# Patient Record
Sex: MALE | Race: WHITE | NOT HISPANIC OR LATINO | Employment: OTHER
[De-identification: names, ages, dates, MRNs, and addresses within clinical notes are randomized per-mention and may not be internally consistent; named-entity substitution may affect disease eponyms.]

---

## 2019-10-04 ENCOUNTER — RECORDS - HEALTHEAST (OUTPATIENT)
Dept: ADMINISTRATIVE | Facility: OTHER | Age: 54
End: 2019-10-04

## 2019-11-02 ENCOUNTER — RECORDS - HEALTHEAST (OUTPATIENT)
Dept: ADMINISTRATIVE | Facility: OTHER | Age: 54
End: 2019-11-02

## 2019-11-06 ENCOUNTER — RECORDS - HEALTHEAST (OUTPATIENT)
Dept: ADMINISTRATIVE | Facility: OTHER | Age: 54
End: 2019-11-06

## 2019-12-04 ENCOUNTER — RECORDS - HEALTHEAST (OUTPATIENT)
Dept: ADMINISTRATIVE | Facility: OTHER | Age: 54
End: 2019-12-04

## 2019-12-04 ENCOUNTER — AMBULATORY - HEALTHEAST (OUTPATIENT)
Dept: VASCULAR SURGERY | Facility: CLINIC | Age: 54
End: 2019-12-04

## 2019-12-04 DIAGNOSIS — L03.031 PARONYCHIA OF FIFTH TOE OF RIGHT FOOT: ICD-10-CM

## 2019-12-16 ENCOUNTER — COMMUNICATION - HEALTHEAST (OUTPATIENT)
Dept: VASCULAR SURGERY | Facility: CLINIC | Age: 54
End: 2019-12-16

## 2021-05-26 ENCOUNTER — RECORDS - HEALTHEAST (OUTPATIENT)
Dept: ADMINISTRATIVE | Facility: CLINIC | Age: 56
End: 2021-05-26

## 2021-06-20 NOTE — LETTER
Letter by Gaye Cisneros at      Author: Gaye Cisneros Service: -- Author Type: --    Filed:  Encounter Date: 12/16/2019 Status: Signed         12/16/19      Angel CUENCA Jaimie  [address] [place] [id number]    Dear Angel,    As a valued [facility] patient, your healthcare needs are our priority. Our clinic records indicate we have attempted to contact you to schedule a wound consultation with Dr. Justin Cedeno, but have not heard back from you. To prevent further delays in your care please contact our office to schedule your appointment as soon as possible.  We can be reached at: [phone]    Sincerely,    [facility], Vein, [facility] Wound

## 2021-11-12 ENCOUNTER — TELEPHONE (OUTPATIENT)
Dept: DERMATOLOGY | Facility: CLINIC | Age: 56
End: 2021-11-12
Payer: COMMERCIAL

## 2022-03-08 ENCOUNTER — TRANSCRIBE ORDERS (OUTPATIENT)
Dept: OTHER | Age: 57
End: 2022-03-08
Payer: COMMERCIAL

## 2022-03-08 DIAGNOSIS — L73.2 HIDRADENITIS SUPPURATIVA: ICD-10-CM

## 2022-03-08 DIAGNOSIS — L98.9 SKIN PROBLEM: Primary | ICD-10-CM

## 2022-03-09 ENCOUNTER — OFFICE VISIT (OUTPATIENT)
Dept: DERMATOLOGY | Facility: CLINIC | Age: 57
End: 2022-03-09
Payer: COMMERCIAL

## 2022-03-09 ENCOUNTER — DOCUMENTATION ONLY (OUTPATIENT)
Dept: DERMATOLOGY | Facility: CLINIC | Age: 57
End: 2022-03-09

## 2022-03-09 DIAGNOSIS — L73.2 HIDRADENITIS SUPPURATIVA: ICD-10-CM

## 2022-03-09 DIAGNOSIS — L20.84 INTRINSIC ATOPIC DERMATITIS: Primary | ICD-10-CM

## 2022-03-09 PROCEDURE — 99202 OFFICE O/P NEW SF 15 MIN: CPT | Performed by: DERMATOLOGY

## 2022-03-09 RX ORDER — TRIAMCINOLONE ACETONIDE 1 MG/G
CREAM TOPICAL
COMMUNITY
Start: 2022-02-09 | End: 2022-11-10

## 2022-03-09 RX ORDER — FLUOCINONIDE 0.5 MG/G
CREAM TOPICAL DAILY
COMMUNITY
Start: 2020-07-24 | End: 2023-11-16

## 2022-03-09 RX ORDER — DOXYCYCLINE 100 MG/1
100 CAPSULE ORAL 2 TIMES DAILY
Qty: 60 CAPSULE | Refills: 4 | Status: SHIPPED | OUTPATIENT
Start: 2022-03-09 | End: 2022-03-25

## 2022-03-09 RX ORDER — FENOFIBRATE 160 MG/1
160 TABLET ORAL DAILY
COMMUNITY
Start: 2022-02-07 | End: 2022-12-14

## 2022-03-09 RX ORDER — CLINDAMYCIN PHOSPHATE 10 UG/ML
LOTION TOPICAL
Qty: 120 ML | Refills: 11 | Status: SHIPPED | OUTPATIENT
Start: 2022-03-09 | End: 2024-05-24

## 2022-03-09 RX ORDER — LOSARTAN POTASSIUM 25 MG/1
25 TABLET ORAL DAILY
COMMUNITY
Start: 2022-02-07 | End: 2022-12-14

## 2022-03-09 RX ORDER — MELOXICAM 15 MG/1
15 TABLET ORAL DAILY
COMMUNITY
Start: 2022-02-07 | End: 2022-12-14

## 2022-03-09 RX ORDER — BUPROPION HYDROCHLORIDE 150 MG/1
150 TABLET ORAL DAILY
COMMUNITY
Start: 2022-02-07 | End: 2022-12-14

## 2022-03-09 RX ORDER — ATORVASTATIN CALCIUM 40 MG/1
40 TABLET, FILM COATED ORAL DAILY
COMMUNITY
Start: 2022-02-07 | End: 2022-12-14

## 2022-03-09 ASSESSMENT — PAIN SCALES - GENERAL: PAINLEVEL: NO PAIN (0)

## 2022-03-09 NOTE — LETTER
"3/9/2022       RE: Angel Etienne  [address] [facility] [address]     Dear Colleague,    Thank you for referring your patient, Angel Etienne, to the [facility] DERMATOLOGY CLINIC [place] at [facility]. Please see a copy of my visit note below.    [facility] Dermatology Note  Encounter Date: Mar 9, 2022  Office Visit     Dermatology Problem List:  1. Hidradenitis suppurative  -Start doxycycline 100 mg BID, clindamycin lotion daily, BPO wash daily.  Dr Patel appointment in August 2022  2.  Atopic dermatitis  - Had patch testing with local dermatologist (Dr Parada) with several positive reactions including urea and clobetasol (full list not available at time of visit 3/9/2022)  - Will attempt to get Dupixent and refer to Allergy    ____________________________________________    Assessment & Plan:   1. Hidradenitis suppurative  -Start doxycycline 100 mg BID, clindamycin lotion daily, BPO wash daily.  Dr Patel appointment in August 2022  2.  Atopic dermatitis  - Had patch testing with local dermatologist (Dr Parada) with several positive reactions including urea and clobetasol (full list not available at time of visit 3/9/2022)  - Will attempt to get Dupixent and refer to Allergy      Follow-up: 3 month(s) in-person, or earlier for new or changing lesions    Staff:     Becki Tang MD  ____________________________________________    CC: Derm Problem (Karl is here today for a rash on his legs and back. He states \" I have boils on my arm pits and a rash on legs and back x15 years\")    HPI:  Mr. Angel Etienne is a(n) 56 year old male who presents today as a new patient for for 2 skin issues  1.  HS- limited to axilla at this time.  Has had groin and buttock involvement in past.  Tried ? HIbiclens without improvement  2. Dermatitis- most bothersome.  Ongoing for many years.  Pruritis keeps him up at night.  Had patch " testing with local dermatologist (Dr LISA Parada at [facility]) with many positive reaction.  Brought medical records on a computer disc (will be sent to scanning to download).  Did have allergy to clobetasol and urea.  Trying topical steroids without improvement.     Patient is otherwise feeling well, without additional skin concerns.     Labs Reviewed:  N/A    Physical Exam:  Vitals: There were no vitals taken for this visit.  SKIN: Total skin excluding the undergarment areas was performed. The exam included the head/face, neck, both arms, chest, back, abdomen, both legs, digits and/or nails.   - There are pink scaly patches and plaques on the arms, legs and trunk.  - inflammatory nodules of the bilateral axilla with scarring.    - No other lesions of concern on areas examined.     Medications:  Current Outpatient Medications   Medication     fluocinonide (LIDEX) 0.05 % external cream     atorvastatin (LIPITOR) 40 MG tablet     buPROPion (WELLBUTRIN XL) 150 MG 24 hr tablet     fenofibrate (TRIGLIDE/LOFIBRA) 160 MG tablet     losartan (COZAAR) 25 MG tablet     meloxicam (MOBIC) 15 MG tablet     triamcinolone (KENALOG) 0.1 % external cream     No current facility-administered medications for this visit.      Past Medical History:   There is no problem list on file for this patient.    No past medical history on file.    CC Antony Arizmendi [facility]  [address] [place] [address] on close of this encounter.

## 2022-03-09 NOTE — NURSING NOTE
"Dermatology Rooming Note    Angel Etienne's goals for this visit include:   Chief Complaint   Patient presents with     Derm Problem     Karl is here today for a rash on his legs and back. He states \" I have boils on my arm pits and a rash on legs and back x15 years\"     Judith Schmid, RMA  "

## 2022-03-09 NOTE — PATIENT INSTRUCTIONS
Preventive Care:    Colorectal Cancer Screening: During our visit today, we discussed that it is recommended you receive colorectal cancer screening. Please call or make an appointment with your primary care provider to discuss this. You may also call the [facility] Mobile scheduling line ([phone]) to set up a colonoscopy appointment.

## 2022-03-09 NOTE — PROGRESS NOTES
"[facility] Dermatology Note  Encounter Date: Mar 9, 2022  Office Visit     Dermatology Problem List:  1. Hidradenitis suppurative  -Start doxycycline 100 mg BID, clindamycin lotion daily, BPO wash daily.  Dr Patel appointment in August 2022  2.  Atopic dermatitis  - Had patch testing with local dermatologist (Dr Parada) with several positive reactions including urea and clobetasol (full list not available at time of visit 3/9/2022)  - Will attempt to get Dupixent and refer to Allergy    ____________________________________________    Assessment & Plan:   1. Hidradenitis suppurative  -Start doxycycline 100 mg BID, clindamycin lotion daily, BPO wash daily.  Dr Patel appointment in August 2022  2.  Atopic dermatitis  - Had patch testing with local dermatologist (Dr Parada) with several positive reactions including urea and clobetasol (full list not available at time of visit 3/9/2022)  - Will attempt to get Dupixent and refer to Allergy      Follow-up: 3 month(s) in-person, or earlier for new or changing lesions    Staff:     Becki Tang MD  ____________________________________________    CC: Derm Problem (Karl is here today for a rash on his legs and back. He states \" I have boils on my arm pits and a rash on legs and back x15 years\")    HPI:  Mr. Angel Etienne is a(n) 56 year old male who presents today as a new patient for for 2 skin issues  1.  HS- limited to axilla at this time.  Has had groin and buttock involvement in past.  Tried ? HIbiclens without improvement  2. Dermatitis- most bothersome.  Ongoing for many years.  Pruritis keeps him up at night.  Had patch testing with local dermatologist (Dr LISA Parada at [facility]) with many positive reaction.  Brought medical records on a computer disc (will be sent to scanning to download).  Did have allergy to clobetasol and urea.  Trying topical steroids without improvement.     Patient is otherwise feeling well, without " additional skin concerns.     Labs Reviewed:  N/A    Physical Exam:  Vitals: There were no vitals taken for this visit.  SKIN: Total skin excluding the undergarment areas was performed. The exam included the head/face, neck, both arms, chest, back, abdomen, both legs, digits and/or nails.   - There are pink scaly patches and plaques on the arms, legs and trunk.  - inflammatory nodules of the bilateral axilla with scarring.    - No other lesions of concern on areas examined.     Medications:  Current Outpatient Medications   Medication     fluocinonide (LIDEX) 0.05 % external cream     atorvastatin (LIPITOR) 40 MG tablet     buPROPion (WELLBUTRIN XL) 150 MG 24 hr tablet     fenofibrate (TRIGLIDE/LOFIBRA) 160 MG tablet     losartan (COZAAR) 25 MG tablet     meloxicam (MOBIC) 15 MG tablet     triamcinolone (KENALOG) 0.1 % external cream     No current facility-administered medications for this visit.      Past Medical History:   There is no problem list on file for this patient.    No past medical history on file.    CC Antony Arizmendi [facility]  [address] [place] [address] on close of this encounter.

## 2022-03-09 NOTE — PROGRESS NOTES
Action 03.09.2022 RM   Action Taken Received image via disc took to 4n  To be uploaded to chart.

## 2022-03-21 DIAGNOSIS — L73.2 HIDRADENITIS SUPPURATIVA: ICD-10-CM

## 2022-03-22 ENCOUNTER — TELEPHONE (OUTPATIENT)
Dept: DERMATOLOGY | Facility: CLINIC | Age: 57
End: 2022-03-22
Payer: COMMERCIAL

## 2022-03-22 DIAGNOSIS — L73.2 HIDRADENITIS SUPPURATIVA: Primary | ICD-10-CM

## 2022-03-22 NOTE — TELEPHONE ENCOUNTER
Per [facility], the patient is requesting an alternative to the clindamycin (CLEOCIN T) 1 % external lotion It is too expensive    Cynthia England, Procedure

## 2022-03-23 RX ORDER — CLINDAMYCIN PHOSPHATE 11.9 MG/ML
SOLUTION TOPICAL
Qty: 60 ML | Refills: 11 | Status: SHIPPED | OUTPATIENT
Start: 2022-03-23 | End: 2022-11-10

## 2022-03-23 NOTE — TELEPHONE ENCOUNTER
Called patient and let him know solution was sent in, patient asked about an allergy referral and I let him know it is placed they should call to schedule.     Janett BOTELLO CMA

## 2022-03-23 NOTE — TELEPHONE ENCOUNTER
I called the pharmacy and they said since the clindamycin lotion is covered they don't have a list of alternatives.     Lotion is $166.05  Solution ( cash price) $20     Routing to Dr. Tang to determine if the solution is appropriate.     Janett BOTELLO CMA

## 2022-03-23 NOTE — TELEPHONE ENCOUNTER
Becki Tang MD  [facility] 46 minutes ago (7:36 AM)     KB    Please ask the pharmacy which topical antibiotics for acne are covered for the patient by his insurance.

## 2022-03-25 RX ORDER — DOXYCYCLINE 100 MG/1
TABLET ORAL
Qty: 60 TABLET | Refills: 4 | Status: SHIPPED | OUTPATIENT
Start: 2022-03-25 | End: 2022-11-10

## 2022-03-25 NOTE — TELEPHONE ENCOUNTER
Switching tablet formulation as patient has shellac allergy. Will send new doxycycline prescription per pharmacy request.

## 2022-04-04 NOTE — TELEPHONE ENCOUNTER
FUTURE VISIT INFORMATION      FUTURE VISIT INFORMATION:    Date: 7.26.22    Time: 2:00    [place] [facility] [place]  REFERRAL INFORMATION:    Referring provider:  Dr. Tang    Referring providers clinic:  [facility]    Reason for visit/diagnosis      RECORDS REQUESTED FROM:       Clinic name Comments Records Status   [facility] 3.9.22  Dr. Tang Epic

## 2022-04-16 ENCOUNTER — HEALTH MAINTENANCE LETTER (OUTPATIENT)
Age: 57
End: 2022-04-16

## 2022-07-06 ENCOUNTER — OFFICE VISIT (OUTPATIENT)
Dept: DERMATOLOGY | Facility: CLINIC | Age: 57
End: 2022-07-06
Payer: COMMERCIAL

## 2022-07-06 DIAGNOSIS — L73.2 HIDRADENITIS SUPPURATIVA: ICD-10-CM

## 2022-07-06 DIAGNOSIS — L20.84 INTRINSIC ATOPIC DERMATITIS: Primary | ICD-10-CM

## 2022-07-06 PROCEDURE — 99214 OFFICE O/P EST MOD 30 MIN: CPT | Mod: GC | Performed by: DERMATOLOGY

## 2022-07-06 ASSESSMENT — PAIN SCALES - GENERAL: PAINLEVEL: NO PAIN (0)

## 2022-07-06 NOTE — PATIENT INSTRUCTIONS
Atopic Dermatitis or Eczema    This is a very common skin condition that is due to an abnormal protein in the skin called filaggrin. There is a genetic association and atopic dermatitis/eczema often runs in families. It is frequrntly also associated with asthma and allergies (hayfever). In this condition, the body can not keep water in the skin. It then gets very dry and irritated. To treat this, we need to get the water back in the skin by using moisturizer at least once daily. We also use topical steroids to help calm down flares.     Medicated ointments:  - Triamcinolone 0.1% ointment - 1-2 times daily to rash on body  - if you don't have any active flares/rash, do not use this medications (do not use these as moisturizer as it can cause skin thinning if used on normal skin)    Moisturizer:  - Vaseline - 1-2 times a day to all skin (even when rash is not present, this will help prevent the rash from coming back)    Medicated ointments should be applied first, and then moisturizer on top.     Follow gentle skin care recommendations below.     Gentle Skin Care Recommendations    Bathing   - limit showers to once daily, 15 minutes or less, with warm water   - use gentle soaps that are free of colors and scents and are not too strong of cleansers  - consider limiting soap to only the 'dirty' areas, like the armpits  and groin as much as possible to prevent stripping your skin in other areas of their natural moisture  - do not vigorously scrub when cleansing  - avoid any soaps with microbeads  - after showering, pat your skin gently dry with a towel  - make sure you are applying a good moisturizer after bathing every time (see below)  - do not take bubble baths    Examples of gentle cleansers:   - Mild bar soaps: Vanicream bar soap, Neutragena glycerin bar, Dove sensitive skin (fragrance-free)   - Mild liquid soaps: Vanicream liquid cleanser, Cerave liquid cleanser    Moisturizing     It is important to moisturize at  least twice per day to the whole body. IMMEDIATELY after getting out of the shower, apply a moisturizer (preferably from the list below) to the entire body. If you have been prescribed any medications, apply those medications to the skin first and then you can apply the moisturizer on top.    Moisturizers come in a variety of types some of which are greasier, heavier, or lighter. The heaviest moisturizers are ointments, which are greasy like vaseline. The next best are creams, which are usually white and thick but absorb into the skin a little better. The lightest are lotions which are typically thin and contain more ingredients which can be irritating to your skin. For this reason, we do NOT recommend lotions.     Examples of good moisturizers:   - Ointments: vaseline, Cerave healing ointment, Vaniply, coconut oil   - Creams: Cerave, Vanicream, Neutrogena Norwegian Formula Hand Cream     Laundry     Be sure to use laundry products that are free of dyes and fragrances--many laundry products that claim to be fragrance-free actually are not free of these! Do not use laundry softeners or dryer sheets.     Good laundry products include:  - 7th generation Natural Laundry Detergent Liquid, 7th Generation Free and Clear Natural Laundry Detergent packs, 7th Generation Free and Clear natural 4x Concentrated Laundry Detergent, ECOS hypoallergenic laundry detergent, free & clear, ERA Ultra Era Free Liquid Laundry Detergent, All Free & Clear     Other Gentle Skin Recommendations    - Do not use products such as powders, perfumes, or colognes on your skin  - Avoid saunas and steam baths - these temperatures are too hot   - Avoid tight or  scratchy  clothing such as wool  - Always wash new clothing before wearing them for the first time  - Sometimes a humidifier or vaporizer, used at night can help the dry skin - but remember to keep it clean to void mold growth.  - Avoid applying essential oils to the skin or diffusing in the  home.

## 2022-07-06 NOTE — LETTER
7/6/2022       RE: Angel Etienne  [address] [facility] [address]     Dear Colleague,    Thank you for referring your patient, Angel Etienne, to the [facility] DERMATOLOGY CLINIC [place] at [facility]. Please see a copy of my visit note below.    [facility] Dermatology Note  Encounter Date: Jul 6, 2022  Office Visit     Dermatology Problem List:  1. Hidradenitis suppurative  -Start doxycycline 100 mg BID, clindamycin lotion daily, BPO wash daily.  Dr Patel appointment in August 2022  2.  Atopic dermatitis  - Had patch testing with local dermatologist (Dr Parada) with several positive reactions including urea and clobetasol (full list not available at time of visit 3/9/2022)  - Triamcinolone 0.1% ointment, gentle skincares   - Approved for dupixent has not started yet     ____________________________________________    Assessment & Plan:   # Hidradenitis suppurative  - Continue doxycyline 100 mg BID, clindamycin lotion daily, BPO wash daily  - Follow up with Dr Patel appointment in August 2022    # Atopic dermatitis  - Had patch testing with local dermatologist (Dr Parada) with several positive reactions including urea and clobetasol (full list not available at time of visit 3/9/2022)  - Dupixent is approved by large copmylene, will await input from Dr. Novoa whether he recommends starting dupixent for this patient  - Appointment with Dr. Novoa in July   - Continue TAC 0.1% ointment to itchy rash areas BID PRN   - We discussed gentle skin cares and would recommend he use plain Vaseline until he sees Dr. Novoa daily; handout provided with instructions     Follow-up: with Dr. Novoa in July and Dr. Patel in August     Staff and Resident:      Rosemary Hardwick MD     The patient was seen and staffed with Dr. Kitty MD   I, Becki Tang MD, saw this patient with the resident and agree with the  resident s findings and plan of care as documented in the resident s note.      ____________________________________________    CC: Rash (Karl is here for a rash follow-up. States his condition has slightly improved since last seen.)    HPI:  Mr. Angel Etienne is a(n) 56 year old male who presents today as a new patient for for 2 skin issues.  HS has been on doxy for the past couple of months.  Thinks that he is still getting the boils in the axilla.  Just has one today on the left side.  It is not draining anything now but it did a couple of weeks ago.  It is not that painful at this time, it just is painful before it starts to drain.  Tolerating doxycyline well, no stomach upset, nausea.  Has appointment with Dr. Patel in August.      RE rash: When it is humid he gets rash on upper body and back.  He is still waiting to get tested by Dr. Clark.  He is using the triamcinolone ointment wherever he gets the rash.  He thinks his rash is much improved on the legs since starting doxycyline.  He is itchy on the back right now.      Patient is otherwise feeling well, without additional skin concerns.     Labs Reviewed:  N/A    Physical Exam:  Vitals: There were no vitals taken for this visit.  SKIN: Total skin excluding the undergarment areas was performed. The exam included the head/face, neck, both arms, chest, back, abdomen, both legs, digits and/or nails.   - There are pink scaly follicularly red/brown papules on the trunk and back   - Inflammatory papule without drainage on the proximal axilla with scarring of bilateral axilla  - No other lesions of concern on areas examined.     Medications:  Current Outpatient Medications   Medication     atorvastatin (LIPITOR) 40 MG tablet     benzoyl peroxide 5 % external liquid     buPROPion (WELLBUTRIN XL) 150 MG 24 hr tablet     clindamycin (CLEOCIN T) 1 % external lotion     clindamycin (CLEOCIN T) 1 % external solution     doxycycline monohydrate (ADOXA) 100 MG  tablet     Dupilumab 300 MG/2ML SOPN     Dupilumab 300 MG/2ML SOPN     fenofibrate (TRIGLIDE/LOFIBRA) 160 MG tablet     fluocinonide (LIDEX) 0.05 % external cream     losartan (COZAAR) 25 MG tablet     meloxicam (MOBIC) 15 MG tablet     triamcinolone (KENALOG) 0.1 % external cream     No current facility-administered medications for this visit.      Past Medical History:   There is no problem list on file for this patient.    No past medical history on file.    CC Antony Arizmendi [facility]  [address] [place] [address] on close of this encounter.

## 2022-07-06 NOTE — NURSING NOTE
Dermatology Rooming Note    Angel Etienne's goals for this visit include:   Chief Complaint   Patient presents with     Rash     Karl is here for a rash follow-up. States his condition has slightly improved since last seen.     Clovis Renner, EMT

## 2022-07-06 NOTE — PROGRESS NOTES
[facility] Dermatology Note  Encounter Date: Jul 6, 2022  Office Visit     Dermatology Problem List:  1. Hidradenitis suppurative  -Start doxycycline 100 mg BID, clindamycin lotion daily, BPO wash daily.  Dr Patel appointment in August 2022  2.  Atopic dermatitis  - Had patch testing with local dermatologist (Dr Parada) with several positive reactions including urea and clobetasol (full list not available at time of visit 3/9/2022)  - Triamcinolone 0.1% ointment, gentle skincares   - Approved for dupixent has not started yet     ____________________________________________    Assessment & Plan:   # Hidradenitis suppurative  - Continue doxycyline 100 mg BID, clindamycin lotion daily, BPO wash daily  - Follow up with Dr Patel appointment in August 2022    # Atopic dermatitis  - Had patch testing with local dermatologist (Dr Parada) with several positive reactions including urea and clobetasol (full list not available at time of visit 3/9/2022)  - Dupixent is approved by large copay, will await input from Dr. Novoa whether he recommends starting dupixent for this patient  - Appointment with Dr. Novoa in July   - Continue TAC 0.1% ointment to itchy rash areas BID PRN   - We discussed gentle skin cares and would recommend he use plain Vaseline until he sees Dr. Novoa daily; handout provided with instructions     Follow-up: with Dr. Novoa in July and Dr. Patel in August     Staff and Resident:      Rosemary Hardwick MD     The patient was seen and staffed with Dr. Kitty MD   I, Becki Tang MD, saw this patient with the resident and agree with the resident s findings and plan of care as documented in the resident s note.      ____________________________________________    CC: Rash (Karl is here for a rash follow-up. States his condition has slightly improved since last seen.)    HPI:  Mr. Angel Etienne is a(n) 56 year old male who presents today as a new  patient for for 2 skin issues.  HS has been on doxy for the past couple of months.  Thinks that he is still getting the boils in the axilla.  Just has one today on the left side.  It is not draining anything now but it did a couple of weeks ago.  It is not that painful at this time, it just is painful before it starts to drain.  Tolerating doxycyline well, no stomach upset, nausea.  Has appointment with Dr. Patel in August.      RE rash: When it is humid he gets rash on upper body and back.  He is still waiting to get tested by Dr. Clark.  He is using the triamcinolone ointment wherever he gets the rash.  He thinks his rash is much improved on the legs since starting doxycyline.  He is itchy on the back right now.      Patient is otherwise feeling well, without additional skin concerns.     Labs Reviewed:  N/A    Physical Exam:  Vitals: There were no vitals taken for this visit.  SKIN: Total skin excluding the undergarment areas was performed. The exam included the head/face, neck, both arms, chest, back, abdomen, both legs, digits and/or nails.   - There are pink scaly follicularly red/brown papules on the trunk and back   - Inflammatory papule without drainage on the proximal axilla with scarring of bilateral axilla  - No other lesions of concern on areas examined.     Medications:  Current Outpatient Medications   Medication     atorvastatin (LIPITOR) 40 MG tablet     benzoyl peroxide 5 % external liquid     buPROPion (WELLBUTRIN XL) 150 MG 24 hr tablet     clindamycin (CLEOCIN T) 1 % external lotion     clindamycin (CLEOCIN T) 1 % external solution     doxycycline monohydrate (ADOXA) 100 MG tablet     Dupilumab 300 MG/2ML SOPN     Dupilumab 300 MG/2ML SOPN     fenofibrate (TRIGLIDE/LOFIBRA) 160 MG tablet     fluocinonide (LIDEX) 0.05 % external cream     losartan (COZAAR) 25 MG tablet     meloxicam (MOBIC) 15 MG tablet     triamcinolone (KENALOG) 0.1 % external cream     No current facility-administered  medications for this visit.      Past Medical History:   There is no problem list on file for this patient.    No past medical history on file.    CC Antony Arizmendi [facility]  [address] [place] [address] on close of this encounter.

## 2022-07-08 NOTE — TELEPHONE ENCOUNTER
FUTURE VISIT INFORMATION      FUTURE VISIT INFORMATION:    Date: 8.25.22    Time: 1:00    Location[place] [facility] [place]  REFERRAL INFORMATION:    Referring provider:  Kitty    Referring providers clinic:  Derm    Reason for visit/diagnosis  hs    RECORDS REQUESTED FROM:       Clinic name Comments Records Status   Derm 7.6.22, 3.9.22  Kitty Fuller

## 2022-07-26 ENCOUNTER — PRE VISIT (OUTPATIENT)
Dept: ALLERGY | Facility: CLINIC | Age: 57
End: 2022-07-26

## 2022-07-26 ENCOUNTER — OFFICE VISIT (OUTPATIENT)
Dept: ALLERGY | Facility: CLINIC | Age: 57
End: 2022-07-26
Payer: COMMERCIAL

## 2022-07-26 DIAGNOSIS — Z91.09 ALLERGY TO AMERICAN HOUSE DUST MITE: ICD-10-CM

## 2022-07-26 DIAGNOSIS — L73.2 HIDRADENITIS SUPPURATIVA: ICD-10-CM

## 2022-07-26 DIAGNOSIS — L20.84 INTRINSIC ATOPIC DERMATITIS: Primary | ICD-10-CM

## 2022-07-26 PROCEDURE — 95004 PERQ TESTS W/ALRGNC XTRCS: CPT | Performed by: DERMATOLOGY

## 2022-07-26 PROCEDURE — 99214 OFFICE O/P EST MOD 30 MIN: CPT | Mod: 25 | Performed by: DERMATOLOGY

## 2022-07-26 ASSESSMENT — PAIN SCALES - GENERAL: PAINLEVEL: NO PAIN (0)

## 2022-07-26 NOTE — NURSING NOTE
Chief Complaint   Patient presents with     Allergy Consult     Karl is here today for allergy patch test consult, referred by pts primary care. Pt has had patch testing done previously at [facility] but pt did not get much info from that.     Faby Mccarty RN

## 2022-07-26 NOTE — PROGRESS NOTES
[facility] Dermato-allergology Note  Office visit  Encounter Date: Jul 26, 2022  ____________________________________________    CC: Allergy Consult (Karl is here today for allergy patch test consult, referred by pts primary care. Pt has had patch testing done previously at [facility] but pt did not get much info from that.)      HPI:  (Jul 26, 2022)  Mr. Angel Etienne is a(n) 56 year old male who presents today as new patient for allergy consultation  - 15 years of hidradenitis suppurativa: takes doxycycline, topical clindamycin, benzoyl peroxide. Prescribed humira but hasn't taken these yet. Has upcoming appointment with Dr. Patel for HS.   - 15 years of diffuse dermatitis. Per Dr. Tang of dermatology, consistent with atopic dermatitis. Prescribed dupixent, but hasn't started due to concern of side effects  - no triggers other than possibly heat identified. But he also gets it in the winter; just doesn't flare up as much in the winter  - chest, abdomen, flanks, back, arms affected. Typically occurs on areas that are covered by clothes.   - flares are intermittent and unpredictable.   - symptoms include itching and dryness  - no known history of eczema or asthma  - ?allergy to corn as a child  - has been using the BPO once to twice weekly  - history of venous stasis s/p multiple venous stasis surgeries   - local dermatologist (Dr Parada) performed patch testing, results with several positive reactions including urea and clobetasol during summer of 2021   - no problems with adhesives  - otherwise feeling well in usual state of health    Physical exam:  General: In no acute distress, well-developed, well-nourished  Eyes: no conjunctivitis  ENT: no signs of rhinitis   Pulmonary: no wheezing or coughing  Skin: Focused examination of the skin on test sites was performed = see test results below  Focused examination of the back, abdomen, chest and legs was performed.  -generalized  xerosis  -multiple poorly defined pink to red papules and plaques with excoriation on the back, abdomen, chest, bilateral legs  -pitted scars of the left axilla    Past Medical History:   There is no problem list on file for this patient.    No past medical history on file.    Allergies:  Allergies   Allergen Reactions     Clobetasol Other (See Comments)     Allergy testing done - Clobetasol-77-Propionate     Shellac Other (See Comments)     Allergy testing done     Trolamine Other (See Comments)     Allergy testing done     Urea Other (See Comments)     Allergy testing done       Medications:  Current Outpatient Medications   Medication     atorvastatin (LIPITOR) 40 MG tablet     benzoyl peroxide 5 % external liquid     buPROPion (WELLBUTRIN XL) 150 MG 24 hr tablet     clindamycin (CLEOCIN T) 1 % external lotion     clindamycin (CLEOCIN T) 1 % external solution     doxycycline monohydrate (ADOXA) 100 MG tablet     fenofibrate (TRIGLIDE/LOFIBRA) 160 MG tablet     fluocinonide (LIDEX) 0.05 % external cream     losartan (COZAAR) 25 MG tablet     meloxicam (MOBIC) 15 MG tablet     triamcinolone (KENALOG) 0.1 % external cream     Dupilumab 300 MG/2ML SOPN     Dupilumab 300 MG/2ML SOPN     No current facility-administered medications for this visit.       Social History:  The patient works as an independent . Patient has the following hobbies or non-occupational exposure: boating, snowmobiling, riding motorcycle.     Family History:  No family history on file.    Previous Labs, Allergy Tests, Dermatopathology, Imaging:  Patch testing as above    Referred By: Antony Arizmendi [facility]  [address] [place] [address]     Allergy Tests:    Past Allergy Test    Order for Future Allergy Testing:    [x] Outpatient  [] Inpatient: [facility]..../ Bed ....       Skin Atopy (atopic dermatitis) [x] Yes   [] No .........  Contact allergies:   [x] Yes   [] No ..........  Hand eczema:   [x] Yes    [] No           Leading hand:   [] R   [] L       [] Ambidextrous         Drug allergies:        [x] Yes   [] No  Which?......possibly clobetasol?    Urticaria/Angioedema  [] Yes   [x] No .........  Food Allergy:  [] Yes   [x] No  which?......  Pets :  [] Yes   [x] No  which?......         []  Rhinitis   [] Conjunctivitis   [] Sinusitis   [] Polyposis   [] Otitis   [] Pharyngitis         []  Postnasal drip    [x]  none  Operations:   [] Tonsils   [] Septum   [] Sinus   [] Polyposis        [] Asthma bronchiale   [] Coughing      [x]  none  Symptoms (mostly Rhinoconjunctivitis and Asthma) aggravated by:  Season   [] I   [] II   [] III   [] IV   []V   []VI   []VII   []VIII   []IX   []X   []XI   []XII     [] perennial   Day time      [] morning   [] noon      [] evening        [] night    [] whole day........  [x]  none  Location/changes    [] inside        [] outside   [] mountains    [] sea     [] others.............   [x]  none  Triggers, specific     [] animals     [] plants     [] dust              [] others ...........................    [x]  none  Triggers, others       [] work          [] psyche    [] sport            [] others .............................  [x]  none  Irritant                [] phys efforts [] smoke    [x] heat/cold     [] odors  []others............... []  none    Order for PATCH TESTS  Reason for tests (suspected allergy): possible allergic contact dermatitis  Known previous allergies: possible clobetasol or components, shellac, trolamine, urea per prior outside patch testing  Standardized panels  [x] Standard panel (40 tests)  [x] Preservatives & Antimicrobials (31 tests)  [x] Emulsifiers & Additives (25 tests)   [x] Perfumes/Flavours & Plants (25 tests)  [] Hairdresser panel (12 tests)  [] Rubber Chemicals (22 tests)  [] Plastics (26 tests)  [] Colorants/Dyes/Food additives (20 tests)  [] Metals (implants/dental) (24 tests)  [] Local anaesthetics/NSAIDs (13 tests)  [x] Antibiotics &  Antimycotics (14 tests)   [x] Corticosteroids (15 tests)   [] Photopatch test (62 tests)   [] others: ...      [] Patient's own products: ...    DO NOT test if chemical or biological identity is unknown!     always ask from patient the product information and safety sheets (MSDS)       Order for PRICK TESTS    Reason for tests (suspected allergy): generalized eczema on exam  Known previous allergies: none    Standardized prick panels  [x] Atopic panel (20 tests)  [] Pediatric Panel (12 tests)  [] Milk, Meat, Eggs, Grains (20 tests)   [] Dust, Epithelia, Feathers (10 tests)  [] Fish, Seafood, Shellfish (17 tests)  [] Nuts, Beans (14 tests)  [] Spice, Vegetable, Fruit (17 tests)  [] Pollen Panel = Tree, Grass, Weed (24 tests)  [] Others: ...      [] Patient's own products: ...    DO NOT test if chemical or biological identity is unknown!     always ask from patient the product information and safety sheets (MSDS)     Standardized intradermal tests  [x] Penicillium notatum [x] Aspergillus fumigatus [x] House dust mites D.farinae only!!!  [] Cat    [] dog  [] Others: ...  [] Bee venom   [] Wasp venom  !!Specific protocol with dilutions!!       Order for Drug allergy tests (prick & Intradermal & patch tests)    [] Penicillin G  [] Ampicillin [] Cefazolin   [] Ceftriaxone   [] Ceftazidime  [] Bactrim    [] Others: ...  Order for ... as test date    Atopy Screen (Placed Jul 26, 2022)  No Substance Readings (15 min) Evaluation   POS Histamine 1mg/ml ++    NEG NaCl 0.9% -      No Substance Readings (15 min) Evaluation   1 Alternaria alternata (tenuis)  -    2 Cladosporium herbarum -    3 Aspergillus fumigatus -    4 Penicillium notatum -    5 Dermatophagoides pteronyssinus ++    6 Dermatophagoides farinae -    7 Dog epithelium (canis spp) -    8 Cat hair (crescencio catus) -    9 Cockroach   (Blatella americana & germanica) -    10 Grass mix midwest   (Katerin, Orchard, Redtop, Aurelio) -    11 Josef grass (sorghum halepense) -     12 Weed mix   (common Cocklebur, Lamb s quarters, rough redroot Pigweed, Dock/Sorrel) -    13 Mug wort (artemisia vulgare) -    14 Ragweed giant/short (ambrosia spp) -    15 White birch (Betula papyrifera) -    16 Tree mix 1 (Pecan, Maple BHR, Oak RVW, american [place], black [place]) -    17 Red cedar (juniperus virginia) -    18 Tree mix 2   (white [place], river/red Birch, black [place], common [place], american Elm) -    19 Box elder/Maple mix (acer spp) -    20 [place] shagbark (carya ovata) -           Conclusion: immediate type reaction to house dust mite D. pteronyssinus    ________________________________    Assessment & Plan:    ==> Final Diagnosis:     # Hidradenitis suppurativa   Plan to consider Humira, upcoming appointment with Dr. Patel in August 2022  * chronic illness with exacerbation, progression, side effects from treatment    # atopic predisposition with:    In prick test positive to house dust mite D. Pteronyssinus (but clinically not very relevant)    Dry, hypersensitive skin  * chronic illness with severe exacerbation, progression, side effects from treatment    These conclusions are made at the best of one's knowledge and belief based on the provided evidence such as patient's history and allergy test results and they can change over time or can be incomplete because of missing information's.    ==> Treatment Plan:  -plan for patch testing; upcoming appointment in allergy in a few weeks  -Dupixent would likely be helpful given the patient's atopic dermatitis  -Humira would likely improve hidradenitis suppurutiva     Procedures Performed: Allergy tests, including prick tests    Staff:  Dr. Novoa and student    Staff Physician Comments:  I was present with the medical student who participated in the service and in the documentation of the note. I have verified the history and personally performed the physical exam and medical decision making. I agree with the assessment and plan as  documented in the note. I have reviewed and if necessary amended the note.      Denny Novoa MD  Professor  Head of Dermato-Allergy Division  Department of Dermatology  [facility]      Follow-up in Derm-Allergy clinic for scheduled patch testing in a few weeks.     I spent a total of 40 minutes with Angel Etienne. This time was spent counseling the patient and/or coordinating care, explaining the allergy tests , performing allergy tests and assessing the clinical relevance.

## 2022-07-26 NOTE — LETTER
7/26/2022         RE: Angel Etienne  [address]  [place] [address]        Dear Colleague,    Thank you for referring your patient, Angel Etienne, to the [facility] [place]. Please see a copy of my visit note below.    [facility] Dermato-allergology Note  Office visit  Encounter Date: Jul 26, 2022  ____________________________________________    CC: Allergy Consult (Karl is here today for allergy patch test consult, referred by pts primary care. Pt has had patch testing done previously at [facility] but pt did not get much info from that.)      HPI:  (Jul 26, 2022)  Mr. Angel Etienne is a(n) 56 year old male who presents today as new patient for allergy consultation  - 15 years of hidradenitis suppurativa: takes doxycycline, topical clindamycin, benzoyl peroxide. Prescribed humira but hasn't taken these yet. Has upcoming appointment with Dr. Patel for HS.   - 15 years of diffuse dermatitis. Per Dr. Tang of dermatology, consistent with atopic dermatitis. Prescribed dupixent, but hasn't started due to concern of side effects  - no triggers other than possibly heat identified. But he also gets it in the winter; just doesn't flare up as much in the winter  - chest, abdomen, flanks, back, arms affected. Typically occurs on areas that are covered by clothes.   - flares are intermittent and unpredictable.   - symptoms include itching and dryness  - no known history of eczema or asthma  - ?allergy to corn as a child  - has been using the BPO once to twice weekly  - history of venous stasis s/p multiple venous stasis surgeries   - local dermatologist (Dr Parada) performed patch testing, results with several positive reactions including urea and clobetasol during summer of 2021   - no problems with adhesives  - otherwise feeling well in usual state of health    Physical exam:  General: In no acute distress, well-developed, well-nourished  Eyes:  no conjunctivitis  ENT: no signs of rhinitis   Pulmonary: no wheezing or coughing  Skin: Focused examination of the skin on test sites was performed = see test results below  Focused examination of the back, abdomen, chest and legs was performed.  -generalized xerosis  -multiple poorly defined pink to red papules and plaques with excoriation on the back, abdomen, chest, bilateral legs  -pitted scars of the left axilla    Past Medical History:   There is no problem list on file for this patient.    No past medical history on file.    Allergies:  Allergies   Allergen Reactions     Clobetasol Other (See Comments)     Allergy testing done - Clobetasol-77-Propionate     Shellac Other (See Comments)     Allergy testing done     Trolamine Other (See Comments)     Allergy testing done     Urea Other (See Comments)     Allergy testing done       Medications:  Current Outpatient Medications   Medication     atorvastatin (LIPITOR) 40 MG tablet     benzoyl peroxide 5 % external liquid     buPROPion (WELLBUTRIN XL) 150 MG 24 hr tablet     clindamycin (CLEOCIN T) 1 % external lotion     clindamycin (CLEOCIN T) 1 % external solution     doxycycline monohydrate (ADOXA) 100 MG tablet     fenofibrate (TRIGLIDE/LOFIBRA) 160 MG tablet     fluocinonide (LIDEX) 0.05 % external cream     losartan (COZAAR) 25 MG tablet     meloxicam (MOBIC) 15 MG tablet     triamcinolone (KENALOG) 0.1 % external cream     Dupilumab 300 MG/2ML SOPN     Dupilumab 300 MG/2ML SOPN     No current facility-administered medications for this visit.       Social History:  The patient works as an independent . Patient has the following hobbies or non-occupational exposure: boating, snowmobiling, riding motorcycle.     Family History:  No family history on file.    Previous Labs, Allergy Tests, Dermatopathology, Imaging:  Patch testing as above    Referred By: Antony Arizmendi [facility]  [address] [place] [address]      Allergy Tests:    Past Allergy Test    Order for Future Allergy Testing:    [x] Outpatient  [] Inpatient: [facility]..../ Bed ....       Skin Atopy (atopic dermatitis) [x] Yes   [] No .........  Contact allergies:   [x] Yes   [] No ..........  Hand eczema:   [x] Yes   [] No           Leading hand:   [] R   [] L       [] Ambidextrous         Drug allergies:        [x] Yes   [] No  Which?......possibly clobetasol?    Urticaria/Angioedema  [] Yes   [x] No .........  Food Allergy:  [] Yes   [x] No  which?......  Pets :  [] Yes   [x] No  which?......         []  Rhinitis   [] Conjunctivitis   [] Sinusitis   [] Polyposis   [] Otitis   [] Pharyngitis         []  Postnasal drip    [x]  none  Operations:   [] Tonsils   [] Septum   [] Sinus   [] Polyposis        [] Asthma bronchiale   [] Coughing      [x]  none  Symptoms (mostly Rhinoconjunctivitis and Asthma) aggravated by:  Season   [] I   [] II   [] III   [] IV   []V   []VI   []VII   []VIII   []IX   []X   []XI   []XII     [] perennial   Day time      [] morning   [] noon      [] evening        [] night    [] whole day........  [x]  none  Location/changes    [] inside        [] outside   [] mountains    [] sea     [] others.............   [x]  none  Triggers, specific     [] animals     [] plants     [] dust              [] others ...........................    [x]  none  Triggers, others       [] work          [] psyche    [] sport            [] others .............................  [x]  none  Irritant                [] phys efforts [] smoke    [x] heat/cold     [] odors  []others............... []  none    Order for PATCH TESTS  Reason for tests (suspected allergy): possible allergic contact dermatitis  Known previous allergies: possible clobetasol or components, shellac, trolamine, urea per prior outside patch testing  Standardized panels  [x] Standard panel (40 tests)  [x] Preservatives & Antimicrobials (31 tests)  [x] Emulsifiers & Additives (25 tests)   [x]  Perfumes/Flavours & Plants (25 tests)  [] Hairdresser panel (12 tests)  [] Rubber Chemicals (22 tests)  [] Plastics (26 tests)  [] Colorants/Dyes/Food additives (20 tests)  [] Metals (implants/dental) (24 tests)  [] Local anaesthetics/NSAIDs (13 tests)  [x] Antibiotics & Antimycotics (14 tests)   [x] Corticosteroids (15 tests)   [] Photopatch test (62 tests)   [] others: ...      [] Patient's own products: ...    DO NOT test if chemical or biological identity is unknown!     always ask from patient the product information and safety sheets (MSDS)       Order for PRICK TESTS    Reason for tests (suspected allergy): generalized eczema on exam  Known previous allergies: none    Standardized prick panels  [x] Atopic panel (20 tests)  [] Pediatric Panel (12 tests)  [] Milk, Meat, Eggs, Grains (20 tests)   [] Dust, Epithelia, Feathers (10 tests)  [] Fish, Seafood, Shellfish (17 tests)  [] Nuts, Beans (14 tests)  [] Spice, Vegetable, Fruit (17 tests)  [] Pollen Panel = Tree, Grass, Weed (24 tests)  [] Others: ...      [] Patient's own products: ...    DO NOT test if chemical or biological identity is unknown!     always ask from patient the product information and safety sheets (MSDS)     Standardized intradermal tests  [x] Penicillium notatum [x] Aspergillus fumigatus [x] House dust mites D.farinae only!!!  [] Cat    [] dog  [] Others: ...  [] Bee venom   [] Wasp venom  !!Specific protocol with dilutions!!       Order for Drug allergy tests (prick & Intradermal & patch tests)    [] Penicillin G  [] Ampicillin [] Cefazolin   [] Ceftriaxone   [] Ceftazidime  [] Bactrim    [] Others: ...  Order for ... as test date    Atopy Screen (Placed Jul 26, 2022)  No Substance Readings (15 min) Evaluation   POS Histamine 1mg/ml ++    NEG NaCl 0.9% -      No Substance Readings (15 min) Evaluation   1 Alternaria alternata (tenuis)  -    2 Cladosporium herbarum -    3 Aspergillus fumigatus -    4 Penicillium notatum -    5  Dermatophagoides pteronyssinus ++    6 Dermatophagoides farinae -    7 Dog epithelium (canis spp) -    8 Cat hair (crescencio catus) -    9 Cockroach   (Blatella americana & germanica) -    10 Grass mix midwest   (Katerin, Orchard, Redtop, Aurelio) -    11 Josef grass (sorghum halepense) -    12 Weed mix   (common Cocklebur, Lamb s quarters, rough redroot Pigweed, Dock/Sorrel) -    13 Mug wort (artemisia vulgare) -    14 Ragweed giant/short (ambrosia spp) -    15 White birch (Betula papyrifera) -    16 Tree mix 1 (Pecan, Maple BHR, Oak RVW, american [place], black [place]) -    17 Red cedar (juniperus virgin[place]) -    18 Tree mix 2   (white [place], river/red Birch, black [place], common [place], american Elm) -    19 Box elder/Maple mix (acer spp) -    20 [place] shagbark (carya ovata) -           Conclusion: immediate type reaction to house dust mite D. pteronyssinus    ________________________________    Assessment & Plan:    ==> Final Diagnosis:     # Hidradenitis suppurativa   Plan to consider Humira, upcoming appointment with Dr. Patel in August 2022  * chronic illness with exacerbation, progression, side effects from treatment    # atopic predisposition with:    In prick test positive to house dust mite D. Pteronyssinus (but clinically not very relevant)    Dry, hypersensitive skin  * chronic illness with severe exacerbation, progression, side effects from treatment    These conclusions are made at the best of one's knowledge and belief based on the provided evidence such as patient's history and allergy test results and they can change over time or can be incomplete because of missing information's.    ==> Treatment Plan:  -plan for patch testing; upcoming appointment in allergy in a few weeks  -Dupixent would likely be helpful given the patient's atopic dermatitis  -Humira would likely improve hidradenitis suppurutiva     Procedures Performed: Allergy tests, including prick tests    Staff:  Dr. Novoa and  student    Staff Physician Comments:  I was present with the medical student who participated in the service and in the documentation of the note. I have verified the history and personally performed the physical exam and medical decision making. I agree with the assessment and plan as documented in the note. I have reviewed and if necessary amended the note.      Denny Novoa MD  Professor  Head of Dermato-Allergy Division  Department of Dermatology  [facility]      Follow-up in Derm-Allergy clinic for scheduled patch testing in a few weeks.     I spent a total of 40 minutes with Angel Etienne. This time was spent counseling the patient and/or coordinating care, explaining the allergy tests , performing allergy tests and assessing the clinical relevance.        Again, thank you for allowing me to participate in the care of your patient.        Sincerely,        Denny Novoa MD

## 2022-08-11 ENCOUNTER — TELEPHONE (OUTPATIENT)
Dept: ALLERGY | Facility: CLINIC | Age: 57
End: 2022-08-11

## 2022-08-11 NOTE — TELEPHONE ENCOUNTER
STANDARD Series                                          # Substance 2 days 4 days remarks     1 Joaquín Mix [C] - -       2 Colophony - -       3  2-Mercaptobenzothiazole  - -       4 Methylisothiazolinone - -       5 Carba Mix - -       6 Thiuram Mix [A] - -       7 Bisphenol A Epoxy Resin - -       8 I-Yrhn-Cqbwyylhrkg-Formaldehyde Resin - -       9 Mercapto Mix [A] - -       10 Black Rubber Mix- PPD [B] - -       11 Potassium Dichromate  -  -       12 Balsam of [place] (Myroxylon Pereirae Resin) - -       13 Nickel Sulphate Hexahydrate - -       14 Mixed Dialkyl Thiourea - -       15 Paraben Mix [B] - -       16 Methyldibromo Glutaronitrile - -       17 Fragrance Mix - -       18 2-Bromo-2-Nitropropane-1,3-Diol (Bronopol) CT - -       19 Lyral - -       20 Tixocortol-21- Pivalate CT - -       21 Diazolidiyl Urea (Germall II) - -       22 Methyl Methacrylate - -       23 Cobalt (II) Chloride Hexahydrate - -       24 Fragrance Mix II  - -       25 Compositae Mix - -       26 Benzoyl Peroxide - -       27 Bacitracin - -       28 Formaldehyde - -       29 Methylchloroisothiazolinone / Methylisothiazolinone - -       30 Corticosteroid Mix CT - -       31 Sodium Lauryl Sulfate - -       32 Lanolin Alcohol - -       33 Turpentine - -       34 Cetylstearylalcohol - -       35 Chlorhexidine Dicluconate - -       36 Budenoside - -       37 Imidazolidinyl Urea  - -       38 Ethyl-2 Cyanoacrylate - -       39 Quaternium 15 (Dowicil 200) - -       40 Decyl Glucoside - -     PRESERVATIVES & ANTIMICROBIALS        # Substance 2 days 4 days remarks   41 1  1,2-Benzisothiazoline-3-One, Sodium Salt - -     2  1,3,5-Guillermo (2-Hydroxyethyl) - Hexahydrotriazine (Grotan BK) - -     3 2-Hcprpixzjmgal-8-Nitro-1, 3-Propanediol - -     4  3, 4, 4' - Triclocarban - -    45 5 4 - Chloro - 3 - Cresol - -     6 4 - Chloro - 4 - Xylenol (PCMX) - -     7 7-Ethylbicyclooxazolidine (Bioban [id number]) - -     8 Benzalkonium Chloride CT - -     9 Benzyl  Alcohol - -    50 10 Cetalkonium Chloride - -     11 Cetylpyrimidine Chloride  - -     12 Chloroacetamide - -     13 DMDM Hydantoin - -     14 Glutaraldehyde - -    55 15 Triclosan - -     16 Glyoxal Trimeric Dihydrate - -     17 Iodopropynyl Butylcarbamate - -     18 Octylisothiazoline - -     19 Bithionol CT - -    60 20 Bioban P 1487 (Nitrobutyl) Morpholine/(Ethylnitro-Trimethylene) Dimorpholine - -     21 Phenoxyethanol - -     22 Phenyl Salicylate - -     23 Povidone Iodine - -     24 Sodium Benzoate - -    65 25 Sodium Disulfite - -     26 Sorbic Acid - -     27 Thimerosal       28 Melamine Formaldehyde Resin       29 Ethylenediamine Dihydrochloride        Parabens      70 30 Butyl-P-Hydroxybenzoate - -     31 Ethyl-P-Hydroxybenzoate - -     32 Methyl-P-Hydroxybenzoate - -    73 33 Propyl-P-Hydroxybenzoate - -    EMULSIFIERS & ADDITIVES       # Substance 2 days 4 days remarks   74 1 Polyethylene Glycol-400 - -    75 2 Cocamidopropyl Betaine - -     3 Amerchol L101 - -     4 Propylene Glycol - -     5 Triethanolamine - -     6 Sorbitane Sesquiolate CT - -    80 7 Isopropylmyristate - -     8 Polysorbate 80 CT - -     9 Amidoamine   (Stearamidopropyl Dimethylamine) - -     10 Oleamidopropyl Dimethylamine - -     11 Lauryl Glucoside - -    85 12 Coconut Diethanolamide  - -     13 2-Hydroxy-4-Methoxy Benzophenone (Oxybenzone) - -     14 Benzophenone-4 (Sulisobenzon) - -     15 Propolis - -     16 Dexpanthenol - -    90 17 Carboxymethyl Cellulose Sodium - -     18 Abitol - -     19 Tert-Butylhydroquinone - -     20 Benzyl Salicylate - -     21 Dimethylaminopropylamin (DMPA) CT? D053      95 22 Zinc Pyrithione (Zinc Omadine) CT? Z006       23 Guillermo(Hydroxymethyl) Nitromethane CT        Antioxidant - -     24 Dodecyl Gallate - -     25 Butylhydroxyanisole (BHA) - -     26 Butylhydroxytoluene (BHT) - -    100 27 Di-Alpha-Tocopherol (Vit E) - -    101 28 Propyl Gallate - -    PERFUMES, FLAVORS & PLANTS        #  Substance 2 days 4 days remarks   102 1 Benzyl Cinnamate - -     2 Di-Limonene (Dipentene) - -     3 Cananga Odorata (Abdoulaye Stanford) (I) - -    105 4 Lichen Acid Mix - -     5 Mentha Piperita Oil (Peppermint Oil) - -     6 Sesquiterpenelactone mix - -     7 Tea Tree Oil, Oxidized - -     8 Wood Tar Mix - -    110 9 Abietic Acid - -     10 Lavendula Angustifolia Oil (Lavender Oil) - -     11 Fragrance mix II CT * - -      Fragrance Mix I       12 [place]moss Absolute - -     13 Eugenol - -    115 14 Geraniol - -     15 Hydroxycitronellal - -     16 Isoeugenol - -     17 Cinnamic Aldehyde - -     18 Cinnamic Alcohol  - -      Fragrance mix II      120 19 Citronellol - -     20 Alpha-Hexylcinnamic Aldehyde    - -     21 Citral - -     22 Farnesol - -    124 23 Coumarin - -      Hexylcinnamic aldehyde, Coumarin, Farnesol, Hydroxyisohexy3-cyclohexene carboxaldehyde, citral, citrolellol  ANTIBIOTICS & ANTIMYCOTICS    # Substance 2 days 4 days remarks   125 1 Erythromycine - -     2 Framycetine Sulphate - -     3 Fusidic Acid Sodium Salt - -     4 Gentamicin Sulphate - -     5 Neomycine Sulphate - -    130 6 Oxytetracycline  - -     7 Polymyxin B Sulphate - -     8 Tetracycline-HCL - -     9 Sulfanilamide - -     10 Metronidazole - -    135 11 Oxyquinoline Mix - -     12 Nitrofurazone - -     13 Nystatin - -    138 14 Clotrimazole - -        CORTICOSTEROIDS   # Substance 2 days 4 days remarks Allergy  class   139 1 Amcinonide - -  B   140 2 Betametasone-17,21 Dipropionate - -  D1    3 Desoximetasone - -  C    4 Betamethasone-17-Valerate - -  D1    5 Dexamethasone - -  C    6 Hydrocortisone - -  A   145 7 Clobetasol-17-Propionate - -  D1    8 Dexamethasone-21-Phosphate Disodium Salt - -  C    9 Hydrocortisone-17 Butyrate - -  D2    10 Prednisolone - -  A    11 Triamcinolone Acetonide - -  B   150 12 Methylprednisolone Aceponate - -  D2    13 Hydrocortisone-21-Acetate - -  A   152 14 Prednicarbate - -  D2     Group Characteristics  of group Generic name Name  cross reactions   A Hydrocortisone   Cloprednole, Fludrocortisone acétate, Hydrocortison acetate, Methylprednisolone, Prednisolone, Tixocortolpivalate Alfacortone, Fucidin H, Dermacalm, Hexacortone, Premandole, Imacort With group D2   B Triamcinolone-acetonide   Budenoside (R-isomer), Amcinonide, Desonide, Fluocinolone acetonide, Triamcinolone acetonide Locapred, Locatop  Synalar, Pevisone, [place]ort -   C Betamethasone (Without Mounika)   Betamethasone, Dexamethasone, Flumethasone pivalate, Halomethasone Daivobet, Dexasalyl, Locasalen,   -   D1 Betamethasone-diproprionate   Betamethasone dipropionate, Betamethasone-17-valerate, Clobetasole-propionate, Fluticasone propionate, Mometasone furoate Betnovate, Diprogenta, Diprosalic, Diprosone, Celestoderm, Fucicort,  Cutivate, Axotide, Elocom -   D2 Methylprednisolone-aceponate   Hydrocortisone-aceponate, Hydrocortisone-buteprate, Hydrocortisone-17-butyrate, Methylprednisolone aceponate, Prednicarbate Locoïd, Advantan,  Prednitop With group A and Budesonide (S-isomer)     Results of patch tests:                         Interpretation:  - Negative                    A    = Allergic      (+) Erythema    TI   = Toxic/irritant   + E + Infiltration    RaP = Relevance at Present     ++ E/I + Papulovesicle   Rpr  = Relevance Previously     +++ E/I/P + Blister     nR   = No Relevance

## 2022-08-12 NOTE — PROGRESS NOTES
[facility] Dermato-allergology Note  Office visit  Encounter Date: Aug 15, 2022  ____________________________________________    CC: No chief complaint on file.      HPI:  (Aug 15, 2022)  Mr. Angel Etienne is a(n) 56 year old male who presents today as a return patient for allergy tests as planned  - Follow-up in Derm-Allergy clinic for scheduled patch testing in a few weeks.  - otherwise feeling well in usual state of health    Physical exam:  General: In no acute distress, well-developed, well-nourished  Eyes: no conjunctivitis  ENT: no signs of rhinitis   Pulmonary: no wheezing or coughing  Skin: Focused examination of the skin on test sites was performed = see test results below  No active eczematous skin lesions on tests sites, particularly back  --> left lower leg still swollen with some erythema and erosions --> probably itchy    Earlier History and Allergy exams:  (Jul 26, 2022)  - 15 years of hidradenitis suppurativa: takes doxycycline, topical clindamycin, benzoyl peroxide. Prescribed humira but hasn't taken these yet. Has upcoming appointment with Dr. Patel for HS.   - 15 years of diffuse dermatitis. Per Dr. Tang of dermatology, consistent with atopic dermatitis. Prescribed dupixent, but hasn't started due to concern of side effects  - no triggers other than possibly heat identified. But he also gets it in the winter; just doesn't flare up as much in the winter  - chest, abdomen, flanks, back, arms affected. Typically occurs on areas that are covered by clothes.   - flares are intermittent and unpredictable.   - symptoms include itching and dryness  - no known history of eczema or asthma  - ?allergy to corn as a child  - has been using the BPO once to twice weekly  - history of venous stasis s/p multiple venous stasis surgeries   - local dermatologist (Dr Parada) performed patch testing, results with several positive reactions including urea and clobetasol during summer of  2021   - no problems with adhesives    Focused examination of the back, abdomen, chest and legs was performed.  -generalized xerosis  -multiple poorly defined pink to red papules and plaques with excoriation on the back, abdomen, chest, bilateral legs  -pitted scars of the left axilla    Past Medical History:   There is no problem list on file for this patient.    No past medical history on file.    Allergies:  Allergies   Allergen Reactions     Clobetasol Other (See Comments)     Allergy testing done - Clobetasol-77-Propionate     Shellac Other (See Comments)     Allergy testing done     Trolamine Other (See Comments)     Allergy testing done     Urea Other (See Comments)     Allergy testing done       Medications:  Current Outpatient Medications   Medication     atorvastatin (LIPITOR) 40 MG tablet     benzoyl peroxide 5 % external liquid     buPROPion (WELLBUTRIN XL) 150 MG 24 hr tablet     clindamycin (CLEOCIN T) 1 % external lotion     clindamycin (CLEOCIN T) 1 % external solution     doxycycline monohydrate (ADOXA) 100 MG tablet     Dupilumab 300 MG/2ML SOPN     Dupilumab 300 MG/2ML SOPN     fenofibrate (TRIGLIDE/LOFIBRA) 160 MG tablet     fluocinonide (LIDEX) 0.05 % external cream     losartan (COZAAR) 25 MG tablet     meloxicam (MOBIC) 15 MG tablet     triamcinolone (KENALOG) 0.1 % external cream     No current facility-administered medications for this visit.       Social History:  The patient works as an independent . Patient has the following hobbies or non-occupational exposure: boating, snowmobiling, riding motorcycle.     Family History:  No family history on file.    Previous Labs, Allergy Tests, Dermatopathology, Imaging:  Patch testing as above    Referred By: Antony Arizmendi [facility]  [address] [place] [address]     Allergy Tests:    Past Allergy Test    Order for Future Allergy Testing:    [x] Outpatient  [] Inpatient: Wilder..../ Bed ....       Skin  Atopy (atopic dermatitis) [x] Yes   [] No .........  Contact allergies:   [x] Yes   [] No ..........  Hand eczema:   [x] Yes   [] No           Leading hand:   [] R   [] L       [] Ambidextrous         Drug allergies:        [x] Yes   [] No  Which?......possibly clobetasol?    Urticaria/Angioedema  [] Yes   [x] No .........  Food Allergy:  [] Yes   [x] No  which?......  Pets :  [] Yes   [x] No  which?......         []  Rhinitis   [] Conjunctivitis   [] Sinusitis   [] Polyposis   [] Otitis   [] Pharyngitis         []  Postnasal drip    [x]  none  Operations:   [] Tonsils   [] Septum   [] Sinus   [] Polyposis        [] Asthma bronchiale   [] Coughing      [x]  none  Symptoms (mostly Rhinoconjunctivitis and Asthma) aggravated by:  Season   [] I   [] II   [] III   [] IV   []V   []VI   []VII   []VIII   []IX   []X   []XI   []XII     [] perennial   Day time      [] morning   [] noon      [] evening        [] night    [] whole day........  [x]  none  Location/changes    [] inside        [] outside   [] mountains    [] sea     [] others.............   [x]  none  Triggers, specific     [] animals     [] plants     [] dust              [] others ...........................    [x]  none  Triggers, others       [] work          [] psyche    [] sport            [] others .............................  [x]  none  Irritant                [] phys efforts [] smoke    [x] heat/cold     [] odors  []others............... []  none    Order for PATCH TESTS  Reason for tests (suspected allergy): possible allergic contact dermatitis  Known previous allergies: possible clobetasol or components, shellac, trolamine, urea per prior outside patch testing  Standardized panels  [x] Standard panel (40 tests)  [x] Preservatives & Antimicrobials (31 tests)  [x] Emulsifiers & Additives (25 tests)   [x] Perfumes/Flavours & Plants (25 tests)  [] Hairdresser panel (12 tests)  [] Rubber Chemicals (22 tests)  [] Plastics (26 tests)  [] Colorants/Dyes/Food  additives (20 tests)  [] Metals (implants/dental) (24 tests)  [] Local anaesthetics/NSAIDs (13 tests)  [x] Antibiotics & Antimycotics (14 tests)   [x] Corticosteroids (15 tests)   [] Photopatch test (62 tests)   [] others: ...      [] Patient's own products: ...    DO NOT test if chemical or biological identity is unknown!     always ask from patient the product information and safety sheets (MSDS)       Order for PRICK TESTS    Reason for tests (suspected allergy): generalized eczema on exam  Known previous allergies: none    Standardized prick panels  [x] Atopic panel (20 tests)  [] Pediatric Panel (12 tests)  [] Milk, Meat, Eggs, Grains (20 tests)   [] Dust, Epithelia, Feathers (10 tests)  [] Fish, Seafood, Shellfish (17 tests)  [] Nuts, Beans (14 tests)  [] Spice, Vegetable, Fruit (17 tests)  [] Pollen Panel = Tree, Grass, Weed (24 tests)  [] Others: ...      [] Patient's own products: ...    DO NOT test if chemical or biological identity is unknown!     always ask from patient the product information and safety sheets (MSDS)     Standardized intradermal tests  [x] Penicillium notatum [x] Aspergillus fumigatus [x] House dust mites D.farinae only!!!  [] Cat    [] dog  [] Others: ...  [] Bee venom   [] Wasp venom  !!Specific protocol with dilutions!!       Order for Drug allergy tests (prick & Intradermal & patch tests)    [] Penicillin G  [] Ampicillin [] Cefazolin   [] Ceftriaxone   [] Ceftazidime  [] Bactrim    [] Others: ...  Order for ... as test date    ________________________________    RESULTS & EVALUATION of PATCH TESTS    Patch test readings after     [x] 2 days, [x] 3 days [] 4 days, [] 5 days,    Aug 15, 2022 application of patch tests with results:    STANDARD Series                                          # Substance 2 days 4 days remarks     1 Joaquín Mix [C] - -       2 Colophony - -       3  2-Mercaptobenzothiazole  - -       4 Methylisothiazolinone - -       5 Carba Mix - -       6 Thiuram Mix  [A] - -       7 Bisphenol A Epoxy Resin - -       8 K-Cosb-Xgroxkdltnk-Formaldehyde Resin - -       9 Mercapto Mix [A] - -       10 Black Rubber Mix- PPD [B] - -       11 Potassium Dichromate  -  -       12 Balsam of [place] (Myroxylon Pereirae Resin) - -       13 Nickel Sulphate Hexahydrate - -       14 Mixed Dialkyl Thiourea - -       15 Paraben Mix [B] - -       16 Methyldibromo Glutaronitrile - -       17 Fragrance Mix - -       18 2-Bromo-2-Nitropropane-1,3-Diol (Bronopol) CT - -       19 Lyral - -       20 Tixocortol-21- Pivalate CT - -       21 Diazolidiyl Urea (Germall II) - -       22 Methyl Methacrylate - -       23 Cobalt (II) Chloride Hexahydrate - -       24 Fragrance Mix II  - -       25 Compositae Mix - -       26 Benzoyl Peroxide - -       27 Bacitracin - -       28 Formaldehyde - -       29 Methylchloroisothiazolinone / Methylisothiazolinone - -       30 Corticosteroid Mix CT NA NA       31 Sodium Lauryl Sulfate - -       32 Lanolin Alcohol - -       33 Turpentine - -       34 Cetylstearylalcohol - -       35 Chlorhexidine Dicluconate - -       36 Budenoside - -       37 Imidazolidinyl Urea  - -       38 Ethyl-2 Cyanoacrylate NA NA       39 Quaternium 15 (Dowicil 200) - -       40 Decyl Glucoside - -     PRESERVATIVES & ANTIMICROBIALS        # Substance 2 days 4 days remarks   41 1  1,2-Benzisothiazoline-3-One, Sodium Salt - -     2  1,3,5-Guillermo (2-Hydroxyethyl) - Hexahydrotriazine (Grotan BK) - -     3 4-Rgyeqqswgnxhs-4-Nitro-1, 3-Propanediol NA NA     4  3, 4, 4' - Triclocarban - -    45 5 4 - Chloro - 3 - Cresol - -     6 4 - Chloro - 4 - Xylenol (PCMX) - -     7 7-Ethylbicyclooxazolidine (Bioban [id number]) - -     8 Benzalkonium Chloride CT - -     9 Benzyl Alcohol - -    50 10 Cetalkonium Chloride - -     11 Cetylpyrimidine Chloride  - -     12 Chloroacetamide - -     13 DMDM Hydantoin - -     14 Glutaraldehyde - -    55 15 Triclosan - -     16 Glyoxal Trimeric Dihydrate - -     17 Iodopropynyl  Butylcarbamate - -     18 Octylisothiazoline - -     19 Bithionol CT - -    60 20 Bioban P 1487 (Nitrobutyl) Morpholine/(Ethylnitro-Trimethylene) Dimorpholine - -     21 Phenoxyethanol - -     22 Phenyl Salicylate - -     23 Povidone Iodine - -     24 Sodium Benzoate - -    65 25 Sodium Disulfite - -     26 Sorbic Acid - -     27 Thimerosal       28 Melamine Formaldehyde Resin       29 Ethylenediamine Dihydrochloride        Parabens      70 30 Butyl-P-Hydroxybenzoate - -     31 Ethyl-P-Hydroxybenzoate - -     32 Methyl-P-Hydroxybenzoate - -    73 33 Propyl-P-Hydroxybenzoate - -    EMULSIFIERS & ADDITIVES       # Substance 2 days 4 days remarks   74 1 Polyethylene Glycol-400 - -    75 2 Cocamidopropyl Betaine - -     3 Amerchol L101 - -     4 Propylene Glycol NA NA     5 Triethanolamine - -     6 Sorbitane Sesquiolate CT - -    80 7 Isopropylmyristate - -     8 Polysorbate 80 CT - -     9 Amidoamine   (Stearamidopropyl Dimethylamine) NA NA     10 Oleamidopropyl Dimethylamine - -     11 Lauryl Glucoside NA NA    85 12 Coconut Diethanolamide  - -     13 2-Hydroxy-4-Methoxy Benzophenone (Oxybenzone) - -     14 Benzophenone-4 (Sulisobenzon) NA NA     15 Propolis - -     16 Dexpanthenol - -    90 17 Carboxymethyl Cellulose Sodium NA NA     18 Abitol - -     19 Tert-Butylhydroquinone - -     20 Benzyl Salicylate - -     21 Dimethylaminopropylamin (DMPA) CT? D053      95 22 Zinc Pyrithione (Zinc Omadine) CT? Z006       23 Guillermo(Hydroxymethyl) Nitromethane CT        Antioxidant - -     24 Dodecyl Gallate - -     25 Butylhydroxyanisole (BHA) - -     26 Butylhydroxytoluene (BHT) NA NA    100 27 Di-Alpha-Tocopherol (Vit E) - -    101 28 Propyl Gallate - -    PERFUMES, FLAVORS & PLANTS        # Substance 2 days 4 days remarks   102 1 Benzyl Cinnamate - -     2 Di-Limonene (Dipentene) - -     3 Cananga Odorata (Ylang Ylang) (I) - -    105 4 Lichen Acid Mix - -     5 Mentha Piperita Oil (Peppermint Oil) - -     6  Sesquiterpenelactone mix - -     7 Tea Tree Oil, Oxidized - -     8 Wood Tar Mix - -    110 9 Abietic Acid - -     10 Lavendula Angustifolia Oil (Lavender Oil) - -     11 Fragrance mix II CT * - -      Fragrance Mix I       12 [place]moss Absolute - -     13 Eugenol - -    115 14 Geraniol - -     15 Hydroxycitronellal - -     16 Isoeugenol - -     17 Cinnamic Aldehyde - -     18 Cinnamic Alcohol  NA NA      Fragrance mix II      120 19 Citronellol NA NA     20 Alpha-Hexylcinnamic Aldehyde    - -     21 Citral - -     22 Farnesol - -    124 23 Coumarin - -      Hexylcinnamic aldehyde, Coumarin, Farnesol, Hydroxyisohexy3-cyclohexene carboxaldehyde, citral, citrolellol  ANTIBIOTICS & ANTIMYCOTICS    # Substance 2 days 4 days remarks   125 1 Erythromycine - -     2 Framycetine Sulphate - -     3 Fusidic Acid Sodium Salt - -     4 Gentamicin Sulphate - -     5 Neomycine Sulphate - -    130 6 Oxytetracycline  - -     7 Polymyxin B Sulphate - -     8 Tetracycline-HCL - -     9 Sulfanilamide - -     10 Metronidazole - -    135 11 Oxyquinoline Mix - -     12 Nitrofurazone - -     13 Nystatin - -    138 14 Clotrimazole - -      CORTICOSTEROIDS   # Substance 2 days 4 days remarks Allergy  class   139 1 Amcinonide - -  B   140 2 Betametasone-17,21 Dipropionate - -  D1    3 Desoximetasone - -  C    4 Betamethasone-17-Valerate - -  D1    5 Dexamethasone - -  C    6 Hydrocortisone - -  A   145 7 Clobetasol-17-Propionate - -  D1    8 Dexamethasone-21-Phosphate Disodium Salt - -  C    9 Hydrocortisone-17 Butyrate - -  D2    10 Prednisolone - -  A    11 Triamcinolone Acetonide - -  B   150 12 Methylprednisolone Aceponate - -  D2    13 Hydrocortisone-21-Acetate - -  A   152 14 Prednicarbate - -  D2     Group Characteristics of group Generic name Name  cross reactions   A Hydrocortisone   Cloprednole, Fludrocortisone acétate, Hydrocortison acetate, Methylprednisolone, Prednisolone, Tixocortolpivalate Alfacortone, Fucidin H, Dermacalm,  Hexacortone, Premandole, Imacort With group D2   B Triamcinolone-acetonide   Budenoside (R-isomer), Amcinonide, Desonide, Fluocinolone acetonide, Triamcinolone acetonide Locapred, Locatop  Synalar, Pevisone, Ken[place]ort -   C Betamethasone (Without Mounika)   Betamethasone, Dexamethasone, Flumethasone pivalate, Halomethasone Daivobet, Dexasalyl, Locasalen,   -   D1 Betamethasone-diproprionate   Betamethasone dipropionate, Betamethasone-17-valerate, Clobetasole-propionate, Fluticasone propionate, Mometasone furoate Betnovate, Diprogenta, Diprosalic, Diprosone, Celestoderm, Fucicort,  Cutivate, Axotide, Elocom -   D2 Methylprednisolone-aceponate   Hydrocortisone-aceponate, Hydrocortisone-buteprate, Hydrocortisone-17-butyrate, Methylprednisolone aceponate, Prednicarbate Locoïd, Advantan,  Prednitop With group A and Budesonide (S-isomer)     Results of patch tests:                         Interpretation:  - Negative                    A    = Allergic      (+) Erythema    TI   = Toxic/irritant   + E + Infiltration    RaP = Relevance at Present     ++ E/I + Papulovesicle   Rpr  = Relevance Previously     +++ E/I/P + Blister     nR   = No Relevance    Atopy Screen (Placed Jul 26, 2022)  No Substance Readings (15 min) Evaluation   POS Histamine 1mg/ml ++    NEG NaCl 0.9% -      No Substance Readings (15 min) Evaluation   1 Alternaria alternata (tenuis)  -    2 Cladosporium herbarum -    3 Aspergillus fumigatus -    4 Penicillium notatum -    5 Dermatophagoides pteronyssinus ++    6 Dermatophagoides farinae -    7 Dog epithelium (canis spp) -    8 Cat hair (crescencio catus) -    9 Cockroach   (Blatella americana & germanica) -    10 Grass mix midwest   (Katerin, Orchard, Redtop, Aurelio) -    11 Josef grass (sorghum halepense) -    12 Weed mix   (common Cocklebur, Lamb s quarters, rough redroot Pigweed, Dock/Sorrel) -    13 Mug wort (artemisia vulgare) -    14 Ragweed giant/short (ambrosia spp) -    15 White birch (Betula papyrifera)  -    16 Tree mix 1 (Pecan, Maple BHR, Oak RVW, american Ashley, black [place]) -    17 Red cedar (juniperus virginia) -    18 Tree mix 2   (white [place], river/red Birch, black [place], common [place], american Elm) -    19 Box elder/Maple mix (acer spp) -    20 [place] shagbark (carya ovata) -           Conclusion: immediate type reaction to house dust mite D. pteronyssinus      Intradermal Testing (Placed Aug 15, 2022 )    No Substance Conc.  Reading (15min)  immediate Papule [mm] / Erythema [mm] Reading   (... days)  delayed Papule [mm] / Erythema [mm] Remarks   - NaCl  0.9% +   -    + Histamine (prick) 0.1mg/ml -   -    DF Standard Dust Mite - D. Farinae 1:10 ++ 7/15  -    A Aspergillus fumigatus  1:10 -   -    P Penicillium notatum 1:10 -   -    Conclusions: patient has immediate type sensitization to dust mites. Will observe if any delayed reactions to molds or dust mites    [] No relevant allergic reaction observed    [] Allergic reaction diagnosed against following allergens:      Interpretation/ remarks:   See later    [] Patient information given   [] ACDS CAMP information's (# ....) to following compounds: .....   [] General information's to following compounds: ......      Assessment & Plan:    ==> Final Diagnosis:     # Hidradenitis suppurativa   Plan to consider Humira, upcoming appointment with Dr. Patel in August 2022  * chronic illness with exacerbation, progression, side effects from treatment    # atopic predisposition with:    In prick test positive to house dust mite D. Pteronyssinus (but clinically not very relevant)    Dry, hypersensitive skin and atopic dermatitis?  * chronic illness with severe exacerbation, progression, side effects from treatment    # underlying contact allergy with recurrent dermatitis (maybe as well cause for the cellulitis and itching on left lower leg)  * chronic illness with severe exacerbation, progression, side effects from treatment    # Cellulitis on left lower  leg last 10 days    On antibiotics for 7 days    Put on open areas daily Gentian violet solution and elevate the left leg or put on compression stocking or short stretch bandages   * chronic illness with severe exacerbation, progression, side effects from treatment    These conclusions are made at the best of one's knowledge and belief based on the provided evidence such as patient's history and allergy test results and they can change over time or can be incomplete because of missing information's.    ==> Treatment Plan:    -Dupixent would likely be helpful given the patient's atopic dermatitis  -Humira would likely improve hidradenitis suppurutiva        Procedures Performed: Allergy tests, including intradermal and patch tests    Staff: : provider    Follow-up in Derm-Allergy clinic for 1st readings of patch tests after 2 days (virtual) and 2nd readings and final conclusions after 4 days (in person)   ___________________________    I spent a total of 35 minutes with Angel Etienne during today s  visit. This time was spent discussing all the individual test results, correlating them to the clinical relevance, counseling the patient and/or coordinating care and performing allergy tests

## 2022-08-15 ENCOUNTER — OFFICE VISIT (OUTPATIENT)
Dept: ALLERGY | Facility: CLINIC | Age: 57
End: 2022-08-15
Payer: COMMERCIAL

## 2022-08-15 DIAGNOSIS — L23.89 ALLERGIC CONTACT DERMATITIS DUE TO OTHER AGENTS: ICD-10-CM

## 2022-08-15 DIAGNOSIS — L98.9 SKIN EROSION: ICD-10-CM

## 2022-08-15 DIAGNOSIS — L73.2 HIDRADENITIS SUPPURATIVA: ICD-10-CM

## 2022-08-15 DIAGNOSIS — I89.0 LYMPHEDEMA: ICD-10-CM

## 2022-08-15 DIAGNOSIS — Z91.09 ALLERGY TO AMERICAN HOUSE DUST MITE: ICD-10-CM

## 2022-08-15 DIAGNOSIS — L03.116 CELLULITIS OF LEFT LOWER EXTREMITY: Primary | ICD-10-CM

## 2022-08-15 PROCEDURE — 95024 IQ TESTS W/ALLERGENIC XTRCS: CPT | Performed by: DERMATOLOGY

## 2022-08-15 PROCEDURE — 95044 PATCH/APPLICATION TESTS: CPT | Performed by: DERMATOLOGY

## 2022-08-15 RX ORDER — EMOLLIENT BASE
CREAM (GRAM) TOPICAL 2 TIMES DAILY
Qty: 113 G | Refills: 3 | Status: SHIPPED | OUTPATIENT
Start: 2022-08-15 | End: 2023-11-16

## 2022-08-15 ASSESSMENT — PAIN SCALES - GENERAL: PAINLEVEL: NO PAIN (0)

## 2022-08-15 NOTE — LETTER
8/15/2022         RE: Angel Etienne  [address]  [facility] [address]        Dear Colleague,    Thank you for referring your patient, Angel Eitenne, to the [facility] [place]. Please see a copy of my visit note below.    [facility] Dermato-allergology Note  Office visit  Encounter Date: Aug 15, 2022  ____________________________________________    CC: No chief complaint on file.      HPI:  (Aug 15, 2022)  Mr. Angel Etienne is a(n) 56 year old male who presents today as a return patient for allergy tests as planned  - Follow-up in Derm-Allergy clinic for scheduled patch testing in a few weeks.  - otherwise feeling well in usual state of health    Physical exam:  General: In no acute distress, well-developed, well-nourished  Eyes: no conjunctivitis  ENT: no signs of rhinitis   Pulmonary: no wheezing or coughing  Skin: Focused examination of the skin on test sites was performed = see test results below  No active eczematous skin lesions on tests sites, particularly back  --> left lower leg still swollen with some erythema and erosions --> probably itchy    Earlier History and Allergy exams:  (Jul 26, 2022)  - 15 years of hidradenitis suppurativa: takes doxycycline, topical clindamycin, benzoyl peroxide. Prescribed humira but hasn't taken these yet. Has upcoming appointment with Dr. Patel for HS.   - 15 years of diffuse dermatitis. Per Dr. Tang of dermatology, consistent with atopic dermatitis. Prescribed dupixent, but hasn't started due to concern of side effects  - no triggers other than possibly heat identified. But he also gets it in the winter; just doesn't flare up as much in the winter  - chest, abdomen, flanks, back, arms affected. Typically occurs on areas that are covered by clothes.   - flares are intermittent and unpredictable.   - symptoms include itching and dryness  - no known history of eczema or asthma  - ?allergy to corn as a  child  - has been using the BPO once to twice weekly  - history of venous stasis s/p multiple venous stasis surgeries   - local dermatologist (Dr Parada) performed patch testing, results with several positive reactions including urea and clobetasol during summer of 2021   - no problems with adhesives    Focused examination of the back, abdomen, chest and legs was performed.  -generalized xerosis  -multiple poorly defined pink to red papules and plaques with excoriation on the back, abdomen, chest, bilateral legs  -pitted scars of the left axilla    Past Medical History:   There is no problem list on file for this patient.    No past medical history on file.    Allergies:  Allergies   Allergen Reactions     Clobetasol Other (See Comments)     Allergy testing done - Clobetasol-77-Propionate     Shellac Other (See Comments)     Allergy testing done     Trolamine Other (See Comments)     Allergy testing done     Urea Other (See Comments)     Allergy testing done       Medications:  Current Outpatient Medications   Medication     atorvastatin (LIPITOR) 40 MG tablet     benzoyl peroxide 5 % external liquid     buPROPion (WELLBUTRIN XL) 150 MG 24 hr tablet     clindamycin (CLEOCIN T) 1 % external lotion     clindamycin (CLEOCIN T) 1 % external solution     doxycycline monohydrate (ADOXA) 100 MG tablet     Dupilumab 300 MG/2ML SOPN     Dupilumab 300 MG/2ML SOPN     fenofibrate (TRIGLIDE/LOFIBRA) 160 MG tablet     fluocinonide (LIDEX) 0.05 % external cream     losartan (COZAAR) 25 MG tablet     meloxicam (MOBIC) 15 MG tablet     triamcinolone (KENALOG) 0.1 % external cream     No current facility-administered medications for this visit.       Social History:  The patient works as an independent . Patient has the following hobbies or non-occupational exposure: boating, snowmobiling, riding motorcycle.     Family History:  No family history on file.    Previous Labs, Allergy Tests, Dermatopathology, Imaging:  Patch  testing as above    Referred By: Antony Arizmendi [facility]  [address] [place] [address]     Allergy Tests:    Past Allergy Test    Order for Future Allergy Testing:    [x] Outpatient  [] Inpatient: [facility]..../ Bed ....       Skin Atopy (atopic dermatitis) [x] Yes   [] No .........  Contact allergies:   [x] Yes   [] No ..........  Hand eczema:   [x] Yes   [] No           Leading hand:   [] R   [] L       [] Ambidextrous         Drug allergies:        [x] Yes   [] No  Which?......possibly clobetasol?    Urticaria/Angioedema  [] Yes   [x] No .........  Food Allergy:  [] Yes   [x] No  which?......  Pets :  [] Yes   [x] No  which?......         []  Rhinitis   [] Conjunctivitis   [] Sinusitis   [] Polyposis   [] Otitis   [] Pharyngitis         []  Postnasal drip    [x]  none  Operations:   [] Tonsils   [] Septum   [] Sinus   [] Polyposis        [] Asthma bronchiale   [] Coughing      [x]  none  Symptoms (mostly Rhinoconjunctivitis and Asthma) aggravated by:  Season   [] I   [] II   [] III   [] IV   []V   []VI   []VII   []VIII   []IX   []X   []XI   []XII     [] perennial   Day time      [] morning   [] noon      [] evening        [] night    [] whole day........  [x]  none  Location/changes    [] inside        [] outside   [] mountains    [] sea     [] others.............   [x]  none  Triggers, specific     [] animals     [] plants     [] dust              [] others ...........................    [x]  none  Triggers, others       [] work          [] psyche    [] sport            [] others .............................  [x]  none  Irritant                [] phys efforts [] smoke    [x] heat/cold     [] odors  []others............... []  none    Order for PATCH TESTS  Reason for tests (suspected allergy): possible allergic contact dermatitis  Known previous allergies: possible clobetasol or components, shellac, trolamine, urea per prior outside patch testing  Standardized panels  [x]  Standard panel (40 tests)  [x] Preservatives & Antimicrobials (31 tests)  [x] Emulsifiers & Additives (25 tests)   [x] Perfumes/Flavours & Plants (25 tests)  [] Hairdresser panel (12 tests)  [] Rubber Chemicals (22 tests)  [] Plastics (26 tests)  [] Colorants/Dyes/Food additives (20 tests)  [] Metals (implants/dental) (24 tests)  [] Local anaesthetics/NSAIDs (13 tests)  [x] Antibiotics & Antimycotics (14 tests)   [x] Corticosteroids (15 tests)   [] Photopatch test (62 tests)   [] others: ...      [] Patient's own products: ...    DO NOT test if chemical or biological identity is unknown!     always ask from patient the product information and safety sheets (MSDS)       Order for PRICK TESTS    Reason for tests (suspected allergy): generalized eczema on exam  Known previous allergies: none    Standardized prick panels  [x] Atopic panel (20 tests)  [] Pediatric Panel (12 tests)  [] Milk, Meat, Eggs, Grains (20 tests)   [] Dust, Epithelia, Feathers (10 tests)  [] Fish, Seafood, Shellfish (17 tests)  [] Nuts, Beans (14 tests)  [] Spice, Vegetable, Fruit (17 tests)  [] Pollen Panel = Tree, Grass, Weed (24 tests)  [] Others: ...      [] Patient's own products: ...    DO NOT test if chemical or biological identity is unknown!     always ask from patient the product information and safety sheets (MSDS)     Standardized intradermal tests  [x] Penicillium notatum [x] Aspergillus fumigatus [x] House dust mites D.farinae only!!!  [] Cat    [] dog  [] Others: ...  [] Bee venom   [] Wasp venom  !!Specific protocol with dilutions!!       Order for Drug allergy tests (prick & Intradermal & patch tests)    [] Penicillin G  [] Ampicillin [] Cefazolin   [] Ceftriaxone   [] Ceftazidime  [] Bactrim    [] Others: ...  Order for ... as test date    ________________________________    RESULTS & EVALUATION of PATCH TESTS    Patch test readings after     [x] 2 days, [x] 3 days [] 4 days, [] 5 days,    Aug 15, 2022 application of patch tests  with results:    STANDARD Series                                          # Substance 2 days 4 days remarks     1 Joaquín Mix [C] - -       2 Colophony - -       3  2-Mercaptobenzothiazole  - -       4 Methylisothiazolinone - -       5 Carba Mix - -       6 Thiuram Mix [A] - -       7 Bisphenol A Epoxy Resin - -       8 H-Eice-Tvkpovsajkj-Formaldehyde Resin - -       9 Mercapto Mix [A] - -       10 Black Rubber Mix- PPD [B] - -       11 Potassium Dichromate  -  -       12 Balsam of [place] (Myroxylon Pereirae Resin) - -       13 Nickel Sulphate Hexahydrate - -       14 Mixed Dialkyl Thiourea - -       15 Paraben Mix [B] - -       16 Methyldibromo Glutaronitrile - -       17 Fragrance Mix - -       18 2-Bromo-2-Nitropropane-1,3-Diol (Bronopol) CT - -       19 Lyral - -       20 Tixocortol-21- Pivalate CT - -       21 Diazolidiyl Urea (Germall II) - -       22 Methyl Methacrylate - -       23 Cobalt (II) Chloride Hexahydrate - -       24 Fragrance Mix II  - -       25 Compositae Mix - -       26 Benzoyl Peroxide - -       27 Bacitracin - -       28 Formaldehyde - -       29 Methylchloroisothiazolinone / Methylisothiazolinone - -       30 Corticosteroid Mix CT NA NA       31 Sodium Lauryl Sulfate - -       32 Lanolin Alcohol - -       33 Turpentine - -       34 Cetylstearylalcohol - -       35 Chlorhexidine Dicluconate - -       36 Budenoside - -       37 Imidazolidinyl Urea  - -       38 Ethyl-2 Cyanoacrylate NA NA       39 Quaternium 15 (Dowicil 200) - -       40 Decyl Glucoside - -     PRESERVATIVES & ANTIMICROBIALS        # Substance 2 days 4 days remarks   41 1  1,2-Benzisothiazoline-3-One, Sodium Salt - -     2  1,3,5-Guillermo (2-Hydroxyethyl) - Hexahydrotriazine (Grotan BK) - -     3 5-Mkwplsgsioacj-7-Nitro-1, 3-Propanediol NA NA     4  3, 4, 4' - Triclocarban - -    45 5 4 - Chloro - 3 - Cresol - -     6 4 - Chloro - 4 - Xylenol (PCMX) - -     7 7-Ethylbicyclooxazolidine (Bioban [id number]) - -     8 Benzalkonium Chloride  CT - -     9 Benzyl Alcohol - -    50 10 Cetalkonium Chloride - -     11 Cetylpyrimidine Chloride  - -     12 Chloroacetamide - -     13 DMDM Hydantoin - -     14 Glutaraldehyde - -    55 15 Triclosan - -     16 Glyoxal Trimeric Dihydrate - -     17 Iodopropynyl Butylcarbamate - -     18 Octylisothiazoline - -     19 Bithionol CT - -    60 20 Bioban P 1487 (Nitrobutyl) Morpholine/(Ethylnitro-Trimethylene) Dimorpholine - -     21 Phenoxyethanol - -     22 Phenyl Salicylate - -     23 Povidone Iodine - -     24 Sodium Benzoate - -    65 25 Sodium Disulfite - -     26 Sorbic Acid - -     27 Thimerosal       28 Melamine Formaldehyde Resin       29 Ethylenediamine Dihydrochloride        Parabens      70 30 Butyl-P-Hydroxybenzoate - -     31 Ethyl-P-Hydroxybenzoate - -     32 Methyl-P-Hydroxybenzoate - -    73 33 Propyl-P-Hydroxybenzoate - -    EMULSIFIERS & ADDITIVES       # Substance 2 days 4 days remarks   74 1 Polyethylene Glycol-400 - -    75 2 Cocamidopropyl Betaine - -     3 Amerchol L101 - -     4 Propylene Glycol NA NA     5 Triethanolamine - -     6 Sorbitane Sesquiolate CT - -    80 7 Isopropylmyristate - -     8 Polysorbate 80 CT - -     9 Amidoamine   (Stearamidopropyl Dimethylamine) NA NA     10 Oleamidopropyl Dimethylamine - -     11 Lauryl Glucoside NA NA    85 12 Coconut Diethanolamide  - -     13 2-Hydroxy-4-Methoxy Benzophenone (Oxybenzone) - -     14 Benzophenone-4 (Sulisobenzon) NA NA     15 Propolis - -     16 Dexpanthenol - -    90 17 Carboxymethyl Cellulose Sodium NA NA     18 Abitol - -     19 Tert-Butylhydroquinone - -     20 Benzyl Salicylate - -     21 Dimethylaminopropylamin (DMPA) CT? D053      95 22 Zinc Pyrithione (Zinc Omadine) CT? Z006       23 Guillermo(Hydroxymethyl) Nitromethane CT        Antioxidant - -     24 Dodecyl Gallate - -     25 Butylhydroxyanisole (BHA) - -     26 Butylhydroxytoluene (BHT) NA NA    100 27 Di-Alpha-Tocopherol (Vit E) - -    101 28 Propyl Gallate - -    PERFUMES,  FLAVORS & PLANTS        # Substance 2 days 4 days remarks   102 1 Benzyl Cinnamate - -     2 Di-Limonene (Dipentene) - -     3 Cananga Odorata (Abdoulaye Stanford) (I) - -    105 4 Lichen Acid Mix - -     5 Mentha Piperita Oil (Peppermint Oil) - -     6 Sesquiterpenelactone mix - -     7 Tea Tree Oil, Oxidized - -     8 Wood Tar Mix - -    110 9 Abietic Acid - -     10 Lavendula Angustifolia Oil (Lavender Oil) - -     11 Fragrance mix II CT * - -      Fragrance Mix I       12 [place]moss Absolute - -     13 Eugenol - -    115 14 Geraniol - -     15 Hydroxycitronellal - -     16 Isoeugenol - -     17 Cinnamic Aldehyde - -     18 Cinnamic Alcohol  NA NA      Fragrance mix II      120 19 Citronellol NA NA     20 Alpha-Hexylcinnamic Aldehyde    - -     21 Citral - -     22 Farnesol - -    124 23 Coumarin - -      Hexylcinnamic aldehyde, Coumarin, Farnesol, Hydroxyisohexy3-cyclohexene carboxaldehyde, citral, citrolellol  ANTIBIOTICS & ANTIMYCOTICS    # Substance 2 days 4 days remarks   125 1 Erythromycine - -     2 Framycetine Sulphate - -     3 Fusidic Acid Sodium Salt - -     4 Gentamicin Sulphate - -     5 Neomycine Sulphate - -    130 6 Oxytetracycline  - -     7 Polymyxin B Sulphate - -     8 Tetracycline-HCL - -     9 Sulfanilamide - -     10 Metronidazole - -    135 11 Oxyquinoline Mix - -     12 Nitrofurazone - -     13 Nystatin - -    138 14 Clotrimazole - -      CORTICOSTEROIDS   # Substance 2 days 4 days remarks Allergy  class   139 1 Amcinonide - -  B   140 2 Betametasone-17,21 Dipropionate - -  D1    3 Desoximetasone - -  C    4 Betamethasone-17-Valerate - -  D1    5 Dexamethasone - -  C    6 Hydrocortisone - -  A   145 7 Clobetasol-17-Propionate - -  D1    8 Dexamethasone-21-Phosphate Disodium Salt - -  C    9 Hydrocortisone-17 Butyrate - -  D2    10 Prednisolone - -  A    11 Triamcinolone Acetonide - -  B   150 12 Methylprednisolone Aceponate - -  D2    13 Hydrocortisone-21-Acetate - -  A   152 14 Prednicarbate - -   D2     Group Characteristics of group Generic name Name  cross reactions   A Hydrocortisone   Cloprednole, Fludrocortisone acétate, Hydrocortison acetate, Methylprednisolone, Prednisolone, Tixocortolpivalate Alfacortone, Fucidin H, Dermacalm, Hexacortone, Premandole, Imacort With group D2   B Triamcinolone-acetonide   Budenoside (R-isomer), Amcinonide, Desonide, Fluocinolone acetonide, Triamcinolone acetonide Locapred, Locatop  Synalar, Pevisone, Kenacort -   C Betamethasone (Without Mounika)   Betamethasone, Dexamethasone, Flumethasone pivalate, Halomethasone Daivobet, Dexasalyl, Locasalen,   -   D1 Betamethasone-diproprionate   Betamethasone dipropionate, Betamethasone-17-valerate, Clobetasole-propionate, Fluticasone propionate, Mometasone furoate Betnovate, Diprogenta, Diprosalic, Diprosone, Celestoderm, Fucicort,  Cutivate, Axotide, Elocom -   D2 Methylprednisolone-aceponate   Hydrocortisone-aceponate, Hydrocortisone-buteprate, Hydrocortisone-17-butyrate, Methylprednisolone aceponate, Prednicarbate Locoïd, Advantan,  Prednitop With group A and Budesonide (S-isomer)     Results of patch tests:                         Interpretation:  - Negative                    A    = Allergic      (+) Erythema    TI   = Toxic/irritant   + E + Infiltration    RaP = Relevance at Present     ++ E/I + Papulovesicle   Rpr  = Relevance Previously     +++ E/I/P + Blister     nR   = No Relevance    Atopy Screen (Placed Jul 26, 2022)  No Substance Readings (15 min) Evaluation   POS Histamine 1mg/ml ++    NEG NaCl 0.9% -      No Substance Readings (15 min) Evaluation   1 Alternaria alternata (tenuis)  -    2 Cladosporium herbarum -    3 Aspergillus fumigatus -    4 Penicillium notatum -    5 Dermatophagoides pteronyssinus ++    6 Dermatophagoides farinae -    7 Dog epithelium (canis spp) -    8 Cat hair (crescencio catus) -    9 Cockroach   (Blatella americana & germanica) -    10 Grass mix midwest   (Katerin, Orchard, Redtop, Aurelio) -    11  Josef grass (sorghum halepense) -    12 Weed mix   (common Cocklebur, Lamb s quarters, rough redroot Pigweed, Dock/Sorrel) -    13 Mug wort (artemisia vulgare) -    14 Ragweed giant/short (ambrosia spp) -    15 White birch (Betula papyrifera) -    16 Tree mix 1 (Pecan, Maple BHR, Oak RVW, american [place], black [place]) -    17 Red cedar (juniperus virginia) -    18 Tree mix 2   (white [place], river/red Birch, black [place], common [place], american Elm) -    19 Box elder/Maple mix (acer spp) -    20 Volusia shagbark (carya ovata) -           Conclusion: immediate type reaction to house dust mite D. pteronyssinus      Intradermal Testing (Placed Aug 15, 2022 )    No Substance Conc.  Reading (15min)  immediate Papule [mm] / Erythema [mm] Reading   (... days)  delayed Papule [mm] / Erythema [mm] Remarks   - NaCl  0.9% +   -    + Histamine (prick) 0.1mg/ml -   -    DF Standard Dust Mite - D. Farinae 1:10 ++ 7/15  -    A Aspergillus fumigatus  1:10 -   -    P Penicillium notatum 1:10 -   -    Conclusions: patient has immediate type sensitization to dust mites. Will observe if any delayed reactions to molds or dust mites    [] No relevant allergic reaction observed    [] Allergic reaction diagnosed against following allergens:      Interpretation/ remarks:   See later    [] Patient information given   [] ACDS CAMP information's (# ....) to following compounds: .....   [] General information's to following compounds: ......      Assessment & Plan:    ==> Final Diagnosis:     # Hidradenitis suppurativa   Plan to consider Humira, upcoming appointment with Dr. Patel in August 2022  * chronic illness with exacerbation, progression, side effects from treatment    # atopic predisposition with:    In prick test positive to house dust mite D. Pteronyssinus (but clinically not very relevant)    Dry, hypersensitive skin and atopic dermatitis?  * chronic illness with severe exacerbation, progression, side effects from  treatment    # underlying contact allergy with recurrent dermatitis (maybe as well cause for the cellulitis and itching on left lower leg)  * chronic illness with severe exacerbation, progression, side effects from treatment    # Cellulitis on left lower leg last 10 days    On antibiotics for 7 days    Put on open areas daily Gentian violet solution and elevate the left leg or put on compression stocking or short stretch bandages   * chronic illness with severe exacerbation, progression, side effects from treatment    These conclusions are made at the best of one's knowledge and belief based on the provided evidence such as patient's history and allergy test results and they can change over time or can be incomplete because of missing information's.    ==> Treatment Plan:    -Dupixent would likely be helpful given the patient's atopic dermatitis  -Humira would likely improve hidradenitis suppurutiva        Procedures Performed: Allergy tests, including intradermal and patch tests    Staff: : provider    Follow-up in Derm-Allergy clinic for 1st readings of patch tests after 2 days (virtual) and 2nd readings and final conclusions after 4 days (in person)   ___________________________    I spent a total of 35 minutes with Angel Etienne during today s  visit. This time was spent discussing all the individual test results, correlating them to the clinical relevance, counseling the patient and/or coordinating care and performing allergy tests            Again, thank you for allowing me to participate in the care of your patient.        Sincerely,        Denny Novoa MD

## 2022-08-15 NOTE — NURSING NOTE
Chief Complaint   Patient presents with     Allergy Testing Followup     Karl is here today for allergy Patch Testing Day 1     Tarik Ramirezedic

## 2022-08-16 NOTE — PROGRESS NOTES
[facility] Dermato-allergology Note  Office visit  Encounter Date: Aug 17, 2022  ____________________________________________    CC: No chief complaint on file.      HPI:  (Aug 17, 2022)  Mr. Angel Etienne is a(n) 56 year old male who presents today as a return patient for allergy consultation  -Follow-up in Derm-Allergy clinic for 1st readings of patch tests after 2 days   - otherwise feeling well in usual state of health    Physical exam:  General: In no acute distress, well-developed, well-nourished  Eyes: no conjunctivitis  ENT: no signs of rhinitis   Pulmonary: no wheezing or coughing  Skin:Focused examination of the skin on test sites was performed = see test results below    Earlier History and Allergy exams:  (Aug 15, 2022)  - Follow-up in Derm-Allergy clinic for scheduled patch testing in a few weeks.  --> left lower leg still swollen with some erythema and erosions and some pruritus    Earlier History and Allergy exams:  (Jul 26, 2022)  - 15 years of hidradenitis suppurativa: takes doxycycline, topical clindamycin, benzoyl peroxide. Prescribed humira but hasn't taken these yet. Has upcoming appointment with Dr. Patel for HS.   - 15 years of diffuse dermatitis. Per Dr. Tang of dermatology, consistent with atopic dermatitis. Prescribed dupixent, but hasn't started due to concern of side effects  - no triggers other than possibly heat identified. But he also gets it in the winter; just doesn't flare up as much in the winter  - chest, abdomen, flanks, back, arms affected. Typically occurs on areas that are covered by clothes.   - flares are intermittent and unpredictable.   - symptoms include itching and dryness  - no known history of eczema or asthma  - ?allergy to corn as a child  - has been using the BPO once to twice weekly  - history of venous stasis s/p multiple venous stasis surgeries   - local dermatologist (Dr Parada) performed patch testing, results with several  positive reactions including urea and clobetasol during summer of 2021   - no problems with adhesives    Focused examination of the back, abdomen, chest and legs was performed.  -generalized xerosis  -multiple poorly defined pink to red papules and plaques with excoriation on the back, abdomen, chest, bilateral legs  -pitted scars of the left axilla    Past Medical History:   There is no problem list on file for this patient.    No past medical history on file.    Allergies:  Allergies   Allergen Reactions     Clobetasol Other (See Comments)     Allergy testing done - Clobetasol-77-Propionate     Shellac Other (See Comments)     Allergy testing done     Trolamine Other (See Comments)     Allergy testing done     Urea Other (See Comments)     Allergy testing done       Medications:  Current Outpatient Medications   Medication     atorvastatin (LIPITOR) 40 MG tablet     benzoyl peroxide 5 % external liquid     buPROPion (WELLBUTRIN XL) 150 MG 24 hr tablet     clindamycin (CLEOCIN T) 1 % external lotion     clindamycin (CLEOCIN T) 1 % external solution     Compression Bandages KIT     doxycycline monohydrate (ADOXA) 100 MG tablet     Dupilumab 300 MG/2ML SOPN     Dupilumab 300 MG/2ML SOPN     emollient (VANICREAM) external cream     fenofibrate (TRIGLIDE/LOFIBRA) 160 MG tablet     fluocinonide (LIDEX) 0.05 % external cream     gentian violet 1 % external solution     losartan (COZAAR) 25 MG tablet     meloxicam (MOBIC) 15 MG tablet     triamcinolone (KENALOG) 0.1 % external cream     No current facility-administered medications for this visit.       Social History:  The patient works as an independent . Patient has the following hobbies or non-occupational exposure: boating, snowmobiling, riding motorcycle.     Family History:  No family history on file.    Previous Labs, Allergy Tests, Dermatopathology, Imaging:  Patch testing as above    Referred By: Antony Arizmendi [facility]  [address] [place] [address]     Allergy Tests:    Past Allergy Test    Order for Future Allergy Testing:    [x] Outpatient  [] Inpatient: Wilder..../ Bed ....       Skin Atopy (atopic dermatitis) [x] Yes   [] No .........  Contact allergies:   [x] Yes   [] No ..........  Hand eczema:   [x] Yes   [] No           Leading hand:   [] R   [] L       [] Ambidextrous         Drug allergies:        [x] Yes   [] No  Which?......possibly clobetasol?    Urticaria/Angioedema  [] Yes   [x] No .........  Food Allergy:  [] Yes   [x] No  which?......  Pets :  [] Yes   [x] No  which?......         []  Rhinitis   [] Conjunctivitis   [] Sinusitis   [] Polyposis   [] Otitis   [] Pharyngitis         []  Postnasal drip    [x]  none  Operations:   [] Tonsils   [] Septum   [] Sinus   [] Polyposis        [] Asthma bronchiale   [] Coughing      [x]  none  Symptoms (mostly Rhinoconjunctivitis and Asthma) aggravated by:  Season   [] I   [] II   [] III   [] IV   []V   []VI   []VII   []VIII   []IX   []X   []XI   []XII     [] perennial   Day time      [] morning   [] noon      [] evening        [] night    [] whole day........  [x]  none  Location/changes    [] inside        [] outside   [] mountains    [] sea     [] others.............   [x]  none  Triggers, specific     [] animals     [] plants     [] dust              [] others ...........................    [x]  none  Triggers, others       [] work          [] psyche    [] sport            [] others .............................  [x]  none  Irritant                [] phys efforts [] smoke    [x] heat/cold     [] odors  []others............... []  none    Order for PATCH TESTS  Reason for tests (suspected allergy): possible allergic contact dermatitis  Known previous allergies: possible clobetasol or components, shellac, trolamine, urea per prior outside patch testing  Standardized panels  [x] Standard panel (40 tests)  [x] Preservatives & Antimicrobials (31 tests)  [x] Emulsifiers &  Additives (25 tests)   [x] Perfumes/Flavours & Plants (25 tests)  [] Hairdresser panel (12 tests)  [] Rubber Chemicals (22 tests)  [] Plastics (26 tests)  [] Colorants/Dyes/Food additives (20 tests)  [] Metals (implants/dental) (24 tests)  [] Local anaesthetics/NSAIDs (13 tests)  [x] Antibiotics & Antimycotics (14 tests)   [x] Corticosteroids (15 tests)   [] Photopatch test (62 tests)   [] others: ...      [] Patient's own products: ...    DO NOT test if chemical or biological identity is unknown!     always ask from patient the product information and safety sheets (MSDS)       Order for PRICK TESTS    Reason for tests (suspected allergy): generalized eczema on exam  Known previous allergies: none    Standardized prick panels  [x] Atopic panel (20 tests)  [] Pediatric Panel (12 tests)  [] Milk, Meat, Eggs, Grains (20 tests)   [] Dust, Epithelia, Feathers (10 tests)  [] Fish, Seafood, Shellfish (17 tests)  [] Nuts, Beans (14 tests)  [] Spice, Vegetable, Fruit (17 tests)  [] Pollen Panel = Tree, Grass, Weed (24 tests)  [] Others: ...      [] Patient's own products: ...    DO NOT test if chemical or biological identity is unknown!     always ask from patient the product information and safety sheets (MSDS)     Standardized intradermal tests  [x] Penicillium notatum [x] Aspergillus fumigatus [x] House dust mites D.farinae only!!!  [] Cat    [] dog  [] Others: ...  [] Bee venom   [] Wasp venom  !!Specific protocol with dilutions!!       Order for Drug allergy tests (prick & Intradermal & patch tests)    [] Penicillin G  [] Ampicillin [] Cefazolin   [] Ceftriaxone   [] Ceftazidime  [] Bactrim    [] Others: ...  Order for ... as test date    ________________________________    RESULTS & EVALUATION of PATCH TESTS    Patch test readings after     [x] 2 days, [x] 3 days [] 4 days, [] 5 days,    Aug 15, 2022 application of patch tests with results:    STANDARD Series                                          # Substance 2 days  4 days remarks     1 Joaquín Mix [C] - -       2 Colophony - -       3  2-Mercaptobenzothiazole  - -       4 Methylisothiazolinone - -       5 Carba Mix - -       6 Thiuram Mix [A] - -       7 Bisphenol A Epoxy Resin - -       8 C-Nlgj-Zbvpcwnalyv-Formaldehyde Resin - -       9 Mercapto Mix [A] - -       10 Black Rubber Mix- PPD [B] - -       11 Potassium Dichromate  -  -       12 Balsam of [place] (Myroxylon Pereirae Resin) - -       13 Nickel Sulphate Hexahydrate - -       14 Mixed Dialkyl Thiourea - -       15 Paraben Mix [B] - -       16 Methyldibromo Glutaronitrile - -       17 Fragrance Mix - -       18 2-Bromo-2-Nitropropane-1,3-Diol (Bronopol) CT - -       19 Lyral - -       20 Tixocortol-21- Pivalate CT - -       21 Diazolidiyl Urea (Germall II) - -       22 Methyl Methacrylate - -       23 Cobalt (II) Chloride Hexahydrate - -       24 Fragrance Mix II  - -       25 Compositae Mix - -       26 Benzoyl Peroxide - -       27 Bacitracin - -       28 Formaldehyde - -       29 Methylchloroisothiazolinone / Methylisothiazolinone - -       30 Corticosteroid Mix CT NA NA       31 Sodium Lauryl Sulfate - -       32 Lanolin Alcohol - -       33 Turpentine - -       34 Cetylstearylalcohol - -       35 Chlorhexidine Dicluconate - -       36 Budenoside - -       37 Imidazolidinyl Urea  - -       38 Ethyl-2 Cyanoacrylate NA NA       39 Quaternium 15 (Dowicil 200) - -       40 Decyl Glucoside - -     PRESERVATIVES & ANTIMICROBIALS        # Substance 2 days 4 days remarks   41 1  1,2-Benzisothiazoline-3-One, Sodium Salt - -     2  1,3,5-Guillermo (2-Hydroxyethyl) - Hexahydrotriazine (Grotan BK) - -     3 4-Rewcmecyjqwuf-2-Nitro-1, 3-Propanediol NA NA     4  3, 4, 4' - Triclocarban - -    45 5 4 - Chloro - 3 - Cresol - -     6 4 - Chloro - 4 - Xylenol (PCMX) - -     7 7-Ethylbicyclooxazolidine (Bioban NH5703) - -     8 Benzalkonium Chloride CT - -     9 Benzyl Alcohol - -    50 10 Cetalkonium Chloride - -     11 Cetylpyrimidine  Chloride  - -     12 Chloroacetamide - -     13 DMDM Hydantoin - -     14 Glutaraldehyde - -    55 15 Triclosan - -     16 Glyoxal Trimeric Dihydrate - -     17 Iodopropynyl Butylcarbamate - -     18 Octylisothiazoline - -     19 Bithionol CT - -    60 20 Bioban P 1487 (Nitrobutyl) Morpholine/(Ethylnitro-Trimethylene) Dimorpholine - -     21 Phenoxyethanol - -     22 Phenyl Salicylate - -     23 Povidone Iodine - -     24 Sodium Benzoate - -    65 25 Sodium Disulfite - -     26 Sorbic Acid - -     27 Thimerosal       28 Melamine Formaldehyde Resin       29 Ethylenediamine Dihydrochloride        Parabens      70 30 Butyl-P-Hydroxybenzoate - -     31 Ethyl-P-Hydroxybenzoate - -     32 Methyl-P-Hydroxybenzoate - -    73 33 Propyl-P-Hydroxybenzoate - -    EMULSIFIERS & ADDITIVES       # Substance 2 days 4 days remarks   74 1 Polyethylene Glycol-400 - -    75 2 Cocamidopropyl Betaine - -     3 Amerchol L101 - -     4 Propylene Glycol NA NA     5 Triethanolamine - -     6 Sorbitane Sesquiolate CT - -    80 7 Isopropylmyristate - -     8 Polysorbate 80 CT - -     9 Amidoamine   (Stearamidopropyl Dimethylamine) NA NA     10 Oleamidopropyl Dimethylamine - -     11 Lauryl Glucoside NA NA    85 12 Coconut Diethanolamide  - -     13 2-Hydroxy-4-Methoxy Benzophenone (Oxybenzone) - -     14 Benzophenone-4 (Sulisobenzon) NA NA     15 Propolis - -     16 Dexpanthenol - -    90 17 Carboxymethyl Cellulose Sodium NA NA     18 Abitol - -     19 Tert-Butylhydroquinone - -     20 Benzyl Salicylate - -     21 Dimethylaminopropylamin (DMPA) CT? D053      95 22 Zinc Pyrithione (Zinc Omadine) CT? Z006       23 Guillermo(Hydroxymethyl) Nitromethane CT        Antioxidant - -     24 Dodecyl Gallate - -     25 Butylhydroxyanisole (BHA) - -     26 Butylhydroxytoluene (BHT) NA NA    100 27 Di-Alpha-Tocopherol (Vit E) - -    101 28 Propyl Gallate - -    PERFUMES, FLAVORS & PLANTS        # Substance 2 days 4 days remarks   102 1 Benzyl Cinnamate - -      2 Di-Limonene (Dipentene) - -     3 Cananga Odorata (Abdoulaye Stanford) (I) - -    105 4 Lichen Acid Mix - -     5 Mentha Piperita Oil (Peppermint Oil) - -     6 Sesquiterpenelactone mix - -     7 Tea Tree Oil, Oxidized - -     8 Wood Tar Mix - -    110 9 Abietic Acid - -     10 Lavendula Angustifolia Oil (Lavender Oil) - -     11 Fragrance mix II CT * - -      Fragrance Mix I       12 [place] Absolute - -     13 Eugenol - -    115 14 Geraniol - -     15 Hydroxycitronellal - -     16 Isoeugenol - -     17 Cinnamic Aldehyde - -     18 Cinnamic Alcohol  NA NA      Fragrance mix II      120 19 Citronellol NA NA     20 Alpha-Hexylcinnamic Aldehyde    - -     21 Citral - -     22 Farnesol - -    124 23 Coumarin - -      Hexylcinnamic aldehyde, Coumarin, Farnesol, Hydroxyisohexy3-cyclohexene carboxaldehyde, citral, citrolellol  ANTIBIOTICS & ANTIMYCOTICS    # Substance 2 days 4 days remarks   125 1 Erythromycine - -     2 Framycetine Sulphate - -     3 Fusidic Acid Sodium Salt - -     4 Gentamicin Sulphate - -     5 Neomycine Sulphate - -    130 6 Oxytetracycline  - -     7 Polymyxin B Sulphate - -     8 Tetracycline-HCL - -     9 Sulfanilamide - -     10 Metronidazole - -    135 11 Oxyquinoline Mix - -     12 Nitrofurazone - -     13 Nystatin - -    138 14 Clotrimazole - -      CORTICOSTEROIDS   # Substance 2 days 4 days remarks Allergy  class   139 1 Amcinonide - -  B   140 2 Betametasone-17,21 Dipropionate - -  D1    3 Desoximetasone - -  C    4 Betamethasone-17-Valerate - -  D1    5 Dexamethasone - -  C    6 Hydrocortisone - -  A   145 7 Clobetasol-17-Propionate - -  D1    8 Dexamethasone-21-Phosphate Disodium Salt - -  C    9 Hydrocortisone-17 Butyrate - -  D2    10 Prednisolone - -  A    11 Triamcinolone Acetonide - -  B   150 12 Methylprednisolone Aceponate - -  D2    13 Hydrocortisone-21-Acetate - -  A   152 14 Prednicarbate - -  D2     Group Characteristics of group Generic name Name  cross reactions   A  Hydrocortisone   Cloprednole, Fludrocortisone acétate, Hydrocortison acetate, Methylprednisolone, Prednisolone, Tixocortolpivalate Alfacortone, Fucidin H, Dermacalm, Hexacortone, Premandole, Imacort With group D2   B Triamcinolone-acetonide   Budenoside (R-isomer), Amcinonide, Desonide, Fluocinolone acetonide, Triamcinolone acetonide Locapred, Locatop  Synalar, Pevisone, [place] -   C Betamethasone (Without Mounika)   Betamethasone, Dexamethasone, Flumethasone pivalate, Halomethasone Daivobet, Dexasalyl, Locasalen,   -   D1 Betamethasone-diproprionate   Betamethasone dipropionate, Betamethasone-17-valerate, Clobetasole-propionate, Fluticasone propionate, Mometasone furoate Betnovate, Diprogenta, Diprosalic, Diprosone, Celestoderm, Fucicort,  Cutivate, Axotide, Elocom -   D2 Methylprednisolone-aceponate   Hydrocortisone-aceponate, Hydrocortisone-buteprate, Hydrocortisone-17-butyrate, Methylprednisolone aceponate, Prednicarbate Locoïd, Advantan,  Prednitop With group A and Budesonide (S-isomer)     Results of patch tests:                         Interpretation:  - Negative                    A    = Allergic      (+) Erythema    TI   = Toxic/irritant   + E + Infiltration    RaP = Relevance at Present     ++ E/I + Papulovesicle   Rpr  = Relevance Previously     +++ E/I/P + Blister     nR   = No Relevance    Atopy Screen (Placed Jul 26, 2022)  No Substance Readings (15 min) Evaluation   POS Histamine 1mg/ml ++    NEG NaCl 0.9% -      No Substance Readings (15 min) Evaluation   1 Alternaria alternata (tenuis)  -    2 Cladosporium herbarum -    3 Aspergillus fumigatus -    4 Penicillium notatum -    5 Dermatophagoides pteronyssinus ++    6 Dermatophagoides farinae -    7 Dog epithelium (canis spp) -    8 Cat hair (crescencio catus) -    9 Cockroach   (Blatella americana & germanica) -    10 Grass mix midwest   (Katerin, Orchard, Redtop, Aurelio) -    11 Josef grass (sorghum halepense) -    12 Weed mix   (common Cocklebur, Tee s  quarters, rough redroot Pigweed, Dock/Sorrel) -    13 Mug wort (artemisia vulgare) -    14 Ragweed giant/short (ambrosia spp) -    15 White birch (Betula papyrifera) -    16 Tree mix 1 (Pecan, Maple BHR, Oak RVW, american Rentz, black [place]) -    17 Red cedar (juniperus virginia) -    18 Tree mix 2   (white [place], river/red Birch, black [place], common [place], american Elm) -    19 Box elder/Maple mix (acer spp) -    20 [place] shagbark (carya ovata) -           Conclusion: immediate type reaction to house dust mite D. pteronyssinus      Intradermal Testing (Placed Aug 15, 2022 )    No Substance Conc.  Reading (15min)  immediate Papule [mm] / Erythema [mm] Reading   (... days)  delayed Papule [mm] / Erythema [mm] Remarks   - NaCl  0.9% +   -    + Histamine (prick) 0.1mg/ml -   -    DF Standard Dust Mite - D. Farinae 1:10 ++ 7/15  -    A Aspergillus fumigatus  1:10 -   -    P Penicillium notatum 1:10 -   -    Conclusions: patient has immediate type sensitization to dust mites. No delayed reactions after 2 and 3? days    [x] No relevant allergic reaction observed   Patient had an angry back with erythema all over, but it calmed down over time. However, no field in the moment infiltrated    [] Allergic reaction diagnosed against following allergens:      Interpretation/ remarks:   See later    [] Patient information given   [] ACDS CAMP information's (# ....) to following compounds: .....   [] General information's to following compounds: ......      Assessment & Plan:    ==> Final Diagnosis:     # Hidradenitis suppurativa   Plan to consider Humira, upcoming appointment with Dr. Patel in August 2022  * chronic illness with exacerbation, progression, side effects from treatment    # atopic predisposition with:    In prick test positive to house dust mite D. Pteronyssinus (but clinically not very relevant)    Dry, hypersensitive skin and atopic dermatitis?  * chronic illness with severe exacerbation, progression,  side effects from treatment    # underlying contact allergy with recurrent dermatitis (maybe as well cause for the cellulitis and itching on left lower leg)  * chronic illness with severe exacerbation, progression, side effects from treatment    # Cellulitis on left lower leg last 10 days    On antibiotics for 7 days    Put on open areas daily Gentian violet solution and elevate the left leg or put on compression stocking or short stretch bandages   * chronic illness with severe exacerbation, progression, side effects from treatment    These conclusions are made at the best of one's knowledge and belief based on the provided evidence such as patient's history and allergy test results and they can change over time or can be incomplete because of missing information's.    ==> Treatment Plan:  -Dupixent would likely be helpful given the patient's atopic dermatitis ==> maybe start tomorrow  -Humira would likely improve hidradenitis suppurutiva     ___________________________    Staff: : provider    Follow-up in Derm-Allergy clinic for 2nd readings and final conclusions after 3 days (in person)   ___________________________    I spent a total of 18 minutes with Angel Etienne during today s  visit. This time was spent discussing all the individual test results, correlating them to the clinical relevance, counseling the patient and/or coordinating care

## 2022-08-17 ENCOUNTER — OFFICE VISIT (OUTPATIENT)
Dept: ALLERGY | Facility: CLINIC | Age: 57
End: 2022-08-17
Payer: COMMERCIAL

## 2022-08-17 DIAGNOSIS — L23.89 ALLERGIC CONTACT DERMATITIS DUE TO OTHER AGENTS: Primary | ICD-10-CM

## 2022-08-17 DIAGNOSIS — L73.2 HIDRADENITIS SUPPURATIVA: ICD-10-CM

## 2022-08-17 DIAGNOSIS — Z91.09 ALLERGY TO AMERICAN HOUSE DUST MITE: ICD-10-CM

## 2022-08-17 DIAGNOSIS — L20.84 INTRINSIC ATOPIC DERMATITIS: ICD-10-CM

## 2022-08-17 PROCEDURE — 99214 OFFICE O/P EST MOD 30 MIN: CPT | Performed by: DERMATOLOGY

## 2022-08-17 NOTE — LETTER
8/17/2022         RE: Angel Etienne  [address]  [facility] [address]        Dear Colleague,    Thank you for referring your patient, Angel Etienne, to the [facility] [place]. Please see a copy of my visit note below.    [facility] Dermato-allergology Note  Office visit  Encounter Date: Aug 17, 2022  ____________________________________________    CC: No chief complaint on file.      HPI:  (Aug 17, 2022)  Mr. Angel Etienne is a(n) 56 year old male who presents today as a return patient for allergy consultation  -Follow-up in Derm-Allergy clinic for 1st readings of patch tests after 2 days   - otherwise feeling well in usual state of health    Physical exam:  General: In no acute distress, well-developed, well-nourished  Eyes: no conjunctivitis  ENT: no signs of rhinitis   Pulmonary: no wheezing or coughing  Skin:Focused examination of the skin on test sites was performed = see test results below    Earlier History and Allergy exams:  (Aug 15, 2022)  - Follow-up in Derm-Allergy clinic for scheduled patch testing in a few weeks.  --> left lower leg still swollen with some erythema and erosions and some pruritus    Earlier History and Allergy exams:  (Jul 26, 2022)  - 15 years of hidradenitis suppurativa: takes doxycycline, topical clindamycin, benzoyl peroxide. Prescribed humira but hasn't taken these yet. Has upcoming appointment with Dr. Patel for HS.   - 15 years of diffuse dermatitis. Per Dr. Tang of dermatology, consistent with atopic dermatitis. Prescribed dupixent, but hasn't started due to concern of side effects  - no triggers other than possibly heat identified. But he also gets it in the winter; just doesn't flare up as much in the winter  - chest, abdomen, flanks, back, arms affected. Typically occurs on areas that are covered by clothes.   - flares are intermittent and unpredictable.   - symptoms include itching and dryness  - no  known history of eczema or asthma  - ?allergy to corn as a child  - has been using the BPO once to twice weekly  - history of venous stasis s/p multiple venous stasis surgeries   - local dermatologist (Dr Parada) performed patch testing, results with several positive reactions including urea and clobetasol during summer of 2021   - no problems with adhesives    Focused examination of the back, abdomen, chest and legs was performed.  -generalized xerosis  -multiple poorly defined pink to red papules and plaques with excoriation on the back, abdomen, chest, bilateral legs  -pitted scars of the left axilla    Past Medical History:   There is no problem list on file for this patient.    No past medical history on file.    Allergies:  Allergies   Allergen Reactions     Clobetasol Other (See Comments)     Allergy testing done - Clobetasol-77-Propionate     Shellac Other (See Comments)     Allergy testing done     Trolamine Other (See Comments)     Allergy testing done     Urea Other (See Comments)     Allergy testing done       Medications:  Current Outpatient Medications   Medication     atorvastatin (LIPITOR) 40 MG tablet     benzoyl peroxide 5 % external liquid     buPROPion (WELLBUTRIN XL) 150 MG 24 hr tablet     clindamycin (CLEOCIN T) 1 % external lotion     clindamycin (CLEOCIN T) 1 % external solution     Compression Bandages KIT     doxycycline monohydrate (ADOXA) 100 MG tablet     Dupilumab 300 MG/2ML SOPN     Dupilumab 300 MG/2ML SOPN     emollient (VANICREAM) external cream     fenofibrate (TRIGLIDE/LOFIBRA) 160 MG tablet     fluocinonide (LIDEX) 0.05 % external cream     gentian violet 1 % external solution     losartan (COZAAR) 25 MG tablet     meloxicam (MOBIC) 15 MG tablet     triamcinolone (KENALOG) 0.1 % external cream     No current facility-administered medications for this visit.       Social History:  The patient works as an independent . Patient has the following hobbies or  non-occupational exposure: boating, snowmobiling, riding motorcycle.     Family History:  No family history on file.    Previous Labs, Allergy Tests, Dermatopathology, Imaging:  Patch testing as above    Referred By: Antony Arizmendi [facility]  [address] [place] [address]     Allergy Tests:    Past Allergy Test    Order for Future Allergy Testing:    [x] Outpatient  [] Inpatient: Wilder..../ Bed ....       Skin Atopy (atopic dermatitis) [x] Yes   [] No .........  Contact allergies:   [x] Yes   [] No ..........  Hand eczema:   [x] Yes   [] No           Leading hand:   [] R   [] L       [] Ambidextrous         Drug allergies:        [x] Yes   [] No  Which?......possibly clobetasol?    Urticaria/Angioedema  [] Yes   [x] No .........  Food Allergy:  [] Yes   [x] No  which?......  Pets :  [] Yes   [x] No  which?......         []  Rhinitis   [] Conjunctivitis   [] Sinusitis   [] Polyposis   [] Otitis   [] Pharyngitis         []  Postnasal drip    [x]  none  Operations:   [] Tonsils   [] Septum   [] Sinus   [] Polyposis        [] Asthma bronchiale   [] Coughing      [x]  none  Symptoms (mostly Rhinoconjunctivitis and Asthma) aggravated by:  Season   [] I   [] II   [] III   [] IV   []V   []VI   []VII   []VIII   []IX   []X   []XI   []XII     [] perennial   Day time      [] morning   [] noon      [] evening        [] night    [] whole day........  [x]  none  Location/changes    [] inside        [] outside   [] mountains    [] sea     [] others.............   [x]  none  Triggers, specific     [] animals     [] plants     [] dust              [] others ...........................    [x]  none  Triggers, others       [] work          [] psyche    [] sport            [] others .............................  [x]  none  Irritant                [] phys efforts [] smoke    [x] heat/cold     [] odors  []others............... []  none    Order for PATCH TESTS  Reason for tests (suspected allergy):  possible allergic contact dermatitis  Known previous allergies: possible clobetasol or components, shellac, trolamine, urea per prior outside patch testing  Standardized panels  [x] Standard panel (40 tests)  [x] Preservatives & Antimicrobials (31 tests)  [x] Emulsifiers & Additives (25 tests)   [x] Perfumes/Flavours & Plants (25 tests)  [] Hairdresser panel (12 tests)  [] Rubber Chemicals (22 tests)  [] Plastics (26 tests)  [] Colorants/Dyes/Food additives (20 tests)  [] Metals (implants/dental) (24 tests)  [] Local anaesthetics/NSAIDs (13 tests)  [x] Antibiotics & Antimycotics (14 tests)   [x] Corticosteroids (15 tests)   [] Photopatch test (62 tests)   [] others: ...      [] Patient's own products: ...    DO NOT test if chemical or biological identity is unknown!     always ask from patient the product information and safety sheets (MSDS)       Order for PRICK TESTS    Reason for tests (suspected allergy): generalized eczema on exam  Known previous allergies: none    Standardized prick panels  [x] Atopic panel (20 tests)  [] Pediatric Panel (12 tests)  [] Milk, Meat, Eggs, Grains (20 tests)   [] Dust, Epithelia, Feathers (10 tests)  [] Fish, Seafood, Shellfish (17 tests)  [] Nuts, Beans (14 tests)  [] Spice, Vegetable, Fruit (17 tests)  [] Pollen Panel = Tree, Grass, Weed (24 tests)  [] Others: ...      [] Patient's own products: ...    DO NOT test if chemical or biological identity is unknown!     always ask from patient the product information and safety sheets (MSDS)     Standardized intradermal tests  [x] Penicillium notatum [x] Aspergillus fumigatus [x] House dust mites D.farinae only!!!  [] Cat    [] dog  [] Others: ...  [] Bee venom   [] Wasp venom  !!Specific protocol with dilutions!!       Order for Drug allergy tests (prick & Intradermal & patch tests)    [] Penicillin G  [] Ampicillin [] Cefazolin   [] Ceftriaxone   [] Ceftazidime  [] Bactrim    [] Others: ...  Order for ... as test  date    ________________________________    RESULTS & EVALUATION of PATCH TESTS    Patch test readings after     [x] 2 days, [x] 3 days [] 4 days, [] 5 days,    Aug 15, 2022 application of patch tests with results:    STANDARD Series                                          # Substance 2 days 4 days remarks     1 Joaquín Mix [C] - -       2 Colophony - -       3  2-Mercaptobenzothiazole  - -       4 Methylisothiazolinone - -       5 Carba Mix - -       6 Thiuram Mix [A] - -       7 Bisphenol A Epoxy Resin - -       8 H-Bwov-Jagxrmqhqhp-Formaldehyde Resin - -       9 Mercapto Mix [A] - -       10 Black Rubber Mix- PPD [B] - -       11 Potassium Dichromate  -  -       12 Balsam of [place] (Myroxylon Pereirae Resin) - -       13 Nickel Sulphate Hexahydrate - -       14 Mixed Dialkyl Thiourea - -       15 Paraben Mix [B] - -       16 Methyldibromo Glutaronitrile - -       17 Fragrance Mix - -       18 2-Bromo-2-Nitropropane-1,3-Diol (Bronopol) CT - -       19 Lyral - -       20 Tixocortol-21- Pivalate CT - -       21 Diazolidiyl Urea (Germall II) - -       22 Methyl Methacrylate - -       23 Cobalt (II) Chloride Hexahydrate - -       24 Fragrance Mix II  - -       25 Compositae Mix - -       26 Benzoyl Peroxide - -       27 Bacitracin - -       28 Formaldehyde - -       29 Methylchloroisothiazolinone / Methylisothiazolinone - -       30 Corticosteroid Mix CT NA NA       31 Sodium Lauryl Sulfate - -       32 Lanolin Alcohol - -       33 Turpentine - -       34 Cetylstearylalcohol - -       35 Chlorhexidine Dicluconate - -       36 Budenoside - -       37 Imidazolidinyl Urea  - -       38 Ethyl-2 Cyanoacrylate NA NA       39 Quaternium 15 (Dowicil 200) - -       40 Decyl Glucoside - -     PRESERVATIVES & ANTIMICROBIALS        # Substance 2 days 4 days remarks   41 1  1,2-Benzisothiazoline-3-One, Sodium Salt - -     2  1,3,5-Guillermo (2-Hydroxyethyl) - Hexahydrotriazine (Grotan BK) - -     3 2-Fokakwsxubgdo-3-Nitro-1, 3-Propanediol  NA NA     4  3, 4, 4' - Triclocarban - -    45 5 4 - Chloro - 3 - Cresol - -     6 4 - Chloro - 4 - Xylenol (PCMX) - -     7 7-Ethylbicyclooxazolidine (Bioban RM5145) - -     8 Benzalkonium Chloride CT - -     9 Benzyl Alcohol - -    50 10 Cetalkonium Chloride - -     11 Cetylpyrimidine Chloride  - -     12 Chloroacetamide - -     13 DMDM Hydantoin - -     14 Glutaraldehyde - -    55 15 Triclosan - -     16 Glyoxal Trimeric Dihydrate - -     17 Iodopropynyl Butylcarbamate - -     18 Octylisothiazoline - -     19 Bithionol CT - -    60 20 Bioban P 1487 (Nitrobutyl) Morpholine/(Ethylnitro-Trimethylene) Dimorpholine - -     21 Phenoxyethanol - -     22 Phenyl Salicylate - -     23 Povidone Iodine - -     24 Sodium Benzoate - -    65 25 Sodium Disulfite - -     26 Sorbic Acid - -     27 Thimerosal       28 Melamine Formaldehyde Resin       29 Ethylenediamine Dihydrochloride        Parabens      70 30 Butyl-P-Hydroxybenzoate - -     31 Ethyl-P-Hydroxybenzoate - -     32 Methyl-P-Hydroxybenzoate - -    73 33 Propyl-P-Hydroxybenzoate - -    EMULSIFIERS & ADDITIVES       # Substance 2 days 4 days remarks   74 1 Polyethylene Glycol-400 - -    75 2 Cocamidopropyl Betaine - -     3 Amerchol L101 - -     4 Propylene Glycol NA NA     5 Triethanolamine - -     6 Sorbitane Sesquiolate CT - -    80 7 Isopropylmyristate - -     8 Polysorbate 80 CT - -     9 Amidoamine   (Stearamidopropyl Dimethylamine) NA NA     10 Oleamidopropyl Dimethylamine - -     11 Lauryl Glucoside NA NA    85 12 Coconut Diethanolamide  - -     13 2-Hydroxy-4-Methoxy Benzophenone (Oxybenzone) - -     14 Benzophenone-4 (Sulisobenzon) NA NA     15 Propolis - -     16 Dexpanthenol - -    90 17 Carboxymethyl Cellulose Sodium NA NA     18 Abitol - -     19 Tert-Butylhydroquinone - -     20 Benzyl Salicylate - -     21 Dimethylaminopropylamin (DMPA) CT? D053      95 22 Zinc Pyrithione (Zinc Omadine) CT? Z006       23 Guillermo(Hydroxymethyl) Nitromethane CT         Antioxidant - -     24 Dodecyl Gallate - -     25 Butylhydroxyanisole (BHA) - -     26 Butylhydroxytoluene (BHT) NA NA    100 27 Di-Alpha-Tocopherol (Vit E) - -    101 28 Propyl Gallate - -    PERFUMES, FLAVORS & PLANTS        # Substance 2 days 4 days remarks   102 1 Benzyl Cinnamate - -     2 Di-Limonene (Dipentene) - -     3 Cananga Odorata (Abdoulaye Stanford) (I) - -    105 4 Lichen Acid Mix - -     5 Mentha Piperita Oil (Peppermint Oil) - -     6 Sesquiterpenelactone mix - -     7 Tea Tree Oil, Oxidized - -     8 Wood Tar Mix - -    110 9 Abietic Acid - -     10 Lavendula Angustifolia Oil (Lavender Oil) - -     11 Fragrance mix II CT * - -      Fragrance Mix I       12 Oakmoss Absolute - -     13 Eugenol - -    115 14 Geraniol - -     15 Hydroxycitronellal - -     16 Isoeugenol - -     17 Cinnamic Aldehyde - -     18 Cinnamic Alcohol  NA NA      Fragrance mix II      120 19 Citronellol NA NA     20 Alpha-Hexylcinnamic Aldehyde    - -     21 Citral - -     22 Farnesol - -    124 23 Coumarin - -      Hexylcinnamic aldehyde, Coumarin, Farnesol, Hydroxyisohexy3-cyclohexene carboxaldehyde, citral, citrolellol  ANTIBIOTICS & ANTIMYCOTICS    # Substance 2 days 4 days remarks   125 1 Erythromycine - -     2 Framycetine Sulphate - -     3 Fusidic Acid Sodium Salt - -     4 Gentamicin Sulphate - -     5 Neomycine Sulphate - -    130 6 Oxytetracycline  - -     7 Polymyxin B Sulphate - -     8 Tetracycline-HCL - -     9 Sulfanilamide - -     10 Metronidazole - -    135 11 Oxyquinoline Mix - -     12 Nitrofurazone - -     13 Nystatin - -    138 14 Clotrimazole - -      CORTICOSTEROIDS   # Substance 2 days 4 days remarks Allergy  class   139 1 Amcinonide - -  B   140 2 Betametasone-17,21 Dipropionate - -  D1    3 Desoximetasone - -  C    4 Betamethasone-17-Valerate - -  D1    5 Dexamethasone - -  C    6 Hydrocortisone - -  A   145 7 Clobetasol-17-Propionate - -  D1    8 Dexamethasone-21-Phosphate Disodium Salt - -  C    9  Hydrocortisone-17 Butyrate - -  D2    10 Prednisolone - -  A    11 Triamcinolone Acetonide - -  B   150 12 Methylprednisolone Aceponate - -  D2    13 Hydrocortisone-21-Acetate - -  A   152 14 Prednicarbate - -  D2     Group Characteristics of group Generic name Name  cross reactions   A Hydrocortisone   Cloprednole, Fludrocortisone acétate, Hydrocortison acetate, Methylprednisolone, Prednisolone, Tixocortolpivalate Alfacortone, Fucidin H, Dermacalm, Hexacortone, Premandole, Imacort With group D2   B Triamcinolone-acetonide   Budenoside (R-isomer), Amcinonide, Desonide, Fluocinolone acetonide, Triamcinolone acetonide Locapred, Locatop  Synalar, Pevisone, Kenacort -   C Betamethasone (Without Mounika)   Betamethasone, Dexamethasone, Flumethasone pivalate, Halomethasone Daivobet, Dexasalyl, Locasalen,   -   D1 Betamethasone-diproprionate   Betamethasone dipropionate, Betamethasone-17-valerate, Clobetasole-propionate, Fluticasone propionate, Mometasone furoate Betnovate, Diprogenta, Diprosalic, Diprosone, Celestoderm, Fucicort,  Cutivate, Axotide, Elocom -   D2 Methylprednisolone-aceponate   Hydrocortisone-aceponate, Hydrocortisone-buteprate, Hydrocortisone-17-butyrate, Methylprednisolone aceponate, Prednicarbate Locoïd, Advantan,  Prednitop With group A and Budesonide (S-isomer)     Results of patch tests:                         Interpretation:  - Negative                    A    = Allergic      (+) Erythema    TI   = Toxic/irritant   + E + Infiltration    RaP = Relevance at Present     ++ E/I + Papulovesicle   Rpr  = Relevance Previously     +++ E/I/P + Blister     nR   = No Relevance    Atopy Screen (Placed Jul 26, 2022)  No Substance Readings (15 min) Evaluation   POS Histamine 1mg/ml ++    NEG NaCl 0.9% -      No Substance Readings (15 min) Evaluation   1 Alternaria alternata (tenuis)  -    2 Cladosporium herbarum -    3 Aspergillus fumigatus -    4 Penicillium notatum -    5 Dermatophagoides pteronyssinus ++    6  Dermatophagoides farinae -    7 Dog epithelium (canis spp) -    8 Cat hair (crescencio catus) -    9 Cockroach   (Blatella americana & germanica) -    10 Grass mix midwest   (Katerin, Orchard, Redtop, Aurelio) -    11 Josef grass (sorghum halepense) -    12 Weed mix   (common Cocklebur, Lamb s quarters, rough redroot Pigweed, Dock/Sorrel) -    13 Mug wort (artemisia vulgare) -    14 Ragweed giant/short (ambrosia spp) -    15 White birch (Betula papyrifera) -    16 Tree mix 1 (Pecan, Maple BHR, Oak RVW, american Calumet, black Chuckey) -    17 Red cedar (juniperus virginia) -    18 Tree mix 2   (white Jordan, river/red Birch, black Orlando, common Macoupin, american Elm) -    19 Box elder/Maple mix (acer spp) -    20 Kauai shagbark (carya ovata) -           Conclusion: immediate type reaction to house dust mite D. pteronyssinus      Intradermal Testing (Placed Aug 15, 2022 )    No Substance Conc.  Reading (15min)  immediate Papule [mm] / Erythema [mm] Reading   (... days)  delayed Papule [mm] / Erythema [mm] Remarks   - NaCl  0.9% +   -    + Histamine (prick) 0.1mg/ml -   -    DF Standard Dust Mite - D. Farinae 1:10 ++ 7/15  -    A Aspergillus fumigatus  1:10 -   -    P Penicillium notatum 1:10 -   -    Conclusions: patient has immediate type sensitization to dust mites. No delayed reactions after 2 and 3? days    [x] No relevant allergic reaction observed   Patient had an angry back with erythema all over, but it calmed down over time. However, no field in the moment infiltrated    [] Allergic reaction diagnosed against following allergens:      Interpretation/ remarks:   See later    [] Patient information given   [] ACDS CAMP information's (# ....) to following compounds: .....   [] General information's to following compounds: ......      Assessment & Plan:    ==> Final Diagnosis:     # Hidradenitis suppurativa   Plan to consider Humira, upcoming appointment with Dr. Patel in August 2022  * chronic illness with  exacerbation, progression, side effects from treatment    # atopic predisposition with:    In prick test positive to house dust mite D. Pteronyssinus (but clinically not very relevant)    Dry, hypersensitive skin and atopic dermatitis?  * chronic illness with severe exacerbation, progression, side effects from treatment    # underlying contact allergy with recurrent dermatitis (maybe as well cause for the cellulitis and itching on left lower leg)  * chronic illness with severe exacerbation, progression, side effects from treatment    # Cellulitis on left lower leg last 10 days    On antibiotics for 7 days    Put on open areas daily Gentian violet solution and elevate the left leg or put on compression stocking or short stretch bandages   * chronic illness with severe exacerbation, progression, side effects from treatment    These conclusions are made at the best of one's knowledge and belief based on the provided evidence such as patient's history and allergy test results and they can change over time or can be incomplete because of missing information's.    ==> Treatment Plan:  -Dupixent would likely be helpful given the patient's atopic dermatitis ==> maybe start tomorrow  -Humira would likely improve hidradenitis suppurutiva     ___________________________    Staff: : provider    Follow-up in Derm-Allergy clinic for 2nd readings and final conclusions after 3 days (in person)   ___________________________    I spent a total of 18 minutes with Angel Etienne during today s  visit. This time was spent discussing all the individual test results, correlating them to the clinical relevance, counseling the patient and/or coordinating care         Again, thank you for allowing me to participate in the care of your patient.        Sincerely,        Denny Novoa MD

## 2022-08-17 NOTE — NURSING NOTE
Dermatology Rooming Note    Angel Etienne's goals for this visit include:   Chief Complaint   Patient presents with     Allergy Recheck     Patch testing day 3     Elizabeth Wilkinson CMA

## 2022-08-17 NOTE — PROGRESS NOTES
[facility] Dermato-allergology Note  Office visit  Encounter Date: Aug 18, 2022  ____________________________________________    CC: No chief complaint on file.      HPI:  (Aug 18, 2022)  Mr. Angel Etienne is a(n) 56 year old male who presents today as a return patient for allergy consultation  - Follow-up in Derm-Allergy clinic for 2nd readings and final conclusions after 3 days (in person)   - otherwise feeling well in usual state of health    Physical exam:  General: In no acute distress, well-developed, well-nourished  Eyes: no conjunctivitis  ENT: no signs of rhinitis   Pulmonary: no wheezing or coughing  Skin:Focused examination of the skin on test sites was performed = see test results below    Earlier History and Allergy exams:  (Aug 17, 2022)  -Follow-up in Derm-Allergy clinic for 1st readings of patch tests after 2 days     Earlier History and Allergy exams:  (Aug 15, 2022)  - Follow-up in Derm-Allergy clinic for scheduled patch testing in a few weeks.  --> left lower leg still swollen with some erythema and erosions and some pruritus    Earlier History and Allergy exams:  (Jul 26, 2022)  - 15 years of hidradenitis suppurativa: takes doxycycline, topical clindamycin, benzoyl peroxide. Prescribed humira but hasn't taken these yet. Has upcoming appointment with Dr. Patel for HS.   - 15 years of diffuse dermatitis. Per Dr. Tang of dermatology, consistent with atopic dermatitis. Prescribed dupixent, but hasn't started due to concern of side effects  - no triggers other than possibly heat identified. But he also gets it in the winter; just doesn't flare up as much in the winter  - chest, abdomen, flanks, back, arms affected. Typically occurs on areas that are covered by clothes.   - flares are intermittent and unpredictable.   - symptoms include itching and dryness  - no known history of eczema or asthma  - ?allergy to corn as a child  - has been using the BPO once to twice  weekly  - history of venous stasis s/p multiple venous stasis surgeries   - local dermatologist (Dr Parada) performed patch testing, results with several positive reactions including urea and clobetasol during summer of 2021   - no problems with adhesives    Focused examination of the back, abdomen, chest and legs was performed.  -generalized xerosis  -multiple poorly defined pink to red papules and plaques with excoriation on the back, abdomen, chest, bilateral legs  -pitted scars of the left axilla    Past Medical History:   There is no problem list on file for this patient.    No past medical history on file.    Allergies:  Allergies   Allergen Reactions     Clobetasol Other (See Comments)     Allergy testing done - Clobetasol-77-Propionate     Shellac Other (See Comments)     Allergy testing done     Trolamine Other (See Comments)     Allergy testing done     Urea Other (See Comments)     Allergy testing done       Medications:  Current Outpatient Medications   Medication     atorvastatin (LIPITOR) 40 MG tablet     benzoyl peroxide 5 % external liquid     buPROPion (WELLBUTRIN XL) 150 MG 24 hr tablet     clindamycin (CLEOCIN T) 1 % external lotion     clindamycin (CLEOCIN T) 1 % external solution     Compression Bandages KIT     doxycycline monohydrate (ADOXA) 100 MG tablet     dupilumab (DUPIXENT) 300 MG/2ML prefilled pen     dupilumab (DUPIXENT) 300 MG/2ML prefilled pen     emollient (VANICREAM) external cream     fenofibrate (TRIGLIDE/LOFIBRA) 160 MG tablet     fluocinonide (LIDEX) 0.05 % external cream     gentian violet 1 % external solution     losartan (COZAAR) 25 MG tablet     meloxicam (MOBIC) 15 MG tablet     triamcinolone (KENALOG) 0.1 % external cream     No current facility-administered medications for this visit.       Social History:  The patient works as an independent . Patient has the following hobbies or non-occupational exposure: boating, snowmobiling, riding motorcycle.     Family  History:  No family history on file.    Previous Labs, Allergy Tests, Dermatopathology, Imaging:  Patch testing as above    Referred By: Antony Arizmendi [facility]  [address] [place] [address]     Allergy Tests:    Past Allergy Test    Order for Future Allergy Testing:    [x] Outpatient  [] Inpatient: Wilder..../ Bed ....       Skin Atopy (atopic dermatitis) [x] Yes   [] No .........  Contact allergies:   [x] Yes   [] No ..........  Hand eczema:   [x] Yes   [] No           Leading hand:   [] R   [] L       [] Ambidextrous         Drug allergies:        [x] Yes   [] No  Which?......possibly clobetasol?    Urticaria/Angioedema  [] Yes   [x] No .........  Food Allergy:  [] Yes   [x] No  which?......  Pets :  [] Yes   [x] No  which?......         []  Rhinitis   [] Conjunctivitis   [] Sinusitis   [] Polyposis   [] Otitis   [] Pharyngitis         []  Postnasal drip    [x]  none  Operations:   [] Tonsils   [] Septum   [] Sinus   [] Polyposis        [] Asthma bronchiale   [] Coughing      [x]  none  Symptoms (mostly Rhinoconjunctivitis and Asthma) aggravated by:  Season   [] I   [] II   [] III   [] IV   []V   []VI   []VII   []VIII   []IX   []X   []XI   []XII     [] perennial   Day time      [] morning   [] noon      [] evening        [] night    [] whole day........  [x]  none  Location/changes    [] inside        [] outside   [] mountains    [] sea     [] others.............   [x]  none  Triggers, specific     [] animals     [] plants     [] dust              [] others ...........................    [x]  none  Triggers, others       [] work          [] psyche    [] sport            [] others .............................  [x]  none  Irritant                [] phys efforts [] smoke    [x] heat/cold     [] odors  []others............... []  none    Order for PATCH TESTS  Reason for tests (suspected allergy): possible allergic contact dermatitis  Known previous allergies: possible clobetasol  or components, shellac, trolamine, urea per prior outside patch testing  Standardized panels  [x] Standard panel (40 tests)  [x] Preservatives & Antimicrobials (31 tests)  [x] Emulsifiers & Additives (25 tests)   [x] Perfumes/Flavours & Plants (25 tests)  [] Hairdresser panel (12 tests)  [] Rubber Chemicals (22 tests)  [] Plastics (26 tests)  [] Colorants/Dyes/Food additives (20 tests)  [] Metals (implants/dental) (24 tests)  [] Local anaesthetics/NSAIDs (13 tests)  [x] Antibiotics & Antimycotics (14 tests)   [x] Corticosteroids (15 tests)   [] Photopatch test (62 tests)   [] others: ...      [] Patient's own products: ...    DO NOT test if chemical or biological identity is unknown!     always ask from patient the product information and safety sheets (MSDS)       Order for PRICK TESTS    Reason for tests (suspected allergy): generalized eczema on exam  Known previous allergies: none    Standardized prick panels  [x] Atopic panel (20 tests)  [] Pediatric Panel (12 tests)  [] Milk, Meat, Eggs, Grains (20 tests)   [] Dust, Epithelia, Feathers (10 tests)  [] Fish, Seafood, Shellfish (17 tests)  [] Nuts, Beans (14 tests)  [] Spice, Vegetable, Fruit (17 tests)  [] Pollen Panel = Tree, Grass, Weed (24 tests)  [] Others: ...      [] Patient's own products: ...    DO NOT test if chemical or biological identity is unknown!     always ask from patient the product information and safety sheets (MSDS)     Standardized intradermal tests  [x] Penicillium notatum [x] Aspergillus fumigatus [x] House dust mites D.farinae only!!!  [] Cat    [] dog  [] Others: ...  [] Bee venom   [] Wasp venom  !!Specific protocol with dilutions!!       Order for Drug allergy tests (prick & Intradermal & patch tests)    [] Penicillin G  [] Ampicillin [] Cefazolin   [] Ceftriaxone   [] Ceftazidime  [] Bactrim    [] Others: ...  Order for ... as test date    ________________________________    RESULTS & EVALUATION of PATCH TESTS    Patch test readings  after     [x] 2 days, [x] 3 days [] 4 days, [] 5 days,    Aug 15, 2022 application of patch tests with results:    STANDARD Series                                          # Substance 2 days 4 days remarks     1 Joaquín Mix [C] - -       2 Colophony - -       3  2-Mercaptobenzothiazole  - -       4 Methylisothiazolinone - -       5 Carba Mix - -       6 Thiuram Mix [A] - -       7 Bisphenol A Epoxy Resin - -       8 Y-Whil-Mhzoinottij-Formaldehyde Resin - -       9 Mercapto Mix [A] - -       10 Black Rubber Mix- PPD [B] - -       11 Potassium Dichromate  -  -       12 Balsam of [place] (Myroxylon Pereirae Resin) - -       13 Nickel Sulphate Hexahydrate - -       14 Mixed Dialkyl Thiourea - -       15 Paraben Mix [B] - -       16 Methyldibromo Glutaronitrile - -       17 Fragrance Mix - -       18 2-Bromo-2-Nitropropane-1,3-Diol (Bronopol) CT - -       19 Lyral - -       20 Tixocortol-21- Pivalate CT - -       21 Diazolidiyl Urea (Germall II) - -       22 Methyl Methacrylate - -       23 Cobalt (II) Chloride Hexahydrate - -       24 Fragrance Mix II  - -       25 Compositae Mix - -       26 Benzoyl Peroxide - -       27 Bacitracin - -       28 Formaldehyde - -       29 Methylchloroisothiazolinone / Methylisothiazolinone - -       30 Corticosteroid Mix CT NA NA       31 Sodium Lauryl Sulfate - -       32 Lanolin Alcohol - -       33 Turpentine - -       34 Cetylstearylalcohol - -       35 Chlorhexidine Dicluconate - -       36 Budenoside - -       37 Imidazolidinyl Urea  - -       38 Ethyl-2 Cyanoacrylate NA NA       39 Quaternium 15 (Dowicil 200) - -       40 Decyl Glucoside - -     PRESERVATIVES & ANTIMICROBIALS        # Substance 2 days 4 days remarks   41 1  1,2-Benzisothiazoline-3-One, Sodium Salt - -     2  1,3,5-Guillermo (2-Hydroxyethyl) - Hexahydrotriazine (Grotan BK) - -     3 1-Aiuovibjkqdwz-6-Nitro-1, 3-Propanediol NA NA     4  3, 4, 4' - Triclocarban - -    45 5 4 - Chloro - 3 - Cresol - -     6 4 - Chloro - 4 -  Xylenol (PCMX) - -     7 7-Ethylbicyclooxazolidine (Bioban IC6269) - -     8 Benzalkonium Chloride CT - -     9 Benzyl Alcohol - -    50 10 Cetalkonium Chloride - -     11 Cetylpyrimidine Chloride  - -     12 Chloroacetamide - -     13 DMDM Hydantoin - -     14 Glutaraldehyde - -    55 15 Triclosan - -     16 Glyoxal Trimeric Dihydrate - -     17 Iodopropynyl Butylcarbamate - -     18 Octylisothiazoline - -     19 Bithionol CT - -    60 20 Bioban P 1487 (Nitrobutyl) Morpholine/(Ethylnitro-Trimethylene) Dimorpholine - -     21 Phenoxyethanol - -     22 Phenyl Salicylate - -     23 Povidone Iodine - -     24 Sodium Benzoate - -    65 25 Sodium Disulfite - -     26 Sorbic Acid - -     27 Thimerosal       28 Melamine Formaldehyde Resin       29 Ethylenediamine Dihydrochloride        Parabens      70 30 Butyl-P-Hydroxybenzoate - -     31 Ethyl-P-Hydroxybenzoate - -     32 Methyl-P-Hydroxybenzoate - -    73 33 Propyl-P-Hydroxybenzoate - -    EMULSIFIERS & ADDITIVES       # Substance 2 days 4 days remarks   74 1 Polyethylene Glycol-400 - -    75 2 Cocamidopropyl Betaine - -     3 Amerchol L101 - -     4 Propylene Glycol NA NA     5 Triethanolamine - -     6 Sorbitane Sesquiolate CT - -    80 7 Isopropylmyristate - -     8 Polysorbate 80 CT - -     9 Amidoamine   (Stearamidopropyl Dimethylamine) NA NA     10 Oleamidopropyl Dimethylamine - -     11 Lauryl Glucoside NA NA    85 12 Coconut Diethanolamide  - -     13 2-Hydroxy-4-Methoxy Benzophenone (Oxybenzone) - -     14 Benzophenone-4 (Sulisobenzon) NA NA     15 Propolis - -     16 Dexpanthenol - -    90 17 Carboxymethyl Cellulose Sodium NA NA     18 Abitol - -     19 Tert-Butylhydroquinone - -     20 Benzyl Salicylate - -     21 Dimethylaminopropylamin (DMPA) CT? D053      95 22 Zinc Pyrithione (Zinc Omadine) CT? Z006       23 Guillermo(Hydroxymethyl) Nitromethane CT        Antioxidant - -     24 Dodecyl Gallate - -     25 Butylhydroxyanisole (BHA) - -     26 Butylhydroxytoluene  (BHT) NA NA    100 27 Di-Alpha-Tocopherol (Vit E) - -    101 28 Propyl Gallate - -    PERFUMES, FLAVORS & PLANTS        # Substance 2 days 4 days remarks   102 1 Benzyl Cinnamate - -     2 Di-Limonene (Dipentene) - -     3 Cananga Odorata (Abdoulaye Stanford) (I) - -    105 4 Lichen Acid Mix - -     5 Mentha Piperita Oil (Peppermint Oil) - -     6 Sesquiterpenelactone mix - -     7 Tea Tree Oil, Oxidized - -     8 Wood Tar Mix - -    110 9 Abietic Acid - -     10 Lavendula Angustifolia Oil (Lavender Oil) - -     11 Fragrance mix II CT * - -      Fragrance Mix I       12 [place] Absolute - -     13 Eugenol - -    115 14 Geraniol - -     15 Hydroxycitronellal - -     16 Isoeugenol - -     17 Cinnamic Aldehyde - -     18 Cinnamic Alcohol  NA NA      Fragrance mix II      120 19 Citronellol NA NA     20 Alpha-Hexylcinnamic Aldehyde    - -     21 Citral - -     22 Farnesol - -    124 23 Coumarin - -      Hexylcinnamic aldehyde, Coumarin, Farnesol, Hydroxyisohexy3-cyclohexene carboxaldehyde, citral, citrolellol  ANTIBIOTICS & ANTIMYCOTICS    # Substance 2 days 4 days remarks   125 1 Erythromycine - -     2 Framycetine Sulphate - -     3 Fusidic Acid Sodium Salt - -     4 Gentamicin Sulphate - -     5 Neomycine Sulphate - -    130 6 Oxytetracycline  - -     7 Polymyxin B Sulphate - -     8 Tetracycline-HCL - -     9 Sulfanilamide - -     10 Metronidazole - -    135 11 Oxyquinoline Mix - -     12 Nitrofurazone - -     13 Nystatin - -    138 14 Clotrimazole - -      CORTICOSTEROIDS   # Substance 2 days 4 days remarks Allergy  class   139 1 Amcinonide - -  B   140 2 Betametasone-17,21 Dipropionate - -  D1    3 Desoximetasone - -  C    4 Betamethasone-17-Valerate - -  D1    5 Dexamethasone - -  C    6 Hydrocortisone - -  A   145 7 Clobetasol-17-Propionate - -  D1    8 Dexamethasone-21-Phosphate Disodium Salt - -  C    9 Hydrocortisone-17 Butyrate - -  D2    10 Prednisolone - -  A    11 Triamcinolone Acetonide - -  B   150 12  Methylprednisolone Aceponate - -  D2    13 Hydrocortisone-21-Acetate - -  A   152 14 Prednicarbate - -  D2     Group Characteristics of group Generic name Name  cross reactions   A Hydrocortisone   Cloprednole, Fludrocortisone acétate, Hydrocortison acetate, Methylprednisolone, Prednisolone, Tixocortolpivalate Alfacortone, Fucidin H, Dermacalm, Hexacortone, Premandole, Imacort With group D2   B Triamcinolone-acetonide   Budenoside (R-isomer), Amcinonide, Desonide, Fluocinolone acetonide, Triamcinolone acetonide Locapred, Locatop  Synalar, Pevisone, [place]ort -   C Betamethasone (Without Mounika)   Betamethasone, Dexamethasone, Flumethasone pivalate, Halomethasone Daivobet, Dexasalyl, Locasalen,   -   D1 Betamethasone-diproprionate   Betamethasone dipropionate, Betamethasone-17-valerate, Clobetasole-propionate, Fluticasone propionate, Mometasone furoate Betnovate, Diprogenta, Diprosalic, Diprosone, Celestoderm, Fucicort,  Cutivate, Axotide, Elocom -   D2 Methylprednisolone-aceponate   Hydrocortisone-aceponate, Hydrocortisone-buteprate, Hydrocortisone-17-butyrate, Methylprednisolone aceponate, Prednicarbate Locoïd, Advantan,  Prednitop With group A and Budesonide (S-isomer)     Results of patch tests:                         Interpretation:  - Negative                    A    = Allergic      (+) Erythema    TI   = Toxic/irritant   + E + Infiltration    RaP = Relevance at Present     ++ E/I + Papulovesicle   Rpr  = Relevance Previously     +++ E/I/P + Blister     nR   = No Relevance    Atopy Screen (Placed Jul 26, 2022)  No Substance Readings (15 min) Evaluation   POS Histamine 1mg/ml ++    NEG NaCl 0.9% -      No Substance Readings (15 min) Evaluation   1 Alternaria alternata (tenuis)  -    2 Cladosporium herbarum -    3 Aspergillus fumigatus -    4 Penicillium notatum -    5 Dermatophagoides pteronyssinus ++    6 Dermatophagoides farinae -    7 Dog epithelium (canis spp) -    8 Cat hair (crescencio catus) -    9 Cockroach    (Blatella americana & germanica) -    10 Grass mix midwest   (Katerin, Orchard, Redtop, Aurelio) -    11 Josef grass (sorghum halepense) -    12 Weed mix   (common Cocklebur, Lamb s quarters, rough redroot Pigweed, Dock/Sorrel) -    13 Mug wort (artemisia vulgare) -    14 Ragweed giant/short (ambrosia spp) -    15 White birch (Betula papyrifera) -    16 Tree mix 1 (Pecan, Maple BHR, Oak RVW, american [place], black [place]) -    17 Red cedar (juniperus virginia) -    18 Tree mix 2   (white [place], river/red Birch, black [place], common Real, american Elm) -    19 Box elder/Maple mix (acer spp) -    20 [place] shagbark (carya ovata) -           Conclusion: immediate type reaction to house dust mite D. pteronyssinus      Intradermal Testing (Placed Aug 15, 2022 )    No Substance Conc.  Reading (15min)  immediate Papule [mm] / Erythema [mm] Reading   (... days)  delayed Papule [mm] / Erythema [mm] Remarks   - NaCl  0.9% +   -    + Histamine (prick) 0.1mg/ml -   -    DF Standard Dust Mite - D. Farinae 1:10 ++ 7/15  -    A Aspergillus fumigatus  1:10 -   -    P Penicillium notatum 1:10 -   -    Conclusions: patient has immediate type sensitization to dust mites. No delayed reactions after 2 and 3? days    [x] No relevant allergic reaction observed   Patient had an angry back with erythema all over and diffuse erytema and desquamation and therefore evaluation difficult, but no area with separate and localized infiltration and vesicular reaction. Based on the readings no clear signs to specific sensitizations.     [] Allergic reaction diagnosed against following allergens:      Interpretation/ remarks:   Angry back with active atopic dermatitis and this should be addressed now first.    [] Patient information given   [] ACDS CAMP information's (# ....) to following compounds: .....   [] General information's to following compounds: ......      Assessment & Plan:    ==> Final Diagnosis:     # Hidradenitis  suppurativa   Plan to consider Humira, upcoming appointment with Dr. Patel in August 2022  * chronic illness with exacerbation, progression, side effects from treatment    # atopic predisposition with:    In prick test positive to house dust mite D. Pteronyssinus (but clinically not very relevant)    Dry, hypersensitive skin and atopic dermatitis?  * chronic illness with severe exacerbation, progression, side effects from treatment    # underlying contact allergy with recurrent dermatitis   >> mostly atopic dermatitis  >> additional stasis dermatitis with chronic venous insufficiency and recurrent cellulitis  * chronic illness with severe exacerbation, progression, side effects from treatment    # Cellulitis on left lower leg last 10 days    On antibiotics for 7 days    Put on open areas daily Gentian violet solution and elevate the left leg or put on compression stocking or short stretch bandages   * chronic illness with severe exacerbation, progression, side effects from treatment    These conclusions are made at the best of one's knowledge and belief based on the provided evidence such as patient's history and allergy test results and they can change over time or can be incomplete because of missing information's.    ==> Treatment Plan:  - start ASAP with Dupixent (Dupilumab) injections. Start with 2 injections (600mg) and then continue with 300mg every 2 weeks  - on the eczematous lesions put Mometasone ointment daily for about 10 days    - if Dupixent doesn't improve the eczema significantly, but stabilized the skin. Then we would repeat the patch tests (maybe don't charge the material)    >> Humira would likely improve hidradenitis suppurutiva  (sees Dr. Patel in about 1 week)     >> follow up the Cellulitis with family sadi  ___________________________      Staff: : provider    Follow-up in Derm-Allergy clinic in about 6 weeks (later decide if me or Dr. Patel or Dr. Tang)  continue  ___________________________    I spent a total of 32 minutes with Angel Etienne during today s  visit. This time was spent discussing all the individual test results, correlating them to the clinical relevance, counseling the patient and/or coordinating care and performing allergy tests or procedures.

## 2022-08-18 ENCOUNTER — OFFICE VISIT (OUTPATIENT)
Dept: ALLERGY | Facility: CLINIC | Age: 57
End: 2022-08-18
Payer: COMMERCIAL

## 2022-08-18 DIAGNOSIS — L03.116 CELLULITIS OF LEFT LOWER EXTREMITY: ICD-10-CM

## 2022-08-18 DIAGNOSIS — Z91.09 ALLERGY TO AMERICAN HOUSE DUST MITE: ICD-10-CM

## 2022-08-18 DIAGNOSIS — L20.84 INTRINSIC ATOPIC DERMATITIS: Primary | ICD-10-CM

## 2022-08-18 DIAGNOSIS — L23.89 ALLERGIC CONTACT DERMATITIS DUE TO OTHER AGENTS: ICD-10-CM

## 2022-08-18 PROCEDURE — 99214 OFFICE O/P EST MOD 30 MIN: CPT | Performed by: DERMATOLOGY

## 2022-08-18 RX ORDER — MOMETASONE FUROATE 1 MG/G
OINTMENT TOPICAL DAILY
Qty: 45 G | Refills: 3 | Status: SHIPPED | OUTPATIENT
Start: 2022-08-18 | End: 2023-11-16

## 2022-08-18 RX ORDER — MOMETASONE FUROATE 1 MG/G
OINTMENT TOPICAL DAILY
Qty: 45 G | Refills: 3 | Status: SHIPPED | OUTPATIENT
Start: 2022-08-18 | End: 2022-08-18

## 2022-08-18 NOTE — LETTER
8/18/2022         RE: Angel Etienne  [address]  [facility] [address]        Dear Colleague,    Thank you for referring your patient, Angel Etienne, to the [facility] [place]. Please see a copy of my visit note below.    [facility] Dermato-allergology Note  Office visit  Encounter Date: Aug 18, 2022  ____________________________________________    CC: No chief complaint on file.      HPI:  (Aug 18, 2022)  Mr. Angel Etienne is a(n) 56 year old male who presents today as a return patient for allergy consultation  - Follow-up in Derm-Allergy clinic for 2nd readings and final conclusions after 3 days (in person)   - otherwise feeling well in usual state of health    Physical exam:  General: In no acute distress, well-developed, well-nourished  Eyes: no conjunctivitis  ENT: no signs of rhinitis   Pulmonary: no wheezing or coughing  Skin:Focused examination of the skin on test sites was performed = see test results below    Earlier History and Allergy exams:  (Aug 17, 2022)  -Follow-up in Derm-Allergy clinic for 1st readings of patch tests after 2 days     Earlier History and Allergy exams:  (Aug 15, 2022)  - Follow-up in Derm-Allergy clinic for scheduled patch testing in a few weeks.  --> left lower leg still swollen with some erythema and erosions and some pruritus    Earlier History and Allergy exams:  (Jul 26, 2022)  - 15 years of hidradenitis suppurativa: takes doxycycline, topical clindamycin, benzoyl peroxide. Prescribed humira but hasn't taken these yet. Has upcoming appointment with Dr. Patel for HS.   - 15 years of diffuse dermatitis. Per Dr. Tang of dermatology, consistent with atopic dermatitis. Prescribed dupixent, but hasn't started due to concern of side effects  - no triggers other than possibly heat identified. But he also gets it in the winter; just doesn't flare up as much in the winter  - chest, abdomen, flanks, back, arms  affected. Typically occurs on areas that are covered by clothes.   - flares are intermittent and unpredictable.   - symptoms include itching and dryness  - no known history of eczema or asthma  - ?allergy to corn as a child  - has been using the BPO once to twice weekly  - history of venous stasis s/p multiple venous stasis surgeries   - local dermatologist (Dr Parada) performed patch testing, results with several positive reactions including urea and clobetasol during summer of 2021   - no problems with adhesives    Focused examination of the back, abdomen, chest and legs was performed.  -generalized xerosis  -multiple poorly defined pink to red papules and plaques with excoriation on the back, abdomen, chest, bilateral legs  -pitted scars of the left axilla    Past Medical History:   There is no problem list on file for this patient.    No past medical history on file.    Allergies:  Allergies   Allergen Reactions     Clobetasol Other (See Comments)     Allergy testing done - Clobetasol-77-Propionate     Shellac Other (See Comments)     Allergy testing done     Trolamine Other (See Comments)     Allergy testing done     Urea Other (See Comments)     Allergy testing done       Medications:  Current Outpatient Medications   Medication     atorvastatin (LIPITOR) 40 MG tablet     benzoyl peroxide 5 % external liquid     buPROPion (WELLBUTRIN XL) 150 MG 24 hr tablet     clindamycin (CLEOCIN T) 1 % external lotion     clindamycin (CLEOCIN T) 1 % external solution     Compression Bandages KIT     doxycycline monohydrate (ADOXA) 100 MG tablet     dupilumab (DUPIXENT) 300 MG/2ML prefilled pen     dupilumab (DUPIXENT) 300 MG/2ML prefilled pen     emollient (VANICREAM) external cream     fenofibrate (TRIGLIDE/LOFIBRA) 160 MG tablet     fluocinonide (LIDEX) 0.05 % external cream     gentian violet 1 % external solution     losartan (COZAAR) 25 MG tablet     meloxicam (MOBIC) 15 MG tablet     triamcinolone (KENALOG) 0.1 %  external cream     No current facility-administered medications for this visit.       Social History:  The patient works as an independent . Patient has the following hobbies or non-occupational exposure: boating, snowmobiling, riding motorcycle.     Family History:  No family history on file.    Previous Labs, Allergy Tests, Dermatopathology, Imaging:  Patch testing as above    Referred By: Antony Arizmendi [facility]  [address] [place] [address]     Allergy Tests:    Past Allergy Test    Order for Future Allergy Testing:    [x] Outpatient  [] Inpatient: [facility]..../ Bed ....       Skin Atopy (atopic dermatitis) [x] Yes   [] No .........  Contact allergies:   [x] Yes   [] No ..........  Hand eczema:   [x] Yes   [] No           Leading hand:   [] R   [] L       [] Ambidextrous         Drug allergies:        [x] Yes   [] No  Which?......possibly clobetasol?    Urticaria/Angioedema  [] Yes   [x] No .........  Food Allergy:  [] Yes   [x] No  which?......  Pets :  [] Yes   [x] No  which?......         []  Rhinitis   [] Conjunctivitis   [] Sinusitis   [] Polyposis   [] Otitis   [] Pharyngitis         []  Postnasal drip    [x]  none  Operations:   [] Tonsils   [] Septum   [] Sinus   [] Polyposis        [] Asthma bronchiale   [] Coughing      [x]  none  Symptoms (mostly Rhinoconjunctivitis and Asthma) aggravated by:  Season   [] I   [] II   [] III   [] IV   []V   []VI   []VII   []VIII   []IX   []X   []XI   []XII     [] perennial   Day time      [] morning   [] noon      [] evening        [] night    [] whole day........  [x]  none  Location/changes    [] inside        [] outside   [] mountains    [] sea     [] others.............   [x]  none  Triggers, specific     [] animals     [] plants     [] dust              [] others ...........................    [x]  none  Triggers, others       [] work          [] psyche    [] sport            [] others .............................  [x]   none  Irritant                [] phys efforts [] smoke    [x] heat/cold     [] odors  []others............... []  none    Order for PATCH TESTS  Reason for tests (suspected allergy): possible allergic contact dermatitis  Known previous allergies: possible clobetasol or components, shellac, trolamine, urea per prior outside patch testing  Standardized panels  [x] Standard panel (40 tests)  [x] Preservatives & Antimicrobials (31 tests)  [x] Emulsifiers & Additives (25 tests)   [x] Perfumes/Flavours & Plants (25 tests)  [] Hairdresser panel (12 tests)  [] Rubber Chemicals (22 tests)  [] Plastics (26 tests)  [] Colorants/Dyes/Food additives (20 tests)  [] Metals (implants/dental) (24 tests)  [] Local anaesthetics/NSAIDs (13 tests)  [x] Antibiotics & Antimycotics (14 tests)   [x] Corticosteroids (15 tests)   [] Photopatch test (62 tests)   [] others: ...      [] Patient's own products: ...    DO NOT test if chemical or biological identity is unknown!     always ask from patient the product information and safety sheets (MSDS)       Order for PRICK TESTS    Reason for tests (suspected allergy): generalized eczema on exam  Known previous allergies: none    Standardized prick panels  [x] Atopic panel (20 tests)  [] Pediatric Panel (12 tests)  [] Milk, Meat, Eggs, Grains (20 tests)   [] Dust, Epithelia, Feathers (10 tests)  [] Fish, Seafood, Shellfish (17 tests)  [] Nuts, Beans (14 tests)  [] Spice, Vegetable, Fruit (17 tests)  [] Pollen Panel = Tree, Grass, Weed (24 tests)  [] Others: ...      [] Patient's own products: ...    DO NOT test if chemical or biological identity is unknown!     always ask from patient the product information and safety sheets (MSDS)     Standardized intradermal tests  [x] Penicillium notatum [x] Aspergillus fumigatus [x] House dust mites D.farinae only!!!  [] Cat    [] dog  [] Others: ...  [] Bee venom   [] Wasp venom  !!Specific protocol with dilutions!!       Order for Drug allergy tests (prick  & Intradermal & patch tests)    [] Penicillin G  [] Ampicillin [] Cefazolin   [] Ceftriaxone   [] Ceftazidime  [] Bactrim    [] Others: ...  Order for ... as test date    ________________________________    RESULTS & EVALUATION of PATCH TESTS    Patch test readings after     [x] 2 days, [x] 3 days [] 4 days, [] 5 days,    Aug 15, 2022 application of patch tests with results:    STANDARD Series                                          # Substance 2 days 4 days remarks     1 Joaquín Mix [C] - -       2 Colophony - -       3  2-Mercaptobenzothiazole  - -       4 Methylisothiazolinone - -       5 Carba Mix - -       6 Thiuram Mix [A] - -       7 Bisphenol A Epoxy Resin - -       8 M-Yynv-Geimouzirnf-Formaldehyde Resin - -       9 Mercapto Mix [A] - -       10 Black Rubber Mix- PPD [B] - -       11 Potassium Dichromate  -  -       12 Balsam of [place] (Myroxylon Pereirae Resin) - -       13 Nickel Sulphate Hexahydrate - -       14 Mixed Dialkyl Thiourea - -       15 Paraben Mix [B] - -       16 Methyldibromo Glutaronitrile - -       17 Fragrance Mix - -       18 2-Bromo-2-Nitropropane-1,3-Diol (Bronopol) CT - -       19 Lyral - -       20 Tixocortol-21- Pivalate CT - -       21 Diazolidiyl Urea (Germall II) - -       22 Methyl Methacrylate - -       23 Cobalt (II) Chloride Hexahydrate - -       24 Fragrance Mix II  - -       25 Compositae Mix - -       26 Benzoyl Peroxide - -       27 Bacitracin - -       28 Formaldehyde - -       29 Methylchloroisothiazolinone / Methylisothiazolinone - -       30 Corticosteroid Mix CT NA NA       31 Sodium Lauryl Sulfate - -       32 Lanolin Alcohol - -       33 Turpentine - -       34 Cetylstearylalcohol - -       35 Chlorhexidine Dicluconate - -       36 Budenoside - -       37 Imidazolidinyl Urea  - -       38 Ethyl-2 Cyanoacrylate NA NA       39 Quaternium 15 (Dowicil 200) - -       40 Decyl Glucoside - -     PRESERVATIVES & ANTIMICROBIALS        # Substance 2 days 4 days remarks   41 1   1,2-Benzisothiazoline-3-One, Sodium Salt - -     2  1,3,5-Guillermo (2-Hydroxyethyl) - Hexahydrotriazine (Grotan BK) - -     3 3-Nnhpjfmuzhqjc-5-Nitro-1, 3-Propanediol NA NA     4  3, 4, 4' - Triclocarban - -    45 5 4 - Chloro - 3 - Cresol - -     6 4 - Chloro - 4 - Xylenol (PCMX) - -     7 7-Ethylbicyclooxazolidine (Bioban [id number]) - -     8 Benzalkonium Chloride CT - -     9 Benzyl Alcohol - -    50 10 Cetalkonium Chloride - -     11 Cetylpyrimidine Chloride  - -     12 Chloroacetamide - -     13 DMDM Hydantoin - -     14 Glutaraldehyde - -    55 15 Triclosan - -     16 Glyoxal Trimeric Dihydrate - -     17 Iodopropynyl Butylcarbamate - -     18 Octylisothiazoline - -     19 Bithionol CT - -    60 20 Bioban P 1487 (Nitrobutyl) Morpholine/(Ethylnitro-Trimethylene) Dimorpholine - -     21 Phenoxyethanol - -     22 Phenyl Salicylate - -     23 Povidone Iodine - -     24 Sodium Benzoate - -    65 25 Sodium Disulfite - -     26 Sorbic Acid - -     27 Thimerosal       28 Melamine Formaldehyde Resin       29 Ethylenediamine Dihydrochloride        Parabens      70 30 Butyl-P-Hydroxybenzoate - -     31 Ethyl-P-Hydroxybenzoate - -     32 Methyl-P-Hydroxybenzoate - -    73 33 Propyl-P-Hydroxybenzoate - -    EMULSIFIERS & ADDITIVES       # Substance 2 days 4 days remarks   74 1 Polyethylene Glycol-400 - -    75 2 Cocamidopropyl Betaine - -     3 Amerchol L101 - -     4 Propylene Glycol NA NA     5 Triethanolamine - -     6 Sorbitane Sesquiolate CT - -    80 7 Isopropylmyristate - -     8 Polysorbate 80 CT - -     9 Amidoamine   (Stearamidopropyl Dimethylamine) NA NA     10 Oleamidopropyl Dimethylamine - -     11 Lauryl Glucoside NA NA    85 12 Coconut Diethanolamide  - -     13 2-Hydroxy-4-Methoxy Benzophenone (Oxybenzone) - -     14 Benzophenone-4 (Sulisobenzon) NA NA     15 Propolis - -     16 Dexpanthenol - -    90 17 Carboxymethyl Cellulose Sodium NA NA     18 Abitol - -     19 Tert-Butylhydroquinone - -     20 Benzyl  Salicylate - -     21 Dimethylaminopropylamin (DMPA) CT? D053      95 22 Zinc Pyrithione (Zinc Omadine) CT? Z006       23 Guillermo(Hydroxymethyl) Nitromethane CT        Antioxidant - -     24 Dodecyl Gallate - -     25 Butylhydroxyanisole (BHA) - -     26 Butylhydroxytoluene (BHT) NA NA    100 27 Di-Alpha-Tocopherol (Vit E) - -    101 28 Propyl Gallate - -    PERFUMES, FLAVORS & PLANTS        # Substance 2 days 4 days remarks   102 1 Benzyl Cinnamate - -     2 Di-Limonene (Dipentene) - -     3 Cananga Odorata (Ylang Ylang) (I) - -    105 4 Lichen Acid Mix - -     5 Mentha Piperita Oil (Peppermint Oil) - -     6 Sesquiterpenelactone mix - -     7 Tea Tree Oil, Oxidized - -     8 Wood Tar Mix - -    110 9 Abietic Acid - -     10 Lavendula Angustifolia Oil (Lavender Oil) - -     11 Fragrance mix II CT * - -      Fragrance Mix I       12 Oakmoss Absolute - -     13 Eugenol - -    115 14 Geraniol - -     15 Hydroxycitronellal - -     16 Isoeugenol - -     17 Cinnamic Aldehyde - -     18 Cinnamic Alcohol  NA NA      Fragrance mix II      120 19 Citronellol NA NA     20 Alpha-Hexylcinnamic Aldehyde    - -     21 Citral - -     22 Farnesol - -    124 23 Coumarin - -      Hexylcinnamic aldehyde, Coumarin, Farnesol, Hydroxyisohexy3-cyclohexene carboxaldehyde, citral, citrolellol  ANTIBIOTICS & ANTIMYCOTICS    # Substance 2 days 4 days remarks   125 1 Erythromycine - -     2 Framycetine Sulphate - -     3 Fusidic Acid Sodium Salt - -     4 Gentamicin Sulphate - -     5 Neomycine Sulphate - -    130 6 Oxytetracycline  - -     7 Polymyxin B Sulphate - -     8 Tetracycline-HCL - -     9 Sulfanilamide - -     10 Metronidazole - -    135 11 Oxyquinoline Mix - -     12 Nitrofurazone - -     13 Nystatin - -    138 14 Clotrimazole - -      CORTICOSTEROIDS   # Substance 2 days 4 days remarks Allergy  class   139 1 Amcinonide - -  B   140 2 Betametasone-17,21 Dipropionate - -  D1    3 Desoximetasone - -  C    4 Betamethasone-17-Valerate - -   D1    5 Dexamethasone - -  C    6 Hydrocortisone - -  A   145 7 Clobetasol-17-Propionate - -  D1    8 Dexamethasone-21-Phosphate Disodium Salt - -  C    9 Hydrocortisone-17 Butyrate - -  D2    10 Prednisolone - -  A    11 Triamcinolone Acetonide - -  B   150 12 Methylprednisolone Aceponate - -  D2    13 Hydrocortisone-21-Acetate - -  A   152 14 Prednicarbate - -  D2     Group Characteristics of group Generic name Name  cross reactions   A Hydrocortisone   Cloprednole, Fludrocortisone acétate, Hydrocortison acetate, Methylprednisolone, Prednisolone, Tixocortolpivalate Alfacortone, Fucidin H, Dermacalm, Hexacortone, Premandole, Imacort With group D2   B Triamcinolone-acetonide   Budenoside (R-isomer), Amcinonide, Desonide, Fluocinolone acetonide, Triamcinolone acetonide Locapred, Locatop  Synalar, Pevisone, Kenacort -   C Betamethasone (Without Mounika)   Betamethasone, Dexamethasone, Flumethasone pivalate, Halomethasone Daivobet, Dexasalyl, Locasalen,   -   D1 Betamethasone-diproprionate   Betamethasone dipropionate, Betamethasone-17-valerate, Clobetasole-propionate, Fluticasone propionate, Mometasone furoate Betnovate, Diprogenta, Diprosalic, Diprosone, Celestoderm, Fucicort,  Cutivate, Axotide, Elocom -   D2 Methylprednisolone-aceponate   Hydrocortisone-aceponate, Hydrocortisone-buteprate, Hydrocortisone-17-butyrate, Methylprednisolone aceponate, Prednicarbate Locoïd, Advantan,  Prednitop With group A and Budesonide (S-isomer)     Results of patch tests:                         Interpretation:  - Negative                    A    = Allergic      (+) Erythema    TI   = Toxic/irritant   + E + Infiltration    RaP = Relevance at Present     ++ E/I + Papulovesicle   Rpr  = Relevance Previously     +++ E/I/P + Blister     nR   = No Relevance    Atopy Screen (Placed Jul 26, 2022)  No Substance Readings (15 min) Evaluation   POS Histamine 1mg/ml ++    NEG NaCl 0.9% -      No Substance Readings (15 min) Evaluation   1  Alternaria alternata (tenuis)  -    2 Cladosporium herbarum -    3 Aspergillus fumigatus -    4 Penicillium notatum -    5 Dermatophagoides pteronyssinus ++    6 Dermatophagoides farinae -    7 Dog epithelium (canis spp) -    8 Cat hair (crescencio catus) -    9 Cockroach   (Blatella americana & germanica) -    10 Grass mix midwest   (Katerin, Orchard, Redtop, Aurelio) -    11 Josef grass (sorghum halepense) -    12 Weed mix   (common Cocklebur, Lamb s quarters, rough redroot Pigweed, Dock/Sorrel) -    13 Mug wort (artemisia vulgare) -    14 Ragweed giant/short (ambrosia spp) -    15 White birch (Betula papyrifera) -    16 Tree mix 1 (Pecan, Maple BHR, Oak RVW, american [place], black [place]) -    17 Red cedar (juniperus virginia) -    18 Tree mix 2   (white Jordan, river/red Birch, black Nathalie, common Gill, american Elm) -    19 Box elder/Maple mix (acer spp) -    20 Reeves shagbark (carya ovata) -           Conclusion: immediate type reaction to house dust mite D. pteronyssinus      Intradermal Testing (Placed Aug 15, 2022 )    No Substance Conc.  Reading (15min)  immediate Papule [mm] / Erythema [mm] Reading   (... days)  delayed Papule [mm] / Erythema [mm] Remarks   - NaCl  0.9% +   -    + Histamine (prick) 0.1mg/ml -   -    DF Standard Dust Mite - D. Farinae 1:10 ++ 7/15  -    A Aspergillus fumigatus  1:10 -   -    P Penicillium notatum 1:10 -   -    Conclusions: patient has immediate type sensitization to dust mites. No delayed reactions after 2 and 3? days    [x] No relevant allergic reaction observed   Patient had an angry back with erythema all over and diffuse erytema and desquamation and therefore evaluation difficult, but no area with separate and localized infiltration and vesicular reaction. Based on the readings no clear signs to specific sensitizations.     [] Allergic reaction diagnosed against following allergens:      Interpretation/ remarks:   Angry back with active atopic dermatitis and this  should be addressed now first.    [] Patient information given   [] ACDS CAMP information's (# ....) to following compounds: .....   [] General information's to following compounds: ......      Assessment & Plan:    ==> Final Diagnosis:     # Hidradenitis suppurativa   Plan to consider Humira, upcoming appointment with Dr. Patel in August 2022  * chronic illness with exacerbation, progression, side effects from treatment    # atopic predisposition with:    In prick test positive to house dust mite D. Pteronyssinus (but clinically not very relevant)    Dry, hypersensitive skin and atopic dermatitis?  * chronic illness with severe exacerbation, progression, side effects from treatment    # underlying contact allergy with recurrent dermatitis   >> mostly atopic dermatitis  >> additional stasis dermatitis with chronic venous insufficiency and recurrent cellulitis  * chronic illness with severe exacerbation, progression, side effects from treatment    # Cellulitis on left lower leg last 10 days    On antibiotics for 7 days    Put on open areas daily Gentian violet solution and elevate the left leg or put on compression stocking or short stretch bandages   * chronic illness with severe exacerbation, progression, side effects from treatment    These conclusions are made at the best of one's knowledge and belief based on the provided evidence such as patient's history and allergy test results and they can change over time or can be incomplete because of missing information's.    ==> Treatment Plan:  - start ASAP with Dupixent (Dupilumab) injections. Start with 2 injections (600mg) and then continue with 300mg every 2 weeks  - on the eczematous lesions put Mometasone ointment daily for about 10 days    - if Dupixent doesn't improve the eczema significantly, but stabilized the skin. Then we would repeat the patch tests (maybe don't charge the material)    >> Humira would likely improve hidradenitis suppurutiva  (sees   Jorge in about 1 week)     >> follow up the Cellulitis with family sadi  ___________________________      Staff: : provider    Follow-up in Derm-Allergy clinic in about 6 weeks (later decide if me or Dr. Patel or Dr. Tang) continue  ___________________________    I spent a total of 32 minutes with Angel Etienne during today s  visit. This time was spent discussing all the individual test results, correlating them to the clinical relevance, counseling the patient and/or coordinating care and performing allergy tests or procedures.           Again, thank you for allowing me to participate in the care of your patient.        Sincerely,        Denny Novoa MD

## 2022-08-24 ENCOUNTER — TELEPHONE (OUTPATIENT)
Dept: DERMATOLOGY | Facility: CLINIC | Age: 57
End: 2022-08-24

## 2022-08-24 NOTE — TELEPHONE ENCOUNTER
[facility]    Phone Message    May a detailed message be left on voicemail: yes     Reason for Call: Other: Pt states he would like to reschedule the 8/25/22 new HS visit. Please call Pt back to reschedule. Thank you.      Action Taken: Message routed to:  [facility] Derm    Travel Screening: Not Applicable

## 2022-08-25 ENCOUNTER — PRE VISIT (OUTPATIENT)
Dept: DERMATOLOGY | Facility: CLINIC | Age: 57
End: 2022-08-25

## 2022-10-12 NOTE — PROGRESS NOTES
[facility] Dermatology Note  Encounter Date: Oct 13, 2022  Office Visit     Dermatology Problem List:  1. Hidradenitis suppurative, Stage II  - Current tx: minocycline 100 mg BID, Hibiclens 4% wash, resorcinol 15% cream   - Prior tx: doxycycline 100 mg BID, clindamycin lotion, BPO wash  2. Atopic dermatitis   - triamcinolone ointment, Dupixent   ____________________________________________    Assessment & Plan:  # Hidradenitis suppurative, Stage II Discussed treatment options, including antibiotics, surgery, continuing doxycyline, adding topicals Due to isolated nature of the HS, would recommend surgery to remove the tunnels from the left axilla. On medications that would negatively interact Bactrim.   - Start minocycline 100 mg BID. Counseled on side effects, including elevated liver enzymes and join pain.   - Start Hibiclens 4% wash to affected areas daily  - Start resorcinol 15% cream PRN for flares   - Stop doxycycline   - Labs ordered today: A1c, zinc, vitamin D  - Referred to dermatological surgery today.     # Atopic dermatitis  - Continue triamcinolone ointment 0.1% prn   - Had patch testing with local dermatologist (Dr Parada) with several positive reactions including urea and clobetasol (full list not available at time of visit 3/9/2022)  - Start Dupixent pending insurance approval (ordered by prior dermatologist)    Follow-up: 3 month(s) virtually (telephone with photos), or earlier for new or changing lesions    Staff, Student and Scribe:     Rudi Alves, MS4    Scribe Disclosure:  I, BRYANNA LÓPEZ, am serving as a scribe to document services personally performed by Reynold Patel MD based on data collection and the provider's statements to me.      Staff Physician:  I was present with the medical student who participated in the service and in the documentation of the note. I have verified the history and personally performed the physical exam and medical decision making. I  agree with the assessment and plan of care as documented in the note.     Staff Physician Comments:   I saw and evaluated the patient with the resident and I agree with the assessment and plan.  I was present for the examination.      Reynold Patel MD    Department of Dermatology  [facility]: Phone: [phone], Fax: [phone]  11/8/2022        ____________________________________________    CC: Derm Problem    HPI:  Mr. Angel Etienne is a(n) 56 year old male who presents today as a new patient for HS. Last seen by Dr. Tang on 3/9/22, at which time patient was started on doxycyline 100 mg BID, BPO wash, and clindamycin lotion for treatment of HS.    Today, the patient reports that he has had HS for 15 years. His father also has a history of HS.The patient reports flares that occur every 3-4 months, primarily on the left armpit and occasionally in left groin and behind the right leg. He is unsure when tunneling started. He has been on doxycyline but has not noticed any changes since July. Having some GI side effects. Clindamycin and BPO have provided minimal relief. He is unsure which antibiotics have provided the most relief. He has questions about Humira.     Additionally, the patient reports he has atopic dermatitis, which he manges with triamcinolone ointment. It mildly affects his quality of life. Dupixent was ordered, but the patient was wait until this visit. He was recently hospitalized with cellulitis in the left leg.     HS Nurse Assessment    Nurse Assessment Data 10/13/2022   Over the past 30 days how many old lesions flared back up? 2   Over the past 30 days how many new lesions did you get? 0   Over the past week, how many dressing changes do you do each day? 0   Over the past week, has your wound drainage been: None   Rate your HS overall from 0 - 10 (0 = no disease, 10 = worst) over the past  week:  10   Rate your pain score from 0 - 10 (0 = no disease, 10 = worst) for the most painful/symptomatic lesion in the past week:  6   Over the past week, how much has HS influenced your quality of life? slightly     Screenings  - Tobacco use: 1 pack/day, trying to quit   - PCOS: no  - Obesity: yes   - DMII or Insulin Resistance: checking A1c today    Patient is otherwise feeling well, without additional skin concerns.    Labs Reviewed:  A1c (11/1/21)    Physical Exam:  Vitals: There were no vitals taken for this visit.  SKIN: Full skin, which includes the head/face, both arms, chest, back, abdomen,both legs, genitalia and/or groin buttocks, digits and/or nails, was examined.  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    HS Data  HS Exam Data 10/13/2022   LC Type LC1   Clinical Subtypes Regular type   Acne? Yes   Dissecting Cellulitis? No   Visual analogue score (0-100) 15   Total Jeffries Stage II   Total Inflammatory Nodules 1   Total Abcesses 1   Total Draining Tunnels 0   Total Abscess and Nodule Count 2   IHS4 Score  3   Total  HASI Score 0        - No other lesions of concern on areas examined.     Medications:  Current Outpatient Medications   Medication     atorvastatin (LIPITOR) 40 MG tablet     benzoyl peroxide 5 % external liquid     buPROPion (WELLBUTRIN XL) 150 MG 24 hr tablet     clindamycin (CLEOCIN T) 1 % external lotion     clindamycin (CLEOCIN T) 1 % external solution     Compression Bandages KIT     doxycycline monohydrate (ADOXA) 100 MG tablet     dupilumab (DUPIXENT) 300 MG/2ML prefilled pen     dupilumab (DUPIXENT) 300 MG/2ML prefilled pen     emollient (VANICREAM) external cream     fenofibrate (TRIGLIDE/LOFIBRA) 160 MG tablet     fluocinonide (LIDEX) 0.05 % external cream     gentian violet 1 % external  solution     losartan (COZAAR) 25 MG tablet     meloxicam (MOBIC) 15 MG tablet     mometasone (ELOCON) 0.1 % external ointment     triamcinolone (KENALOG) 0.1 % external cream     No current facility-administered medications for this visit.      Past Medical History:   Hidradenitis suppurativa  Atopic dermatitis  Hyperlipidemia  Hypertension  Venous insufficiency/Stasis dermatitis  Tobacco use  ETOH abuse  Anxiety  Prediabetes

## 2022-10-13 ENCOUNTER — OFFICE VISIT (OUTPATIENT)
Dept: DERMATOLOGY | Facility: CLINIC | Age: 57
End: 2022-10-13
Payer: COMMERCIAL

## 2022-10-13 DIAGNOSIS — L73.2 HIDRADENITIS SUPPURATIVA: Primary | ICD-10-CM

## 2022-10-13 DIAGNOSIS — L20.89 OTHER ATOPIC DERMATITIS: ICD-10-CM

## 2022-10-13 PROCEDURE — 99214 OFFICE O/P EST MOD 30 MIN: CPT | Performed by: DERMATOLOGY

## 2022-10-13 RX ORDER — RESORCINOL
POWDER (GRAM) MISCELLANEOUS
Qty: 30 G | Refills: 11 | Status: SHIPPED | OUTPATIENT
Start: 2022-10-13 | End: 2022-12-14

## 2022-10-13 RX ORDER — MINOCYCLINE HYDROCHLORIDE 100 MG/1
100 CAPSULE ORAL 2 TIMES DAILY
Qty: 180 CAPSULE | Refills: 3 | Status: SHIPPED | OUTPATIENT
Start: 2022-10-13 | End: 2023-11-16

## 2022-10-13 NOTE — PROGRESS NOTES
[facility]  Address: [address] [place] [address], [place] [address]  Hours: Open Closes 6PM  Phone: [phone]

## 2022-10-13 NOTE — LETTER
10/13/2022       RE: Angel Etienne  [address] [facility] [address]     Dear Colleague,    Thank you for referring your patient, Angel Etienne, to the [facility] DERMATOLOGY [facility] [place] at [facility]. Please see a copy of my visit note below.    [facility] Dermatology Note  Encounter Date: Oct 13, 2022  Office Visit     Dermatology Problem List:  1. Hidradenitis suppurative, Stage II  - Current tx: minocycline 100 mg BID, Hibiclens 4% wash, resorcinol 15% cream   - Prior tx: doxycycline 100 mg BID, clindamycin lotion, BPO wash  2. Atopic dermatitis   - triamcinolone ointment, Dupixent   ____________________________________________    Assessment & Plan:  # Hidradenitis suppurative, Stage II Discussed treatment options, including antibiotics, surgery, continuing doxycyline, adding topicals Due to isolated nature of the HS, would recommend surgery to remove the tunnels from the left axilla. On medications that would negatively interact Bactrim.   - Start minocycline 100 mg BID. Counseled on side effects, including elevated liver enzymes and join pain.   - Start Hibiclens 4% wash to affected areas daily  - Start resorcinol 15% cream PRN for flares   - Stop doxycycline   - Labs ordered today: A1c, zinc, vitamin D  - Referred to dermatological surgery today.     # Atopic dermatitis  - Continue triamcinolone ointment 0.1% prn   - Had patch testing with local dermatologist (Dr Parada) with several positive reactions including urea and clobetasol (full list not available at time of visit 3/9/2022)  - Start Dupixent pending insurance approval (ordered by prior dermatologist)    Follow-up: 3 month(s) virtually (telephone with photos), or earlier for new or changing lesions    Staff, Student and Scribe:     Rudi Alves, MS4    Scribe Disclosure:  I, BRYANNA LÓPEZ, am serving as a scribe to document services personally performed by Reynold  Juan Manuel Patel MD based on data collection and the provider's statements to me.      Staff Physician:  I was present with the medical student who participated in the service and in the documentation of the note. I have verified the history and personally performed the physical exam and medical decision making. I agree with the assessment and plan of care as documented in the note.     Staff Physician Comments:   I saw and evaluated the patient with the resident and I agree with the assessment and plan.  I was present for the examination.      Reynold Patel MD    Department of Dermatology  [facility]: Phone: [phone], Fax: [phone]  11/8/2022        ____________________________________________    CC: Derm Problem    HPI:  Mr. Angel Etienne is a(n) 56 year old male who presents today as a new patient for HS. Last seen by Dr. Tang on 3/9/22, at which time patient was started on doxycyline 100 mg BID, BPO wash, and clindamycin lotion for treatment of HS.    Today, the patient reports that he has had HS for 15 years. His father also has a history of HS.The patient reports flares that occur every 3-4 months, primarily on the left armpit and occasionally in left groin and behind the right leg. He is unsure when tunneling started. He has been on doxycyline but has not noticed any changes since July. Having some GI side effects. Clindamycin and BPO have provided minimal relief. He is unsure which antibiotics have provided the most relief. He has questions about Humira.     Additionally, the patient reports he has atopic dermatitis, which he manges with triamcinolone ointment. It mildly affects his quality of life. Dupixent was ordered, but the patient was wait until this visit. He was recently hospitalized with cellulitis in the left leg.     HS Nurse Assessment    Nurse Assessment Data 10/13/2022   Over the past  30 days how many old lesions flared back up? 2   Over the past 30 days how many new lesions did you get? 0   Over the past week, how many dressing changes do you do each day? 0   Over the past week, has your wound drainage been: None   Rate your HS overall from 0 - 10 (0 = no disease, 10 = worst) over the past week:  10   Rate your pain score from 0 - 10 (0 = no disease, 10 = worst) for the most painful/symptomatic lesion in the past week:  6   Over the past week, how much has HS influenced your quality of life? slightly     Screenings  - Tobacco use: 1 pack/day, trying to quit   - PCOS: no  - Obesity: yes   - DMII or Insulin Resistance: checking A1c today    Patient is otherwise feeling well, without additional skin concerns.    Labs Reviewed:  A1c (11/1/21)    Physical Exam:  Vitals: There were no vitals taken for this visit.  SKIN: Full skin, which includes the head/face, both arms, chest, back, abdomen,both legs, genitalia and/or groin buttocks, digits and/or nails, was examined.  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    HS Data  HS Exam Data 10/13/2022   LC Type LC1   Clinical Subtypes Regular type   Acne? Yes   Dissecting Cellulitis? No   Visual analogue score (0-100) 15   Total Jeffries Stage II   Total Inflammatory Nodules 1   Total Abcesses 1   Total Draining Tunnels 0   Total Abscess and Nodule Count 2   IHS4 Score  3   Total  HASI Score 0        - No other lesions of concern on areas examined.     Medications:  Current Outpatient Medications   Medication     atorvastatin (LIPITOR) 40 MG tablet     benzoyl peroxide 5 % external liquid     buPROPion (WELLBUTRIN XL) 150 MG 24 hr tablet     clindamycin (CLEOCIN T) 1 % external lotion     clindamycin (CLEOCIN T) 1 % external solution     Compression Bandages KIT      doxycycline monohydrate (ADOXA) 100 MG tablet     dupilumab (DUPIXENT) 300 MG/2ML prefilled pen     dupilumab (DUPIXENT) 300 MG/2ML prefilled pen     emollient (VANICREAM) external cream     fenofibrate (TRIGLIDE/LOFIBRA) 160 MG tablet     fluocinonide (LIDEX) 0.05 % external cream     gentian violet 1 % external solution     losartan (COZAAR) 25 MG tablet     meloxicam (MOBIC) 15 MG tablet     mometasone (ELOCON) 0.1 % external ointment     triamcinolone (KENALOG) 0.1 % external cream     No current facility-administered medications for this visit.      Past Medical History:   Hidradenitis suppurativa  Atopic dermatitis  Hyperlipidemia  Hypertension  Venous insufficiency/Stasis dermatitis  Tobacco use  ETOH abuse  Anxiety  Prediabetes

## 2022-10-13 NOTE — PATIENT INSTRUCTIONS
Stop doxycycline   Start minocycline 100 mg twice daily - side effects: I emphasized the photosensitizing nature of this medication and the importance of sun protection to avoid burn.  Reviewed risk of stomach upset - ok to take with food but advised to avoid taking calcium at the same time as it will decrease efficacy.  Discussed risk of headache/vision changes and pigmentary changes as well as drug induced hepatitis.  Discussed risk of allergy that occurs with any new medication.  If any adverse event occurs, stop medication and call the clinic.   Start Hibiclens wash to affected areas   Start Dupixent for atopic dermatitis   Labs today: A1c, zinc, vitamin D  Start resorcinol cream to affected areas as needed    [facility]  Address: [address] [place] [id number]  Hours: Open Closes 6PM  Phone: [phone]      What is hidradenitis suppurativa (HS)?  Hidradenitis suppurativa (HS) is a chronic skin disorder affecting the hair follicles. The disease starts with sore red lumps (or boils), typically involving the armpits, breasts, lower abdomen, groin, and buttocks. These lesions appear suddenly, increase in size and then burst or rupture, usually under the surface of the skin. This causes severe pain. Sometimes they rupture to the surface of the skin, draining pus. In some people, they can result in tunnels under the skin that may or may not continue to drain. When the lumps heal, they can leave scars.     Facts:   HS is a chronic skin disease, that comes and goes in flares, and is very painful  HS happens in many people - men and women, young and elderly, all races and ethnicities. But HS is more common in young adults, women  and skin of color  One out of three people with HS has a family member with HS   HS is NOT due to an infection or washing habits  HS is NOT contagious, so it cannot be spread from one person to another person   HS is NOT caused by how well you wash or what soaps you  use  HS is NOT caused by smoking or obesity, but quitting smoking and losing weight have helped some people        Causes  The cause of HS remains unclear. It is thought that there is a problem in the hair follicle that makes it weaker and easier to break open. The current theory is that there are three steps that cause an HS lesion to form::   The hair follicle gets plugged   The hair follicle swells and then ruptures, causing pain and inflammation   In some people, the inflammation does not stop and results in tunneling through the skin       Associated Conditions  HS is associated with several other medical conditions and activities including:  Tobacco use  High cholesterol, heart disease and stroke   Type 2 diabetes   Depression and anxiety   Arthritis, which causes stiffness and pain in joints   Inflammatory bowel disease (including Crohn's disease and ulcerative colitis)  Severe acne and pilonidal sinus/cyst  Polycystic ovarian syndrome  Skin cancer in areas of longstanding HS lesions, typically in the groin or buttocks (rare)    It is important to ask your physician to watch out for these conditions.      To help manage mental health issues related to HS and emotional support:  Help finding a mental health specialist: https://www.psychologytoday.com/us/therapists  Suicide prevention (24/7 free confidential support):   Phone: (447) 743-4050  Website: https://suicidepreventionlifeline.org/    Support groups:  https://hopeforhs.org/  Intimacy support: American Association of Sexuality Educators, Counselors and Therapists (AASECT) website: https://www.aasect.org    For help quitting smoking   Phone:  English: 0-995-CJQS-NOW (1-273.342.1051)  [place]: 4-243-MHUPWD-YA (1-420.888.8056)    Website:   English: https://smokefree.gov/  [place]: quint[place].smokefree.gov     Lifestyle Modifications   Quitting smoking or weight loss can help your overall health and may help improve HS symptoms in some people, but not  everyone.   It is recommended to eat a well-balanced, healthy diet (https://health.gov/dietaryguidelines) and to maintain a healthy weight. Avoiding dietary triggers can help some people suffering with HS, but will not necessarily help others with HS.    Some people may find that friction, irritation, and rubbing may worsen HS symptoms. Some people do better with loose, breathable clothing and fabrics. Shaving close to the affected areas may also worsen HS in some people. But, not everyone has the same triggers for their HS, so see what works for you!    Some medications may worsen HS such as lithium, testosterone, and some progesterone-only birth control.  Please discuss this with your provider before stopping medication.     Zinc supplementation has been shown in some studies to help HS. However, every treatment can have a side effects,  so talk to your provider before starting any treatment.     Treatment                    Medicines, procedures and surgeries are offered to treat HS. Treatment can help reduce breakouts and improve quality of life.  Medicines used:  Topical washes (e.g. chlorhexidine, benzoyl peroxide)    Clindamycin on the skin - seems effective for pustules and papules. Probably not helpful for larger chronic lesions.     Oral antibiotics (e.g. doxycycline, clindamycin + rifampin, dapsone) - antibiotics may help some, but not all patients    Hormonal therapies (e.g. spironolactone, oral contraceptives) - primarily used in females though to have a hormonal component to their HS (e.g perimenstrual flares, worsening in pregnancy, polycystic ovarian syndrome)    Immunosuppression (e.g. prednisone)     Injectables (e.g. adalimumab, infliximab) - Adalimumab is the only federal drug administration (FDA) approved medication for HS    Procedures:  Intralesional steroid injections - may help areas of acute active inflammation     Some hair removal lasers - If considering this option, we recommend doing  this only with a medical professional that has experience treating HS.     Surgeries:  Incision and drainage - Provides fast, short-term relief, but symptoms likely to recur.        Deroofing - This surgery involves opening the lesion and cleaning out the base. This treatment is effective for recurring lesions.  Recurrence rates seem to be similar to excision. These are typically left open and allowed to heal on their own over 1-3 months      Excision - This involves cutting out chronic lesions.  This may entail cutting out a large affected area referred to as wide local excision, or cutting out single lesions, referred to as localized excisions.  Excision may be done with a scalpel, electric heating device or laser (e.g. carbon dioxide [CO2] laser).  These are either allowed to heal on their own, closed with sutures, or closed with a graft or flap.  Grafts are typically done by taking skin from one site of the body and using it to close another part of the body.  Flaps are done by moving tissue around to close a wound.     Check out this website to help decide the best treatment options for you!     https://www.informed-decisions.org/hidradenitispda.php

## 2022-10-19 ENCOUNTER — TELEPHONE (OUTPATIENT)
Dept: DERMATOLOGY | Facility: CLINIC | Age: 57
End: 2022-10-19

## 2022-10-19 NOTE — TELEPHONE ENCOUNTER
[facility] Call Center    Phone Message    May a detailed message be left on voicemail: yes     Reason for Call: Appointment Intake      Referring Provider Name:   Reynold Patel MD in [facility] DERMATOLOGY    Diagnosis and/or Symptoms:   Hidradenitis suppurativa   Referred to Dr Meyer  (does not give any other details on Referral Order but notes and records in [facility])      [facility] APPT  Referral Type: Derm Surgery  Dated 10/13/22      I am supposed to route over all [facility] ones sitting in Referral Order workqueue for review - Thank you!      Action Taken: Message routed to:  Other: [facility]    Travel Screening: Not Applicable

## 2022-10-20 ENCOUNTER — TELEPHONE (OUTPATIENT)
Dept: DERMATOLOGY | Facility: CLINIC | Age: 57
End: 2022-10-20

## 2022-10-20 NOTE — TELEPHONE ENCOUNTER
Scheduled for consult with Dr. Meyer on 10/31/2022 at 2pm via telephone due to patients request.     Elsa Gregory LPN

## 2022-10-20 NOTE — TELEPHONE ENCOUNTER
[facility] Call Center    Phone Message    May a detailed message be left on voicemail: yes     Reason for Call: Symptoms or Concerns     If patient has red-flag symptoms, warm transfer to triage line    Current symptom or concern: Pt called and said that a few weeks ago his upper lip and mustache were inflamed and was given some antibiotics. Pt said he saw Dr. Patel and the rx was changed and pt wanted Dr. Tang to look at it and see what else should the pt do. Pt stated that now his eyebrows are inflamed and the skin there is constantly peeling.  Pt said that they are all inflamed and very irritable. Pt says he hasn't been able to go to work in the past 3 weeks. Pt doesn't know what else to do. Please call him back. Thanks     Symptoms have been present for:  1-3 week(s)    Has patient previously been seen for this? Yes    By : Dr. Patel and Dr. Tang     Date: 10/13/22 and 7/06/22    Are there any new or worsening symptoms? Yes: see above      Action Taken: Message routed to:  [facility]    Travel Screening: Not Applicable

## 2022-10-22 ENCOUNTER — HEALTH MAINTENANCE LETTER (OUTPATIENT)
Age: 57
End: 2022-10-22

## 2022-10-24 NOTE — TELEPHONE ENCOUNTER
Spoke with the patient and things are getting better for him due to the change in antibiotics and using Aquaphor. Patient will call the clinic back if things that a turn for the worst.    Kj Candelario, [facility]

## 2022-10-31 ENCOUNTER — VIRTUAL VISIT (OUTPATIENT)
Dept: DERMATOLOGY | Facility: CLINIC | Age: 57
End: 2022-10-31
Payer: COMMERCIAL

## 2022-10-31 DIAGNOSIS — L73.2 HIDRADENITIS SUPPURATIVA: Primary | ICD-10-CM

## 2022-10-31 PROCEDURE — 99207 PR NO BILLABLE SERVICE THIS VISIT: CPT | Mod: 95 | Performed by: DERMATOLOGY

## 2022-10-31 NOTE — PROGRESS NOTES
This patient submitted photos electronically and then ultimately decided to have a face-to-face consult visit.  No charge for this encounter will follow-up for hidradenitis suppurativa surgery consult visit at a later date.     Conrado Meyer DO    Department of Dermatology  [facility]: Phone: [phone], Fax:[phone]  [facility]: Phone: [phone], Fax: [phone]

## 2022-10-31 NOTE — LETTER
10/31/2022         RE: Angel Etienne  [address] [facility] [address]        Dear Colleague,    Thank you for referring your patient, Angel Etienne, to the [facility]. Please see a copy of my visit note below.          This patient submitted photos electronically and then ultimately decided to have a face-to-face consult visit.  No charge for this encounter will follow-up for hidradenitis suppurativa surgery consult visit at a later date.     Conrado Meyer DO    Department of Dermatology  [facility]: Phone: [phone], Fax:[phone]  [facility]: Phone: [phone], Fax: [phone]          Again, thank you for allowing me to participate in the care of your patient.        Sincerely,        Conrado Meyer MD

## 2022-10-31 NOTE — NURSING NOTE
Teledermatology Nurse Call Patients:     Are you in the [facility] at the time of the encounter? yes    Today's visit will be billed to you and your insurance.    A teledermatology visit is not as thorough as an in-person visit and the quality of the photograph sent may not be of the same quality as that taken by the dermatology clinic.

## 2022-11-10 ENCOUNTER — OFFICE VISIT (OUTPATIENT)
Dept: FAMILY MEDICINE | Facility: CLINIC | Age: 57
End: 2022-11-10
Payer: COMMERCIAL

## 2022-11-10 VITALS
TEMPERATURE: 97.6 F | HEIGHT: 76 IN | OXYGEN SATURATION: 100 % | DIASTOLIC BLOOD PRESSURE: 83 MMHG | HEART RATE: 99 BPM | RESPIRATION RATE: 18 BRPM | SYSTOLIC BLOOD PRESSURE: 126 MMHG | BODY MASS INDEX: 38.36 KG/M2 | WEIGHT: 315 LBS

## 2022-11-10 DIAGNOSIS — L73.2 HIDRADENITIS SUPPURATIVA: ICD-10-CM

## 2022-11-10 DIAGNOSIS — L21.9 DERMATITIS, SEBORRHEIC: Primary | ICD-10-CM

## 2022-11-10 DIAGNOSIS — I87.2 CHRONIC STASIS DERMATITIS OF LEFT LOWER EXTREMITY: ICD-10-CM

## 2022-11-10 DIAGNOSIS — R73.03 PREDIABETES: ICD-10-CM

## 2022-11-10 DIAGNOSIS — L20.84 INTRINSIC ATOPIC DERMATITIS: ICD-10-CM

## 2022-11-10 LAB
ALBUMIN UR-MCNC: NEGATIVE MG/DL
APPEARANCE UR: ABNORMAL
BILIRUB UR QL STRIP: NEGATIVE
COLOR UR AUTO: YELLOW
GLUCOSE UR STRIP-MCNC: NEGATIVE MG/DL
HBA1C MFR BLD: 6 % (ref 0–5.6)
HGB UR QL STRIP: NEGATIVE
KETONES UR STRIP-MCNC: NEGATIVE MG/DL
LEUKOCYTE ESTERASE UR QL STRIP: NEGATIVE
NITRATE UR QL: NEGATIVE
PH UR STRIP: 7 [PH] (ref 5–7)
SP GR UR STRIP: 1.02 (ref 1–1.03)
UROBILINOGEN UR STRIP-ACNC: 0.2 E.U./DL

## 2022-11-10 PROCEDURE — 36415 COLL VENOUS BLD VENIPUNCTURE: CPT | Performed by: INTERNAL MEDICINE

## 2022-11-10 PROCEDURE — 83036 HEMOGLOBIN GLYCOSYLATED A1C: CPT | Performed by: INTERNAL MEDICINE

## 2022-11-10 PROCEDURE — 81003 URINALYSIS AUTO W/O SCOPE: CPT | Performed by: INTERNAL MEDICINE

## 2022-11-10 PROCEDURE — 80048 BASIC METABOLIC PNL TOTAL CA: CPT | Performed by: INTERNAL MEDICINE

## 2022-11-10 PROCEDURE — 99214 OFFICE O/P EST MOD 30 MIN: CPT | Performed by: INTERNAL MEDICINE

## 2022-11-10 RX ORDER — MONTELUKAST SODIUM 10 MG/1
10 TABLET ORAL AT BEDTIME
Qty: 30 TABLET | Refills: 5 | Status: SHIPPED | OUTPATIENT
Start: 2022-11-10 | End: 2022-12-14

## 2022-11-10 RX ORDER — CLOTRIMAZOLE AND BETAMETHASONE DIPROPIONATE 10; .64 MG/G; MG/G
CREAM TOPICAL 2 TIMES DAILY
Qty: 45 G | Refills: 2 | Status: SHIPPED | OUTPATIENT
Start: 2022-11-10 | End: 2023-11-16

## 2022-11-10 ASSESSMENT — PAIN SCALES - GENERAL: PAINLEVEL: SEVERE PAIN (7)

## 2022-11-10 NOTE — PROGRESS NOTES
[facility] Internal Medicine Progress Note           Assessment and Plan:     Dermatitis, seborrheic  - clotrimazole-betamethasone (LOTRISONE) 1-0.05 % external cream; Apply topically 2 times daily    Intrinsic atopic dermatitis  - montelukast (SINGULAIR) 10 MG tablet; Take 1 tablet (10 mg) by mouth At Bedtime    Chronic stasis dermatitis of left lower extremity  Continue Triamcinolone.    Hidradenitis suppurativa  Keep Doxycycline as prescribed by patient's dermatologist.    Prediabetes  - UA without Microscopic - lab collect  - Hemoglobin A1c  - Basic metabolic panel  (Ca, Cl, CO2, Creat, Gluc, K, Na, BUN)           Interval History:     Rash      Duration/timing: chronic    Description  Location: lips, eyes  Itching: yes  Pain: no  Discharge: no  Redness: no  Scaling: yes    Severity:  moderate    Accompanying signs and symptoms:            Fever/chills: no           Abdominal pain: no           Morning stiffness: no           Weakness: no    History (similar episodes/previous evaluation):             Trauma: no            Surgery: no            Allergic reaction:unknown            Skin cancer: no    Precipitating or alleviating factors:  New exposures:  no  Recent travel: no    Therapies tried and outcome:            Topical preparations: yes           Antihistamines: yes           Steroids:yes           UV light treatment: no                          Significant Problems:   Hidradenitis suppurative  Morbid obesity  Prediabetes           Review of Systems:   CONSTITUTIONAL: NEGATIVE for fever, chills, change in weight  INTEGUMENTARY/SKIN: As above.  EYES: NEGATIVE for vision changes or irritation  ENT/MOUTH: NEGATIVE for ear, mouth and throat problems  RESP: NEGATIVE for significant cough or SOB  CV: NEGATIVE for chest pain, palpitations or peripheral edema  GI: NEGATIVE for nausea, abdominal pain, heartburn, or change in bowel habits  : NEGATIVE for frequency, dysuria, or  "hematuria  MUSCULOSKELETAL: NEGATIVE for significant arthralgias or myalgia  NEURO: NEGATIVE for weakness, dizziness or paresthesias  ENDOCRINE: NEGATIVE for temperature intolerance, skin/hair changes  HEME: NEGATIVE for bleeding problems  PSYCHIATRIC: NEGATIVE for changes in mood or affect             Physical Exam:   /83 (BP Location: Left arm, Patient Position: Sitting, Cuff Size: Adult Large)   Pulse 99   Temp 97.6  F (36.4  C) (Tympanic)   Resp 18   Ht 1.918 m (6' 3.5\")   Wt 144.6 kg (318 lb 12.8 oz)   SpO2 100%   BMI 39.32 kg/m    Constitutional: Awake, alert, cooperative, no apparent distress, and appears stated age.  Eyes: extra-ocular muscles intact and sclera clear  ENT: normocepalic, without obvious abnormality  Lungs: no increased work of breathing and no retractions  Musculoskeletal: Pitting edema on both lower extremities.  Neurologic: Motor Exam:  moves all extremities well and symmetrically  Sensory:  Sensory intact  Neuropsychiatric: Affect: normal  Skin: Multiple patches of erythematous scaly rash.          Data:   Epic reviewed.     Disposition:  Follow-up in 4 weeks.    Torsten Thomas MD  Internal Medicine  [facility] Team    "

## 2022-11-11 ENCOUNTER — TELEPHONE (OUTPATIENT)
Dept: FAMILY MEDICINE | Facility: CLINIC | Age: 57
End: 2022-11-11

## 2022-11-11 LAB
ANION GAP SERPL CALCULATED.3IONS-SCNC: 8 MMOL/L (ref 3–14)
BUN SERPL-MCNC: 18 MG/DL (ref 7–30)
CALCIUM SERPL-MCNC: 9.5 MG/DL (ref 8.5–10.1)
CHLORIDE BLD-SCNC: 107 MMOL/L (ref 94–109)
CO2 SERPL-SCNC: 24 MMOL/L (ref 20–32)
CREAT SERPL-MCNC: 0.8 MG/DL (ref 0.66–1.25)
GFR SERPL CREATININE-BSD FRML MDRD: >90 ML/MIN/1.73M2
GLUCOSE BLD-MCNC: 94 MG/DL (ref 70–99)
POTASSIUM BLD-SCNC: 4.1 MMOL/L (ref 3.4–5.3)
SODIUM SERPL-SCNC: 139 MMOL/L (ref 133–144)

## 2022-11-11 NOTE — TELEPHONE ENCOUNTER
I spoke with the patient.    He currently takes Triamcinolone acetonide cream without any side effects despite reported allergy to Clobetasol.    Therefore, I advised Karl to try Betamethasone-Lotrisone.    Patient agrees with plan.

## 2022-11-11 NOTE — TELEPHONE ENCOUNTER
General Call    Contacts       Type Contact Phone/Fax    11/11/2022 02:41 PM CST Phone (Incoming) Karl Etienne (Self) [phone] (M)        Reason for Call:  Allergy    What are your questions or concerns:  The cream that was prescribed to him the pharmacy said that there was an ingredient in it that he was allergic to and previously have allergic reaction to it    Date of last appointment with provider: 11/10/2022    Could we send this information to you in [place] or would you prefer to receive a phone call?:   Patient would prefer a phone call   Okay to leave a detailed message?: Yes at Cell number on file:    Telephone Information:   Mobile [phone]

## 2022-12-14 ENCOUNTER — OFFICE VISIT (OUTPATIENT)
Dept: FAMILY MEDICINE | Facility: CLINIC | Age: 57
End: 2022-12-14
Payer: COMMERCIAL

## 2022-12-14 VITALS
TEMPERATURE: 98 F | SYSTOLIC BLOOD PRESSURE: 134 MMHG | WEIGHT: 310.4 LBS | BODY MASS INDEX: 38.59 KG/M2 | HEIGHT: 75 IN | DIASTOLIC BLOOD PRESSURE: 76 MMHG | HEART RATE: 90 BPM | RESPIRATION RATE: 15 BRPM | OXYGEN SATURATION: 99 %

## 2022-12-14 DIAGNOSIS — E78.5 HYPERLIPIDEMIA LDL GOAL <100: ICD-10-CM

## 2022-12-14 DIAGNOSIS — M15.0 PRIMARY OSTEOARTHRITIS INVOLVING MULTIPLE JOINTS: ICD-10-CM

## 2022-12-14 DIAGNOSIS — R73.03 PREDIABETES: ICD-10-CM

## 2022-12-14 DIAGNOSIS — I10 HTN, GOAL BELOW 140/90: ICD-10-CM

## 2022-12-14 DIAGNOSIS — I87.2 EDEMA OF BOTH LOWER EXTREMITIES DUE TO PERIPHERAL VENOUS INSUFFICIENCY: Primary | ICD-10-CM

## 2022-12-14 DIAGNOSIS — E66.01 MORBID OBESITY (H): ICD-10-CM

## 2022-12-14 LAB
ALBUMIN UR-MCNC: NEGATIVE MG/DL
APPEARANCE UR: CLEAR
BILIRUB UR QL STRIP: NEGATIVE
COLOR UR AUTO: YELLOW
GLUCOSE UR STRIP-MCNC: NEGATIVE MG/DL
HBA1C MFR BLD: 5.8 % (ref 0–5.6)
HGB UR QL STRIP: NEGATIVE
KETONES UR STRIP-MCNC: NEGATIVE MG/DL
LEUKOCYTE ESTERASE UR QL STRIP: NEGATIVE
NITRATE UR QL: NEGATIVE
PH UR STRIP: 5.5 [PH] (ref 5–7)
SP GR UR STRIP: >=1.03 (ref 1–1.03)
UROBILINOGEN UR STRIP-ACNC: 0.2 E.U./DL

## 2022-12-14 PROCEDURE — 80048 BASIC METABOLIC PNL TOTAL CA: CPT | Performed by: INTERNAL MEDICINE

## 2022-12-14 PROCEDURE — 36415 COLL VENOUS BLD VENIPUNCTURE: CPT | Performed by: INTERNAL MEDICINE

## 2022-12-14 PROCEDURE — 81003 URINALYSIS AUTO W/O SCOPE: CPT | Performed by: INTERNAL MEDICINE

## 2022-12-14 PROCEDURE — 83036 HEMOGLOBIN GLYCOSYLATED A1C: CPT | Performed by: INTERNAL MEDICINE

## 2022-12-14 PROCEDURE — 99214 OFFICE O/P EST MOD 30 MIN: CPT | Performed by: INTERNAL MEDICINE

## 2022-12-14 RX ORDER — FUROSEMIDE 20 MG
20 TABLET ORAL 2 TIMES DAILY PRN
Qty: 60 TABLET | Refills: 11 | Status: SHIPPED | OUTPATIENT
Start: 2022-12-14 | End: 2024-01-29

## 2022-12-14 RX ORDER — LOSARTAN POTASSIUM 25 MG/1
25 TABLET ORAL DAILY
Qty: 90 TABLET | Refills: 3 | Status: SHIPPED | OUTPATIENT
Start: 2022-12-14 | End: 2024-02-07

## 2022-12-14 RX ORDER — MELOXICAM 15 MG/1
15 TABLET ORAL DAILY
Qty: 90 TABLET | Refills: 3 | Status: SHIPPED | OUTPATIENT
Start: 2022-12-14 | End: 2024-02-07

## 2022-12-14 RX ORDER — FENOFIBRATE 160 MG/1
160 TABLET ORAL DAILY
Qty: 90 TABLET | Refills: 3 | Status: SHIPPED | OUTPATIENT
Start: 2022-12-14 | End: 2024-02-07

## 2022-12-14 RX ORDER — ATORVASTATIN CALCIUM 40 MG/1
40 TABLET, FILM COATED ORAL DAILY
Qty: 90 TABLET | Refills: 3 | Status: SHIPPED | OUTPATIENT
Start: 2022-12-14 | End: 2023-11-16

## 2022-12-14 ASSESSMENT — PAIN SCALES - GENERAL: PAINLEVEL: NO PAIN (0)

## 2022-12-14 NOTE — PROGRESS NOTES
[facility] Internal Medicine Progress Note           Assessment and Plan:     Edema of both lower extremities due to peripheral venous insufficiency  - furosemide (LASIX) 20 MG tablet; Take 1 tablet (20 mg) by mouth 2 times daily as needed (swelling of both extremities)    Prediabetes  - REVIEW OF HEALTH MAINTENANCE PROTOCOL ORDERS  - Hemoglobin A1c  - Basic metabolic panel  (Ca, Cl, CO2, Creat, Gluc, K, Na, BUN)  - UA without Microscopic - lab collect    Morbid obesity (H)  - REVIEW OF HEALTH MAINTENANCE PROTOCOL ORDERS  - Hemoglobin A1c  - Basic metabolic panel  (Ca, Cl, CO2, Creat, Gluc, K, Na, BUN)    HTN, goal below 140/90  - REVIEW OF HEALTH MAINTENANCE PROTOCOL ORDERS  - losartan (COZAAR) 25 MG tablet; Take 1 tablet (25 mg) by mouth daily    Hyperlipidemia LDL goal <100  - REVIEW OF HEALTH MAINTENANCE PROTOCOL ORDERS  - atorvastatin (LIPITOR) 40 MG tablet; Take 1 tablet (40 mg) by mouth daily  - fenofibrate (TRIGLIDE/LOFIBRA) 160 MG tablet; Take 1 tablet (160 mg) by mouth daily    Primary osteoarthritis involving multiple joints  - meloxicam (MOBIC) 15 MG tablet; Take 1 tablet (15 mg) by mouth daily           Interval History:     Karl is a 57 year old male with prediabetes and hidradenitis suppurativa, presenting for the following health issues:    Rash and edema of both legs  Onset/Duration: chronic  Description  Location: lower legs  Character: linear, round, blotchy, raised, flakey, painful, burning, draining, red  Itching: severe  Intensity:  severe  Progression of Symptoms:  same and intermittent  Accompanying signs and symptoms:   Fever: No  Body aches or joint pain: YES- minor body aches  Sore throat symptoms: No  Recent cold symptoms: No  History:           Previous episodes of similar rash: chronic   New exposures:  None  Recent travel: No  Exposure to similar rash: N/A  Precipitating or alleviating factors: none  Therapies tried and outcome: Lotrisone for facial seborrheic dermatitis  "(helpful).              Significant Problems:   Patient Active Problem List   Diagnosis     Hidradenitis suppurativa     Intrinsic atopic dermatitis     Edema of both lower extremities due to peripheral venous insufficiency     Dermatitis, seborrheic              Review of Systems:   CONSTITUTIONAL:POSITIVE  for morbid obesity.  INTEGUMENTARY/SKIN: NEGATIVE for worrisome rashes, moles or lesions  EYES: NEGATIVE for vision changes or irritation  ENT/MOUTH: NEGATIVE for ear, mouth and throat problems  RESP: NEGATIVE for significant cough or SOB  CV: POSITIVE for HX HTN  GI: NEGATIVE for nausea, abdominal pain, heartburn, or change in bowel habits  : NEGATIVE for frequency, dysuria, or hematuria  MUSCULOSKELETAL:POSITIVE  for osteoarthritis at multiple joints.  NEURO: NEGATIVE for weakness, dizziness or paresthesias  ENDOCRINE: POSITIVE  for hyperlipidemia.  HEME: NEGATIVE for bleeding problems  PSYCHIATRIC: NEGATIVE for changes in mood or affect             Physical Exam:   /76 (BP Location: Left arm, Patient Position: Sitting, Cuff Size: Thigh)   Pulse 90   Temp 98  F (36.7  C) (Oral)   Resp 15   Ht 1.898 m (6' 2.72\")   Wt 140.8 kg (310 lb 6.4 oz)   SpO2 99%   BMI 39.08 kg/m    Constitutional: Awake, alert, cooperative, no apparent distress, and appears stated age.  Eyes: extra-ocular muscles intact  ENT: Normocephalic, without obvious abnormality, atramatic, sinuses nontender on palpation, external ears without lesions, oral pharynx with moist mucus membranes, tonsils without erythema or exudates, gums normal and good dentition.  Lungs: No increased work of breathing, good air exchange, clear to auscultation bilaterally, no crackles or wheezing.  Cardiovascular: Regular rate and rhythm, normal S1 and S2, no S3 or S4, and no murmur noted.  Musculoskeletal: Edema of both lower extremities, grade 2+ with reddish-brownish discoloration of both lower legs.  Neurologic: Motor Exam:  moves all extremities " well and symmetrically  Neuropsychiatric: Normal affect, mood, orientation, memory and insight.          Data:   All laboratory data reviewed     Disposition:  Follow-up in 4 weeks.    Torsten Thomas MD  Internal Medicine  [facility] Team

## 2022-12-14 NOTE — PATIENT INSTRUCTIONS
At [facility], we strive to deliver an exceptional experience to you, every time we see you. If you receive a survey, please complete it as we do value your feedback.  If you have MyChart, you can expect to receive results automatically within 24 hours of their completion.  Your provider will send a note interpreting your results as well.   If you do not have MyChart, you should receive your results in about a week by mail.    Your care team:                            Family Medicine Internal Medicine   MD Torsten Apple MD Shantel Branch-Fleming, MD Srinivasa Vaka, MD Katya Belousova, PALUCY Romano CNP, MD ([facility]   Dick Arana, MD Zina Smith MD Amelia Massimini APRN ERIN Hoang APRN MD Bhavani Jacobs MD          Clinic hours: Monday - Thursday 7 am-6 pm; Fridays 7 am-5 pm.   Urgent care: Monday - Friday 10 am- 8 pm; Saturday and Sunday 9 am-5 pm.    Clinic: [phone]       [facility]: Monday - Thursday 8 am - 7 pm; Friday 8 am - 6 pm  [facility]: [phone]

## 2022-12-15 LAB
ANION GAP SERPL CALCULATED.3IONS-SCNC: 6 MMOL/L (ref 3–14)
BUN SERPL-MCNC: 16 MG/DL (ref 7–30)
CALCIUM SERPL-MCNC: 9.3 MG/DL (ref 8.5–10.1)
CHLORIDE BLD-SCNC: 106 MMOL/L (ref 94–109)
CO2 SERPL-SCNC: 25 MMOL/L (ref 20–32)
CREAT SERPL-MCNC: 0.69 MG/DL (ref 0.66–1.25)
GFR SERPL CREATININE-BSD FRML MDRD: >90 ML/MIN/1.73M2
GLUCOSE BLD-MCNC: 116 MG/DL (ref 70–99)
POTASSIUM BLD-SCNC: 3.9 MMOL/L (ref 3.4–5.3)
SODIUM SERPL-SCNC: 137 MMOL/L (ref 133–144)

## 2023-03-30 ENCOUNTER — TELEPHONE (OUTPATIENT)
Dept: DERMATOLOGY | Facility: CLINIC | Age: 58
End: 2023-03-30

## 2023-03-30 NOTE — TELEPHONE ENCOUNTER
LVM asking pt to call clinic to reschedule. Dr Patel requested 3 month by phone follow up. Writer can schedule sooner than June. If answering staff has a hard time finding this opening, route to writer.

## 2023-03-30 NOTE — TELEPHONE ENCOUNTER
[facility] Call Center    Phone Message    May a detailed message be left on voicemail: yes     Reason for Call: Appointment Intake    Referring Provider Name: Dr. Patle  Diagnosis and/or Symptoms: 3m in person follow up on 03/31 cancelled due to pt. Having COVID. Pt scheduled for next available 06/08/2023. Would like to be seen sooner if at all possible due to some concerns please call pt to discuss. Thanks    Action Taken: Message routed to:  [facility] Derm    Travel Screening: Not Applicable

## 2023-06-01 ENCOUNTER — HEALTH MAINTENANCE LETTER (OUTPATIENT)
Age: 58
End: 2023-06-01

## 2023-06-06 ENCOUNTER — TELEPHONE (OUTPATIENT)
Dept: DERMATOLOGY | Facility: CLINIC | Age: 58
End: 2023-06-06
Payer: COMMERCIAL

## 2023-06-06 NOTE — TELEPHONE ENCOUNTER
[facility] Call Center    Phone Message    May a detailed message be left on voicemail: yes     Reason for Call: Other: Pt looking to speak with Dr. Brady team about a procedure that Dr. Patel recommended pt. get. Pt has not gotten the procedure done yet and has some concerns about where he sis supposed to be seen for this procedure and he'd like to discuss with Dr. Brady team about it. Pt available after 11:00 each day.Thanks!    Action Taken: Other: Derm    Travel Screening: Not Applicable

## 2023-06-06 NOTE — TELEPHONE ENCOUNTER
Karl was at work and could not discuss surgery at this time. Writer asked for pt to call back on Thursday as writer is in clinic tomorrow. Writer offered phone appt with Dr Patel on Thursday. Pt declined stating he thinks he can just discuss with writer.

## 2023-07-07 ENCOUNTER — TELEPHONE (OUTPATIENT)
Dept: FAMILY MEDICINE | Facility: CLINIC | Age: 58
End: 2023-07-07
Payer: COMMERCIAL

## 2023-07-07 NOTE — TELEPHONE ENCOUNTER
Patient Quality Outreach    Patient is due for the following:   Hypertension -  BP check  Colon Cancer Screening  Physical Preventive Adult Physical      Topic Date Due     Hepatitis B Vaccine (1 of 3 - 3-dose series) Never done     Pneumococcal Vaccine (1 - PCV) Never done     Zoster (Shingles) Vaccine (1 of 2) Never done       Next Steps:   Schedule a Adult Preventative    Type of outreach:    Sent [employer] message.      Questions for provider review:    None           Yesenia Montano MA

## 2023-09-22 ENCOUNTER — TELEPHONE (OUTPATIENT)
Dept: FAMILY MEDICINE | Facility: CLINIC | Age: 58
End: 2023-09-22
Payer: COMMERCIAL

## 2023-09-22 NOTE — TELEPHONE ENCOUNTER
Reason for Call:  Appointment Request    Patient requesting this type of appt:  Preventive     Requested provider: Torsten Thomas    Reason patient unable to be scheduled: Not within requested timeframe    When does patient want to be seen/preferred time:  Pt. Is self employed and very flexible. He would like to be seen sooner than the November appointment that is scheduled.    Comments: See above.    Could we send this information to you in [place] or would you prefer to receive a phone call?:   Patient would prefer a phone call   Okay to leave a detailed message?: Yes at Cell number on file:    Telephone Information:   Mobile [phone]       Call taken on 9/22/2023 at 1:50 PM by Joyce Archer

## 2023-10-11 ENCOUNTER — TRANSFERRED RECORDS (OUTPATIENT)
Dept: HEALTH INFORMATION MANAGEMENT | Facility: CLINIC | Age: 58
End: 2023-10-11
Payer: COMMERCIAL

## 2023-11-08 ENCOUNTER — TRANSFERRED RECORDS (OUTPATIENT)
Dept: HEALTH INFORMATION MANAGEMENT | Facility: CLINIC | Age: 58
End: 2023-11-08
Payer: COMMERCIAL

## 2023-11-16 ENCOUNTER — OFFICE VISIT (OUTPATIENT)
Dept: FAMILY MEDICINE | Facility: CLINIC | Age: 58
End: 2023-11-16
Payer: COMMERCIAL

## 2023-11-16 VITALS
DIASTOLIC BLOOD PRESSURE: 91 MMHG | OXYGEN SATURATION: 94 % | WEIGHT: 310.4 LBS | SYSTOLIC BLOOD PRESSURE: 147 MMHG | HEIGHT: 74 IN | TEMPERATURE: 97.7 F | HEART RATE: 81 BPM | BODY MASS INDEX: 39.83 KG/M2

## 2023-11-16 DIAGNOSIS — L40.9 PSORIASIS: ICD-10-CM

## 2023-11-16 DIAGNOSIS — R39.15 BENIGN PROSTATIC HYPERPLASIA (BPH) WITH URINARY URGENCY: ICD-10-CM

## 2023-11-16 DIAGNOSIS — E66.01 MORBID OBESITY (H): Primary | ICD-10-CM

## 2023-11-16 DIAGNOSIS — R73.03 PREDIABETES: ICD-10-CM

## 2023-11-16 DIAGNOSIS — E78.5 HYPERLIPIDEMIA LDL GOAL <100: ICD-10-CM

## 2023-11-16 DIAGNOSIS — N40.1 BENIGN PROSTATIC HYPERPLASIA (BPH) WITH URINARY URGENCY: ICD-10-CM

## 2023-11-16 LAB — HBA1C MFR BLD: 5.8 % (ref 0–5.6)

## 2023-11-16 PROCEDURE — 36415 COLL VENOUS BLD VENIPUNCTURE: CPT | Performed by: INTERNAL MEDICINE

## 2023-11-16 PROCEDURE — 80048 BASIC METABOLIC PNL TOTAL CA: CPT | Performed by: INTERNAL MEDICINE

## 2023-11-16 PROCEDURE — 80061 LIPID PANEL: CPT | Performed by: INTERNAL MEDICINE

## 2023-11-16 PROCEDURE — 99214 OFFICE O/P EST MOD 30 MIN: CPT | Performed by: INTERNAL MEDICINE

## 2023-11-16 PROCEDURE — G0103 PSA SCREENING: HCPCS | Performed by: INTERNAL MEDICINE

## 2023-11-16 PROCEDURE — 83036 HEMOGLOBIN GLYCOSYLATED A1C: CPT | Performed by: INTERNAL MEDICINE

## 2023-11-16 PROCEDURE — 83721 ASSAY OF BLOOD LIPOPROTEIN: CPT | Mod: 59 | Performed by: INTERNAL MEDICINE

## 2023-11-16 PROCEDURE — 84460 ALANINE AMINO (ALT) (SGPT): CPT | Performed by: INTERNAL MEDICINE

## 2023-11-16 RX ORDER — TAMSULOSIN HYDROCHLORIDE 0.4 MG/1
0.4 CAPSULE ORAL DAILY
Qty: 90 CAPSULE | Refills: 3 | Status: SHIPPED | OUTPATIENT
Start: 2023-11-16 | End: 2024-01-29

## 2023-11-16 RX ORDER — FLUOCINONIDE 0.5 MG/G
CREAM TOPICAL 2 TIMES DAILY
COMMUNITY
Start: 2023-11-16 | End: 2024-07-22

## 2023-11-16 ASSESSMENT — ENCOUNTER SYMPTOMS
EYE PAIN: 1
NAUSEA: 0
FEVER: 0
HEMATOCHEZIA: 0
HEADACHES: 0
COUGH: 1
HEMATURIA: 0
PALPITATIONS: 0
DYSURIA: 0
HEARTBURN: 1
PARESTHESIAS: 1
NERVOUS/ANXIOUS: 1
SHORTNESS OF BREATH: 1
JOINT SWELLING: 0
FREQUENCY: 1
CHILLS: 0
SORE THROAT: 0
ARTHRALGIAS: 1
ABDOMINAL PAIN: 0
DIZZINESS: 0
WEAKNESS: 0
MYALGIAS: 1
CONSTIPATION: 1
DIARRHEA: 0

## 2023-11-16 ASSESSMENT — PAIN SCALES - GENERAL: PAINLEVEL: NO PAIN (0)

## 2023-11-16 NOTE — PROGRESS NOTES
[facility] Internal Medicine Progress Note           Assessment and Plan:     Benign prostatic hyperplasia (BPH) with urinary urgency  - PSA, screen  - tamsulosin (FLOMAX) 0.4 MG capsule; Take 1 capsule (0.4 mg) by mouth daily    Morbid obesity (H)  - ALT  - Basic metabolic panel  - Semaglutide-Weight Management (WEGOVY) 0.25 MG/0.5ML pen; Inject 0.25 mg Subcutaneous once a week    Hyperlipidemia LDL goal <100  - Lipid panel reflex to direct LDL Non-fasting  - ALT  - LDL cholesterol direct    Prediabetes  - Hemoglobin A1c  - ALT  - Basic metabolic panel    Psoriasis  - fluocinonide (LIDEX) 0.05 % external cream; Apply topically 2 times daily  - Adult Dermatology  Referral; Future            Interval History:     Updates  -I feel like I have urination problem, urgency. If I hold, then I couldn't go. Sometimes, it takes before I urinate. Then it's back to inconsistent schedule. Weak stream.  -Bowel movements are also inconsistent.  -Family hx of prostate cancer recent diagnosis.  -Lost 20 pounds on keto diet, but he doesn't felt good.  -Get upset stomach and it goes away.    Today's PHQ-2 Score:       11/16/2023     9:53 AM   PHQ-2 ( 1999 [employer])   Q1: Little interest or pleasure in doing things 1   Q2: Feeling down, depressed or hopeless 1   PHQ-2 Score 2   Q1: Little interest or pleasure in doing things Several days   Q2: Feeling down, depressed or hopeless Several days   PHQ-2 Score 2               Significant Problems:     Patient Active Problem List   Diagnosis    Hidradenitis suppurativa    Intrinsic atopic dermatitis    Edema of both lower extremities due to peripheral venous insufficiency    Dermatitis, seborrheic              Review of Systems:     CONSTITUTIONAL:POSITIVE  for morbid obesity.  INTEGUMENTARY/SKIN: POSITIVE for psoriasis.  EYES: NEGATIVE for vision changes or irritation  ENT/MOUTH: NEGATIVE for ear, mouth and throat problems  RESP: NEGATIVE for significant cough or SOB  CV:  "NEGATIVE for chest pain, palpitations or peripheral edema  GI: NEGATIVE for nausea, abdominal pain, heartburn, or change in bowel habits  : NEGATIVE for frequency, dysuria, or hematuria  MUSCULOSKELETAL: NEGATIVE for significant arthralgias or myalgia  NEURO: NEGATIVE for weakness, dizziness or paresthesias  ENDOCRINE: POSITIVE  for prediabetes.  HEME: NEGATIVE for bleeding problems  PSYCHIATRIC: NEGATIVE for changes in mood or affect             Physical Exam:   BP (!) 147/91   Pulse 81   Temp 97.7  F (36.5  C) (Oral)   Ht 1.88 m (6' 2\")   Wt 140.8 kg (310 lb 6.4 oz)   SpO2 94%   BMI 39.85 kg/m     Constitutional:   awake, alert, cooperative, no apparent distress, appears stated age, and morbidly obese     Eyes:   extra-ocular muscles intact and sclera clear     ENT:   normocepalic, without obvious abnormality     Lungs:   no increased work of breathing, no retractions, and clear to auscultation     Cardiovascular:   Normal apical impulse, regular rate and rhythm, normal S1 and S2, no S3 or S4, and no murmur noted     Abdomen:   Obese, soft and nontender.     Neurologic:   Motor Exam:  moves all extremities well and symmetrically  Sensory:  Sensory intact     Neuropsychiatric:   Affect: normal             Data:   Epi reviewed.     Disposition:  Follow-up in 4 weeks or as needed.    Torsten Thomas MD  Internal Medicine  [facility] Team   "

## 2023-11-17 ENCOUNTER — TELEPHONE (OUTPATIENT)
Dept: FAMILY MEDICINE | Facility: CLINIC | Age: 58
End: 2023-11-17

## 2023-11-17 LAB
ALT SERPL W P-5'-P-CCNC: 38 U/L (ref 0–70)
ANION GAP SERPL CALCULATED.3IONS-SCNC: 12 MMOL/L (ref 7–15)
BUN SERPL-MCNC: 16.2 MG/DL (ref 6–20)
CALCIUM SERPL-MCNC: 9.5 MG/DL (ref 8.6–10)
CHLORIDE SERPL-SCNC: 102 MMOL/L (ref 98–107)
CHOLEST SERPL-MCNC: 185 MG/DL
CREAT SERPL-MCNC: 0.77 MG/DL (ref 0.67–1.17)
DEPRECATED HCO3 PLAS-SCNC: 25 MMOL/L (ref 22–29)
EGFRCR SERPLBLD CKD-EPI 2021: >90 ML/MIN/1.73M2
GLUCOSE SERPL-MCNC: 111 MG/DL (ref 70–99)
HDLC SERPL-MCNC: 36 MG/DL
LDLC SERPL CALC-MCNC: ABNORMAL MG/DL
LDLC SERPL DIRECT ASSAY-MCNC: 88 MG/DL
NONHDLC SERPL-MCNC: 149 MG/DL
POTASSIUM SERPL-SCNC: 4.5 MMOL/L (ref 3.4–5.3)
PSA SERPL DL<=0.01 NG/ML-MCNC: 0.46 NG/ML (ref 0–3.5)
SODIUM SERPL-SCNC: 139 MMOL/L (ref 135–145)
TRIGL SERPL-MCNC: 446 MG/DL

## 2023-11-17 NOTE — TELEPHONE ENCOUNTER
PRIOR AUTHORIZATION DENIED    Medication: SEMAGLUTIDE-WEIGHT MANAGEMENT 0.25 MG/0.5ML SC SOAJ  Insurance Company: [employer] [place] - Phone [phone] Fax [phone]  Denial Date: 11/16/2023  Denial Rational:     Appeal Information:     Patient Notified: No

## 2023-11-22 ENCOUNTER — TELEPHONE (OUTPATIENT)
Dept: FAMILY MEDICINE | Facility: CLINIC | Age: 58
End: 2023-11-22
Payer: COMMERCIAL

## 2024-01-23 ENCOUNTER — TELEPHONE (OUTPATIENT)
Dept: FAMILY MEDICINE | Facility: CLINIC | Age: 59
End: 2024-01-23
Payer: COMMERCIAL

## 2024-01-23 NOTE — TELEPHONE ENCOUNTER
Reason for Call:  Appointment Request    Patient requesting this type of appt: Pre-op    Requested provider: Torsten Thomas    Reason patient unable to be scheduled: Not within requested timeframe    When does patient want to be seen/preferred time: 3-7 days    Comments: Pt is having surgery 2/2/24 for rotator cuff and bicep repair and would like PCP to see him    Could we send this information to you in [facility] or would you prefer to receive a phone call?:   Patient would prefer a phone call   Okay to leave a detailed message?: Yes at Home number on file [phone] (home)    Call taken on 1/23/2024 at 4:47 PM by Becki Metzger

## 2024-01-29 ENCOUNTER — ANCILLARY PROCEDURE (OUTPATIENT)
Dept: GENERAL RADIOLOGY | Facility: CLINIC | Age: 59
End: 2024-01-29
Attending: INTERNAL MEDICINE
Payer: COMMERCIAL

## 2024-01-29 ENCOUNTER — OFFICE VISIT (OUTPATIENT)
Dept: FAMILY MEDICINE | Facility: CLINIC | Age: 59
End: 2024-01-29
Payer: COMMERCIAL

## 2024-01-29 DIAGNOSIS — H61.21 IMPACTED CERUMEN OF RIGHT EAR: ICD-10-CM

## 2024-01-29 DIAGNOSIS — Z01.818 VISIT FOR PRE-OPERATIVE EXAMINATION: Primary | ICD-10-CM

## 2024-01-29 DIAGNOSIS — I10 HTN, GOAL BELOW 140/90: ICD-10-CM

## 2024-01-29 DIAGNOSIS — N40.1 BENIGN PROSTATIC HYPERPLASIA (BPH) WITH URINARY URGENCY: ICD-10-CM

## 2024-01-29 DIAGNOSIS — R39.15 BENIGN PROSTATIC HYPERPLASIA (BPH) WITH URINARY URGENCY: ICD-10-CM

## 2024-01-29 DIAGNOSIS — E78.5 HYPERLIPIDEMIA LDL GOAL <100: ICD-10-CM

## 2024-01-29 DIAGNOSIS — I48.91 ATRIAL FIBRILLATION, UNSPECIFIED TYPE (H): ICD-10-CM

## 2024-01-29 DIAGNOSIS — F17.200 TOBACCO USE DISORDER: ICD-10-CM

## 2024-01-29 LAB
BASOPHILS # BLD AUTO: 0 10E3/UL (ref 0–0.2)
BASOPHILS NFR BLD AUTO: 0 %
EOSINOPHIL # BLD AUTO: 0.1 10E3/UL (ref 0–0.7)
EOSINOPHIL NFR BLD AUTO: 2 %
ERYTHROCYTE [DISTWIDTH] IN BLOOD BY AUTOMATED COUNT: 13 % (ref 10–15)
HCT VFR BLD AUTO: 46.8 % (ref 40–53)
HGB BLD-MCNC: 15.5 G/DL (ref 13.3–17.7)
IMM GRANULOCYTES # BLD: 0 10E3/UL
IMM GRANULOCYTES NFR BLD: 0 %
INR PPP: 1.1 (ref 0.85–1.15)
LYMPHOCYTES # BLD AUTO: 3 10E3/UL (ref 0.8–5.3)
LYMPHOCYTES NFR BLD AUTO: 39 %
MCH RBC QN AUTO: 31.8 PG (ref 26.5–33)
MCHC RBC AUTO-ENTMCNC: 33.1 G/DL (ref 31.5–36.5)
MCV RBC AUTO: 96 FL (ref 78–100)
MONOCYTES # BLD AUTO: 0.6 10E3/UL (ref 0–1.3)
MONOCYTES NFR BLD AUTO: 8 %
NEUTROPHILS # BLD AUTO: 3.9 10E3/UL (ref 1.6–8.3)
NEUTROPHILS NFR BLD AUTO: 51 %
PLATELET # BLD AUTO: 194 10E3/UL (ref 150–450)
RBC # BLD AUTO: 4.88 10E6/UL (ref 4.4–5.9)
WBC # BLD AUTO: 7.7 10E3/UL (ref 4–11)

## 2024-01-29 PROCEDURE — 93000 ELECTROCARDIOGRAM COMPLETE: CPT | Mod: 59 | Performed by: INTERNAL MEDICINE

## 2024-01-29 PROCEDURE — 36415 COLL VENOUS BLD VENIPUNCTURE: CPT | Performed by: INTERNAL MEDICINE

## 2024-01-29 PROCEDURE — 80053 COMPREHEN METABOLIC PANEL: CPT | Performed by: INTERNAL MEDICINE

## 2024-01-29 PROCEDURE — 85610 PROTHROMBIN TIME: CPT | Performed by: INTERNAL MEDICINE

## 2024-01-29 PROCEDURE — 85025 COMPLETE CBC W/AUTO DIFF WBC: CPT | Performed by: INTERNAL MEDICINE

## 2024-01-29 PROCEDURE — 99214 OFFICE O/P EST MOD 30 MIN: CPT | Mod: 25 | Performed by: INTERNAL MEDICINE

## 2024-01-29 PROCEDURE — 71046 X-RAY EXAM CHEST 2 VIEWS: CPT | Mod: TC | Performed by: STUDENT IN AN ORGANIZED HEALTH CARE EDUCATION/TRAINING PROGRAM

## 2024-01-29 PROCEDURE — 69209 REMOVE IMPACTED EAR WAX UNI: CPT | Mod: RT | Performed by: INTERNAL MEDICINE

## 2024-01-29 RX ORDER — TAMSULOSIN HYDROCHLORIDE 0.4 MG/1
0.8 CAPSULE ORAL DAILY
Qty: 180 CAPSULE | Refills: 3 | Status: SHIPPED | OUTPATIENT
Start: 2024-01-29 | End: 2024-07-22

## 2024-01-29 ASSESSMENT — PATIENT HEALTH QUESTIONNAIRE - PHQ9
SUM OF ALL RESPONSES TO PHQ QUESTIONS 1-9: 8
10. IF YOU CHECKED OFF ANY PROBLEMS, HOW DIFFICULT HAVE THESE PROBLEMS MADE IT FOR YOU TO DO YOUR WORK, TAKE CARE OF THINGS AT HOME, OR GET ALONG WITH OTHER PEOPLE: SOMEWHAT DIFFICULT
SUM OF ALL RESPONSES TO PHQ QUESTIONS 1-9: 8

## 2024-01-29 NOTE — PROGRESS NOTES
Preoperative Evaluation  [address] [address]  Phone: [phone]  Primary Provider: Torsten Thomas  Pre-op Performing Provider: TORSTEN THOMAS  Jan 29, 2024       Surgical Information  Surgery/Procedure: Rotator cuff repair, right  Surgery Location: [facility]  Surgeon: Raymond Green MD  Surgery Date: 02/12/2024  Time of Surgery: 0630  Where patient plans to recover: At home with family  Fax number for surgical facility: [phone]    HPI related to upcoming procedure:            This is a 58 year old male who comes in today for a preoperative evaluation.  Mr. Etienne has a non-traumatic right rotator cuff tear. He is scheduled to undergo a right rotator cuff repair on February 12, 2024.           Date: 1/29/2024       Preop Questions   1. Have you ever had a heart attack or stroke? No   2. Have you ever had surgery on your heart or blood vessels, such as a stent placement, a coronary artery bypass, or surgery on an artery in your head, neck, heart, or legs? No   3. Do you have chest pain with activity? No   4. Do you have a history of  heart failure? No   5. Do you currently have a cold, bronchitis or symptoms of other infection? No   6. Do you have a cough, shortness of breath, or wheezing? Yes   7. Do you or anyone in your family have previous history of blood clots? No   8. Do you or does anyone in your family have a serious bleeding problem such as prolonged bleeding following surgeries or cuts? No   9. Have you ever had problems with anemia or been told to take iron pills? No   10. Have you had any abnormal blood loss such as black, tarry or bloody stools? No   11. Have you ever had a blood transfusion? No   12. Are you willing to have a blood transfusion if it is medically needed before, during, or after your surgery? Yes   13. Have you or any of your relatives ever had problems with anesthesia? No    14. Do you have sleep apnea, excessive snoring or daytime drowsiness? No   14a. Do you have a CPAP machine? No   15. Do you have any artifical heart valves or other implanted medical devices like a pacemaker, defibrillator, or continuous glucose monitor? No   16. Do you have artificial joints? No   17. Are you allergic to latex? No      Health Care Directive  Patient does not have a Health Care Directive or Living Will: But patient wants FULL CODE.    Preoperative Review of    reviewed - no record of controlled substances prescribed.      Patient Active Problem List    Diagnosis Date Noted    Hidradenitis suppurativa 11/10/2022     Priority: Medium    Intrinsic atopic dermatitis 11/10/2022     Priority: Medium    Edema of both lower extremities due to peripheral venous insufficiency 11/10/2022     Priority: Medium    Dermatitis, seborrheic 11/10/2022     Priority: Medium      No past medical history on file.  No past surgical history on file.  Current Outpatient Medications   Medication Sig Dispense Refill    clindamycin (CLEOCIN T) 1 % external lotion Use daily after shower 120 mL 11    Compression Bandages KIT 1 each daily Put on the left lower leg against lymphedema 1 kit 2    fenofibrate (TRIGLIDE/LOFIBRA) 160 MG tablet Take 1 tablet (160 mg) by mouth daily 90 tablet 3    fluocinonide (LIDEX) 0.05 % external cream Apply topically 2 times daily      losartan (COZAAR) 25 MG tablet Take 1 tablet (25 mg) by mouth daily 90 tablet 3    meloxicam (MOBIC) 15 MG tablet Take 1 tablet (15 mg) by mouth daily 90 tablet 3    furosemide (LASIX) 20 MG tablet Take 1 tablet (20 mg) by mouth 2 times daily as needed (swelling of both extremities) (Patient not taking: Reported on 1/29/2024) 60 tablet 11    Semaglutide-Weight Management (WEGOVY) 0.25 MG/0.5ML pen Inject 0.25 mg Subcutaneous once a week (Patient not taking: Reported on 1/29/2024) 2 mL 11    tamsulosin (FLOMAX) 0.4 MG capsule Take 1 capsule (0.4 mg) by mouth  "daily (Patient not taking: Reported on 1/29/2024) 90 capsule 3       Allergies   Allergen Reactions    Clobetasol Other (See Comments)     Allergy testing done - Clobetasol-77-Propionate    Shellac Other (See Comments)     Allergy testing done    Trolamine (Triethanolamine) Other (See Comments)     Allergy testing done    Urea Other (See Comments)     Allergy testing done        Social History     Tobacco Use    Smoking status: Every Day     Packs/day: 1.5     Types: Cigarettes     Start date: 11/1/1982    Smokeless tobacco: Never   Substance Use Topics    Alcohol use: Yes       History   Drug Use    Types: Marijuana     Comment: 3x times a week- night time for sleep and back pain     REVIEW OF SYSTEMS    CONSTITUTIONAL: NEGATIVE for fever, chills, change in weight  INTEGUMENTARY/SKIN: NEGATIVE for worrisome rashes, moles or lesions  EYES: NEGATIVE for vision changes or irritation  ENT/MOUTH: NEGATIVE for ear, mouth and throat problems  RESP: NEGATIVE for significant cough or SOB  BREAST: NEGATIVE for masses, tenderness or discharge  CV: NEGATIVE for chest pain, palpitations or peripheral edema  GI: NEGATIVE for nausea, abdominal pain, heartburn, or change in bowel habits  : NEGATIVE for frequency, dysuria, or hematuria  MUSCULOSKELETAL: NEGATIVE for significant arthralgias or myalgia  NEURO: NEGATIVE for weakness, dizziness or paresthesias  ENDOCRINE: NEGATIVE for temperature intolerance, skin/hair changes  HEME: NEGATIVE for bleeding problems  PSYCHIATRIC: NEGATIVE for changes in mood or affect    OBJECTIVE   /72   Pulse 75     Estimated body mass index is 39.85 kg/m  as calculated from the following:    Height as of 11/16/23: 1.88 m (6' 2\").    Weight as of 11/16/23: 140.8 kg (310 lb 6.4 oz).  Physical Exam  GENERAL: alert and no distress  EYES: Eyes grossly normal to inspection and conjunctivae and sclerae normal  HENT: normal cephalic/atraumatic and oral mucous membranes moist. Impacted cerumen, right " ear.  RESP: lungs clear to auscultation - no rales, rhonchi or wheezes  CV: irregularly irregular rhythm and no peripheral edema  ABDOMEN: soft, nontender and bowel sounds normal  MS: no gross musculoskeletal defects noted, no edema  NEURO: Normal strength and tone, mentation intact and speech normal  PSYCH: mentation appears normal, affect normal/bright    Diagnostics   EKG: Atrial fibrillation  Component  Ref Range & Units 9 d ago     INR  0.85 - 1.15 1.10   Resulting Agency [facility]              Specimen Collected: 01/29/24  3:55 PM Last Resulted: 01/29/24  7:05 PM               1/30/2024  5:25 PM     Component Value Flag Ref Range Units Status   Sodium 140  135 - 145 mmol/L Final   Comment:   Reference intervals for this test were updated on 09/26/2023 to more accurately reflect our healthy population. There may be differences in the flagging of prior results with similar values performed with this method. Interpretation of those prior results can be made in the context of the updated reference intervals.   Potassium 4.3  3.4 - 5.3 mmol/L Final   Carbon Dioxide (CO2) 22  22 - 29 mmol/L Final   Anion Gap 12  7 - 15 mmol/L Final   Urea Nitrogen 17.5  6.0 - 20.0 mg/dL Final   Creatinine 0.80  0.67 - 1.17 mg/dL Final   GFR Estimate >90  >60 mL/min/1.73m2 Final   Calcium 9.7  8.6 - 10.0 mg/dL Final   Chloride 106  98 - 107 mmol/L Final   Glucose 88  70 - 99 mg/dL Final   Alkaline Phosphatase 61  40 - 150 U/L Final   Comment:   Reference intervals for this test were updated on 11/14/2023 to more accurately reflect our healthy population. There may be differences in the flagging of prior results with similar values performed with this method. Interpretation of those prior results can be made in the context of the updated reference intervals.   AST 24  0 - 45 U/L Final   Comment:   Reference intervals for this test were updated on 6/12/2023 to more accurately reflect our healthy population. There may be differences in  the flagging of prior results with similar values performed with this method. Interpretation of those prior results can be made in the context of the updated reference intervals.   ALT 33  0 - 70 U/L Final   Comment:   Reference intervals for this test were updated on 2023 to more accurately reflect our healthy population. There may be differences in the flagging of prior results with similar values performed with this method. Interpretation of those prior results can be made in the context of the updated reference intervals.     Protein Total 7.2  6.4 - 8.3 g/dL Final   Albumin 4.6  3.5 - 5.2 g/dL Final   Bilirubin Total 0.4  <=1.2 mg/dL Final       CBC with platelets and differential            Component  Ref Range & Units 9 d ago    WBC Count  4.0 - 11.0 10e3/uL 7.7    RBC Count  4.40 - 5.90 10e6/uL 4.88    Hemoglobin  13.3 - 17.7 g/dL 15.5    Hematocrit  40.0 - 53.0 % 46.8    MCV  78 - 100 fL 96    MCH  26.5 - 33.0 pg 31.8    MCHC  31.5 - 36.5 g/dL 33.1    RDW  10.0 - 15.0 % 13.0    Platelet Count  150 - 450 10e3/uL 194    % Neutrophils  % 51    % Lymphocytes  % 39    % Monocytes  % 8    % Eosinophils  % 2    % Basophils  % 0    % Immature Granulocytes  % 0    Absolute Neutrophils  1.6 - 8.3 10e3/uL 3.9    Absolute Lymphocytes  0.8 - 5.3 10e3/uL 3.0    Absolute Monocytes  0.0 - 1.3 10e3/uL 0.6    Absolute Eosinophils  0.0 - 0.7 10e3/uL 0.1    Absolute Basophils  0.0 - 0.2 10e3/uL 0.0    Absolute Immature Granulocytes  <=0.4 10e3/uL 0.0   Resulting Agency [facility]              Specimen Collected: 24  3:55 PM Last Resulted: 24  4:01 PM             [id number]  [id number]  [id number]^VOCAL^REYNALDO^[place] [address]     Name: ADRIEL BAILEY  MRN: [id number]  : 1965  Study Date: 2024 12:55 PM  Age: 58 yrs  Gender: Male  Patient Location: [facility]  Reason For Study: Visit for pre-operative examination, Atrial fibrillation,  unspec  Ordering  Physician: REYNALDO ARRIETA  Referring Physician: REYNALDO ARRIETA     BSA: 2.6 m2  Height: 74 in  Weight: 310 lb  HR: 76  BP: 190/84 mmHg  ______________________________________________________________________________  Procedure  Complete Echo Adult. Definity (NDC #85529-009) given intravenously.  ______________________________________________________________________________  Interpretation Summary     The left ventricle is normal in size.  Left ventricular function is normal.The ejection fraction is 60-65%.  There is mild concentric left ventricular hypertrophy.  Normal right ventricle size and systolic function.  The left atrium is severely dilated.  The right atrium is moderately dilated.  There is mild (1+) mitral regurgitation.  ______________________________________________________________________________  Left Ventricle  The left ventricle is normal in size. Left ventricular function is normal.The  ejection fraction is 60-65%. There is mild concentric left ventricular  hypertrophy. No regional wall motion abnormalities noted.     Right Ventricle  Normal right ventricle size and systolic function.     Atria  The left atrium is severely dilated. The right atrium is moderately dilated.  There is no color Doppler evidence of an atrial shunt.     Mitral Valve  Mitral valve leaflets appear normal. There is mild (1+) mitral regurgitation.     Tricuspid Valve  The tricuspid valve is not well visualized, but is grossly normal. There is  trace tricuspid regurgitation.     Aortic Valve  Aortic valve leaflets appear normal. There is trace aortic regurgitation. No  hemodynamically significant valvular aortic stenosis.     Pulmonic Valve  The pulmonic valve is not well visualized. This degree of valvular  regurgitation is within normal limits.     Vessels  The aorta root is normal. Normal size ascending aorta. IVC diameter <2.1 cm  collapsing >50% with sniff suggests a normal RA pressure of 3 mmHg.      Pericardium  There is no pericardial effusion.     ______________________________________________________________________________  MMode/2D Measurements & Calculations  IVSd: 1.8 cm  LVIDd: 4.9 cm  LVIDs: 3.4 cm  LVPWd: 1.3 cm  FS: 32.0 %     LV mass(C)d: 321.5 grams  LV mass(C)dI: 122.8 grams/m2  Ao root diam: 3.7 cm  LVOT diam: 2.0 cm  LVOT area: 3.1 cm2  Ao root diam index Ht(cm/m): 2.0  Ao root diam index BSA (cm/m2): 1.4  LA Volume (BP): 105.0 ml  LA Volume Index (BP): 40.1 ml/m2     LA Volume Indexed (AL/bp): 44.3 ml/m2  RV Base: 4.5 cm  RWT: 0.52  TAPSE: 2.9 cm     Time Measurements  MM HR: 71.0 BPM     Doppler Measurements & Calculations  MV E max jason: 113.0 cm/sec  MV A max jason: 47.4 cm/sec  MV E/A: 2.4  MV dec time: 0.21 sec  Ao V2 max: 164.0 cm/sec  Ao max P.0 mmHg  Ao V2 mean: 105.0 cm/sec  Ao mean P.0 mmHg  Ao V2 VTI: 33.9 cm  NEEL(I,D): 2.3 cm2  NEEL(V,D): 2.2 cm2     AI P1/2t: 761.7 msec  LV V1 max P.5 mmHg  LV V1 max: 117.0 cm/sec  LV V1 VTI: 24.3 cm  SV(LVOT): 76.3 ml  SI(LVOT): 29.2 ml/m2  PA acc time: 0.07 sec  AV Jason Ratio (DI): 0.71  NEEL Index (cm2/m2): 0.86  E/E': 9.0  E/E' av.1  Lateral E/e': 9.0  Medial E/e': 9.3  Peak E' Jason: 12.6 cm/sec  RV S Jason: 14.4 cm/sec     ______________________________________________________________________________  Report approved by: Kelsey Dolan 2024 04:05 PM     Narrative & Impression   CHEST TWO VIEWS 2024 3:54 PM      HISTORY: Visit for pre-operative examination. Atrial fibrillation,  unspecified type (H).     COMPARISON: None available.                                                                       IMPRESSION: No focal consolidation, pleural effusion or pneumothorax.  Cardiomediastinal silhouette is unremarkable. Mild-to-moderate  degenerative changes of the thoracic spine.     LINDY BELL MD        ASSESSMENT/PLAN  Visit for pre-operative examination  - EKG 12-lead complete w/read - Clinics  -  Echocardiogram Complete; Future  - CBC with platelets and differential  - Comprehensive metabolic panel  - INR  - XR Chest 2 Views  Revised Cardiac Risk Index (RCRI)  The patient has the following serious cardiovascular risks for perioperative complications:   - No serious cardiac risks = 0 points   RCRI Interpretation: 0 points: Class I (very low risk - 0.4% complication rate)  Patient is cleared for surgery.    Atrial fibrillation, unspecified type (H)  New-onset. Echocardiogram shows NO regional wall motion abnormalities. Chest x-rays show NO pulmonary edema. Precipitating factors include hypertension, obesity and presumably untreated sleep apnea. [id number] score is 1. Start Toprol XL 25 mg at bedtime. Defer anticoagulation with Eliquis postoperatively. Patient has upcoming follow-up appointment on 2/19/2024.  - Echocardiogram Complete; Future  - CBC with platelets and differential  - XR Chest 2 Views  - metoprolol succinate ER (TOPROL XL) 25 MG 24 hr tablet; Take 1 tablet (25 mg) by mouth at bedtime    HTN, goal below 140/90  - losartan (COZAAR) 25 MG tablet; Take 1 tablet (25 mg) by mouth at bedtime    Benign prostatic hyperplasia (BPH) with urinary urgency  - tamsulosin (FLOMAX) 0.4 MG capsule; Take 2 capsules (0.8 mg) by mouth daily  - Comprehensive metabolic panel    Tobacco use disorder  - nicotine (NICORETTE) 2 MG gum; Place 1 each (2 mg) inside cheek every hour as needed for nicotine withdrawal symptoms    Impacted cerumen of right ear    Hyperlipidemia LDL goal <100  - fenofibrate (TRIGLIDE/LOFIBRA) 160 MG tablet; Take 1 tablet (160 mg) by mouth daily         Signed Electronically by: Torsten Thomas MD  Copy of this evaluation report is provided to requesting physician.

## 2024-01-30 LAB
ALBUMIN SERPL BCG-MCNC: 4.6 G/DL (ref 3.5–5.2)
ALP SERPL-CCNC: 61 U/L (ref 40–150)
ALT SERPL W P-5'-P-CCNC: 33 U/L (ref 0–70)
ANION GAP SERPL CALCULATED.3IONS-SCNC: 12 MMOL/L (ref 7–15)
AST SERPL W P-5'-P-CCNC: 24 U/L (ref 0–45)
BILIRUB SERPL-MCNC: 0.4 MG/DL
BUN SERPL-MCNC: 17.5 MG/DL (ref 6–20)
CALCIUM SERPL-MCNC: 9.7 MG/DL (ref 8.6–10)
CHLORIDE SERPL-SCNC: 106 MMOL/L (ref 98–107)
CREAT SERPL-MCNC: 0.8 MG/DL (ref 0.67–1.17)
DEPRECATED HCO3 PLAS-SCNC: 22 MMOL/L (ref 22–29)
EGFRCR SERPLBLD CKD-EPI 2021: >90 ML/MIN/1.73M2
GLUCOSE SERPL-MCNC: 88 MG/DL (ref 70–99)
POTASSIUM SERPL-SCNC: 4.3 MMOL/L (ref 3.4–5.3)
PROT SERPL-MCNC: 7.2 G/DL (ref 6.4–8.3)
SODIUM SERPL-SCNC: 140 MMOL/L (ref 135–145)

## 2024-02-05 ENCOUNTER — HOSPITAL ENCOUNTER (OUTPATIENT)
Dept: CARDIOLOGY | Facility: CLINIC | Age: 59
Discharge: HOME | End: 2024-02-05
Attending: INTERNAL MEDICINE | Admitting: INTERNAL MEDICINE
Payer: COMMERCIAL

## 2024-02-05 DIAGNOSIS — Z01.818 VISIT FOR PRE-OPERATIVE EXAMINATION: ICD-10-CM

## 2024-02-05 DIAGNOSIS — I48.91 ATRIAL FIBRILLATION, UNSPECIFIED TYPE (H): ICD-10-CM

## 2024-02-05 LAB — LVEF ECHO: NORMAL

## 2024-02-05 PROCEDURE — 93306 TTE W/DOPPLER COMPLETE: CPT | Mod: 26 | Performed by: INTERNAL MEDICINE

## 2024-02-05 PROCEDURE — C8929 TTE W OR WO FOL WCON,DOPPLER: HCPCS

## 2024-02-05 PROCEDURE — 255N000002 HC RX 255 OP 636: Performed by: INTERNAL MEDICINE

## 2024-02-05 RX ADMIN — PERFLUTREN 3 ML: 6.52 INJECTION, SUSPENSION INTRAVENOUS at 15:23

## 2024-02-07 ENCOUNTER — TELEPHONE (OUTPATIENT)
Dept: FAMILY MEDICINE | Facility: CLINIC | Age: 59
End: 2024-02-07
Payer: COMMERCIAL

## 2024-02-07 ENCOUNTER — DOCUMENTATION ONLY (OUTPATIENT)
Dept: FAMILY MEDICINE | Facility: CLINIC | Age: 59
End: 2024-02-07
Payer: COMMERCIAL

## 2024-02-07 VITALS — SYSTOLIC BLOOD PRESSURE: 122 MMHG | HEART RATE: 75 BPM | DIASTOLIC BLOOD PRESSURE: 72 MMHG

## 2024-02-07 DIAGNOSIS — E78.5 HYPERLIPIDEMIA LDL GOAL <70: Primary | ICD-10-CM

## 2024-02-07 RX ORDER — METOPROLOL SUCCINATE 25 MG/1
25 TABLET, EXTENDED RELEASE ORAL AT BEDTIME
Qty: 90 TABLET | Refills: 1 | Status: SHIPPED | OUTPATIENT
Start: 2024-02-07 | End: 2024-05-28

## 2024-02-07 RX ORDER — LOSARTAN POTASSIUM 25 MG/1
25 TABLET ORAL AT BEDTIME
Qty: 90 TABLET | Refills: 1 | Status: SHIPPED | OUTPATIENT
Start: 2024-02-07 | End: 2024-08-08

## 2024-02-07 RX ORDER — FENOFIBRATE 160 MG/1
160 TABLET ORAL DAILY
Qty: 90 TABLET | Refills: 3 | Status: SHIPPED | OUTPATIENT
Start: 2024-02-07

## 2024-02-07 NOTE — PROGRESS NOTES
Pre-op, labs and EKG from 1/29/24 faxed to [facility] [phone] on 2/7/24 at 11:56am.

## 2024-02-07 NOTE — PROGRESS NOTES
Pre-op, labs and EKG from 1/29/24 faxed to [facility] [phone] on 2/7/24 at 11:56am.

## 2024-02-07 NOTE — TELEPHONE ENCOUNTER
Pt requesting results of echo done on 2/5/24. Please review when able. Routing to provider.     Thank you - Bridgette Del Rio, DINORAHN, RN

## 2024-02-07 NOTE — TELEPHONE ENCOUNTER
Medication Question or Refill    Pt called and is asking why his atorvastatin was cancelled he called the pharmacy and they said it was discontinued      Pended pt pref pharmacy but unable to pend medication since it is no longer on pt acct  What medication are you calling about (include dose and sig)?:    Disp Refills Start End ILDEFONSO   atorvastatin (LIPITOR) 40 MG tablet (Discontinued) 90 tablet 3 12/14/2022 11/16/2023 No   Sig - Route: Take 1 tablet (40 mg) by mouth daily - Oral   Sent to pharmacy as: Atorvastatin Calcium 40 MG Oral Tablet (LIPITOR)   Class: E-Prescribe   Reason for Discontinue: Therapy completed (No AVS)   Order: [id number]   E-Prescribing Status: Receipt confirmed by pharmacy (12/14/2022 12:56 PM CST)   E-Cancel Status: Request approved by pharmacy (11/16/2023 12:47 PM CST)       E-Cancel Status Note: Medication was dispensed. Most recent: 90 on 10/31/2023 6:53:42 P        CSA -- Patient Level:    CSA: None found at the patient level.         Who prescribed the medication?: Vocal, Torsten Hogue      Do you need a refill? Yes: pt will be out tomorrow    Note from pharmacy?  No    Rx and pharmacy pended per refill request.  Harriet Bahena, CMA

## 2024-02-07 NOTE — PROGRESS NOTES
Patient is scheduled for surgery at an outside facility on February 12, 2024.    Please fax pre-operative note last 1/29/2024.

## 2024-02-07 NOTE — TELEPHONE ENCOUNTER
Test Results    Contacts         Type Contact Phone/Fax    02/07/2024 09:36 AM CST Phone (Incoming) Karl Etienne (Self) [phone] (M)            Who ordered the test:  Dr. Thomas    Type of test: Echocardiogram     Date of test:  02/05/24    Where was the test performed:  [facility]    What are your questions/concerns?:  Would like to discuss results so he knows how to proceed with surgery.    Could we send this information to you in [facility] or would you prefer to receive a phone call?:   Patient would prefer a phone call   Okay to leave a detailed message?: Yes at Cell number on file:    Telephone Information:   Mobile [phone]

## 2024-02-08 RX ORDER — ATORVASTATIN CALCIUM 10 MG/1
10 TABLET, FILM COATED ORAL DAILY
Qty: 90 TABLET | Refills: 3 | Status: SHIPPED | OUTPATIENT
Start: 2024-02-08

## 2024-02-08 NOTE — TELEPHONE ENCOUNTER
Outpatient Medication Detail     Disp Refills Start End ILDEFONSO   atorvastatin (LIPITOR) 10 MG tablet 90 tablet 3 2/8/2024 -- --   Sig - Route: Take 1 tablet (10 mg) by mouth daily - Oral   Sent to pharmacy as: Atorvastatin Calcium 10 MG Oral Tablet (LIPITOR)   Class: E-Prescribe   Order: [id number]   E-Prescribing Status: Receipt confirmed by pharmacy (2/8/2024  8:34 AM CST)     Printout Tracking    External Result Report     Pharmacy    [id number] - [place], [place] - [address]

## 2024-02-12 ENCOUNTER — TRANSFERRED RECORDS (OUTPATIENT)
Dept: HEALTH INFORMATION MANAGEMENT | Facility: CLINIC | Age: 59
End: 2024-02-12
Payer: COMMERCIAL

## 2024-02-19 ENCOUNTER — VIRTUAL VISIT (OUTPATIENT)
Dept: FAMILY MEDICINE | Facility: CLINIC | Age: 59
End: 2024-02-19
Payer: COMMERCIAL

## 2024-02-19 ENCOUNTER — TELEPHONE (OUTPATIENT)
Dept: FAMILY MEDICINE | Facility: CLINIC | Age: 59
End: 2024-02-19

## 2024-02-19 DIAGNOSIS — I10 HYPERTENSION GOAL BP (BLOOD PRESSURE) < 140/90: ICD-10-CM

## 2024-02-19 DIAGNOSIS — G47.00 INSOMNIA, UNSPECIFIED TYPE: ICD-10-CM

## 2024-02-19 DIAGNOSIS — I48.19 ATRIAL FIBRILLATION, PERSISTENT (H): Primary | ICD-10-CM

## 2024-02-19 DIAGNOSIS — R68.89 SENSATION OF SWOLLEN THROAT: ICD-10-CM

## 2024-02-19 PROCEDURE — 99443 PR PHYSICIAN TELEPHONE EVALUATION 21-30 MIN: CPT | Mod: 93 | Performed by: INTERNAL MEDICINE

## 2024-02-19 RX ORDER — APIXABAN 5 MG (74)
KIT ORAL
Qty: 74 EACH | Refills: 0 | Status: SHIPPED | OUTPATIENT
Start: 2024-02-19 | End: 2024-02-19

## 2024-02-19 RX ORDER — ZOLPIDEM TARTRATE 10 MG/1
10 TABLET ORAL
Qty: 30 TABLET | Refills: 2 | Status: SHIPPED | OUTPATIENT
Start: 2024-02-19 | End: 2024-05-24 | Stop reason: SINTOL

## 2024-02-19 NOTE — TELEPHONE ENCOUNTER
Pharmacy calling regarding Eliquis starter pack that was prescribed today.  State that they do not have the starter pack in stock.    They can fill 5mg tabs or they can order the prescription to be filled tomorrow.    Routing to provider.    Myesha Cho RN  [facility]

## 2024-02-20 NOTE — TELEPHONE ENCOUNTER
Outpatient Medication Detail     Disp Refills Start End ILDEFONSO   apixaban ANTICOAGULANT (ELIQUIS) 5 MG tablet 60 tablet 11 2/20/2024 -- No   Sig - Route: Take 1 tablet (5 mg) by mouth 2 times daily - Oral   Sent to pharmacy as: Apixaban 5 MG Oral Tablet (ELIQUIS)   Class: E-Prescribe   Notes to Pharmacy: Cancel Rx for Apixaban Starter Pack.   Order: [id number]   E-Prescribing Status: Receipt confirmed by pharmacy (2/19/2024  6:03 PM CST)

## 2024-02-23 ENCOUNTER — TELEPHONE (OUTPATIENT)
Dept: FAMILY MEDICINE | Facility: CLINIC | Age: 59
End: 2024-02-23
Payer: COMMERCIAL

## 2024-02-23 NOTE — TELEPHONE ENCOUNTER
Reason for Call:  Appointment Request    Patient requesting this type of appt:  cardiology follow up     Requested provider: Torsten Thomas    Reason patient unable to be scheduled: Not within requested timeframe    When does patient want to be seen/preferred time: 1-2 weeks    Comments: patient requesting f/up appt with provider     Could we send this information to you in [facility] or would you prefer to receive a phone call?:   Patient would prefer a phone call   Okay to leave a detailed message?: Yes at Home number on file [phone] (home)      Kimberly BOTELLO,    [facility]      Call taken on 2/23/2024 at 4:00 PM by Kimberly Almaraz

## 2024-02-29 ENCOUNTER — OFFICE VISIT (OUTPATIENT)
Dept: CARDIOLOGY | Facility: CLINIC | Age: 59
End: 2024-02-29
Payer: COMMERCIAL

## 2024-02-29 VITALS
HEART RATE: 68 BPM | DIASTOLIC BLOOD PRESSURE: 76 MMHG | HEIGHT: 74 IN | RESPIRATION RATE: 16 BRPM | BODY MASS INDEX: 40.43 KG/M2 | SYSTOLIC BLOOD PRESSURE: 141 MMHG | OXYGEN SATURATION: 95 % | TEMPERATURE: 98.7 F | WEIGHT: 315 LBS

## 2024-02-29 DIAGNOSIS — E78.5 DYSLIPIDEMIA: ICD-10-CM

## 2024-02-29 DIAGNOSIS — I10 HYPERTENSION, UNSPECIFIED TYPE: ICD-10-CM

## 2024-02-29 DIAGNOSIS — I48.19 ATRIAL FIBRILLATION, PERSISTENT (H): ICD-10-CM

## 2024-02-29 DIAGNOSIS — R06.09 DOE (DYSPNEA ON EXERTION): ICD-10-CM

## 2024-02-29 DIAGNOSIS — I48.91 ATRIAL FIBRILLATION, UNSPECIFIED TYPE (H): Primary | ICD-10-CM

## 2024-02-29 PROCEDURE — 99204 OFFICE O/P NEW MOD 45 MIN: CPT | Performed by: INTERNAL MEDICINE

## 2024-02-29 RX ORDER — HYDROXYZINE PAMOATE 25 MG/1
CAPSULE ORAL
COMMUNITY
Start: 2024-02-09 | End: 2024-05-24

## 2024-02-29 RX ORDER — TRIAMCINOLONE ACETONIDE 1 MG/G
CREAM TOPICAL
COMMUNITY
Start: 2024-01-10

## 2024-02-29 NOTE — PROGRESS NOTES
[facility] [address], [place] [address]   [email or web address]   OFFICE: [phone]            Impression and Plan     1.  Atrial fibrillation.  Karl was diagnosed with atrial fibrillation as part of a preoperative visit.  He was started on apixaban 5 mg twice daily.  Ventricular response would appear to be reasonable on low-dose metoprolol.  Echocardiogram 29 January 2024 revealed normal left ventricular systolic performance, but significant biatrial enlargement.  Parenthetically, he has suspected obstructive sleep apnea and he has been referred to Sleep Medicine for further evaluation.    As noted below, Karl feels he may have been in atrial fibrillation for an extended period of time, perhaps up to years.  Plan:  Continue apixaban 5 mg twice daily.  Continue metoprolol succinate 25 mg daily.  Will obtain 24  Holter monitor to ensure ventricular response is well-controlled.  Will consider eventual direct-current cardioversion, but would be ideal to have him treated for sleep apnea if found to have evidence of JIM prior to cardioversion attempt.    2.  Shortness of breath/dyspnea.  Karl does report mild dyspnea/shortness of breath.  Karl does have risk factors for development of coronary disease.  Do feel it prudent to pursue ischemic workup.  She recently had surgery involving the right upper extremity and therefore will hold off until fully convalesced in this regard.  Plan:  Stress nuclear perfusion study in approximately 4-6 weeks.    3.  Hypertension.  Continue losartan.  Continue metoprolol succinate.    3.  Dyslipidemia.  Continue atorvastatin.    4.  Tobacco abuse.  Karl states that he is fairly intent on full smoking cessation.    35 minutes spent reviewing prior records (including documentation, laboratory studies, cardiac testing/imaging), interview with patient along with physical exam, planning, and subsequent documentation/crafting of  "note).           History of Present Illness    Once again I would like to thank you again for asking me to participate in the care of your patient, Angel Etienne.  As you know, but to reiterate for my own records, Angel Etienne is a 58 year old male with diagnosed with atrial fibrillation as part of a preoperative visit.  He was started on apixaban 5 mg twice daily.    On interview, Karl denies significant palpitations despite atrial fibrillation.  He does say in retrospect that he has noted an irregular pulse even as far back as 2 years ago.  He feels he may have been in atrial fibrillation for months if not even years.  He does report some shortness of breath with certain activities though he had historically attributed this to excess weight and deconditioning.  He reports no chest pain symptoms.  Denies any lightheadedness.    Further review of systems is otherwise negative/noncontributory (medical record and 13 point review of systems reviewed as well and pertinent positives noted).         Cardiac Diagnostics        Echocardiogram 29 January 2024:  Normal left ventricular size and systolic performance with ejection fraction of 60 to 65%.  Mild increase in left ventricular wall thickness.  No significant valvular heart disease.  Normal right ventricular size and systolic performance.  Severe left atrial enlargement.  Moderate right atrial enlargement.    Twelve-lead ECG (personally reviewed) 29 January 2024: Atrial fibrillation with heart rate of 65 bpm.  Cannot rule out anterolateral infarct.    Chest radiograph 29 January 2024:  No focal consolidation, pleural effusion or pneumothorax.  Cardiomediastinal silhouette is unremarkable.   Mild-to-moderate degenerative changes of the thoracic spine.           Physical Examination       BP (!) 141/76 (BP Location: Left arm, Patient Position: Sitting, Cuff Size: Adult Large)   Pulse 68   Temp 98.7  F (37.1  C) (Oral)   Resp 16   Ht 1.88 m (6' 2\")   Wt " 142.9 kg (315 lb)   SpO2 95%   BMI 40.44 kg/m          Wt Readings from Last 3 Encounters:   02/29/24 142.9 kg (315 lb)   11/16/23 140.8 kg (310 lb 6.4 oz)   12/14/22 140.8 kg (310 lb 6.4 oz)       The patient is alert and oriented times three. Sclerae are anicteric. Mucosal membranes are moist. Jugular venous pressure is normal. No significant adenopathy/thyromegally appreciated. Lungs are clear with good expansion. On cardiovascular exam, the patient has an irregular S1 and S2.  Heart sounds are distant.  Abdomen is soft and non-tender. Extremities reveal no clubbing, cyanosis, or edema.       Family History/Social History/Risk Factors   Patient not smoke.  Family history reviewed         Medical History  Surgical History Family History Social History   Atrial fibrillation  Dyslipidemia  Hypertension       Social History     Socioeconomic History    Marital status: Single     Spouse name: Not on file    Number of children: Not on file    Years of education: Not on file    Highest education level: Not on file   Occupational History    Not on file   Tobacco Use    Smoking status: Every Day     Packs/day: 1.5     Types: Cigarettes     Start date: 11/1/1982    Smokeless tobacco: Never   Vaping Use    Vaping Use: Never used   Substance and Sexual Activity    Alcohol use: Yes    Drug use: Yes     Types: Marijuana     Comment: 3x times a week- night time for sleep and back pain    Sexual activity: Not Currently     Partners: Female   Other Topics Concern    Not on file   Social History Narrative    Not on file     Social Determinants of Health     Financial Resource Strain: Low Risk  (1/29/2024)    Financial Resource Strain     Within the past 12 months, have you or your family members you live with been unable to get utilities (heat, electricity) when it was really needed?: No   Food Insecurity: Low Risk  (1/29/2024)    Food Insecurity     Within the past 12 months, did you worry that your food would run out before  you got money to buy more?: No     Within the past 12 months, did the food you bought just not last and you didn t have money to get more?: No   Transportation Needs: Low Risk  (1/29/2024)    Transportation Needs     Within the past 12 months, has lack of transportation kept you from medical appointments, getting your medicines, non-medical meetings or appointments, work, or from getting things that you need?: No   Physical Activity: Not on file   Stress: Not on file   Social Connections: Not on file   Interpersonal Safety: Low Risk  (1/29/2024)    Interpersonal Safety     Do you feel physically and emotionally safe where you currently live?: Yes     Within the past 12 months, have you been hit, slapped, kicked or otherwise physically hurt by someone?: No     Within the past 12 months, have you been humiliated or emotionally abused in other ways by your partner or ex-partner?: No   Housing Stability: Low Risk  (1/29/2024)    Housing Stability     Do you have housing? : Yes     Are you worried about losing your housing?: No           Medications  Allergies   Current Outpatient Medications   Medication Sig Dispense Refill    apixaban ANTICOAGULANT (ELIQUIS) 5 MG tablet Take 1 tablet (5 mg) by mouth 2 times daily 60 tablet 11    atorvastatin (LIPITOR) 10 MG tablet Take 1 tablet (10 mg) by mouth daily 90 tablet 3    clindamycin (CLEOCIN T) 1 % external lotion Use daily after shower 120 mL 11    Compression Bandages KIT 1 each daily Put on the left lower leg against lymphedema 1 kit 2    fenofibrate (TRIGLIDE/LOFIBRA) 160 MG tablet Take 1 tablet (160 mg) by mouth daily 90 tablet 3    fluocinonide (LIDEX) 0.05 % external cream Apply topically 2 times daily      hydrOXYzine josy (VISTARIL) 25 MG capsule TAKE 1 CAPSULE BY MOUTH EVERY 6 HOURS AS NEEDED FOR MUSCLE SPASM ITCHING OR ANXIETY      losartan (COZAAR) 25 MG tablet Take 1 tablet (25 mg) by mouth at bedtime 90 tablet 1    metoprolol succinate ER (TOPROL XL) 25 MG 24 hr  "tablet Take 1 tablet (25 mg) by mouth at bedtime 90 tablet 1    nicotine (NICORETTE) 2 MG gum Place 1 each (2 mg) inside cheek every hour as needed for nicotine withdrawal symptoms      tamsulosin (FLOMAX) 0.4 MG capsule Take 2 capsules (0.8 mg) by mouth daily 180 capsule 3    triamcinolone (KENALOG) 0.1 % external cream APPLY 1 CREAM EXTERNALLY TWICE DAILY TO CHEST      zolpidem (AMBIEN) 10 MG tablet Take 1 tablet (10 mg) by mouth nightly as needed for sleep (Patient not taking: Reported on 2/29/2024) 30 tablet 2       Allergies   Allergen Reactions    Clobetasol Other (See Comments)     Allergy testing done - Clobetasol-77-Propionate    Inhaled Anticholinergic Agents Other (See Comments)     Stearyl Alcohol - Allergy testing done    No Clinical Screening - See Comments Other (See Comments)     Stearyl Alcohol - Allergy testing done    Octylisothiazolinone; Phenylenediamine - allergy testing done    Shellac Other (See Comments)     Allergy testing done    Trolamine (Triethanolamine) Other (See Comments)     Allergy testing done    Urea Other (See Comments)     Allergy testing done          Lab Results    Chemistry/lipid CBC Cardiac Enzymes/BNP/TSH/INR   Recent Labs   Lab Test 11/16/23  1137   CHOL 185   HDL 36*   LDL 88   TRIG 446*     Recent Labs   Lab Test 11/16/23  1137   LDL 88     Recent Labs   Lab Test 01/29/24  1555      POTASSIUM 4.3   CHLORIDE 106   CO2 22   GLC 88   BUN 17.5   CR 0.80   GFRESTIMATED >90   LEESA 9.7     Recent Labs   Lab Test 01/29/24  1555 11/16/23  1137 12/14/22  1310   CR 0.80 0.77 0.69     Recent Labs   Lab Test 11/16/23  1137 12/14/22  1310 11/10/22  1640   A1C 5.8* 5.8* 6.0*          Recent Labs   Lab Test 01/29/24  1555   WBC 7.7   HGB 15.5   HCT 46.8   MCV 96        Recent Labs   Lab Test 01/29/24  1555   HGB 15.5    No results for input(s): \"TROPONINI\" in the last 36886 hours.  No results for input(s): \"BNP\", \"NTBNPI\", \"NTBNP\" in the last 44921 hours.  No results for " "input(s): \"TSH\" in the last 61443 hours.  Recent Labs   Lab Test 01/29/24  1555   INR 1.10          Medications  Allergies   Current Outpatient Medications   Medication Sig Dispense Refill    apixaban ANTICOAGULANT (ELIQUIS) 5 MG tablet Take 1 tablet (5 mg) by mouth 2 times daily 60 tablet 11    atorvastatin (LIPITOR) 10 MG tablet Take 1 tablet (10 mg) by mouth daily 90 tablet 3    clindamycin (CLEOCIN T) 1 % external lotion Use daily after shower 120 mL 11    Compression Bandages KIT 1 each daily Put on the left lower leg against lymphedema 1 kit 2    fenofibrate (TRIGLIDE/LOFIBRA) 160 MG tablet Take 1 tablet (160 mg) by mouth daily 90 tablet 3    fluocinonide (LIDEX) 0.05 % external cream Apply topically 2 times daily      hydrOXYzine josy (VISTARIL) 25 MG capsule TAKE 1 CAPSULE BY MOUTH EVERY 6 HOURS AS NEEDED FOR MUSCLE SPASM ITCHING OR ANXIETY      losartan (COZAAR) 25 MG tablet Take 1 tablet (25 mg) by mouth at bedtime 90 tablet 1    metoprolol succinate ER (TOPROL XL) 25 MG 24 hr tablet Take 1 tablet (25 mg) by mouth at bedtime 90 tablet 1    nicotine (NICORETTE) 2 MG gum Place 1 each (2 mg) inside cheek every hour as needed for nicotine withdrawal symptoms      tamsulosin (FLOMAX) 0.4 MG capsule Take 2 capsules (0.8 mg) by mouth daily 180 capsule 3    triamcinolone (KENALOG) 0.1 % external cream APPLY 1 CREAM EXTERNALLY TWICE DAILY TO CHEST      zolpidem (AMBIEN) 10 MG tablet Take 1 tablet (10 mg) by mouth nightly as needed for sleep (Patient not taking: Reported on 2/29/2024) 30 tablet 2      Allergies   Allergen Reactions    Clobetasol Other (See Comments)     Allergy testing done - Clobetasol-77-Propionate    Inhaled Anticholinergic Agents Other (See Comments)     Stearyl Alcohol - Allergy testing done    No Clinical Screening - See Comments Other (See Comments)     Stearyl Alcohol - Allergy testing done    Octylisothiazolinone; Phenylenediamine - allergy testing done    Shellac Other (See Comments)     " "Allergy testing done    Trolamine (Triethanolamine) Other (See Comments)     Allergy testing done    Urea Other (See Comments)     Allergy testing done          Lab Results   Lab Results   Component Value Date     01/29/2024    CO2 22 01/29/2024    CO2 25 12/14/2022    BUN 17.5 01/29/2024    BUN 16 12/14/2022     Lab Results   Component Value Date    WBC 7.7 01/29/2024    HGB 15.5 01/29/2024    HCT 46.8 01/29/2024    MCV 96 01/29/2024     01/29/2024     Lab Results   Component Value Date    CHOL 185 11/16/2023    TRIG 446 11/16/2023    HDL 36 11/16/2023     Lab Results   Component Value Date    INR 1.10 01/29/2024     No results found for: \"BNP\"  No results found for: \"CKTOTAL\", \"CKMB\", \"TROPONINI\"  No results found for: \"TSH\"               "

## 2024-02-29 NOTE — LETTER
2/29/2024    Torsten Thomas MD  [address]  [place] [address]    RE: Angel CUENCA Etienne       Dear Colleague,     I had the pleasure of seeing Angel Etienne in the [facility].         [facility] [address], [place] [address]   [email or web address]   OFFICE: [phone]            Impression and Plan     1.  Atrial fibrillation.  Karl was diagnosed with atrial fibrillation as part of a preoperative visit.  He was started on apixaban 5 mg twice daily.  Ventricular response would appear to be reasonable on low-dose metoprolol.  Echocardiogram 29 January 2024 revealed normal left ventricular systolic performance, but significant biatrial enlargement.  Parenthetically, he has suspected obstructive sleep apnea and he has been referred to Sleep Medicine for further evaluation.    As noted below, Karl feels he may have been in atrial fibrillation for an extended period of time, perhaps up to years.  Plan:  Continue apixaban 5 mg twice daily.  Continue metoprolol succinate 25 mg daily.  Will obtain 24  Holter monitor to ensure ventricular response is well-controlled.  Will consider eventual direct-current cardioversion, but would be ideal to have him treated for sleep apnea if found to have evidence of JIM prior to cardioversion attempt.    2.  Shortness of breath/dyspnea.  Karl does report mild dyspnea/shortness of breath.  Karl does have risk factors for development of coronary disease.  Do feel it prudent to pursue ischemic workup.  She recently had surgery involving the right upper extremity and therefore will hold off until fully convalesced in this regard.  Plan:  Stress nuclear perfusion study in approximately 4-6 weeks.    3.  Hypertension.  Continue losartan.  Continue metoprolol succinate.    3.  Dyslipidemia.  Continue atorvastatin.    4.  Tobacco abuse.  Karl states that he is fairly intent on full smoking  cessation.    35 minutes spent reviewing prior records (including documentation, laboratory studies, cardiac testing/imaging), interview with patient along with physical exam, planning, and subsequent documentation/crafting of note).           History of Present Illness    Once again I would like to thank you again for asking me to participate in the care of your patient, Angel Etienne.  As you know, but to reiterate for my own records, Angel Etienne is a 58 year old male with diagnosed with atrial fibrillation as part of a preoperative visit.  He was started on apixaban 5 mg twice daily.    On interview, Karl denies significant palpitations despite atrial fibrillation.  He does say in retrospect that he has noted an irregular pulse even as far back as 2 years ago.  He feels he may have been in atrial fibrillation for months if not even years.  He does report some shortness of breath with certain activities though he had historically attributed this to excess weight and deconditioning.  He reports no chest pain symptoms.  Denies any lightheadedness.    Further review of systems is otherwise negative/noncontributory (medical record and 13 point review of systems reviewed as well and pertinent positives noted).         Cardiac Diagnostics        Echocardiogram 29 January 2024:  Normal left ventricular size and systolic performance with ejection fraction of 60 to 65%.  Mild increase in left ventricular wall thickness.  No significant valvular heart disease.  Normal right ventricular size and systolic performance.  Severe left atrial enlargement.  Moderate right atrial enlargement.    Twelve-lead ECG (personally reviewed) 29 January 2024: Atrial fibrillation with heart rate of 65 bpm.  Cannot rule out anterolateral infarct.    Chest radiograph 29 January 2024:  No focal consolidation, pleural effusion or pneumothorax.  Cardiomediastinal silhouette is unremarkable.   Mild-to-moderate degenerative changes of the  "thoracic spine.           Physical Examination       BP (!) 141/76 (BP Location: Left arm, Patient Position: Sitting, Cuff Size: Adult Large)   Pulse 68   Temp 98.7  F (37.1  C) (Oral)   Resp 16   Ht 1.88 m (6' 2\")   Wt 142.9 kg (315 lb)   SpO2 95%   BMI 40.44 kg/m          Wt Readings from Last 3 Encounters:   02/29/24 142.9 kg (315 lb)   11/16/23 140.8 kg (310 lb 6.4 oz)   12/14/22 140.8 kg (310 lb 6.4 oz)       The patient is alert and oriented times three. Sclerae are anicteric. Mucosal membranes are moist. Jugular venous pressure is normal. No significant adenopathy/thyromegally appreciated. Lungs are clear with good expansion. On cardiovascular exam, the patient has an irregular S1 and S2.  Heart sounds are distant.  Abdomen is soft and non-tender. Extremities reveal no clubbing, cyanosis, or edema.       Family History/Social History/Risk Factors   Patient not smoke.  Family history reviewed         Medical History  Surgical History Family History Social History   Atrial fibrillation  Dyslipidemia  Hypertension       Social History     Socioeconomic History    Marital status: Single     Spouse name: Not on file    Number of children: Not on file    Years of education: Not on file    Highest education level: Not on file   Occupational History    Not on file   Tobacco Use    Smoking status: Every Day     Packs/day: 1.5     Types: Cigarettes     Start date: 11/1/1982    Smokeless tobacco: Never   Vaping Use    Vaping Use: Never used   Substance and Sexual Activity    Alcohol use: Yes    Drug use: Yes     Types: Marijuana     Comment: 3x times a week- night time for sleep and back pain    Sexual activity: Not Currently     Partners: Female   Other Topics Concern    Not on file   Social History Narrative    Not on file     Social Determinants of Health     Financial Resource Strain: Low Risk  (1/29/2024)    Financial Resource Strain     Within the past 12 months, have you or your family members you live " with been unable to get utilities (heat, electricity) when it was really needed?: No   Food Insecurity: Low Risk  (1/29/2024)    Food Insecurity     Within the past 12 months, did you worry that your food would run out before you got money to buy more?: No     Within the past 12 months, did the food you bought just not last and you didn t have money to get more?: No   Transportation Needs: Low Risk  (1/29/2024)    Transportation Needs     Within the past 12 months, has lack of transportation kept you from medical appointments, getting your medicines, non-medical meetings or appointments, work, or from getting things that you need?: No   Physical Activity: Not on file   Stress: Not on file   Social Connections: Not on file   Interpersonal Safety: Low Risk  (1/29/2024)    Interpersonal Safety     Do you feel physically and emotionally safe where you currently live?: Yes     Within the past 12 months, have you been hit, slapped, kicked or otherwise physically hurt by someone?: No     Within the past 12 months, have you been humiliated or emotionally abused in other ways by your partner or ex-partner?: No   Housing Stability: Low Risk  (1/29/2024)    Housing Stability     Do you have housing? : Yes     Are you worried about losing your housing?: No           Medications  Allergies   Current Outpatient Medications   Medication Sig Dispense Refill    apixaban ANTICOAGULANT (ELIQUIS) 5 MG tablet Take 1 tablet (5 mg) by mouth 2 times daily 60 tablet 11    atorvastatin (LIPITOR) 10 MG tablet Take 1 tablet (10 mg) by mouth daily 90 tablet 3    clindamycin (CLEOCIN T) 1 % external lotion Use daily after shower 120 mL 11    Compression Bandages KIT 1 each daily Put on the left lower leg against lymphedema 1 kit 2    fenofibrate (TRIGLIDE/LOFIBRA) 160 MG tablet Take 1 tablet (160 mg) by mouth daily 90 tablet 3    fluocinonide (LIDEX) 0.05 % external cream Apply topically 2 times daily      hydrOXYzine josy (VISTARIL) 25 MG  capsule TAKE 1 CAPSULE BY MOUTH EVERY 6 HOURS AS NEEDED FOR MUSCLE SPASM ITCHING OR ANXIETY      losartan (COZAAR) 25 MG tablet Take 1 tablet (25 mg) by mouth at bedtime 90 tablet 1    metoprolol succinate ER (TOPROL XL) 25 MG 24 hr tablet Take 1 tablet (25 mg) by mouth at bedtime 90 tablet 1    nicotine (NICORETTE) 2 MG gum Place 1 each (2 mg) inside cheek every hour as needed for nicotine withdrawal symptoms      tamsulosin (FLOMAX) 0.4 MG capsule Take 2 capsules (0.8 mg) by mouth daily 180 capsule 3    triamcinolone (KENALOG) 0.1 % external cream APPLY 1 CREAM EXTERNALLY TWICE DAILY TO CHEST      zolpidem (AMBIEN) 10 MG tablet Take 1 tablet (10 mg) by mouth nightly as needed for sleep (Patient not taking: Reported on 2/29/2024) 30 tablet 2       Allergies   Allergen Reactions    Clobetasol Other (See Comments)     Allergy testing done - Clobetasol-77-Propionate    Inhaled Anticholinergic Agents Other (See Comments)     Stearyl Alcohol - Allergy testing done    No Clinical Screening - See Comments Other (See Comments)     Stearyl Alcohol - Allergy testing done    Octylisothiazolinone; Phenylenediamine - allergy testing done    Shellac Other (See Comments)     Allergy testing done    Trolamine (Triethanolamine) Other (See Comments)     Allergy testing done    Urea Other (See Comments)     Allergy testing done          Lab Results    Chemistry/lipid CBC Cardiac Enzymes/BNP/TSH/INR   Recent Labs   Lab Test 11/16/23  1137   CHOL 185   HDL 36*   LDL 88   TRIG 446*     Recent Labs   Lab Test 11/16/23  1137   LDL 88     Recent Labs   Lab Test 01/29/24  1555      POTASSIUM 4.3   CHLORIDE 106   CO2 22   GLC 88   BUN 17.5   CR 0.80   GFRESTIMATED >90   LEESA 9.7     Recent Labs   Lab Test 01/29/24  1555 11/16/23  1137 12/14/22  1310   CR 0.80 0.77 0.69     Recent Labs   Lab Test 11/16/23  1137 12/14/22  1310 11/10/22  1640   A1C 5.8* 5.8* 6.0*          Recent Labs   Lab Test 01/29/24  1555   WBC 7.7   HGB 15.5   HCT  "46.8   MCV 96        Recent Labs   Lab Test 01/29/24  1555   HGB 15.5    No results for input(s): \"TROPONINI\" in the last 65682 hours.  No results for input(s): \"BNP\", \"NTBNPI\", \"NTBNP\" in the last 78486 hours.  No results for input(s): \"TSH\" in the last 30910 hours.  Recent Labs   Lab Test 01/29/24  1555   INR 1.10          Medications  Allergies   Current Outpatient Medications   Medication Sig Dispense Refill    apixaban ANTICOAGULANT (ELIQUIS) 5 MG tablet Take 1 tablet (5 mg) by mouth 2 times daily 60 tablet 11    atorvastatin (LIPITOR) 10 MG tablet Take 1 tablet (10 mg) by mouth daily 90 tablet 3    clindamycin (CLEOCIN T) 1 % external lotion Use daily after shower 120 mL 11    Compression Bandages KIT 1 each daily Put on the left lower leg against lymphedema 1 kit 2    fenofibrate (TRIGLIDE/LOFIBRA) 160 MG tablet Take 1 tablet (160 mg) by mouth daily 90 tablet 3    fluocinonide (LIDEX) 0.05 % external cream Apply topically 2 times daily      hydrOXYzine josy (VISTARIL) 25 MG capsule TAKE 1 CAPSULE BY MOUTH EVERY 6 HOURS AS NEEDED FOR MUSCLE SPASM ITCHING OR ANXIETY      losartan (COZAAR) 25 MG tablet Take 1 tablet (25 mg) by mouth at bedtime 90 tablet 1    metoprolol succinate ER (TOPROL XL) 25 MG 24 hr tablet Take 1 tablet (25 mg) by mouth at bedtime 90 tablet 1    nicotine (NICORETTE) 2 MG gum Place 1 each (2 mg) inside cheek every hour as needed for nicotine withdrawal symptoms      tamsulosin (FLOMAX) 0.4 MG capsule Take 2 capsules (0.8 mg) by mouth daily 180 capsule 3    triamcinolone (KENALOG) 0.1 % external cream APPLY 1 CREAM EXTERNALLY TWICE DAILY TO CHEST      zolpidem (AMBIEN) 10 MG tablet Take 1 tablet (10 mg) by mouth nightly as needed for sleep (Patient not taking: Reported on 2/29/2024) 30 tablet 2      Allergies   Allergen Reactions    Clobetasol Other (See Comments)     Allergy testing done - Clobetasol-77-Propionate    Inhaled Anticholinergic Agents Other (See Comments)     Stearyl " "Alcohol - Allergy testing done    No Clinical Screening - See Comments Other (See Comments)     Stearyl Alcohol - Allergy testing done    Octylisothiazolinone; Phenylenediamine - allergy testing done    Shellac Other (See Comments)     Allergy testing done    Trolamine (Triethanolamine) Other (See Comments)     Allergy testing done    Urea Other (See Comments)     Allergy testing done          Lab Results   Lab Results   Component Value Date     01/29/2024    CO2 22 01/29/2024    CO2 25 12/14/2022    BUN 17.5 01/29/2024    BUN 16 12/14/2022     Lab Results   Component Value Date    WBC 7.7 01/29/2024    HGB 15.5 01/29/2024    HCT 46.8 01/29/2024    MCV 96 01/29/2024     01/29/2024     Lab Results   Component Value Date    CHOL 185 11/16/2023    TRIG 446 11/16/2023    HDL 36 11/16/2023     Lab Results   Component Value Date    INR 1.10 01/29/2024     No results found for: \"BNP\"  No results found for: \"CKTOTAL\", \"CKMB\", \"TROPONINI\"  No results found for: \"TSH\"                   Thank you for allowing me to participate in the care of your patient.      Sincerely,     Patrick Garcia MD     [facility]  cc:   No referring provider defined for this encounter.    "

## 2024-03-07 ENCOUNTER — HOSPITAL ENCOUNTER (OUTPATIENT)
Dept: CARDIOLOGY | Facility: CLINIC | Age: 59
Discharge: HOME | End: 2024-03-07
Attending: INTERNAL MEDICINE | Admitting: INTERNAL MEDICINE
Payer: COMMERCIAL

## 2024-03-07 DIAGNOSIS — I48.91 ATRIAL FIBRILLATION, UNSPECIFIED TYPE (H): ICD-10-CM

## 2024-03-07 PROCEDURE — 93225 XTRNL ECG REC<48 HRS REC: CPT

## 2024-03-11 ENCOUNTER — TELEPHONE (OUTPATIENT)
Dept: FAMILY MEDICINE | Facility: CLINIC | Age: 59
End: 2024-03-11

## 2024-03-11 NOTE — TELEPHONE ENCOUNTER
General Call    Contacts         Type Contact Phone/Fax    03/11/2024 11:43 AM CDT Phone (Incoming) Karl Etienne (Self) [phone] ()          Reason for Call:  Patient's appointment today    What are your questions or concerns:  The results from his heart monitor won't be back today. Does Dr. Thomas still want to see him today or should he reschedule?    Date of last appointment with provider: 2/19/2024    Could we send this information to you in [facility] or would you prefer to receive a phone call?:   Patient would prefer a phone call   Okay to leave a detailed message?: Yes at Home number on file [phone] (home)

## 2024-03-18 PROCEDURE — 93227 XTRNL ECG REC<48 HR R&I: CPT | Performed by: INTERNAL MEDICINE

## 2024-03-19 ENCOUNTER — OFFICE VISIT (OUTPATIENT)
Dept: FAMILY MEDICINE | Facility: CLINIC | Age: 59
End: 2024-03-19
Payer: COMMERCIAL

## 2024-03-19 VITALS
OXYGEN SATURATION: 98 % | HEART RATE: 79 BPM | RESPIRATION RATE: 22 BRPM | TEMPERATURE: 97.4 F | BODY MASS INDEX: 40.43 KG/M2 | WEIGHT: 315 LBS | SYSTOLIC BLOOD PRESSURE: 144 MMHG | DIASTOLIC BLOOD PRESSURE: 80 MMHG | HEIGHT: 74 IN

## 2024-03-19 DIAGNOSIS — N39.43 BENIGN PROSTATIC HYPERPLASIA WITH POST-VOID DRIBBLING: ICD-10-CM

## 2024-03-19 DIAGNOSIS — I48.19 PERSISTENT ATRIAL FIBRILLATION (H): Primary | ICD-10-CM

## 2024-03-19 DIAGNOSIS — I87.2 EDEMA OF BOTH LOWER EXTREMITIES DUE TO PERIPHERAL VENOUS INSUFFICIENCY: ICD-10-CM

## 2024-03-19 DIAGNOSIS — N40.1 BENIGN PROSTATIC HYPERPLASIA WITH POST-VOID DRIBBLING: ICD-10-CM

## 2024-03-19 DIAGNOSIS — I87.2 STASIS DERMATITIS OF BOTH LEGS: ICD-10-CM

## 2024-03-19 DIAGNOSIS — I87.2 VENOUS INSUFFICIENCY OF BOTH LOWER EXTREMITIES: ICD-10-CM

## 2024-03-19 PROCEDURE — 99214 OFFICE O/P EST MOD 30 MIN: CPT | Performed by: INTERNAL MEDICINE

## 2024-03-19 RX ORDER — MUPIROCIN 20 MG/G
OINTMENT TOPICAL 2 TIMES DAILY
Qty: 30 G | Refills: 5 | Status: ON HOLD | OUTPATIENT
Start: 2024-03-19 | End: 2024-08-08

## 2024-03-19 ASSESSMENT — PAIN SCALES - GENERAL: PAINLEVEL: NO PAIN (0)

## 2024-03-19 NOTE — PROGRESS NOTES
[facility] INTERNAL MEDICINE NOTE    Angel Etienne is a 58 year old male who presents to clinic today for the following health issues:    Reason for visit: Atrial fibrillation follow-up   Onset: 1/29/2024 (during preop visit)  Course: same  Associated symptoms: easy fatigability and lower extremity edema  History: none prior to 1/29/2024.  Evaluation: yes. Holter monitor last 3/7/2024. Echo last 2/5/2024.  Precipitating factor: undiagnosed obstructive sleep apnea and morbid obesity  Alleviating factor: none  Complications: No congestive heart failure not CVA.  Therapies tried and outcome: Eliquis and Toprol XL.     Patient Active Problem List   Diagnosis    Hidradenitis suppurativa    Intrinsic atopic dermatitis    Edema of both lower extremities due to peripheral venous insufficiency    Dermatitis, seborrheic    Benign prostatic hyperplasia (BPH) with urinary urgency    Tobacco use disorder    Atrial fibrillation, unspecified type (H)     No past surgical history on file.    Social History     Tobacco Use    Smoking status: Every Day     Packs/day: 1.5     Types: Cigarettes     Start date: 11/1/1982    Smokeless tobacco: Never   Substance Use Topics    Alcohol use: Yes     No family history on file.      Allergies   Allergen Reactions    Clobetasol Other (See Comments)     Allergy testing done - Clobetasol-77-Propionate    Inhaled Anticholinergic Agents Other (See Comments)     Stearyl Alcohol - Allergy testing done    No Clinical Screening - See Comments Other (See Comments)     Stearyl Alcohol - Allergy testing done    Octylisothiazolinone; Phenylenediamine - allergy testing done    Shellac Other (See Comments)     Allergy testing done    Trolamine (Triethanolamine) Other (See Comments)     Allergy testing done    Urea Other (See Comments)     Allergy testing done     Recent Labs   Lab Test 01/29/24  1555 11/16/23  1137 12/14/22  1310  "11/10/22  1640   A1C  --  5.8* 5.8* 6.0*   LDL  --  88  --   --    HDL  --  36*  --   --    TRIG  --  446*  --   --    ALT 33 38  --   --    CR 0.80 0.77 0.69 0.80   GFRESTIMATED >90 >90 >90 >90   POTASSIUM 4.3 4.5 3.9 4.1      BP Readings from Last 3 Encounters:   03/19/24 (!) 144/80   02/29/24 (!) 141/76   01/29/24 122/72    Wt Readings from Last 3 Encounters:   03/19/24 144.2 kg (317 lb 12.8 oz)   02/29/24 142.9 kg (315 lb)   11/16/23 140.8 kg (310 lb 6.4 oz)            ROS:  C: NEGATIVE for fever, chills. POSITIVE for weight gain of 7 lbs since 3/19/2204, which he attributes to sedentary lifestyle after right shoulder surgery last 2/12/2024.  I: NEGATIVE for worrisome rashes, moles or lesions  E: NEGATIVE for vision changes or irritation  E/M: NEGATIVE for ear, mouth and throat problems  R: NEGATIVE for significant cough or SOB  B: NEGATIVE for masses, tenderness or discharge  CV: NEGATIVE for chest pain, palpitations. POSITIVE for lower extremity edema, S/P vein stripping.  GI: NEGATIVE for nausea, abdominal pain, heartburn, or change in bowel habits  : NEGATIVE for  dysuria, or hematuria. POSITIVE for nocturia.  M: NEGATIVE for significant arthralgias or myalgia  N: NEGATIVE for weakness, dizziness or paresthesias  E: NEGATIVE for temperature intolerance, skin/hair changes  H: NEGATIVE for bleeding problems  P: NEGATIVE for changes in mood or affect    OBJECTIVE:                                                    BP (!) 144/80 (BP Location: Left arm, Patient Position: Sitting, Cuff Size: Adult Large)   Pulse 79   Temp 97.4  F (36.3  C) (Tympanic)   Resp 22   Ht 1.88 m (6' 2\")   Wt 144.2 kg (317 lb 12.8 oz)   SpO2 98%   BMI 40.80 kg/m    Body mass index is 40.8 kg/m .  GENERAL: alert, no distress, obese, and fatigued  EYES: Eyes grossly normal to inspection and conjunctivae and sclerae normal  HENT: normal cephalic/atraumatic and oral mucous membranes moist  RESP: lungs clear to auscultation - no " rales, rhonchi or wheezes  CV: irregularly irregular rhythm and no peripheral edema  MS: no gross musculoskeletal defects noted, no edema  NEURO: Normal strength and tone, mentation intact and speech normal  PSYCH: mentation appears normal, affect normal/bright    Diagnostic Test Results:  Holter Monitor Conclusion    Holter monitoring (duration 24hrs).  Continuous atrial fibrillation, 48 to 99bpm, average 69bpm.  There were no pauses of greater than 5 seconds. Nocturnal bradycardia was noted.  Rare premature ventricular contractions (<1%).  Symptom triggers - none.        Electronically signed by Roberto Duarte MD  3/15/2024  3:31 PM     ASSESSMENT/PLAN:                                                      Persistent atrial fibrillation (H)  Ventricular rate is well-controlled with Toprol XL 50 mg/day. Despite his new complaints of easy fatigability, patient denies any orthopnea and lung auscultation is normal. His easy fatigability may be due to his beta-blockers. I reassured Mr. Etienne that Toprol XL remains the ideal drug for rate control, notwithstanding his easy fatigability. Continue Eliquis and I gave him a link to obtain savings card to ease his co-pay cost.    Edema of both lower extremities due to peripheral venous insufficiency  History of vein stripping. Echo last 2/5/2204 shows normal LVEF and normal right ventricular size and systolic function.  - US Venous Competency Bilateral; Future    Stasis dermatitis of both legs  - mupirocin (BACTROBAN) 2 % external ointment; Apply topically 2 times daily    Benign prostatic hyperplasia with post-void dribbling  Worse despite taking Tamsulosin 0.8 mg/day. Avoid Myrbetriq due to concurrent beta-blocker use.  - Adult Urology  Referral; Future     Disposition:  Follow-up in 12 weeks or as needed.    Torsten Thomas MD  [facility]

## 2024-03-19 NOTE — NURSING NOTE
Patient Quality Outreach    Patient is due for the following:   Physical Preventive Adult Physical    Next Steps:   Patient declined follow up at this time.    Type of outreach:  Spoke to patient at appointment      Questions for provider review:    None           Sherley Perez MA

## 2024-04-01 ENCOUNTER — ANCILLARY PROCEDURE (OUTPATIENT)
Dept: VASCULAR ULTRASOUND | Facility: CLINIC | Age: 59
End: 2024-04-01
Attending: INTERNAL MEDICINE
Payer: COMMERCIAL

## 2024-04-01 DIAGNOSIS — I87.2 EDEMA OF BOTH LOWER EXTREMITIES DUE TO PERIPHERAL VENOUS INSUFFICIENCY: ICD-10-CM

## 2024-04-01 PROCEDURE — 93970 EXTREMITY STUDY: CPT

## 2024-04-01 PROCEDURE — 93970 EXTREMITY STUDY: CPT | Mod: 26 | Performed by: SURGERY

## 2024-04-03 NOTE — ADDENDUM NOTE
Addended by: REYNALDO ARRIETA on: 4/3/2024 11:42 AM     Modules accepted: Orders    
rt leg dx clot sent by dr ott

## 2024-04-22 ENCOUNTER — TRANSFERRED RECORDS (OUTPATIENT)
Dept: HEALTH INFORMATION MANAGEMENT | Facility: CLINIC | Age: 59
End: 2024-04-22
Payer: COMMERCIAL

## 2024-04-23 ENCOUNTER — TELEPHONE (OUTPATIENT)
Dept: CARDIOLOGY | Facility: CLINIC | Age: 59
End: 2024-04-23
Payer: COMMERCIAL

## 2024-04-23 DIAGNOSIS — I48.19 ATRIAL FIBRILLATION, PERSISTENT (H): ICD-10-CM

## 2024-04-23 DIAGNOSIS — I10 HYPERTENSION, UNSPECIFIED TYPE: ICD-10-CM

## 2024-04-23 DIAGNOSIS — Z01.818 VISIT FOR PRE-OPERATIVE EXAMINATION: ICD-10-CM

## 2024-04-23 DIAGNOSIS — E78.5 DYSLIPIDEMIA: ICD-10-CM

## 2024-04-23 DIAGNOSIS — R06.09 DOE (DYSPNEA ON EXERTION): ICD-10-CM

## 2024-04-23 DIAGNOSIS — I48.91 ATRIAL FIBRILLATION, UNSPECIFIED TYPE (H): Primary | ICD-10-CM

## 2024-04-23 NOTE — PATIENT INSTRUCTIONS
Angel    Thank you for entrusting your care with us at the [facility].      We are prescribing some compression stockings for you. I have included different suppliers that should help you get measured and fitting to ensure proper fitting socks. You should wear these stockings as much as you can. It is especially important to wear them with long periods of standing, sitting, long car rides or if you will be flying. Compression socks should get refilled every 4-6 months. They do not need to be worn at night while in bed. It is recommended to wear compression level of 20-30mmhg or higher from toes to knees.              Varicose Veins    Varicose veins are twisted, enlarged veins near the surface of the skin. They develop most often in the legs and ankles.    Some people may be more likely than others to get varicose veins because of several things. These include aging, pregnancy, being overweight, or because a parent has them. Standing or sitting for long periods of time can also increase risk of varicose veins.    Follow-up care is a key part of your treatment and safety. Be sure to make and go to all appointments, and call your doctor if you are having problems. It's also a good idea to know your test results and keep a list of the medicines you take.      Varicose veins are caused by weakened valves and veins in your legs. Normally, one-way valves in your veins keep blood flowing from your legs up toward your heart. When these valves don't work as they should, blood collects in your legs, and pressure builds up. The veins become weak, large, and twisted.    How can you care for yourself at home?  Wear compression stockings during the day to help relieve symptoms and improve blood flow. Talk to your doctor about which ones to get and where to get them.  Prop up your legs at or above the level of your heart when possible. Try to do this for about 30 minutes at a time, about 3 times a day.  This helps keep the blood from pooling in your lower legs and improves blood flow to the rest of your body.  Avoid sitting and standing for long periods. This puts added stress on your veins.  Stay at a healthy weight. Lose weight if you need to.  Try to take several short walks every day.  Get at least 30 minutes of exercise on most days of the week. Walking is a good choice. You also may want to do other activities, such as running, swimming, cycling, or playing tennis or team sports.  Do calf muscle exercises every day. When you are sitting down, rotate your feet at the ankles in both directions, making small circles. Extend your legs, and point and flex your feet.  Avoid crossing your legs at the knees when sitting.  Take good care of your skin. Treat cuts and scrapes on your legs right away. Keep your legs clean and moisturized to prevent drying and cracking. Prevent sunburns.  Do not smoke. Smoking can make varicose veins worse. If you need help quitting, talk to your doctor about stop-smoking programs and medicines. These can increase your chances of quitting for good.  If you bump your leg so hard that you know it is likely to bruise, prop up your leg and apply ice or cold packs right away. Apply the ice or cold pack for 10 to 20 minutes, 3 or more times a day. Put a thin cloth between the ice and your skin.  If you cut or scratch the skin over a vein, it may bleed a lot. Prop up your leg and apply firm pressure for a full 15 minutes.  If you have a blood clot in a varicose vein, you may have tenderness and swelling over the vein. The vein may feel firm. Be sure to call your doctor right away if you have these symptoms. If your doctor has told you how to care for the clot, follow the instructions.     Care may include the following:    Prop up your leg and apply a damp cloth that is warm or cool.  Ask your doctor if you can take an over-the-counter pain medicine, such as acetaminophen (Tylenol), ibuprofen  (Advil, Motrin), or naproxen (Aleve). Be safe with medicines. Read and follow all instructions on the label.    When should you call for help?     Call 911 anytime you think you may need emergency care. For example, call if:    You have sudden chest pain and shortness of breath, or you cough up blood.    Call your doctor now or seek immediate medical care if:    You have signs of a blood clot in your leg (called a deep vein thrombosis), such as:  Pain in your calf, back of the knee, thigh, or groin.  Swelling in the leg or groin.  A color change on the leg or groin. The skin may be reddish or purplish, depending on your usual skin color.  A varicose vein begins to bleed and you cannot stop it.  You have a tender lump in your leg.  You get an open sore.    Watch closely for changes in your health, and be sure to contact your doctor if:    Your varicose vein symptoms do not improve with home treatment.    Current as of: December 19, 2022  Author: Healthwise Staff  Medical Review:Chris Mercer MD - Family Medicine & HILDA Carney MD - Internal Medicine & Conrado Gonzalez MD - Family Medicine & Danny Mccall MD - Family Medicine & Eric Quick MD - Diagnostic Radiology    Please bring your compression prescription to a home medical supply store. Here is a list of locations but not limited to.     [facility]  [facility]  [address] [place] [address]  Phone: [phone]  Fax: [phone] [facility]  [address] [place] [address]  Phone: [phone]  Fax: [phone]   [facility]  [address] [place] [address]  Phone: [phone]  Fax: [phone] [facility]  [address]  [address] [place] [address]  Phone: [phone]  Fax: [phone]   [facility]  [address] [place] [address]  Phone: [phone] [facility]   [address] ([facility])  [place] [address]  Phone: [phone]   [place]  [address] [place] [address]      Phone: [phone]  Fax: [phone] [place]  [address] [place] [address]      Phone: [phone]  Fax: [phone]     [facility] [email or web address]  [address], [place] [address]  [phone]    [facility]  [email or web address]  [address] [place] [address]  Phone: [phone]     [address] [place] [address]  Phone: [phone]    [address] [place] [address]  Phone: [phone]    [address], [place] [address]  Phone: [phone] [address], [place] [address]  Phone: [phone]    [facility] [email or web address]  [address] [place] [address]  Phone: [phone]    [address], [place] [address]  Phone: [phone]    [address], [place] [address]  Phone: [phone]    [facility]  [email or web address]  [phone]     Pre-Procedure Instructions for Varicose Vein Ablation   We look forward to scheduling a varicose vein procedure for you. First, we will submit a prior authorization to your insurance company if required. This typically takes 10-14 days. We will contact you once we have gotten the approval to schedule the procedure.  The following is helpful information for you regarding your treatment:  **Important: A  will be needed post procedure.  (Unable to use Taxi or Uber).  Please allow 1-2 hours for your vein procedure appointment.  Take your routine medications as you normally would except for blood thinners. Aspirin is ok to continue.  If you take Warfarin, Xarelto, or Eliquis this will need to be HELD prior to procedure  according to primary care provider or cardiology who prescribes this medication. Please notify us if you take this medication.  We have thigh high compression stocking sizes medium to X-large that will be applied immediately after the procedure. You will need to provide your own if one of these sizes will not fit. Another option is to bring in knee high compression and biker compression shorts.   Please wear comfortable clothing.  We recommend that you bring a change of undergarment in case it gets stained by the cleansing solution.  Feel free to bring a personal music player or a CD to listen to during your procedure.  It is advised not to fly within 3 weeks after the procedure.  You do not have to fast prior to this procedure.  For any questions regarding your procedure please call [phone] to speak with the nurse.  If you would like a Good [place] Estimate for your upcoming procedure contact Cost of Care Estimates at [phone], advocates are available Monday through Friday 8am - 4:30pm.    Radiofrequency Ablation Codes:   - [address]9035 for first vein in either leg  Varicose veins may be a sign of something more severe - venous reflux disease.  Healthy leg veins have valves that keep blood flowing to the heart. Venous reflux disease develops when the valves stop working properly and allow blood to flow backward (i.e., reflux) and pool in the lower leg veins.   If venous reflux disease is left untreated, symptoms can worsen over time. Your doctor can help you understand if you have this condition.     Superficial venous reflux disease may cause the following signs and symptoms in your legs:  Varicose veins  Aching  Swelling  Cramping  Heaviness or tiredness  Itching  Restlessness  Open skin sores    Superficial venous reflux disease treatment aims to reduce or stop the backward flow of blood. The following may be prescribed to treat your superficial venous reflux disease. Your doctor can help you decide which  treatment is best for you:   Compression stockings   Removing diseased vein   Closing diseased vein (through thermal or non-thermal treatment)     Radiofrequency Ablation (RFA) Treatment for Varicose Veins  Radiofrequency ablation (RFA) is a thermal procedure to treat varicose veins. It uses heat created from radiofrequency (RF). During RFA treatment, RF heat is sent into your vein through a thin, flexible tube (catheter). This closes off blood flow in the main problem vein.           Getting ready for your treatment  Tell your provider if you:  Are pregnant or think you may be pregnant  Are breastfeeding  Smoke, use alcohol or street drugs on a regular basis  Have any allergies or intolerances to certain medicines. Explain what reaction you have had to these medicines in the past.      The day of your treatment  The treatment takes 45 to 60 minutes. The entire treatment (including time to prepare and recover) takes about 1 to 3 hours. You can go home the same day. For the treatment:   You'll lie down on a hospital bed.  An imaging method, such as ultrasound, is used to guide the procedure.  The leg to be treated is injected with numbing medicine.  Once your leg is numb, a needle makes a small hole (puncture) in the vein to be treated.  The catheter with the RF heat source is inserted into your vein.  More numbing medicine may be injected around your vein.  Once the catheter is in the right position, it is then slowly drawn backward. As the catheter sends out heat, the vein is closed off.  In some cases, other side branch varicose veins may be removed or tied off through a few small cuts (incisions).  When the treatment is done, the catheter is removed. Pressure is applied to the insertion site to stop any bleeding. An elastic compression stocking or a bandage may then be put on your leg.       Recovering at home  Once at home, follow all the instructions you've been given. Be sure to:  Check for signs of infection  at the catheter insertion sites (see below)  Wear thigh high compressions as directed  Keep your legs raised (elevated) as directed  Walk a few times a day  Avoid heavy exercise, lifting, and standing for long periods as advised. No lifting >20lbs for 2 weeks.   Avoid air travel, hot baths, saunas, or whirlpools as advised  Do not drive or operate heavy machinery for 24 hours after the procedure  Leakage from the numbing medications the first few hours is normal.          Call your healthcare provider if you have any of the following:  Fever of 100.4 F (38 C) or higher, or as directed by your provider  Chest pain or trouble breathing  Signs of infection at the catheter insertion site. These include increased redness or swelling (inflammation), warmth, increasing pain, bleeding, or bad-smelling discharge.  Severe numbness or tingling in the treated leg  Severe pain or swelling in the treated leg      Follow-up  You'll have an ultrasound within the same week as the procedure to check for problems, such as blood clots. You will follow up with the provider after 6 weeks.   Risks and possible complications   These include the following:  Bleeding, Infection, Blood clots, Damage to the nerves in the treated area, Irritation or burning of the skin over the treated vein. Treatment doesn't improve the look or the symptoms of the problem veins  Risks of any medicines used during the treatment

## 2024-04-23 NOTE — TELEPHONE ENCOUNTER
Dr. Clemens,  In the absence of Dr. Garcia will you please advise?  Patient called and informed he will not be able to exercise on the treadmill for [place] Treadmill Stress Test. His legs are swollen and he has much difficulty moving about.  Can this test be changed to pharmacologic?  Thank you,  Norma

## 2024-04-24 ENCOUNTER — OFFICE VISIT (OUTPATIENT)
Dept: VASCULAR SURGERY | Facility: CLINIC | Age: 59
End: 2024-04-24
Attending: INTERNAL MEDICINE
Payer: COMMERCIAL

## 2024-04-24 VITALS
HEART RATE: 74 BPM | HEIGHT: 75 IN | OXYGEN SATURATION: 94 % | RESPIRATION RATE: 18 BRPM | WEIGHT: 314 LBS | BODY MASS INDEX: 39.04 KG/M2 | DIASTOLIC BLOOD PRESSURE: 68 MMHG | SYSTOLIC BLOOD PRESSURE: 135 MMHG

## 2024-04-24 DIAGNOSIS — I87.2 VENOUS INSUFFICIENCY OF BOTH LOWER EXTREMITIES: ICD-10-CM

## 2024-04-24 DIAGNOSIS — I83.893 SYMPTOMATIC VARICOSE VEINS OF BOTH LOWER EXTREMITIES: Primary | ICD-10-CM

## 2024-04-24 PROCEDURE — 99214 OFFICE O/P EST MOD 30 MIN: CPT | Performed by: SPECIALIST

## 2024-04-24 PROCEDURE — 99243 OFF/OP CNSLTJ NEW/EST LOW 30: CPT | Performed by: SPECIALIST

## 2024-04-24 ASSESSMENT — PAIN SCALES - GENERAL: PAINLEVEL: SEVERE PAIN (7)

## 2024-04-24 NOTE — PROGRESS NOTES
"[facility]        Patient is here for a consult to discuss varicose veins. The patient has varicose veins that are problematic in bilateral legs. Symptoms patient has been experiencing are  history wounds, heaviness, aching,  itching, tiredness, cramps, discoloration, and  swelling. Patient states their varicose veins are bothersome when standing,  working, and household chores. 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 Patient has not been using pain medication or anti-inflammatory's. Patient has had recent imaging on legs done. Patient has done conservative measures which include: compression stockings. Pt had right rotator cuff surgery in January and working PT and not able to get them on.Treatment has been unsuccessful due to still having symptoms. Educated patient to work on conservative treatments. Pt had previous vein stripping bilaterally and has family history.    Pt is currently taking Statin and Eliquis.Advised to get ok to hold 3 days prior to left GSV RFA. Reviewed with patient and will preauth through [employer] [place].    /68   Pulse 74   Resp 18   Ht 6' 2.5\" (1.892 m)   Wt 314 lb (142.4 kg)   SpO2 94%   BMI 39.78 kg/m        The provider has been notified that the patient has no concerns.     Questions patient would like addressed today are: N/A.    Refills are needed: N/A    Has homecare services and agency name:  No           "

## 2024-04-24 NOTE — PROGRESS NOTES
[facility] Consult      Assessment:     1. varicose veins, bilateral   2. spider veins, bilateral   3. Insuffiencey of left greater saphenous vein,       Plan:     1. Treatment options of conservative therapy of stockings use, exercise, weight loss, elevating legs when possible.    2. Script for compression stockings 20-30 mm hg  3. Ultrasound to evaluate legs for incompetency of both deep and superficial system .   4. Surgical treatment   Endovenous closure ofleft, greater saphenous vein     Risks and benefits of surgical intervention including infection, burns, dvt, thrombophlebitis, not closing, recurrence, numbness and nerve injury and need for further intervention were all discused    5. Follow up:  for procedure if approved  .   6. Call for any questions concerns or issues    Subjective:      Angel Etienne is a 58 year old male  who was referred by Torsten Thomas  for evaluation of varicose veins. Symptoms include pain, aching, fatigue, burning, edema, and dermatitis. Patient has history of leg selling, pain and vein issues that have progressed. Pain and symptoms have affected daily living and work activities needing medications. Here for evaluation today. Stocking use with compression stockings of 20-30 mm hg or greater for greater then 3 months    Allergies:Clobetasol, Inhaled anticholinergic agents, No clinical screening - see comments, Shellac, Trolamine (triethanolamine), and Urea    History reviewed. No pertinent past medical history.    History reviewed. No pertinent surgical history.      Current Outpatient Medications:     apixaban ANTICOAGULANT (ELIQUIS) 5 MG tablet, Take 1 tablet (5 mg) by mouth 2 times daily, Disp: 60 tablet, Rfl: 11    atorvastatin (LIPITOR) 10 MG tablet, Take 1 tablet (10 mg) by mouth daily, Disp: 90 tablet, Rfl: 3    fenofibrate (TRIGLIDE/LOFIBRA) 160 MG tablet, Take 1 tablet (160 mg) by mouth daily, Disp: 90 tablet, Rfl: 3    losartan (COZAAR) 25 MG  tablet, Take 1 tablet (25 mg) by mouth at bedtime, Disp: 90 tablet, Rfl: 1    metoprolol succinate ER (TOPROL XL) 25 MG 24 hr tablet, Take 1 tablet (25 mg) by mouth at bedtime, Disp: 90 tablet, Rfl: 1    tamsulosin (FLOMAX) 0.4 MG capsule, Take 2 capsules (0.8 mg) by mouth daily, Disp: 180 capsule, Rfl: 3    clindamycin (CLEOCIN T) 1 % external lotion, Use daily after shower (Patient not taking: Reported on 4/24/2024), Disp: 120 mL, Rfl: 11    Compression Bandages KIT, 1 each daily Put on the left lower leg against lymphedema (Patient not taking: Reported on 4/24/2024), Disp: 1 kit, Rfl: 2    fluocinonide (LIDEX) 0.05 % external cream, Apply topically 2 times daily (Patient not taking: Reported on 4/24/2024), Disp: , Rfl:     hydrOXYzine josy (VISTARIL) 25 MG capsule, TAKE 1 CAPSULE BY MOUTH EVERY 6 HOURS AS NEEDED FOR MUSCLE SPASM ITCHING OR ANXIETY (Patient not taking: Reported on 4/24/2024), Disp: , Rfl:     mupirocin (BACTROBAN) 2 % external ointment, Apply topically 2 times daily (Patient not taking: Reported on 4/24/2024), Disp: 30 g, Rfl: 5    nicotine (NICORETTE) 2 MG gum, Place 1 each (2 mg) inside cheek every hour as needed for nicotine withdrawal symptoms (Patient not taking: Reported on 4/24/2024), Disp: , Rfl:     triamcinolone (KENALOG) 0.1 % external cream, APPLY 1 CREAM EXTERNALLY TWICE DAILY TO CHEST (Patient not taking: Reported on 4/24/2024), Disp: , Rfl:     zolpidem (AMBIEN) 10 MG tablet, Take 1 tablet (10 mg) by mouth nightly as needed for sleep (Patient not taking: Reported on 4/24/2024), Disp: 30 tablet, Rfl: 2     Family History   Problem Relation Age of Onset    Varicose Veins Father         reports that he has been smoking cigarettes. He started smoking about 41 years ago. He has a 62.2 pack-year smoking history. He has never used smokeless tobacco. He reports current alcohol use. He reports current drug use. Drug: Marijuana.      Review of Systems:    Pertinent items are noted in  "HPI.  Patient has symptomatic veins and changes of bilateral legs. These have progressed to the point of causing symptoms on a daily basis. This causes issues with daily activities and chores such as mowing lawn, outdoor upkeep, and standing for long lengths of time       Objective:     Vitals:    04/24/24 0750   BP: 135/68   Pulse: 74   Resp: 18   SpO2: 94%   Weight: 142.4 kg (314 lb)   Height: 1.892 m (6' 2.5\")     Body mass index is 39.78 kg/m .    EXAM:  GENERAL: This is a well-developed 58 year old male who appears his stated age  HEAD: normocephalic  HEENT: Pupils equal and reactive bilaterally  MOUTH: mucus membranes intact. Normal dentation  CARDIAC: RRR without murmur  CHEST/LUNG:  Clear to auscultation bilaterally  ABDOMEN: Soft, nontender, nondistended, no masses noted   NEUROLOGIC: Focally intact, nonfocal, alert and oriented x 3  INTEGUMENT: No open lesions or ulcers  VASCULAR: Pulses intact, symmetrical upper and lower extremities. There areskin changes consistent with chronic venous insufficiency. Varicose veins present in bilateral greater saphenous distribution. Spider veins present bilateral.                     Imaging:    Exam Information    Exam Date Exam Time Accession # Performing Department Results    4/1/24  3:44 PM [id number] [facility] [place]      PACS Images     Show images for US Venous Competency Bilateral     Study Result    Narrative & Impression   BILATERAL Venous Insufficiency Ultrasound (Date: 04/01/24)    BILATERAL Lower Extremity          Indication:  Surveillance Bilateral Symptomatic Varicose Veins, Pain, and Swelling. Bilateral Ankles Discolored. Hx: Vein Stripping at [facility].      Previous: 11/11/2016.      Patient History: Swelling, Stasis, Vein Stripping, and Obesity     Presenting Symptoms:  Swelling, Varicose Veins, Pain, and Stasis     Technique:   Supine and Upright Ultrasound of the Deep and Superficial Veins with " Valsalva and Compression Augmentation Maneuvers. Duplex Imaging is performed utilizing gray-scale, Two-dimensional images, color-flow imaging, Doppler waveform analysis, and Spectral doppler imaging done with provacative maneuvers.      Incompetency Criteria:  Deep vein reflux reported when greater than 1000 msec flow reversal. Superficial vein reflux reported when equal to or greater than 600 msec flow reversal.  vein reflux reported as greater than 350 msec flow reversal.      Right  Leg Deep Veins    CFV SFJ DFV PROX FV   PROX FV MID FV DIST POP V. PERON.   V. PTV'S   Compressibility  (FC,PC,NC) FC FC FC FC FC FC FC FC FC   Reflux +   - - - - -   -         Right Leg Superficial Veins  Location SFJ PROX THIGH MID THIGH KNEE MID CALF   GSV (mm) 12.1 Stripped  Stripped Stripped Stripped   Reflux + NA NA NA NA   AASV (mm) 3.6           Reflux -           PASV (mm)   3.8          Reflux   -            Location SPJ PROX CALF MID CALF   SSV (mm) 3.8 Stripped  Stripped   Reflux - NA NA      Left  Leg Deep Veins    CFV SFJ DFV PROX FV   PROX FV MID FV DIST POP V. PERON.   V. PTV'S   Compressibility  (FC,PC,NC) FC FC FC FC FC FC FC FC FC   Reflux -   - - - - -   -         Left Leg Superficial Veins  Location SFJ PROX THIGH KNEE MID CALF   GSV (mm) 7.9 5.3 2.4 4.1   Reflux + + - -   AASV (mm) 3.2         Reflux -            Location SPJ PROX CALF MID CALF   SSV (mm) 5.1 4.0 4.0   Reflux - - -      Comments: No incompetent  veins visualized.  Left GSV Proximal Thigh Anterior Branch Incomeptent ( 4.6 mm/ 2406ms).  Left GSV is straight enough to ablate.      Impression:       Right Deep Vein Findings: Patent deep venous system with no evidence of DVT and with focal reflux and common femoral vein     Left Deep Vein Findings: Patent deep venous system with no evidence of DVT and without reflux     Superficial Vein Findings:      Right Greater Saphenous Vein: Patent Greater Saphenous vein with Reflux noted at  the level of saphenofemoral junction with a Maximum diameter of 12.1 mm.  The rest of the GSV is not visualized due to history of stripping .     Right Small Saphenous Vein:  Not visualized consistent with history of stripping .     Left Greater Saphenous Vein: Patent Greater Saphenous vein with Reflux noted from proximal thigh to saphenofemoral junction with a Maximum diameter of 7.9 mm .     Left Small Saphenous Vein: Patent Small Saphenous Vein without evidence of reflux.        Reference:     Compressibility: FC= Fully compressible, PC= Partially compressible, NC= Non-compressible, NV= Not Visualized     Reflux: (+) Incompetent  (-) Competent, (NV) = Not Visualized     Interpretation criteria:          Duration of Retrograde flow (milliseconds)  Category Deep Veins Superficial Veins  veins   Competent < 1000ms < 500ms < 350ms   Incompetent > 1000ms > 500ms > 350ms           Carlos Guevara MD  General Surgery [phone]  Vascular Surgery [phone]

## 2024-04-24 NOTE — TELEPHONE ENCOUNTER
New order placed and message sent to scheduling to change stress test.  ------------------------------------  Monico Clemens MD  You13 hours ago (5:18 PM)     CC  Yes, please switch to pharmacologic nuclear stress test to evaluate for ischemic cause of symptoms. cmc

## 2024-04-25 ENCOUNTER — HOSPITAL ENCOUNTER (OUTPATIENT)
Dept: NUCLEAR MEDICINE | Facility: HOSPITAL | Age: 59
Discharge: HOME | End: 2024-04-25
Attending: INTERNAL MEDICINE
Payer: COMMERCIAL

## 2024-04-25 ENCOUNTER — TELEPHONE (OUTPATIENT)
Dept: VASCULAR SURGERY | Facility: CLINIC | Age: 59
End: 2024-04-25

## 2024-04-25 ENCOUNTER — HOSPITAL ENCOUNTER (OUTPATIENT)
Dept: CARDIOLOGY | Facility: HOSPITAL | Age: 59
Discharge: HOME | End: 2024-04-25
Attending: INTERNAL MEDICINE
Payer: COMMERCIAL

## 2024-04-25 DIAGNOSIS — I10 HYPERTENSION, UNSPECIFIED TYPE: ICD-10-CM

## 2024-04-25 DIAGNOSIS — I48.91 ATRIAL FIBRILLATION, UNSPECIFIED TYPE (H): ICD-10-CM

## 2024-04-25 DIAGNOSIS — E78.5 DYSLIPIDEMIA: ICD-10-CM

## 2024-04-25 DIAGNOSIS — R06.09 DOE (DYSPNEA ON EXERTION): ICD-10-CM

## 2024-04-25 DIAGNOSIS — Z01.818 VISIT FOR PRE-OPERATIVE EXAMINATION: ICD-10-CM

## 2024-04-25 DIAGNOSIS — I48.19 ATRIAL FIBRILLATION, PERSISTENT (H): ICD-10-CM

## 2024-04-25 LAB
CV STRESS CURRENT BP HE: NORMAL
CV STRESS CURRENT HR HE: 57
CV STRESS CURRENT HR HE: 67
CV STRESS CURRENT HR HE: 67
CV STRESS CURRENT HR HE: 69
CV STRESS CURRENT HR HE: 71
CV STRESS CURRENT HR HE: 73
CV STRESS CURRENT HR HE: 73
CV STRESS CURRENT HR HE: 74
CV STRESS CURRENT HR HE: 74
CV STRESS CURRENT HR HE: 75
CV STRESS CURRENT HR HE: 78
CV STRESS CURRENT HR HE: 79
CV STRESS CURRENT HR HE: 80
CV STRESS CURRENT HR HE: 85
CV STRESS CURRENT HR HE: 88
CV STRESS DEVIATION TIME HE: NORMAL
CV STRESS ECHO PERCENT HR HE: NORMAL
CV STRESS EXERCISE STAGE HE: NORMAL
CV STRESS FINAL RESTING BP HE: NORMAL
CV STRESS FINAL RESTING HR HE: 74
CV STRESS MAX HR HE: 88
CV STRESS MAX TREADMILL GRADE HE: 0
CV STRESS MAX TREADMILL SPEED HE: 0
CV STRESS PEAK DIA BP HE: NORMAL
CV STRESS PEAK SYS BP HE: NORMAL
CV STRESS PHASE HE: NORMAL
CV STRESS PROTOCOL HE: NORMAL
CV STRESS RESTING PT POSITION HE: NORMAL
CV STRESS RESTING PT POSITION HE: NORMAL
CV STRESS ST DEVIATION AMOUNT HE: NORMAL
CV STRESS ST DEVIATION ELEVATION HE: NORMAL
CV STRESS ST EVELATION AMOUNT HE: NORMAL
CV STRESS TEST TYPE HE: NORMAL
CV STRESS TOTAL STAGE TIME MIN 1 HE: NORMAL
NUC STRESS EJECTION FRACTION: 72 %
RATE PRESSURE PRODUCT: NORMAL
STRESS ECHO BASELINE DIASTOLIC HE: 81
STRESS ECHO BASELINE HR: 60
STRESS ECHO BASELINE SYSTOLIC BP: 140
STRESS ECHO CALCULATED PERCENT HR: 54 %
STRESS ECHO LAST STRESS DIASTOLIC BP: 84
STRESS ECHO LAST STRESS HR: 75
STRESS ECHO LAST STRESS SYSTOLIC BP: 150
STRESS ECHO TARGET HR: 164

## 2024-04-25 PROCEDURE — 78451 HT MUSCLE IMAGE SPECT SING: CPT | Mod: 26 | Performed by: INTERNAL MEDICINE

## 2024-04-25 PROCEDURE — 250N000011 HC RX IP 250 OP 636: Mod: JZ | Performed by: INTERNAL MEDICINE

## 2024-04-25 PROCEDURE — 343N000001 HC RX 343: Performed by: INTERNAL MEDICINE

## 2024-04-25 PROCEDURE — A9500 TC99M SESTAMIBI: HCPCS | Performed by: INTERNAL MEDICINE

## 2024-04-25 PROCEDURE — 93016 CV STRESS TEST SUPVJ ONLY: CPT | Performed by: INTERNAL MEDICINE

## 2024-04-25 PROCEDURE — 78451 HT MUSCLE IMAGE SPECT SING: CPT

## 2024-04-25 PROCEDURE — 93018 CV STRESS TEST I&R ONLY: CPT | Performed by: INTERNAL MEDICINE

## 2024-04-25 RX ORDER — REGADENOSON 0.08 MG/ML
0.4 INJECTION, SOLUTION INTRAVENOUS ONCE
Status: COMPLETED | OUTPATIENT
Start: 2024-04-25 | End: 2024-04-25

## 2024-04-25 RX ADMIN — Medication 47 MILLICURIE: at 10:58

## 2024-04-25 RX ADMIN — REGADENOSON 0.4 MG: 0.08 INJECTION, SOLUTION INTRAVENOUS at 09:28

## 2024-04-25 NOTE — TELEPHONE ENCOUNTER
Spoke with patient.  Discussed ablation and possible improvement of his symptoms.  Patient made decision to keep his procedure appointment.  No other needs at this time.

## 2024-04-25 NOTE — TELEPHONE ENCOUNTER
Caller: Karl    Provider: MD Carlos Guevara    Detailed reason for call: Karl would like a call back to discuss what the reason for the vein procedure is?  He is wondering if it just cosmetic or will it change things in his leg?  He also states he felt him and Dr. Guevara got off on the wrong foot at the consult, Karl had his mom on speaker when Dr. Guevara came and in and seemed to take offence to this.    Best phone number to contact: [phone]    Best time to contact: any    Ok to leave a detailed message: Yes    Ok to speak to authorized person if needed: No      (Noted to patient if reason is related to wound or incision, to please send a photo via email or [employer].)

## 2024-05-24 ENCOUNTER — OFFICE VISIT (OUTPATIENT)
Dept: SLEEP MEDICINE | Facility: CLINIC | Age: 59
End: 2024-05-24
Payer: COMMERCIAL

## 2024-05-24 VITALS
WEIGHT: 314.3 LBS | DIASTOLIC BLOOD PRESSURE: 73 MMHG | HEIGHT: 75 IN | RESPIRATION RATE: 18 BRPM | SYSTOLIC BLOOD PRESSURE: 132 MMHG | OXYGEN SATURATION: 95 % | HEART RATE: 73 BPM | BODY MASS INDEX: 39.08 KG/M2

## 2024-05-24 DIAGNOSIS — G47.33 OBSTRUCTIVE SLEEP APNEA: Primary | ICD-10-CM

## 2024-05-24 PROCEDURE — 99215 OFFICE O/P EST HI 40 MIN: CPT | Performed by: STUDENT IN AN ORGANIZED HEALTH CARE EDUCATION/TRAINING PROGRAM

## 2024-05-24 NOTE — ASSESSMENT & PLAN NOTE
STOP BANG score is 8/8. Patient likely has sleep apnea based on clinical history of snoring, EDS(ESS 13/24), witnessed apneas, HTN, BMI 40, age, neck circumference, gender, insomnia( AIXA 27/28), snort arousals, nocturia, morning mouth dryness, sleep inertia, and non-restorative sleep.   Obstructive sleep apnea reviewed. Complications of Untreated Sleep Apnea Reviewed.  HST/ Polysomnography reviewed. Information provided regarding treatment options for JIM.  Recommend HST to evaluate for sleep disordered breathing due to high pretest probability for JIM

## 2024-05-24 NOTE — PATIENT INSTRUCTIONS
"MY INFORMATION ON SLEEP APNEA-  Angel Etienne    DOCTOR : Asher Epperson MD  SLEEP CENTER :      MY CONTACT NUMBER:    [facility]   [phone]  [facility]      Am I having a home sleep study?  Watch the video for the device you are using:  /drop off device, Noxturnal T-3: https://www.Bitmenu.com/watch?v=[email or web address]        Frequently asked questions:  1. What is Obstructive Sleep Apnea (JIM)? JIM is the most common type of sleep apnea. Apnea means, \"without breath.\"  Apnea is most often caused by narrowing or collapse of the upper airway as muscles relax during sleep.   Almost everyone has occasional apneas. Most people with sleep apnea have had brief interruptions at night frequently for many years.  The severity of sleep apnea is related to how frequent and severe the events are.   Apneas are any events where there is no breathing.  Hypopneas are any events where there is shallow breathing. Sleep studies will track and then add all of the apneas and hypopneas into a total and then divided this number by the total time asleep to obtain the apnea hypopnea index(AHI). 0-5 events/per hour = No Sleep Apnea; 5-15 events/per hour = Mild Sleep Apnea; 15-30 events/per hour = Moderate Sleep Apnea; Greater than 30 events/per hour = Severe Sleep Apnea.  Sleep apnea is typically worse during REM sleep due to complete muscle relaxation, including the muscles of the airway. Sleep apnea is also typically worse during supine(sleeping on your back) sleep due to internal structures more easily falling on the top of the airway and external pressures more easily crushing the airway.  The respiratory disturbance index(RDI) includes apneas, hypopneas, and other respiratory events such as snoring that may disturb your sleep.  2. What are the consequences of JIM? Symptoms include: feeling sleepy during the day, snoring loudly, gasping or stopping of breathing, trouble " sleeping, and occasionally morning headaches or heartburn at night.  Sleepiness can be serious and even increase the risk of falling asleep while driving. Other health consequences may include development of high blood pressure and other cardiovascular disease in persons who are susceptible. Untreated JIM  can contribute to heart disease, stroke and diabetes.   3. What are the treatment options? In most situations, sleep apnea is a lifelong disease that must be managed with daily therapy. Medications are not effective for sleep apnea and surgery is generally not considered until other therapies have been tried. Your treatment is your choice . Continuous Positive Airway (CPAP) works right away and is the therapy that is effective in nearly everyone. An oral device to hold your jaw forward is usually the next most reliable option. Other options include postioning devices (to keep you off your back), weight loss, and surgery including a tongue pacing device. There is more detail about some of these options below.    Important tips for using CPAP and similar devices   Know your equipment:  CPAP is continuous positive airway pressure that prevents obstructive sleep apnea by keeping the throat from collapsing while you are sleeping. In most cases, the device is  smart  and can slowly self-adjusts if your throat collapses and keeps a record every day of how well you are treated-this information is available to you and your care team.  BPAP is bilevel positive airway pressure that keeps your throat open and also assists each breath with a pressure boost to maintain adequate breathing.  Special kinds of BPAP are used in patients who have inadequate breathing from lung or heart disease. In most cases, the device is  smart  and can slowly self-adjusts to assist breathing. Like CPAP, the device keeps a record of how well you are treated.  Your mask is your connection to the device. You get to choose what feels most comfortable  and the staff will help to make sure if fits. Here: are some examples of the different masks that are available:       Key points to remember on your journey with sleep apnea:  Sleep study.  PAP devices often need to be adjusted during a sleep study to show that they are effective and adjusted right.  Good tips to remember: Try wearing just the mask during a quiet time during the day so your body adapts to wearing it. A humidifier is recommended for comfort in most cases to prevent drying of your nose and throat. Allergy medication from your provider may help you if you are having nasal congestion.  Getting settled-in. It takes more than one night for most of us to get used to wearing a mask. Try wearing just the mask during a quiet time during the day so your body adapts to wearing it. A humidifier is recommended for comfort in most cases. Our team will work with you carefully on the first day and will be in contact within 4 days and again at 2 and 4 weeks for advice and remote device adjustments. Your therapy is evaluated by the device each day.   Use it every night. The more you are able to sleep naturally for 7-8 hours, the more likely you will have good sleep and to prevent health risks or symptoms from sleep apnea. Even if you use it 4 hours it helps. Occasionally all of us are unable to use a medical therapy, in sleep apnea, it is not dangerous to miss one night.   Communicate. Call our skilled team on the number provided on the first day if your visit for problems that make it difficult to wear the device. Over 2 out of 3 patients can learn to wear the device long-term with help from our team. Remember to call our team or your sleep providers if you are unable to wear the device as we may have other solutions for those who cannot adapt to mask CPAP therapy. It is recommended that you sleep your sleep provider within the first 3 months and yearly after that if you are not having problems.   Take care of your  equipment. Make sure you clean your mask and tubing using directions every day and that your filter and mask are replaced as recommended or if they are not working.     BESIDES CPAP, WHAT OTHER THERAPIES ARE THERE?    Positioning Device  Positioning devices are generally used when sleep apnea is mild and only occurs on your back.This example shows a pillow that straps around the waist. It may be appropriate for those whose sleep study shows milder sleep apnea that occurs primarily when lying flat on one's back. Preliminary studies have shown benefit but effectiveness at home may need to be verified by a home sleep test. These devices are generally not covered by medical insurance.    Oral Appliance  What is oral appliance therapy?  An oral appliance device fits on your teeth at night like a retainer used after having braces. The device is made by a specialized dentist and requires several visits over 1-2 months before a manufactured device is made to fit your teeth and is adjusted to prevent your sleep apnea. Once an oral device is working properly, snoring should be improved. A home sleep test may be recommended at that time if to determine whether the sleep apnea is adequately treated.       Some things to remember:  -Oral devices are often, but not always, covered by your medical insurance. Be sure to check with your insurance provider.   -If you are referred for oral therapy, you will be given a list of specialized dentists to consider or you may choose to visit the Web site of the American Academy of Dental Sleep Medicine  -Oral devices are less likely to work if you have severe sleep apnea or are extremely overweight.     More detailed information  An oral appliance is a small acrylic device that fits over the upper and lower teeth  (similar to a retainer or a mouth guard). This device slightly moves jaw forward, which moves the base of the tongue forward, opens the airway, improves breathing for effective  treat snoring and obstructive sleep apnea in perhaps 7 out of 10 people .  The best working devices are custom-made by a dental device  after a mold is made of the teeth 1, 2, 3.  When is an oral appliance indicated?  Oral appliance therapy is recommended as a first-line treatment for patients with primary snoring, mild sleep apnea, and for patients with moderate sleep apnea who prefer appliance therapy to use of CPAP4, 5. Severity of sleep apnea is determined by sleep testing and is based on the number of respiratory events per hour of sleep.   How successful is oral appliance therapy?  The success rate of oral appliance therapy in patients with mild sleep apnea is 75-80% while in patients with moderate sleep apnea it is 50-70%. The chance of success in patients with severe sleep apnea is 40-50%. The research also shows that oral appliances have a beneficial effect on the cardiovascular health of JIM patients at the same magnitude as CPAP therapy7.  Oral appliances should be a second-line treatment in cases of severe sleep apnea, but if not completely successful then a combination therapy utilizing CPAP plus oral appliance therapy may be effective. Oral appliances tend to be effective in a broad range of patients although studies show that the patients who have the highest success are females, younger patients, those with milder disease, and less severe obesity. 3, 6.   Finding a dentist that practices dental sleep medicine  Specific training is available through the American Academy of Dental Sleep Medicine for dentists interested in working in the field of sleep. To find a dentist who is educated in the field of sleep and the use of oral appliances, near you, visit the Web site of the American Academy of Dental Sleep Medicine.    References  1. Molly et al. Objectively measured vs self-reported compliance during oral appliance therapy for sleep-disordered breathing. Chest 2013; 144(5):  5744-9990.  2. Kvng et al. Objective measurement of compliance during oral appliance therapy for sleep-disordered breathing. Thorax 2013; 68(1): 91-96.  3. Salty et al. Mandibular advancement devices in 620 men and women with JIM and snoring: tolerability and predictors of treatment success. Chest 2004; 125: 5966-0153.  4. Ericka, et al. Oral appliances for snoring and JIM: a review. Sleep 2006; 29: 244-262.  5. Adrian et al. Oral appliance treatment for JIM: an update. J Clin Sleep Med 2014; 10(2): 215-227.  6. Afua et al. Predictors of OSAH treatment outcome. J Dent Res 2007; 86: 5971-0294.      Weight Loss:    Weight loss is a long-term strategy that may improve sleep apnea in some patients.    Weight management is a personal decision.  If you are interested in exploring weight loss strategies, the following discussion covers the impact on weight loss on sleep apnea and the approaches that may be successful.    Weight loss decreases severity of sleep apnea in most people with obesity. For those with mild obesity who have developed snoring with weight gain, even 15-30 pound weight loss can improve and occasionally eliminate sleep apnea.  Structured and life-long dietary and health habits are necessary to lose weight and keep healthier weight levels.     Though there may be significant health benefits from weight loss, long-term weight loss is very difficult to achieve- studies show success with dietary management in less than 10% of people. In addition, substantial weight loss may require years of dietary control and may be difficult if patients have severe obesity. In these cases, surgical management may be considered.  Finally, older individuals who have tolerated obesity without health complications may be less likely to benefit from weight loss strategies.    Your BMI is Body mass index is 39.81 kg/m .    Weight management plan: Discussed healthy diet and exercise  guidelines    Surgery:    Surgery for obstructive sleep apnea is considered generally only when other therapies fail to work. Surgery may be discussed with you if you are having a difficult time tolerating CPAP and or when there is an abnormal structure that requires surgical correction.  Nose and throat surgeries often enlarge the airway to prevent collapse.  Most of these surgeries create pain for 1-2 weeks and up to half of the most common surgeries are not effective throughout life.  You should carefully discuss the benefits and drawbacks to surgery with your sleep provider and surgeon to determine if it is the best solution for you.   More information  Surgery for JIM is directed at areas that are responsible for narrowing or complete obstruction of the airway during sleep.  There are a wide range of procedures available to enlarge and/or stabilize the airway to prevent blockage of breathing in the three major areas where it can occur: the palate, tongue, and nasal regions.  Successful surgical treatment depends on the accurate identification of the factors responsible for obstructive sleep apnea in each person.  A personalized approach is required because there is no single treatment that works well for everyone.  Because of anatomic variation, consultation with an examination by a sleep surgeon is a critical first step in determining what surgical options are best for each patient.  In some cases, examination during sedation may be recommended in order to guide the selection of procedures.  Patients will be counseled about risks and benefits as well as the typical recovery course after surgery. Surgery is typically not a cure for a person s JIM.  However, surgery will often significantly improve one s JIM severity (termed  success rate ).  Even in the absence of a cure, surgery will decrease the cardiovascular risk associated with OSA7; improve overall quality of life8 (sleepiness, functionality, sleep  quality, etc).      Palate Procedures:  Patients with JIM often have narrowing of their airway in the region of their tonsils and uvula.  The goals of palate procedures are to widen the airway in this region as well as to help the tissues resist collapse.  Modern palate procedure techniques focus on tissue conservation and soft tissue rearrangement, rather than tissue removal.  Often the uvula is preserved in this procedure. Residual sleep apnea is common in patient after pharyngoplasty with an average reduction in sleep apnea events of 33%2.      Tongue Procedures:  ExamWhile patients are awake, the muscles that surround the throat are active and keep this region open for breathing. These muscles relax during sleep, allowing the tongue and other structures to collapse and block breathing.  There are several different tongue procedures available.  Selection of a tongue base procedure depends on characteristics seen on physical exam.  Generally, procedures are aimed at removing bulky tissues in this area or preventing the back of the tongue from falling back during sleep.  Success rates for tongue surgery range from 50-62%3.    Hypoglossal Nerve Stimulation:  Hypoglossal nerve stimulation has recently received approval from the [place] Food and Drug Administration for the treatment of obstructive sleep apnea.  This is based on research showing that the system was safe and effective in treating sleep apnea6.  Results showed that the median AHI score decreased 68%, from 29.3 to 9.0. This therapy uses an implant system that senses breathing patterns and delivers mild stimulation to airway muscles, which keeps the airway open during sleep.  The system consists of three fully implanted components: a small generator (similar in size to a pacemaker), a breathing sensor, and a stimulation lead.  Using a small handheld remote, a patient turns the therapy on before bed and off upon awakening.    Candidates for this  device must be greater than 22 years of age, have moderate to severe JIM (AHI between 20-65), BMI less than 32, have tried CPAP/oral appliance without success, and have appropriate upper airway anatomy (determined by a sleep endoscopy performed by Dr. Parks).    Hypoglossal Nerve Stimulation Pathway:    The sleep surgeon s office will work with the patient through the insurance prior-authorization process (including communications and appeals).    Nasal Procedures:  Nasal obstruction can interfere with nasal breathing during the day and night.  Studies have shown that relief of nasal obstruction can improve the ability of some patients to tolerate positive airway pressure therapy for obstructive sleep apnea1.  Treatment options include medications such as nasal saline, topical corticosteroid and antihistamine sprays, and oral medications such as antihistamines or decongestants. Non-surgical treatments can include external nasal dilators for selected patients. If these are not successful by themselves, surgery can improve the nasal airway either alone or in combination with these other options.      Combination Procedures:  Combination of surgical procedures and other treatments may be recommended, particularly if patients have more than one area of narrowing or persistent positional disease.  The success rate of combination surgery ranges from 66-80%2,3.    References  See COOPER. The Role of the Nose in Snoring and Obstructive Sleep Apnoea: An Update.  Eur Arch Otorhinolaryngol. 2011; 268: 1365-73.   Curtis SM; Nellie JA; Vanessa JR; Pallanch JF; Marie MB; Ann SG; Sameera DICKENS. Surgical modifications of the upper airway for obstructive sleep apnea in adults: a systematic review and meta-analysis. SLEEP 2010;33(10):7616-9453. Janet HACKETT. Hypopharyngeal surgery in obstructive sleep apnea: an evidence-based medicine review.  Arch Otolaryngol Head Neck Surg. 2006 Feb;132(2):206-13.  Hong YH1, Luis Y, Krzysztof ABE. The  efficacy of anatomically based multilevel surgery for obstructive sleep apnea. Otolaryngol Head Neck Surg. 2003 Oct;129(4):327-35.  Janet HACKETT, Goldberg A. Hypopharyngeal Surgery in Obstructive Sleep Apnea: An Evidence-Based Medicine Review. Arch Otolaryngol Head Neck Surg. 2006 Feb;132(2):206-13.  Taylor PJ et al. Upper-Airway Stimulation for Obstructive Sleep Apnea.  N Engl J Med. 2014 Jan 9;370(2):139-49.  Norbert Y et al. Increased Incidence of Cardiovascular Disease in Middle-aged Men with Obstructive Sleep Apnea. Am J Respir Crit Care Med; 2002 166: 159-165  Barcenas EM et al. Studying Life Effects and Effectiveness of Palatopharyngoplasty (SLEEP) study: Subjective Outcomes of Isolated Uvulopalatopharyngoplasty. Otolaryngol Head Neck Surg. 2011; 144: 623-631.

## 2024-05-24 NOTE — PROGRESS NOTES
Because Dick is not here  [facility]  Consultation Note    Name: Angel Etienne MRN#: [id number]   Age: 58 year old YOB: 1965     Date of Consultation: 05/24/24  Consultation is requested by: No ref. provider found  Primary care provider: Torsten Thomas MD    History of Present Illness:   Angel Etienne is a 58 year old male patient with A-fib, HTN, pre-T2DM, HLD, BPH, atopic dermatitis, Hidradenitis suppurativa, symptomatic varicose veins, RLS, tobacco use    He is sent by No ref. provider found for a sleep consultation regarding Consult (Referred by cardiology, JIM eval, Snoring, Atrial fibrillation)      Angel Etienne main reason for visit: Sangeeta Bridges.  I have A-fib 24/7  Patient states problem(s) started: No clue  Angel Etienne's goals for this visit: To see if I have sleep apnea    He has not had a previous sleep study.    CPAP: No    Assessment and Plan:     Problem List Items Addressed This Visit       Obstructive sleep apnea - Primary     STOP BANG score is 8/8. Patient likely has sleep apnea based on clinical history of snoring, EDS(ESS 13/24), witnessed apneas, HTN, BMI 40, age, neck circumference, gender, insomnia( AIXA 27/28), snort arousals, nocturia, morning mouth dryness, sleep inertia, and non-restorative sleep.   Obstructive sleep apnea reviewed. Complications of Untreated Sleep Apnea Reviewed.  HST/ Polysomnography reviewed. Information provided regarding treatment options for JIM.  Recommend HST to evaluate for sleep disordered breathing due to high pretest probability for JIM            Relevant Orders    HST-Home Sleep Apnea Test - Noxturnal Returnable       SLEEP-WAKE SCHEDULE:   Work/School Days: Patient goes to school/work: Yes   Usually gets into bed at 12 AM  Takes patient about Half an hour to an hour to fall asleep  Has trouble falling asleep 5 nights per week nights per week  Wakes up in the middle of the night 5 or 6 times per night  Pharmacy Requesting:B-D UF III Short Pen Need Mis   times.  Wakes up due to Snorting self awake;Pain;Use the bathroom;Anxiety;Uncertain  He has trouble falling back asleep 4 times a week times a week.   It usually takes Never timed it to get back to sleep  Patient is usually up at 9ish  Uses alarm: No  Sleep Inertia: Yes    Weekends/Non-work Days/All Other Days:  Usually gets into bed at 1 or 2 AM   Takes patient about Do not know to fall asleep  Patient is usually up at 10 gage  Uses alarm: No    Sleep Need  Patient gets  5-6 sleep on average   Patient thinks He needs about Do not know sleep    Angel Etienne prefers to sleep in this position(s): Side   Patient states they do the following activities in bed: Watch TV    Naps  Patient takes a purposeful nap 7 days/week times a week and naps are usually 30 to 60 minutes in duration  He feels better after a nap: Yes  He dozes off unintentionally 5 days/week days per week  Patient has had a driving accident or near-miss due to sleepiness/drowsiness: No      SLEEP DISRUPTIONS:  Breathing/Snoring  Patient snores:Yes  Other people complain about His snoring: Yes  Patient has been told He stops breathing in His sleep:No  He has issues with the following: Morning mouth dryness;Getting up to urinate more than once;Stuffy nose when you wake up    Movement:  Patient gets pain, discomfort, with an urge to move:  No  It happens when He is resting:  No  It happens more at night:  No  Patient has been told Angel Etienne kicks His legs at night:  Yes     Behaviors in Sleep:  Angel Etienne has experienced the following behaviors while sleeping: Eating;Sleep-talking;Sleepwalking  He has experienced sudden muscle weakness during the day: Yes    Is there anything else you would like your sleep provider to know:        CAFFEINE AND OTHER SUBSTANCES:  Patient consumes caffeinated beverages per day:  1/day  Last caffeine use is usually: 10 AM  List of any prescribed or over the counter stimulants that patient takes:  Alcohol  List of any prescribed or over the counter sleep medication patient takes: None  List of previous sleep medications that patient has tried: Melatonin  Patient drinks alcohol to help them sleep: Yes  Patient drinks alcohol near bedtime: Yes    Family History:  Patient has a family member been diagnosed with a sleep disorder:              SCALES:  EPWORTH SLEEPINESS SCALE       5/24/2024     1:00 PM    [place] Sleepiness Scale ( MAIKOL Avalos  [id number]<br>ESS - [place]/English - Final version - 21 Nov 07 - [facility].)   [place] Score (Sleep) 13       INSOMNIA SEVERITY INDEX (AIXA)        5/24/2024     1:00 PM   Insomnia Severity Index (AIXA)   Difficulty falling asleep 4   Difficulty staying asleep 4   Problems waking up too early 3   How SATISFIED/DISSATISFIED are you with your CURRENT sleep pattern? 4   How NOTICEABLE to others do you think your sleep problem is in terms of impairing the quality of your life? 4   How WORRIED/DISTRESSED are you about your current sleep problem? 4   To what extent do you consider your sleep problem to INTERFERE with your daily functioning (e.g. daytime fatigue, mood, ability to function at work/daily chores, concentration, memory, mood, etc.) CURRENTLY? 4   AIXA Total Score 27    Guidelines for Scoring/Interpretation:  Total score categories:  0-7 = No clinically significant insomnia   8-14 = Subthreshold insomnia   15-21 = Clinical insomnia (moderate severity)  22-28 = Clinical insomnia (severe)  Used via courtesy of www.iPAYstealth.va.gov with permission from Monico Rutledge PhD., [facility]      STOP BANG   JIM High Risk             Total Score: 8    JIM: Snores loudly    JIM: Often tired    JIM: Observed stopped breathing    JIM: Hypertension    JIM: BMI over 35 kg/m2    JIM: Over 50 ys old    JIM: Neck Circum >16 in    JIM: Male          GAD7       No data to display                  CAGE-AID       No data to display              CAGE-AID reprinted with  "permission from the [facility], RASHIDA Lynn. and JOSIAH Cordoba, \"Conjoint screening questionnaires for alcohol and drug abuse\" [facility] Journal 94: 135-140, 1995.      PATIENT HEALTH QUESTIONNAIRE-9 (PHQ - 9)      1/29/2024     1:48 PM   PHQ-9 ([employer])   1.  Little interest or pleasure in doing things 1   2.  Feeling down, depressed, or hopeless 1   3.  Trouble falling or staying asleep, or sleeping too much 1   4.  Feeling tired or having little energy 2   5.  Poor appetite or overeating 1   6.  Feeling bad about yourself - or that you are a failure or have let yourself or your family down 1   7.  Trouble concentrating on things, such as reading the newspaper or watching television 1   8.  Moving or speaking so slowly that other people could have noticed. Or the opposite - being so fidgety or restless that you have been moving around a lot more than usual 0   9.  Thoughts that you would be better off dead, or of hurting yourself in some way 0   PHQ-9 Total Score 8   6.  Feeling bad about yourself 1   7.  Trouble concentrating 1   8.  Moving slowly or restless 0   9.  Suicidal or self-harm thoughts 0   1.  Little interest or pleasure in doing things Several days   2.  Feeling down, depressed, or hopeless Several days   3.  Trouble falling or staying asleep, or sleeping too much Several days   4.  Feeling tired or having little energy More than half the days   5.  Poor appetite or overeating Several days   6.  Feeling bad about yourself Several days   7.  Trouble concentrating Several days   8.  Moving slowly or restless Not at all   9.  Suicidal or self-harm thoughts Not at all   PHQ-9 via [employer] TOTAL SCORE-----> 8 (Mild depression)   Difficulty at work, home, or with people Somewhat difficult     Developed by Sangeeta Scott, Noni Looney, Ricco Fabian and colleagues, with an educational angelica from [employer] No permission required to reproduce, translate, display or " distribute.      Allergies:    Allergies   Allergen Reactions    Clobetasol Other (See Comments)     Allergy testing done - Clobetasol-77-Propionate    Inhaled Anticholinergic Agents Other (See Comments)     Stearyl Alcohol - Allergy testing done    No Clinical Screening - See Comments Other (See Comments)     Stearyl Alcohol - Allergy testing done    Octylisothiazolinone; Phenylenediamine - allergy testing done    Shellac Other (See Comments)     Allergy testing done    Trolamine (Triethanolamine) Other (See Comments)     Allergy testing done    Urea Other (See Comments)     Allergy testing done       Medications:    Current Outpatient Medications   Medication Sig Dispense Refill    apixaban ANTICOAGULANT (ELIQUIS) 5 MG tablet Take 1 tablet (5 mg) by mouth 2 times daily 60 tablet 11    atorvastatin (LIPITOR) 10 MG tablet Take 1 tablet (10 mg) by mouth daily 90 tablet 3    dupilumab (DUPIXENT) 300 MG/2ML prefilled syringe Inject 300 mg Subcutaneous every 14 days      fenofibrate (TRIGLIDE/LOFIBRA) 160 MG tablet Take 1 tablet (160 mg) by mouth daily 90 tablet 3    losartan (COZAAR) 25 MG tablet Take 1 tablet (25 mg) by mouth at bedtime 90 tablet 1    metoprolol succinate ER (TOPROL XL) 25 MG 24 hr tablet Take 1 tablet (25 mg) by mouth at bedtime 90 tablet 1    nicotine (NICORETTE) 2 MG gum Place 2 mg inside cheek every hour as needed for nicotine withdrawal symptoms      tamsulosin (FLOMAX) 0.4 MG capsule Take 2 capsules (0.8 mg) by mouth daily 180 capsule 3    fluocinonide (LIDEX) 0.05 % external cream Apply topically 2 times daily (Patient not taking: Reported on 4/24/2024)      mupirocin (BACTROBAN) 2 % external ointment Apply topically 2 times daily (Patient not taking: Reported on 4/24/2024) 30 g 5    triamcinolone (KENALOG) 0.1 % external cream APPLY 1 CREAM EXTERNALLY TWICE DAILY TO CHEST (Patient not taking: Reported on 4/24/2024)         Problem List:  Patient Active Problem List    Diagnosis Date Noted     Obstructive sleep apnea 05/24/2024     Priority: Medium    Benign prostatic hyperplasia (BPH) with urinary urgency 01/29/2024     Priority: Medium    Tobacco use disorder 01/29/2024     Priority: Medium    Atrial fibrillation, unspecified type (H) 01/29/2024     Priority: Medium    Hidradenitis suppurativa 11/10/2022     Priority: Medium    Intrinsic atopic dermatitis 11/10/2022     Priority: Medium    Edema of both lower extremities due to peripheral venous insufficiency 11/10/2022     Priority: Medium    Dermatitis, seborrheic 11/10/2022     Priority: Medium        Past Medical/Surgical History:  No past medical history on file.  No past surgical history on file.    Social History:  Social History     Socioeconomic History    Marital status: Single     Spouse name: Not on file    Number of children: Not on file    Years of education: Not on file    Highest education level: Not on file   Occupational History    Not on file   Tobacco Use    Smoking status: Every Day     Current packs/day: 1.00     Average packs/day: 1.5 packs/day for 41.6 years (62.3 ttl pk-yrs)     Types: Cigarettes     Start date: 11/1/1982    Smokeless tobacco: Never   Vaping Use    Vaping status: Never Used   Substance and Sexual Activity    Alcohol use: Yes    Drug use: Yes     Types: Marijuana     Comment: 3x times a week- night time for sleep and back pain    Sexual activity: Not Currently     Partners: Female   Other Topics Concern    Not on file   Social History Narrative    Not on file     Social Determinants of Health     Financial Resource Strain: Low Risk  (1/29/2024)    Financial Resource Strain     Within the past 12 months, have you or your family members you live with been unable to get utilities (heat, electricity) when it was really needed?: No   Food Insecurity: Low Risk  (1/29/2024)    Food Insecurity     Within the past 12 months, did you worry that your food would run out before you got money to buy more?: No     Within the  past 12 months, did the food you bought just not last and you didn t have money to get more?: No   Transportation Needs: Low Risk  (1/29/2024)    Transportation Needs     Within the past 12 months, has lack of transportation kept you from medical appointments, getting your medicines, non-medical meetings or appointments, work, or from getting things that you need?: No   Physical Activity: Not on file   Stress: Not on file   Social Connections: Not on file   Interpersonal Safety: Low Risk  (1/29/2024)    Interpersonal Safety     Do you feel physically and emotionally safe where you currently live?: Yes     Within the past 12 months, have you been hit, slapped, kicked or otherwise physically hurt by someone?: No     Within the past 12 months, have you been humiliated or emotionally abused in other ways by your partner or ex-partner?: No   Housing Stability: Low Risk  (1/29/2024)    Housing Stability     Do you have housing? : Yes     Are you worried about losing your housing?: No       Family History:  Family History   Problem Relation Age of Onset    Varicose Veins Father        Review of Systems:   A complete review of systems reviewed by me is negative with the exeption of what has been mentioned in the history of present illness.     05/24/24 1500   In the last TWO WEEKS have you experienced any of the following symptoms?   Fevers No   Night Sweats No   Weight Gain Yes   Pain at Night No   Double Vision No   Changes in Vision Yes   Difficulty Breathing through Nose No   Sore Throat in Morning Yes   Dry Mouth in the Morning Yes   Shortness of Breath Lying Flat Yes   Shortness of Breath With Activity Yes   Awakening with Shortness of Breath Yes   Increased Cough Yes   Heart Racing at Night Yes   Swelling in Feet or Legs Yes   Diarrhea at Night No   Heartburn at Night No   Urinating More than Once at Night Yes   Losing Control of Urine at Night No   Joint Pains at Night Yes   Headaches in Morning No   Weakness in  "Arms or Legs Yes   Depressed Mood Yes   Anxiety Yes         Physical Exam:   Vitals: /73   Pulse 73   Resp 18   Ht 1.892 m (6' 2.5\")   Wt 142.6 kg (314 lb 4.8 oz)   SpO2 95%   BMI 39.81 kg/m    BMI= Body mass index is 39.81 kg/m .  Neck Cir (cm): 44 cm  GENERAL: alert and no distress  EYES: Eyes grossly normal to inspection.  No discharge or erythema, or obvious scleral/conjunctival abnormalities.  RESP: No audible wheeze, cough, or visible cyanosis.    SKIN: Visible skin clear. No significant rash, abnormal pigmentation or lesions.  NEURO: Cranial nerves grossly intact.  Mentation and speech appropriate for age.  PSYCH: Appropriate affect, tone, and pace of words         Data: All pertinent previous laboratory data reviewed     Recent Labs   Lab Test 01/29/24  1555 11/16/23  1137    139   POTASSIUM 4.3 4.5   CHLORIDE 106 102   CO2 22 25   ANIONGAP 12 12   GLC 88 111*   BUN 17.5 16.2   CR 0.80 0.77   LEESA 9.7 9.5       Recent Labs   Lab Test 01/29/24  1555   WBC 7.7   RBC 4.88   HGB 15.5   HCT 46.8   MCV 96   MCH 31.8   MCHC 33.1   RDW 13.0          Recent Labs   Lab Test 01/29/24  1555   PROTTOTAL 7.2   ALBUMIN 4.6   BILITOTAL 0.4   ALKPHOS 61   AST 24   ALT 33       No results found for: \"TSH\"    No results found for: \"UAMP\", \"UBARB\", \"BENZODIAZEUR\", \"UCANN\", \"UCOC\", \"OPIT\", \"UPCP\"    No results found for: \"IRONSAT\", \"RB81689\", \"DANNY\"    No results found for: \"PH\", \"PHARTERIAL\", \"PO2\", \"CF6SRPQKDZU\", \"SAT\", \"PCO2\", \"HCO3\", \"BASEEXCESS\", \"CHAITANYA\", \"BEB\"    @LABRCNTIPR(phv:4,pco2v:4,po2v:4,hco3v:4,august:4,o2per:4)@    Echocardiology: No results found for this or any previous visit (from the past 4320 hour(s)).    Chest x-ray:   XR Chest 2 Views 01/29/2024    Narrative  CHEST TWO VIEWS January 29, 2024 3:54 PM    HISTORY: Visit for pre-operative examination. Atrial fibrillation,  unspecified type (H).    COMPARISON: None available.    Impression  IMPRESSION: No focal consolidation, pleural effusion " or pneumothorax.  Cardiomediastinal silhouette is unremarkable. Mild-to-moderate  degenerative changes of the thoracic spine.    LINDY BELL MD      SYSTEM ID:  [id number]      Chest CT:   No results found for this or any previous visit from the past 365 days.      PFT: Most Recent Breeze Pulmonary Function Testing  No results found     Patient was strongly advised to avoid driving, operating any heavy machinery or other hazardous situations while drowsy or sleepy.  Patient was counseled on the importance of driving while alert, to pull over if drowsy, or nap before getting into the vehicle if sleepy.      Plan is for Angel Etienne to follow up in about 2 month(s).     The above note was dictated using voice recognition software. Although reviewed after completion, some word and grammatical error may remain . Please contact the author for any clarifications.    Total time spent reviewing medical records, history and physical examination, review of previous testing and interpretation as well as documentation on this date, 05/24/24: 60 minutes    Asher Epperson MD on 10/20/2022   [facility]   [address], [address]   [address] [place] [address]. [place] [address]   Appointments: [phone]     CC: No ref. provider found

## 2024-05-28 ENCOUNTER — VIRTUAL VISIT (OUTPATIENT)
Dept: FAMILY MEDICINE | Facility: CLINIC | Age: 59
End: 2024-05-28
Payer: COMMERCIAL

## 2024-05-28 DIAGNOSIS — I48.91 ATRIAL FIBRILLATION, UNSPECIFIED TYPE (H): Primary | ICD-10-CM

## 2024-05-28 DIAGNOSIS — R79.89 LOW TESTOSTERONE LEVEL IN MALE: ICD-10-CM

## 2024-05-28 DIAGNOSIS — N40.1 BPH ASSOCIATED WITH NOCTURIA: ICD-10-CM

## 2024-05-28 DIAGNOSIS — R73.03 PREDIABETES: ICD-10-CM

## 2024-05-28 DIAGNOSIS — N46.01 AZOOSPERMIA: ICD-10-CM

## 2024-05-28 DIAGNOSIS — I50.32 CHRONIC DIASTOLIC HEART FAILURE (H): ICD-10-CM

## 2024-05-28 DIAGNOSIS — R35.1 BPH ASSOCIATED WITH NOCTURIA: ICD-10-CM

## 2024-05-28 PROCEDURE — 99442 PR PHYSICIAN TELEPHONE EVALUATION 11-20 MIN: CPT | Mod: 93 | Performed by: INTERNAL MEDICINE

## 2024-05-28 RX ORDER — CARVEDILOL 3.12 MG/1
3.12 TABLET ORAL 2 TIMES DAILY WITH MEALS
Qty: 180 TABLET | Refills: 1 | Status: ON HOLD | OUTPATIENT
Start: 2024-05-28 | End: 2024-08-08

## 2024-05-28 NOTE — PROGRESS NOTES
Karl is a 58 year old who is being evaluated via a billable telephone visit.    What phone number would you like to be contacted at? [phone]   How would you like to obtain your AVS? [facility]           Assessment and Plan:     Atrial fibrillation, unspecified type (H)  - carvedilol (COREG) 3.125 MG tablet; Take 1 tablet (3.125 mg) by mouth 2 times daily (with meals)    Prediabetes  - empagliflozin (JARDIANCE) 10 MG TABS tablet; Take 1 tablet (10 mg) by mouth daily    BPH associated with nocturia  - Adult Urology  Referral; Future    Chronic diastolic heart failure (H)  - empagliflozin (JARDIANCE) 10 MG TABS tablet; Take 1 tablet (10 mg) by mouth daily    Azoospermia  - Testosterone Free and Total; Future          Interval History:     Atrial Fibrillation Follow-up    Symptoms: Shortness of breath  Stroke prevention: DOAC (Eliquis, Xarelto, Pradaxa)        1/29/2024     3:09 PM 2/29/2024     4:16 PM 3/19/2024     2:52 PM 4/24/2024     7:50 AM 5/24/2024     1:30 PM   Date   Pulse 75 68 79 74 73     Current BRE5GE7-KLPg Score: 1 point, which represents a 0.6% annual risk of major embolic event, without anti-coagulation or an LAAO device.     How many servings of fruits and vegetables do you eat daily?  2-3  On average, how many sweetened beverages do you drink each day (Examples: soda, juice, sweet tea, etc.  Do NOT count diet or artificially sweetened beverages)?   0  How many days per week do you exercise enough to make your heart beat faster? 6  How many minutes a day do you exercise enough to make your heart beat faster? 20 - 29  How many days per week do you miss taking your medication? 0    Other concerns:  Complaints of fatigue. Asking if some meds are causing it.  Feels bloated all the time, feels completely stuffed. Tired and has brain fog.  First visit with Sleep Medicine last week, still waiting for CPAP.  No weight loss despite dietary  modifications. GLP-1 agonists are not covered by his health insurance.  Patient also has azoospermia for the past two months.           Significant Problems:     Patient Active Problem List   Diagnosis    Hidradenitis suppurativa    Intrinsic atopic dermatitis    Edema of both lower extremities due to peripheral venous insufficiency    Dermatitis, seborrheic    Benign prostatic hyperplasia (BPH) with urinary urgency    Tobacco use disorder    Atrial fibrillation, unspecified type (H)    Obstructive sleep apnea              Review of Systems:     CONSTITUTIONAL: NEGATIVE for fever, chills, change in weight  INTEGUMENTARY/SKIN: NEGATIVE for worrisome rashes, moles or lesions  EYES: NEGATIVE for vision changes or irritation  ENT/MOUTH: NEGATIVE for ear, mouth and throat problems  RESP: NEGATIVE for significant cough or SOB  CV: NEGATIVE for chest pain, palpitations or peripheral edema  GI: NEGATIVE for nausea, abdominal pain, heartburn, or change in bowel habits  : NEGATIVE for frequency, dysuria, or hematuria  MUSCULOSKELETAL: NEGATIVE for significant arthralgias or myalgia  NEURO: NEGATIVE for weakness, dizziness or paresthesias  ENDOCRINE: NEGATIVE for temperature intolerance, skin/hair changes  HEME: NEGATIVE for bleeding problems  PSYCHIATRIC: NEGATIVE for changes in mood or affect             Physical Exam:   Vital signs and physical exam not obtained due to nature of visit.          Data:     Component  Ref Range & Units 3 d ago     Free Testosterone Calculated  ng/dL 4.29   Comment: Male Nirav Ranges:  Nirav Stage I: Less than or equal to 0.37 ng/dL  Nirav Stage II: 0.03-2.1 ng/dL  Nirav Stage III: 0.10-9.8 ng/dL  Nirav Stage IV: 3.5-16.9 ng/dL  Nirav Stage V: 4.1-23.9 ng/dL    Testosterone Total  240 - 950 ng/dL 235 Low    Resulting Agency [facility]        Disposition:  Follow-up in 12 weeks.    Torsten Thomas MD  Internal Medicine  [facility] Team     Phone call duration: 20 minutes  Start: 9:30  AM  End: 9:50 AM

## 2024-05-29 ENCOUNTER — TELEPHONE (OUTPATIENT)
Dept: VASCULAR SURGERY | Facility: CLINIC | Age: 59
End: 2024-05-29
Payer: COMMERCIAL

## 2024-05-29 ENCOUNTER — TELEPHONE (OUTPATIENT)
Dept: FAMILY MEDICINE | Facility: CLINIC | Age: 59
End: 2024-05-29
Payer: COMMERCIAL

## 2024-05-29 NOTE — TELEPHONE ENCOUNTER
Francisco Javier with vascular center, Dr. 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     Vascular center needs verbal orders from Dr. Thomas to hold eliquis prior to procedure. Francisco Javier states Dr. Guevara typically prefers holding eliquis 3 days prior to the procedure. If a lovenox bridge is needed for patient, primary care provider needs to order and notify/instruct the pt.     Lisset Penny RN    [facility]- Primary Care

## 2024-05-29 NOTE — TELEPHONE ENCOUNTER
Call out to Dr Thomas. Spoke with nurse Lisset [phone]. Request ok to hold eliquis 3 days prior to 6/11/24 vein ablation. Aware if needs to bridge with lovenox will need to order.                    Called patient to review upcoming procedure.Spoke with patient.    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 Carlos Guevara    Date: 6/11/24    Procedure Location: [address], [address], [place], [place]- Phone: [phone], Fax [phone]    Procedure Time :  130pm    Arrival Time: 115pm    Reviewed allergies: Reviewed Allergies and if any adhesive allergies: No      Reviewed okay to eat and drink prior to radiofrequency ablation or Venaseal procedure with the exception of a blood thinner.     Reviewed Blood Thinners and plan for holding, continuing and/or bridging: Other- Eliquis will need to be held 3 days prior to procedure. 5/29/24 Call out to Dr Thomas to get Ok to home .    Reviewed Post Procedure follow up plan and appts made: Yes    Reviewed if needing time off work, job position, estimated return to work. [facility] paperwork should be faxed to the provider's office to [phone]. N/A    Reviewed procedure with patients and instructions: Yes    Bring a  on the day of the procedure for radiofrequency ablation or if having stab phlebectomy.    Please arrive to clinic 15 minutes early prior to arrival time to use the restroom and sign consent.     Please wear a mask and wear loose-fitting comfortable clothing.     For in clinic procedure (Radiofrequency ablation or Endoseal procedure) bring your own thigh-high compression or compression shorts and your knee high stockings if you think you may not fit into our M-XL thigh-high compression socks. You will wear the thigh-high stockings for 2 nights continuously after the procedure, then wear thigh-high stockings for 2 weeks after but taking off at bedtime, and then you  can transition into compression stockings of your choice. You should then continue to wear your compression as much as possible.     For stab phlebectomy you will be discharged with compression wraps from your toe to thigh and will wear the wraps for 5-7 days. You will then transition into thigh high compression stockings for 1 month. You should then continue to wear your compression as much as possible.     You will not be able to fly for 3 weeks, no vigorous activity for 2 weeks, and no lifting over 20 lbs.      We have received approval from [facility]. If your insurance has changed please notify us. Be aware this doesn't mean you are covered at 100%. Please check with insurance for coverage and your out of pocket costs.      All questions answered and number provided if any further questions arise or changes in patient status.

## 2024-05-30 ENCOUNTER — TELEPHONE (OUTPATIENT)
Dept: FAMILY MEDICINE | Facility: CLINIC | Age: 59
End: 2024-05-30

## 2024-05-30 ENCOUNTER — LAB (OUTPATIENT)
Dept: LAB | Facility: CLINIC | Age: 59
End: 2024-05-30
Payer: COMMERCIAL

## 2024-05-30 DIAGNOSIS — N46.01 AZOOSPERMIA: ICD-10-CM

## 2024-05-30 LAB — SHBG SERPL-SCNC: 36 NMOL/L (ref 11–80)

## 2024-05-30 PROCEDURE — 84270 ASSAY OF SEX HORMONE GLOBUL: CPT

## 2024-05-30 PROCEDURE — 36415 COLL VENOUS BLD VENIPUNCTURE: CPT

## 2024-05-30 PROCEDURE — 84403 ASSAY OF TOTAL TESTOSTERONE: CPT

## 2024-05-30 NOTE — TELEPHONE ENCOUNTER
Called and spoke to Vascular clinic.    Relayed provider's approval to hold Eliquis.        No other questions at this time.      DINORAH HowardN, RN  [facility]

## 2024-05-30 NOTE — TELEPHONE ENCOUNTER
Dr. Thomas's office calling back, ok to hold Eliquis for 3 days prior and no bridging is required.     Left voicemail for patient with update. Asked him to call back with any questions.

## 2024-05-30 NOTE — TELEPHONE ENCOUNTER
Chart reviewed. Patient scheduled first available and on wait list. Clinic will reach out if sooner appointment becomes available.     Dana TORRES RN  [facility]

## 2024-05-30 NOTE — TELEPHONE ENCOUNTER
Reason for Call:  Appointment Request    Patient requesting this type of appt:  Sleep Study HST    Requested provider:  Any location    Reason patient unable to be scheduled: Not within requested timeframe    When does patient want to be seen/preferred time:  Sooner than scheduled    Comments:     Could we send this information to you in [facility] or would you prefer to receive a phone call?:   Patient would prefer a phone call   Okay to leave a detailed message?: Yes at Cell number on file:    Telephone Information:   Mobile [phone]       Call taken on 5/30/2024 at 10:44 AM by Heather Hogan

## 2024-06-01 LAB
TESTOST FREE SERPL-MCNC: 4.29 NG/DL
TESTOST SERPL-MCNC: 235 NG/DL (ref 240–950)

## 2024-06-02 ENCOUNTER — HEALTH MAINTENANCE LETTER (OUTPATIENT)
Age: 59
End: 2024-06-02

## 2024-06-02 RX ORDER — TESTOSTERONE 1.62 MG/G
1 GEL TRANSDERMAL DAILY
Qty: 75 G | Refills: 0 | Status: SHIPPED | OUTPATIENT
Start: 2024-06-02 | End: 2024-08-23

## 2024-06-04 NOTE — TELEPHONE ENCOUNTER
Pt received message regarding Eliquis. Reports he may need to cancel procedure because his father-in-law who was 99 passed away unexpectedly. He is unsure when the service is, but it may be on the day he is scheduled. He will call us if needing to reschedule today or tomorrow morning.

## 2024-06-07 ENCOUNTER — TELEPHONE (OUTPATIENT)
Dept: CARDIOLOGY | Facility: CLINIC | Age: 59
End: 2024-06-07
Payer: COMMERCIAL

## 2024-06-07 NOTE — TELEPHONE ENCOUNTER
Patient left VM he has scheduled a sleep study 6/27/24.    Left detailed message with patient that he does not necessarily need to schedule follow up (no availability) and to contact us once he has the result of sleep study.      Per Holter Monitor result note 3/20/2024:  Patrick Garcia MD  3/20/2024  7:12 AM CDT       Holter monitor is reassuring and that it reveals well-controlled ventricular response with his atrial fibrillation (known A-fib).  Lets have Karl contact us after he he has had his sleep apnea evaluation.  We can then consider possible attempt at cardioversion (if he indeed is found to have JIM, would be best to have this treated before trying to restore sinus rhythm).  No new recommendations otherwise.  Thank so much.

## 2024-06-10 ENCOUNTER — OFFICE VISIT (OUTPATIENT)
Dept: SLEEP MEDICINE | Facility: CLINIC | Age: 59
End: 2024-06-10
Attending: STUDENT IN AN ORGANIZED HEALTH CARE EDUCATION/TRAINING PROGRAM
Payer: COMMERCIAL

## 2024-06-10 DIAGNOSIS — G47.33 OBSTRUCTIVE SLEEP APNEA: ICD-10-CM

## 2024-06-10 NOTE — PROGRESS NOTES
Pt is completing a home sleep test. Pt was instructed on how to put on the Noxturnal T3 device and associated equipment before going to bed and given the opportunity to practice putting it on before leaving the sleep center. Pt was reminded to bring the home sleep test kit back to the center tomorrow, at the scheduled time for download and reporting. Patient was instructed to complete study using the following treatment?  None  Neck circumference: 44 CM / 17 1/4 inches.  Ginna Reyes CMA on 6/10/2024 at 10:19 AM

## 2024-06-11 ENCOUNTER — DOCUMENTATION ONLY (OUTPATIENT)
Dept: SLEEP MEDICINE | Facility: CLINIC | Age: 59
End: 2024-06-11
Payer: COMMERCIAL

## 2024-06-11 NOTE — PROGRESS NOTES
This HSAT was performed using a Noxturnal T3 device which recorded snore, sound, movement activity, body position, nasal pressure, oronasal thermal airflow, pulse, oximetry and both chest and abdominal respiratory effort. HSAT data was restricted to the time patient states they were in bed.     HSAT was scored using 1B 4% hypopnea rule.     HST AHI (Non-PAT): 54.2  Snoring was reported as moderate.  Time with SpO2 below 89% was 163.3 minutes.   Overall signal quality was good     Pt will follow up with sleep provider to determine appropriate therapy.

## 2024-06-11 NOTE — NURSING NOTE
Pt returned HST device. It was downloaded and forwarded data to the clinical specialist for scoring.   Ginna Reyes CMA on 6/11/2024 at 12:54 PM

## 2024-06-11 NOTE — PROGRESS NOTES
HST POST-STUDY QUESTIONNAIRE    What time did you go to bed?  1:30 AM  How long do you think it took to fall asleep?  20ISH  What time did you wake up to start the day?  9:30 AM  Did you get up during the night at all?  YES  If you woke up, do you remember approximately what time(s)? 6 AM, 7 AM AND 8:00 AM.  Did you have any difficulty with the equipment?  No  Did you us any type of treatment with this study?  None  Was the head of the bed elevated? No  Did you sleep in a recliner?  No  Did you stop using CPAP at least 3 days before this test?  NA  Any other information you'd like us to know?

## 2024-06-21 ENCOUNTER — MYC MEDICAL ADVICE (OUTPATIENT)
Dept: SLEEP MEDICINE | Facility: CLINIC | Age: 59
End: 2024-06-21
Payer: COMMERCIAL

## 2024-06-21 DIAGNOSIS — G47.33 OBSTRUCTIVE SLEEP APNEA: Primary | ICD-10-CM

## 2024-06-21 NOTE — PROCEDURES
"HOME SLEEP STUDY INTERPRETATION        Patient: Angel Etienne  MRN: [id number]  YOB: 1965  Study Date: 6/10/2024  PCP/Referring Provider: Torsten Thomas;   Ordering Provider: Asher Epperson MD      Indications for Home Study: Angel Etienne is a 58 year old male with a history of  A-fib, HTN, pre-T2DM, HLD, BPH, atopic dermatitis, Hidradenitis suppurativa, symptomatic varicose veins, RLS, tobacco use who presents with symptoms suggestive of obstructive sleep apnea.    Estimated body mass index is 39.81 kg/m  as calculated from the following:    Height as of 5/24/24: 1.892 m (6' 2.5\").    Weight as of 5/24/24: 142.6 kg (314 lb 4.8 oz).  Total score - Gillett: 13 (5/24/2024  1:00 PM)  Total Score: 8 (5/24/2024  1:00 PM)        Data: A full night home sleep study was performed recording the standard physiologic parameters including body position, movement, sound, nasal pressure, thermal oral airflow, chest and abdominal movements with respiratory inductance plethysmography, and oxygen saturation by pulse oximetry. Pulse rate was estimated by oximetry recording. This study was considered adequate based on > 4 hours of quality oximetry and respiratory recording. As specified by the AASM Manual for the Scoring of Sleep and Associated events, version 2.3, Rule VIII.D 1B, 4% oxygen desaturation scoring for hypopneas is used as a standard of care on all home sleep apnea testing.        Analysis Time:  434.9 minutes        Respiration:   Sleep Associated Hypoxemia: sustained hypoxemia was present. Baseline oxygen saturation was 88%.  Time with saturation less than or equal to 88% was 163.3 minutes. The lowest oxygen saturation was 73%.   Snoring: Snoring was present.  Respiratory events: The home study revealed a presence of 218 obstructive apneas and 61 mixed and central apneas. There were 112 hypopneas resulting in a combined apnea/hypopnea index [AHI] of 54.2 events per hour.  AHI was " 65.9 per hour supine, 0 per hour prone, 44.4 per hour on left side, and 0 per hour on right side.   Pattern: Excluding events noted above, respiratory rate and pattern was Abnormal, Type: Periodicity of breathing. However, it was not consistent with Cheyne-Fernando Breathing .      Position: Percent of time spent: supine - 45.4%, prone - 0%, on left - 54.1%, on right - 0%.      Heart Rate: By pulse oximetry normal rate was noted.       Assessment:   Severe obstructive sleep apnea.  Sleep associated hypoxemia was present.    Recommendations:  Consider auto-CPAP at 07-17 cmH2O or polysomnography with full night PAP titration.  Suggest optimizing sleep hygiene and avoiding sleep deprivation.  Weight management.        Diagnosis Code(s): Obstructive Sleep Apnea G47.33, Hypoxemia G47.36    Asher Epperson MD, June 21, 2024   Diplomate, American Board of Internal Medicine, Sleep Medicine

## 2024-07-08 NOTE — TELEPHONE ENCOUNTER
Order/Referral Request    Who is requesting: patient    Orders being requested: C-PAP and supplies    Reason service is needed/diagnosis: patient would like to proceed with c-pap therapy    When are orders needed by: ASAP    Has this been discussed with Provider: Yes    Does patient have a preference on a Group/Provider/Facility? [facility]    Does patient have an appointment scheduled?: No    Where to send orders: Place orders within Epic    Could we send this information to you in [employer] or would you prefer to receive a phone call?:   Patient would like to be contacted via [employer]

## 2024-07-16 ENCOUNTER — DOCUMENTATION ONLY (OUTPATIENT)
Dept: CARDIOLOGY | Facility: CLINIC | Age: 59
End: 2024-07-16

## 2024-07-16 ENCOUNTER — DOCUMENTATION ONLY (OUTPATIENT)
Dept: SLEEP MEDICINE | Facility: CLINIC | Age: 59
End: 2024-07-16
Payer: COMMERCIAL

## 2024-07-16 ENCOUNTER — PREP FOR PROCEDURE (OUTPATIENT)
Dept: CARDIOLOGY | Facility: CLINIC | Age: 59
End: 2024-07-16

## 2024-07-16 DIAGNOSIS — I48.91 ATRIAL FIBRILLATION, UNSPECIFIED TYPE (H): Primary | ICD-10-CM

## 2024-07-16 DIAGNOSIS — G47.33 OBSTRUCTIVE SLEEP APNEA (ADULT) (PEDIATRIC): Primary | ICD-10-CM

## 2024-07-16 RX ORDER — SODIUM CHLORIDE 9 MG/ML
INJECTION, SOLUTION INTRAVENOUS CONTINUOUS
Status: DISCONTINUED | OUTPATIENT
Start: 2024-07-16 | End: 2024-07-16 | Stop reason: HOSPADM

## 2024-07-16 NOTE — PROGRESS NOTES
Patient was offered choice of vendor and chose [facility].  Patient Angel Etienne was set up at [place] on July 16, 2024. Patient received a Resmed Airsense 11 Pressures were set at  7-17 cm H2O.   Patient s ramp is 6 cm H2O for Auto and FLEX/EPR is EPR, 3.  Patient received a Resmed Mask name: AirFit F30i Full Face mask size Medium, heated tubing and heated humidifier.  Patient has the following compliance requirements: usage only.  DEBORAH PERDUE

## 2024-07-16 NOTE — PROGRESS NOTES
H&P  PMD: []  Date: Card OV: []  Date:  Sent for Teach []   Orders: I [x] P  [x]      AC: Eliquis NP Med Review: NEEDS review    DM Meds: None  GLP-1:None     Patrick Garcia MD Fleischhacker, Kelly, RN21 minutes ago (11:00 AM)     Okay, lets go ahead and forge ahead with attempt at direct-current cardioversion.  Thanks.       Angel Etienne, 1965, [id number]  Home:[phone] (home) Cell:[phone] (mobile)  Emergency Contact: Maria Etienne   PCP: Torsten Thomas, [phone]    +++Important patient information for CSC/Cath Lab staff : None+++    [facility] EP Cath Lab Procedure Order   Procedure: Cardioversion for AF  Ordering Provider:  Dr. Garcia  Date Ordered and Prepped: 7/16/2024 Savana Garcia RN  Anticipated Case Duration:  Standard  Scheduling Needs/Timeframe:  Next Available  Scheduling Contact: Please contact pt to schedule  Cardiology Follow Up Apt s/p: Standard- EP HARLEY @ 4-6 wks or previously scheduled General Card apt  Current Device/Device Co Needed for Procedure: None NoneNone  Pre-Procedural Testing needed: None  Anesthesia:  General    [facility] EP Cath Lab Prep   H&P:  Pt to schedule with PMD to complete  Pre-op Labs: K if pt taking diuretic medication or hx of low Potassium, Beta HcG if appropriate, and INR if on Warfarin will be ordered AM of procedure and Review of most recent labs, WEL for procedure  T&S Pre-Procedure Review: T&S is not required for DCCV/DFT Testing  Medical Records Pertinent for Procedure:  None  Iodinated Contrast Dye Allergies (Does not include Shellfish, Egg, and/or Iodine Allergy): NA  GLP-1 Protocol: If on Dulaglutide (Trulicity) (weekly)- Injection hold 7 days prior to procedure  , Exenatide extended release (Bydureon bcise) (weekly)- Injection hold 7 days prior to procedure, Exenatide (Byetta) (twice daily)- Oral Tablet hold day prior and morning of procedure and for Injection hold 7 days prior to procedure, Semaglutide (Ozempic) (weekly)- Injection  and Oral hold 7 days prior to procedure, Liraglutide (Victoza, Saxenda) (daily)- Injection hold day prior and morning of procedure    Allergies   Allergen Reactions    Clobetasol Other (See Comments)     Allergy testing done - Clobetasol-77-Propionate    Inhaled Anticholinergic Agents Other (See Comments)     Stearyl Alcohol - Allergy testing done    No Clinical Screening - See Comments Other (See Comments)     Stearyl Alcohol - Allergy testing done    Octylisothiazolinone; Phenylenediamine - allergy testing done    Shellac Other (See Comments)     Allergy testing done    Trolamine (Triethanolamine) Other (See Comments)     Allergy testing done    Urea Other (See Comments)     Allergy testing done       Current Outpatient Medications:     apixaban ANTICOAGULANT (ELIQUIS) 5 MG tablet, Take 1 tablet (5 mg) by mouth 2 times daily, Disp: 60 tablet, Rfl: 11    atorvastatin (LIPITOR) 10 MG tablet, Take 1 tablet (10 mg) by mouth daily, Disp: 90 tablet, Rfl: 3    carvedilol (COREG) 3.125 MG tablet, Take 1 tablet (3.125 mg) by mouth 2 times daily (with meals), Disp: 180 tablet, Rfl: 1    dupilumab (DUPIXENT) 300 MG/2ML prefilled syringe, Inject 300 mg Subcutaneous every 14 days, Disp: , Rfl:     empagliflozin (JARDIANCE) 10 MG TABS tablet, Take 1 tablet (10 mg) by mouth daily, Disp: 30 tablet, Rfl: 11    fenofibrate (TRIGLIDE/LOFIBRA) 160 MG tablet, Take 1 tablet (160 mg) by mouth daily, Disp: 90 tablet, Rfl: 3    fluocinonide (LIDEX) 0.05 % external cream, Apply topically 2 times daily (Patient not taking: Reported on 4/24/2024), Disp: , Rfl:     losartan (COZAAR) 25 MG tablet, Take 1 tablet (25 mg) by mouth at bedtime, Disp: 90 tablet, Rfl: 1    mupirocin (BACTROBAN) 2 % external ointment, Apply topically 2 times daily (Patient not taking: Reported on 4/24/2024), Disp: 30 g, Rfl: 5    nicotine (NICORETTE) 2 MG gum, Place 2 mg inside cheek every hour as needed for nicotine withdrawal symptoms, Disp: , Rfl:     tamsulosin  (FLOMAX) 0.4 MG capsule, Take 2 capsules (0.8 mg) by mouth daily, Disp: 180 capsule, Rfl: 3    testosterone (ANDROGEL 1.62 % PUMP) 20.25 MG/ACT gel, Place 1 Pump (20.25 mg) onto the skin daily Apply from dispenser to clean, dry, intact skin of the upper arms and shoulders., Disp: 75 g, Rfl: 0    triamcinolone (KENALOG) 0.1 % external cream, APPLY 1 CREAM EXTERNALLY TWICE DAILY TO CHEST (Patient not taking: Reported on 4/24/2024), Disp: , Rfl:     Documentation Date:7/16/2024 11:22 AM  Savana Garcia RN

## 2024-07-17 ENCOUNTER — TELEPHONE (OUTPATIENT)
Dept: FAMILY MEDICINE | Facility: CLINIC | Age: 59
End: 2024-07-17
Payer: COMMERCIAL

## 2024-07-17 ENCOUNTER — TELEPHONE (OUTPATIENT)
Dept: CARDIOLOGY | Facility: CLINIC | Age: 59
End: 2024-07-17
Payer: COMMERCIAL

## 2024-07-17 NOTE — TELEPHONE ENCOUNTER
Reason for Call:  Appointment Request    Patient requesting this type of appt: Pre-op    Requested provider: Torsten Thomas    Reason patient unable to be scheduled: Not within requested timeframe    When does patient want to be seen/preferred time:  before 7/31    Comments: Pre op Cardioversion 7/31    Could we send this information to you in [email or web address] or would you prefer to receive a phone call?:   Patient would prefer a phone call   Okay to leave a detailed message?: Yes at Cell number on file:    Telephone Information:   Mobile [phone]       Call taken on 7/17/2024 at 9:41 AM by Sondra Dyer

## 2024-07-17 NOTE — TELEPHONE ENCOUNTER
Pre-Procedure Education    Procedure: DCCV with Jerrica Laboy NP on 7/31/2024 with arrival time 8:30 am      PT IS ON ELIQUIS AND NO MISSED DOSES   AWARE TO CALL IN IF HE DOES    Orders: Orderset for procedure verified signed/held    COVID: COVID policy- if pt develops COVID like symptoms prior to procedure, he/she would need to complete an at home with a rapid antigen COVID test 1-2 days prior to your procedure date. If COVID + pt is aware the procedure will need to be rescheduled, and to contact CV scheduling as soon as possible    Pre-Op H&P:  PRE OP SET FOR 7/22 DR ARRIETA [facility]   - Will request upon completion    Education:    PT  HAS A  FOR PROCEDURE THAT WILL STAY WITH HIM    PT HAS MY CHART  PT AWARE PROCEDURE LETTER SENT    PT INSTRUCTED TO HOLD ANY VITAMINS, MINERALS CALCIUM IRON OR SUPPLEMENTS THE MORNING OF CV  PT INSTRUCTED NO GUM CHEWING MINTS OR CANDY  PT INSTRUCTED TO LEAVE JEWELRY AT HOME  PT INSTRUCTED TO BATHE OR SHOWER BEFORE COMING IN  PT IS ON ELIQUIS  PT IS ON COREG INSTRUCTED PER JERRICA LABOY CNP TO HOLD HIS MORNING DOES PRIOR CV  PT IS JIM/CPAP  PT HAS POST CV FOLLOW UP WITH KENN 8/28    Contact: Reviewed via phone with pt    Pre-Procedure Instruction: NPO after midnight pre procedure, Defined NPO with pt, Remove all jewelry and leave all valuables at home, Shower prior to arrival, Sedation plan/orders, Transportation requirements and arrangements post procedure, Post-procedure follow up process, Post-procedure restrictions/expectations, and Pre-procedure letter sent- letter tab  Risks:      Medication:   Instructions regarding anticoagulants: Eliquis- To continue anticoagulation uninterrupted through their procedure    Instructions regarding antiarrhythmic medication: Beta Blocker;  PT INSTRUCTED TO HOLD COREG DOSE THE MORNING OF CV    Instructions given to pt regarding diuretics medication: NA for DCCV    Instructions given to pt regarding DM/GLP-1 medication:   DM-  None  GLP-1- None    Instructions for medication, other than anticoagulants and antiarrhythmics listed above, given to pt: Take all medication AM of procedure with small sips of water       Important patient information for staff:  PT IS JIM/PAP    7/17/2024 1:59 PM  Neema Grijalva LPN

## 2024-07-17 NOTE — TELEPHONE ENCOUNTER
Notify patient that he is scheduled for a pre-op visit with this provider on July 22, 2024 at 3:30 PM>

## 2024-07-19 ENCOUNTER — DOCUMENTATION ONLY (OUTPATIENT)
Dept: SLEEP MEDICINE | Facility: CLINIC | Age: 59
End: 2024-07-19
Payer: COMMERCIAL

## 2024-07-19 NOTE — PROGRESS NOTES
3 day Sleep therapy management telephone visit    Diagnostic AHI: HST: 54.2        Confirmed with patient at time of call- Yes Patient is still interested in STM service       Subjective measures:  Patient getting used to CPAP hs no questions or concerns.         Objective data     Order Settings for PAP  CPAP min     CPAP max              Device settings from machine CPAP min 7.0     CPAP max 17.0           EPR Setting THREE    RESMED soft response  ON     Assessment: Nighty usage most nights over four hours      Patient has the following upcoming sleep appts:      Replacement device: No  STM ordered by provider: Yes     Total time spent on accessing and  interpreting remote patient PAP therapy data  10 minutes    Total time spent counseling, coaching  and reviewing PAP therapy data with patient  3 minutes    [id number] no

## 2024-07-22 ENCOUNTER — OFFICE VISIT (OUTPATIENT)
Dept: FAMILY MEDICINE | Facility: CLINIC | Age: 59
End: 2024-07-22
Payer: COMMERCIAL

## 2024-07-22 VITALS
SYSTOLIC BLOOD PRESSURE: 136 MMHG | WEIGHT: 306.4 LBS | HEART RATE: 75 BPM | TEMPERATURE: 96.9 F | DIASTOLIC BLOOD PRESSURE: 76 MMHG | OXYGEN SATURATION: 96 % | RESPIRATION RATE: 16 BRPM | BODY MASS INDEX: 38.1 KG/M2 | HEIGHT: 75 IN

## 2024-07-22 DIAGNOSIS — Z01.818 PREOP GENERAL PHYSICAL EXAM: Primary | ICD-10-CM

## 2024-07-22 PROBLEM — E66.01 CLASS 2 SEVERE OBESITY DUE TO EXCESS CALORIES WITH SERIOUS COMORBIDITY IN ADULT (H): Status: ACTIVE | Noted: 2024-07-22

## 2024-07-22 PROBLEM — E66.812 CLASS 2 SEVERE OBESITY DUE TO EXCESS CALORIES WITH SERIOUS COMORBIDITY IN ADULT (H): Status: ACTIVE | Noted: 2024-07-22

## 2024-07-22 LAB
ALBUMIN SERPL BCG-MCNC: 4.3 G/DL (ref 3.5–5.2)
ALP SERPL-CCNC: 71 U/L (ref 40–150)
ALT SERPL W P-5'-P-CCNC: 33 U/L (ref 0–70)
ANION GAP SERPL CALCULATED.3IONS-SCNC: 11 MMOL/L (ref 7–15)
AST SERPL W P-5'-P-CCNC: 30 U/L (ref 0–45)
BASOPHILS # BLD AUTO: 0 10E3/UL (ref 0–0.2)
BASOPHILS NFR BLD AUTO: 0 %
BILIRUB SERPL-MCNC: 0.5 MG/DL
BUN SERPL-MCNC: 13.9 MG/DL (ref 6–20)
CALCIUM SERPL-MCNC: 9.4 MG/DL (ref 8.8–10.4)
CHLORIDE SERPL-SCNC: 105 MMOL/L (ref 98–107)
CREAT SERPL-MCNC: 0.91 MG/DL (ref 0.67–1.17)
EGFRCR SERPLBLD CKD-EPI 2021: >90 ML/MIN/1.73M2
EOSINOPHIL # BLD AUTO: 0.2 10E3/UL (ref 0–0.7)
EOSINOPHIL NFR BLD AUTO: 3 %
ERYTHROCYTE [DISTWIDTH] IN BLOOD BY AUTOMATED COUNT: 14.2 % (ref 10–15)
GLUCOSE SERPL-MCNC: 96 MG/DL (ref 70–99)
HCO3 SERPL-SCNC: 25 MMOL/L (ref 22–29)
HCT VFR BLD AUTO: 49.6 % (ref 40–53)
HGB BLD-MCNC: 16.3 G/DL (ref 13.3–17.7)
IMM GRANULOCYTES # BLD: 0 10E3/UL
IMM GRANULOCYTES NFR BLD: 0 %
LYMPHOCYTES # BLD AUTO: 2.9 10E3/UL (ref 0.8–5.3)
LYMPHOCYTES NFR BLD AUTO: 37 %
MCH RBC QN AUTO: 30.8 PG (ref 26.5–33)
MCHC RBC AUTO-ENTMCNC: 32.9 G/DL (ref 31.5–36.5)
MCV RBC AUTO: 94 FL (ref 78–100)
MONOCYTES # BLD AUTO: 0.6 10E3/UL (ref 0–1.3)
MONOCYTES NFR BLD AUTO: 8 %
NEUTROPHILS # BLD AUTO: 4 10E3/UL (ref 1.6–8.3)
NEUTROPHILS NFR BLD AUTO: 52 %
PLATELET # BLD AUTO: 217 10E3/UL (ref 150–450)
POTASSIUM SERPL-SCNC: 3.9 MMOL/L (ref 3.4–5.3)
PROT SERPL-MCNC: 7.3 G/DL (ref 6.4–8.3)
RBC # BLD AUTO: 5.29 10E6/UL (ref 4.4–5.9)
SODIUM SERPL-SCNC: 141 MMOL/L (ref 135–145)
TSH SERPL DL<=0.005 MIU/L-ACNC: 2.28 UIU/ML (ref 0.3–4.2)
WBC # BLD AUTO: 7.7 10E3/UL (ref 4–11)

## 2024-07-22 PROCEDURE — 85025 COMPLETE CBC W/AUTO DIFF WBC: CPT | Performed by: INTERNAL MEDICINE

## 2024-07-22 PROCEDURE — 80053 COMPREHEN METABOLIC PANEL: CPT | Performed by: INTERNAL MEDICINE

## 2024-07-22 PROCEDURE — 93000 ELECTROCARDIOGRAM COMPLETE: CPT | Performed by: INTERNAL MEDICINE

## 2024-07-22 PROCEDURE — 36415 COLL VENOUS BLD VENIPUNCTURE: CPT | Performed by: INTERNAL MEDICINE

## 2024-07-22 PROCEDURE — 99214 OFFICE O/P EST MOD 30 MIN: CPT | Performed by: INTERNAL MEDICINE

## 2024-07-22 PROCEDURE — 84443 ASSAY THYROID STIM HORMONE: CPT | Performed by: INTERNAL MEDICINE

## 2024-07-22 NOTE — PATIENT INSTRUCTIONS
At [facility], we strive to deliver an exceptional experience to you, every time we see you. If you receive a survey, please let us know what we are doing well and/or what we could improve upon, as we do value your feedback.  If you have MyChart, you can expect to receive results automatically within 24 hours of their completion.  Your provider will send a note interpreting your results as well.   If you do not have MyChart, you should receive your results in about a week by mail.    Your care team:                            Family Medicine Internal Medicine   MD Torsten Apple, MD Amirah Chahal, MD Vincent Stubbs, MD Shalonda Soto, PABolivarC    Campbell Perry, MD Pediatrics   Sindhu Fonseca, MD Zina Silva, MD Erna Hoang, APRN CNP Suzie Lazo APRN CNP   Bhavani Zelaya, MD Concepcion Thurston, MD Joyce Coffey, CNP     Seferino Doty, CNP Same-Day Provider (No follow-up visits)   LUCY Holland, DNP Gretel Ovalles, LUCY Mac, FNP, BC JOSE CampbellC     Clinic hours: Monday - Thursday 7 am-6 pm; Fridays 7 am-5 pm.   Urgent care: Monday - Friday 10 am- 8 pm; Saturday and Sunday 9 am-5 pm.    Clinic: [phone]       [facility]: Monday - Thursday 8 am - 7 pm; Friday 8 am - 6 pm  [facility]: [phone]        Patient Education   Preparing for Your Surgery  Getting started  A nurse will call you to review your health history and instructions. They will give you an arrival time based on your scheduled surgery time. Please be ready to share:  Your doctor's clinic name and phone number  Your medical, surgical, and anesthesia history  A list of allergies and sensitivities  A list of medicines, including herbal treatments and over-the-counter drugs  Whether the patient has a legal guardian (ask how to send us the papers in advance)  Please tell us if you're  pregnant--or if there's any chance you might be pregnant. Some surgeries may injure a fetus (unborn baby), so they require a pregnancy test. Surgeries that are safe for a fetus don't always need a test, and you can choose whether to have one.   If you have a child who's having surgery, please ask for a copy of Preparing for Your Child's Surgery.    Preparing for surgery  Within 10 to 30 days of surgery: Have a pre-op exam (sometimes called an H&P, or History and Physical). This can be done at a clinic or pre-operative center.  If you're having a , you may not need this exam. Talk to your care team.  At your pre-op exam, talk to your care team about all medicines you take. If you need to stop any medicines before surgery, ask when to start taking them again.  We do this for your safety. Many medicines can make you bleed too much during surgery. Some change how well surgery (anesthesia) drugs work.  Call your insurance company to let them know you're having surgery. (If you don't have insurance, call [phone].)  Call your clinic if there's any change in your health. This includes signs of a cold or flu (sore throat, runny nose, cough, rash, fever). It also includes a scrape or scratch near the surgery site.  If you have questions on the day of surgery, call your hospital or surgery center.  Eating and drinking guidelines  For your safety: Unless your surgeon tells you otherwise, follow the guidelines below.  Eat and drink as usual until 8 hours before you arrive for surgery. After that, no food or milk.  Drink clear liquids until 2 hours before you arrive. These are liquids you can see through, like water, Gatorade, and Propel Water. They also include plain black coffee and tea (no cream or milk), candy, and breath mints. You can spit out gum when you arrive.  If you drink alcohol: Stop drinking it the night before surgery.  If your care team tells you to take medicine on the morning of surgery, it's okay  to take it with a sip of water.  Preventing infection  Shower or bathe the night before and morning of your surgery. Follow the instructions your clinic gave you. (If no instructions, use regular soap.)  Don't shave or clip hair near your surgery site. We'll remove the hair if needed.  Don't smoke or vape the morning of surgery. You may chew nicotine gum up to 2 hours before surgery. A nicotine patch is okay.  Note: Some surgeries require you to completely quit smoking and nicotine. Check with your surgeon.  Your care team will make every effort to keep you safe from infection. We will:  Clean our hands often with soap and water (or an alcohol-based hand rub).  Clean the skin at your surgery site with a special soap that kills germs.  Give you a special gown to keep you warm. (Cold raises the risk of infection.)  Wear special hair covers, masks, gowns and gloves during surgery.  Give antibiotic medicine, if prescribed. Not all surgeries need antibiotics.  What to bring on the day of surgery  Photo ID and insurance card  Copy of your health care directive, if you have one  Glasses and hearing aids (bring cases)  You can't wear contacts during surgery  Inhaler and eye drops, if you use them (tell us about these when you arrive)  CPAP machine or breathing device, if you use them  A few personal items, if spending the night  If you have . . .  A pacemaker, ICD (cardiac defibrillator) or other implant: Bring the ID card.  An implanted stimulator: Bring the remote control.  A legal guardian: Bring a copy of the certified (court-stamped) guardianship papers.  Please remove any jewelry, including body piercings. Leave jewelry and other valuables at home.  If you're going home the day of surgery  You must have a responsible adult drive you home. They should stay with you overnight as well.  If you don't have someone to stay with you, and you aren't safe to go home alone, we may keep you overnight. Insurance often won't pay  for this.  After surgery  If it's hard to control your pain or you need more pain medicine, please call your surgeon's office.  Questions?   If you have any questions for your care team, list them here: _________________________________________________________________________________________________________________________________________________________________________ ____________________________________ ____________________________________ ____________________________________  For informational purposes only. Not to replace the advice of your health care provider. Copyright   2003, 2019 [facility]. All rights reserved. Clinically reviewed by Kayla Shin MD. SMART[employer] 555801 - REV 12/22.

## 2024-07-22 NOTE — H&P (VIEW-ONLY)
Preoperative Evaluation  [address] [address]  Phone: [phone]  Primary Provider: Torsten Thomas MD  Pre-op Performing Provider: Torsten Thomas MD  Jul 22, 2024 7/22/2024   Surgical Information   What procedure is being done? Cardioversion   Facility or Hospital where procedure/surgery will be performed: [facility]       Date of surgery / procedure [id number]   Time of surgery / procedure: 830 am   Where do you plan to recover after surgery? at home with family   Fax number for surgical facility: Note does not need to be faxed, will be available electronically in Epic.    HPI related to upcoming procedure:            This is a 58 year old male who comes in today for a preoperative evaluation. Mr. Etienne has persistent atrial fibrillation, morbid obesity and obstructive sleep apnea. The patient is anticoagulated with Eliquis. He is scheduled to undergo direct current cardioversion on July 31, 2024.        7/22/2024   Pre-Op Questionnaire   Have you ever had a heart attack or stroke? No   Have you ever had surgery on your heart or blood vessels, such as a stent placement, a coronary artery bypass, or surgery on an artery in your head, neck, heart, or legs? No   Do you have chest pain with activity? No   Do you have a history of heart failure? No   Do you currently have a cold, bronchitis or symptoms of other infection? (!) YES   Do you have a cough, shortness of breath, or wheezing? (!) YES    Do you or anyone in your family have previous history of blood clots? No   Do you or does anyone in your family have a serious bleeding problem such as prolonged bleeding following surgeries or cuts? No   Have you ever had problems with anemia or been told to take iron pills? No   Have you had any abnormal blood loss such as black, tarry or bloody stools? No   Have you ever had a blood transfusion? No   Are you willing  to have a blood transfusion if it is medically needed before, during, or after your surgery? Yes   Have you or any of your relatives ever had problems with anesthesia? No   Do you have sleep apnea, excessive snoring or daytime drowsiness? (!) YES   Do you have a CPAP machine? Yes   Do you have any artifical heart valves or other implanted medical devices like a pacemaker, defibrillator, or continuous glucose monitor? No   Do you have artificial joints? No   Are you allergic to latex? No        Health Care Directive  Patient does not have a Health Care Directive or Living Will: Patient wants FULL CODE.    Preoperative Review of    reviewed - no record of controlled substances prescribed.    Patient Active Problem List    Diagnosis Date Noted    Obstructive sleep apnea 05/24/2024     Priority: Medium    Benign prostatic hyperplasia (BPH) with urinary urgency 01/29/2024     Priority: Medium    Tobacco use disorder 01/29/2024     Priority: Medium    Atrial fibrillation, unspecified type (H) 01/29/2024     Priority: Medium    Hidradenitis suppurativa 11/10/2022     Priority: Medium    Intrinsic atopic dermatitis 11/10/2022     Priority: Medium    Edema of both lower extremities due to peripheral venous insufficiency 11/10/2022     Priority: Medium    Dermatitis, seborrheic 11/10/2022     Priority: Medium      No past medical history on file.  No past surgical history on file.  Current Outpatient Medications   Medication Sig Dispense Refill    apixaban ANTICOAGULANT (ELIQUIS) 5 MG tablet Take 1 tablet (5 mg) by mouth 2 times daily 60 tablet 11    atorvastatin (LIPITOR) 10 MG tablet Take 1 tablet (10 mg) by mouth daily 90 tablet 3    carvedilol (COREG) 3.125 MG tablet Take 1 tablet (3.125 mg) by mouth 2 times daily (with meals) 180 tablet 1    dupilumab (DUPIXENT) 300 MG/2ML prefilled syringe Inject 300 mg Subcutaneous every 14 days      empagliflozin (JARDIANCE) 10 MG TABS tablet Take 1 tablet (10 mg) by mouth daily  30 tablet 11    fenofibrate (TRIGLIDE/LOFIBRA) 160 MG tablet Take 1 tablet (160 mg) by mouth daily 90 tablet 3    losartan (COZAAR) 25 MG tablet Take 1 tablet (25 mg) by mouth at bedtime 90 tablet 1    mupirocin (BACTROBAN) 2 % external ointment Apply topically 2 times daily 30 g 5    nicotine (NICORETTE) 2 MG gum Place 2 mg inside cheek every hour as needed for nicotine withdrawal symptoms      tamsulosin (FLOMAX) 0.4 MG capsule Take 2 capsules (0.8 mg) by mouth daily 180 capsule 3    testosterone (ANDROGEL 1.62 % PUMP) 20.25 MG/ACT gel Place 1 Pump (20.25 mg) onto the skin daily Apply from dispenser to clean, dry, intact skin of the upper arms and shoulders. 75 g 0    triamcinolone (KENALOG) 0.1 % external cream       fluocinonide (LIDEX) 0.05 % external cream Apply topically 2 times daily (Patient not taking: Reported on 4/24/2024)         Allergies   Allergen Reactions    Clobetasol Other (See Comments)     Allergy testing done - Clobetasol-77-Propionate    Inhaled Anticholinergic Agents Other (See Comments)     Stearyl Alcohol - Allergy testing done    No Clinical Screening - See Comments Other (See Comments)     Stearyl Alcohol - Allergy testing done    Octylisothiazolinone; Phenylenediamine - allergy testing done    Shellac Other (See Comments)     Allergy testing done    Trolamine (Triethanolamine) Other (See Comments)     Allergy testing done    Urea Other (See Comments)     Allergy testing done        Social History     Tobacco Use    Smoking status: Every Day     Current packs/day: 1.00     Average packs/day: 1.5 packs/day for 41.7 years (62.4 ttl pk-yrs)     Types: Cigarettes     Start date: 11/1/1982    Smokeless tobacco: Never   Substance Use Topics    Alcohol use: Yes       History   Drug Use    Types: Marijuana     Comment: 3x times a week- night time for sleep and back pain         Review of Systems  CONSTITUTIONAL: NEGATIVE for fever, chills, change in weight  INTEGUMENTARY/SKIN: NEGATIVE for  "worrisome rashes, moles or lesions  EYES: NEGATIVE for vision changes or irritation  ENT/MOUTH: NEGATIVE for ear, mouth and throat problems  RESP: NEGATIVE for significant cough or SOB  CV: NEGATIVE for chest pain, palpitations or peripheral edema  GI: NEGATIVE for nausea, abdominal pain, heartburn, or change in bowel habits  : NEGATIVE for frequency, dysuria, or hematuria  MUSCULOSKELETAL: NEGATIVE for significant arthralgias or myalgia  NEURO: NEGATIVE for weakness, dizziness or paresthesias  ENDOCRINE: NEGATIVE for temperature intolerance, skin/hair changes  HEME: NEGATIVE for bleeding problems  PSYCHIATRIC: NEGATIVE for changes in mood or affect    Objective    /76 (BP Location: Left arm, Patient Position: Sitting, Cuff Size: Adult Large)   Pulse 75   Temp 96.9  F (36.1  C) (Temporal)   Resp 16   Ht 1.905 m (6' 3\")   Wt 139 kg (306 lb 6.4 oz)   SpO2 96%   BMI 38.30 kg/m     Estimated body mass index is 38.3 kg/m  as calculated from the following:    Height as of this encounter: 1.905 m (6' 3\").    Weight as of this encounter: 139 kg (306 lb 6.4 oz).  Physical Exam  GENERAL: alert and no distress  EYES: Eyes grossly normal to inspection and conjunctivae and sclerae normal  HENT: normal cephalic/atraumatic and oral mucous membranes moist  NECK: no adenopathy  RESP: lungs clear to auscultation - no rales, rhonchi or wheezes  CV: regular rates and rhythm and no peripheral edema  MS: no gross musculoskeletal defects noted, no edema  NEURO: Normal strength and tone, mentation intact and speech normal  PSYCH: mentation appears normal, affect normal/bright     Diagnostics  Available in EPIC    ASSESSMENT/PLAN  Preop general physical exam  - TSH WITH FREE T4 REFLEX  - EKG 12-lead complete w/read - Clinics  - CBC with platelets and differential  - Comprehensive metabolic panel   Revised Cardiac Risk Index (RCRI)  The patient has the following serious cardiovascular risks for perioperative complications:   - " No serious cardiac risks = 0 points   RCRI Interpretation: 0 points: Class I (very low risk - 0.4% complication rate)  Patient is cleared for surgery.      Signed Electronically by: Torsten Thomas MD  A copy of this evaluation report is provided to the requesting physician.

## 2024-07-22 NOTE — PROGRESS NOTES
Preoperative Evaluation  [address] [address]  Phone: [phone]  Primary Provider: Torsten Thomas MD  Pre-op Performing Provider: Torsten Thomas MD  Jul 22, 2024 7/22/2024   Surgical Information   What procedure is being done? Cardioversion   Facility or Hospital where procedure/surgery will be performed: [facility]       Date of surgery / procedure [id number]   Time of surgery / procedure: 830 am   Where do you plan to recover after surgery? at home with family   Fax number for surgical facility: Note does not need to be faxed, will be available electronically in Epic.    HPI related to upcoming procedure:            This is a 58 year old male who comes in today for a preoperative evaluation. Mr. Etienne has persistent atrial fibrillation, morbid obesity and obstructive sleep apnea. The patient is anticoagulated with Eliquis. He is scheduled to undergo direct current cardioversion on July 31, 2024.        7/22/2024   Pre-Op Questionnaire   Have you ever had a heart attack or stroke? No   Have you ever had surgery on your heart or blood vessels, such as a stent placement, a coronary artery bypass, or surgery on an artery in your head, neck, heart, or legs? No   Do you have chest pain with activity? No   Do you have a history of heart failure? No   Do you currently have a cold, bronchitis or symptoms of other infection? (!) YES   Do you have a cough, shortness of breath, or wheezing? (!) YES    Do you or anyone in your family have previous history of blood clots? No   Do you or does anyone in your family have a serious bleeding problem such as prolonged bleeding following surgeries or cuts? No   Have you ever had problems with anemia or been told to take iron pills? No   Have you had any abnormal blood loss such as black, tarry or bloody stools? No   Have you ever had a blood transfusion? No   Are you willing  to have a blood transfusion if it is medically needed before, during, or after your surgery? Yes   Have you or any of your relatives ever had problems with anesthesia? No   Do you have sleep apnea, excessive snoring or daytime drowsiness? (!) YES   Do you have a CPAP machine? Yes   Do you have any artifical heart valves or other implanted medical devices like a pacemaker, defibrillator, or continuous glucose monitor? No   Do you have artificial joints? No   Are you allergic to latex? No        Health Care Directive  Patient does not have a Health Care Directive or Living Will: Patient wants FULL CODE.    Preoperative Review of    reviewed - no record of controlled substances prescribed.    Patient Active Problem List    Diagnosis Date Noted    Obstructive sleep apnea 05/24/2024     Priority: Medium    Benign prostatic hyperplasia (BPH) with urinary urgency 01/29/2024     Priority: Medium    Tobacco use disorder 01/29/2024     Priority: Medium    Atrial fibrillation, unspecified type (H) 01/29/2024     Priority: Medium    Hidradenitis suppurativa 11/10/2022     Priority: Medium    Intrinsic atopic dermatitis 11/10/2022     Priority: Medium    Edema of both lower extremities due to peripheral venous insufficiency 11/10/2022     Priority: Medium    Dermatitis, seborrheic 11/10/2022     Priority: Medium      No past medical history on file.  No past surgical history on file.  Current Outpatient Medications   Medication Sig Dispense Refill    apixaban ANTICOAGULANT (ELIQUIS) 5 MG tablet Take 1 tablet (5 mg) by mouth 2 times daily 60 tablet 11    atorvastatin (LIPITOR) 10 MG tablet Take 1 tablet (10 mg) by mouth daily 90 tablet 3    carvedilol (COREG) 3.125 MG tablet Take 1 tablet (3.125 mg) by mouth 2 times daily (with meals) 180 tablet 1    dupilumab (DUPIXENT) 300 MG/2ML prefilled syringe Inject 300 mg Subcutaneous every 14 days      empagliflozin (JARDIANCE) 10 MG TABS tablet Take 1 tablet (10 mg) by mouth daily  30 tablet 11    fenofibrate (TRIGLIDE/LOFIBRA) 160 MG tablet Take 1 tablet (160 mg) by mouth daily 90 tablet 3    losartan (COZAAR) 25 MG tablet Take 1 tablet (25 mg) by mouth at bedtime 90 tablet 1    mupirocin (BACTROBAN) 2 % external ointment Apply topically 2 times daily 30 g 5    nicotine (NICORETTE) 2 MG gum Place 2 mg inside cheek every hour as needed for nicotine withdrawal symptoms      tamsulosin (FLOMAX) 0.4 MG capsule Take 2 capsules (0.8 mg) by mouth daily 180 capsule 3    testosterone (ANDROGEL 1.62 % PUMP) 20.25 MG/ACT gel Place 1 Pump (20.25 mg) onto the skin daily Apply from dispenser to clean, dry, intact skin of the upper arms and shoulders. 75 g 0    triamcinolone (KENALOG) 0.1 % external cream       fluocinonide (LIDEX) 0.05 % external cream Apply topically 2 times daily (Patient not taking: Reported on 4/24/2024)         Allergies   Allergen Reactions    Clobetasol Other (See Comments)     Allergy testing done - Clobetasol-77-Propionate    Inhaled Anticholinergic Agents Other (See Comments)     Stearyl Alcohol - Allergy testing done    No Clinical Screening - See Comments Other (See Comments)     Stearyl Alcohol - Allergy testing done    Octylisothiazolinone; Phenylenediamine - allergy testing done    Shellac Other (See Comments)     Allergy testing done    Trolamine (Triethanolamine) Other (See Comments)     Allergy testing done    Urea Other (See Comments)     Allergy testing done        Social History     Tobacco Use    Smoking status: Every Day     Current packs/day: 1.00     Average packs/day: 1.5 packs/day for 41.7 years (62.4 ttl pk-yrs)     Types: Cigarettes     Start date: 11/1/1982    Smokeless tobacco: Never   Substance Use Topics    Alcohol use: Yes       History   Drug Use    Types: Marijuana     Comment: 3x times a week- night time for sleep and back pain         Review of Systems  CONSTITUTIONAL: NEGATIVE for fever, chills, change in weight  INTEGUMENTARY/SKIN: NEGATIVE for  "worrisome rashes, moles or lesions  EYES: NEGATIVE for vision changes or irritation  ENT/MOUTH: NEGATIVE for ear, mouth and throat problems  RESP: NEGATIVE for significant cough or SOB  CV: NEGATIVE for chest pain, palpitations or peripheral edema  GI: NEGATIVE for nausea, abdominal pain, heartburn, or change in bowel habits  : NEGATIVE for frequency, dysuria, or hematuria  MUSCULOSKELETAL: NEGATIVE for significant arthralgias or myalgia  NEURO: NEGATIVE for weakness, dizziness or paresthesias  ENDOCRINE: NEGATIVE for temperature intolerance, skin/hair changes  HEME: NEGATIVE for bleeding problems  PSYCHIATRIC: NEGATIVE for changes in mood or affect    Objective    /76 (BP Location: Left arm, Patient Position: Sitting, Cuff Size: Adult Large)   Pulse 75   Temp 96.9  F (36.1  C) (Temporal)   Resp 16   Ht 1.905 m (6' 3\")   Wt 139 kg (306 lb 6.4 oz)   SpO2 96%   BMI 38.30 kg/m     Estimated body mass index is 38.3 kg/m  as calculated from the following:    Height as of this encounter: 1.905 m (6' 3\").    Weight as of this encounter: 139 kg (306 lb 6.4 oz).  Physical Exam  GENERAL: alert and no distress  EYES: Eyes grossly normal to inspection and conjunctivae and sclerae normal  HENT: normal cephalic/atraumatic and oral mucous membranes moist  NECK: no adenopathy  RESP: lungs clear to auscultation - no rales, rhonchi or wheezes  CV: regular rates and rhythm and no peripheral edema  MS: no gross musculoskeletal defects noted, no edema  NEURO: Normal strength and tone, mentation intact and speech normal  PSYCH: mentation appears normal, affect normal/bright     Diagnostics  Available in EPIC    ASSESSMENT/PLAN  Preop general physical exam  - TSH WITH FREE T4 REFLEX  - EKG 12-lead complete w/read - Clinics  - CBC with platelets and differential  - Comprehensive metabolic panel   Revised Cardiac Risk Index (RCRI)  The patient has the following serious cardiovascular risks for perioperative complications:   - " No serious cardiac risks = 0 points   RCRI Interpretation: 0 points: Class I (very low risk - 0.4% complication rate)  Patient is cleared for surgery.      Signed Electronically by: Torsten Thomas MD  A copy of this evaluation report is provided to the requesting physician.

## 2024-07-30 ENCOUNTER — OFFICE VISIT (OUTPATIENT)
Dept: FAMILY MEDICINE | Facility: CLINIC | Age: 59
End: 2024-07-30
Payer: COMMERCIAL

## 2024-07-30 ENCOUNTER — TELEPHONE (OUTPATIENT)
Dept: FAMILY MEDICINE | Facility: CLINIC | Age: 59
End: 2024-07-30
Payer: COMMERCIAL

## 2024-07-30 ENCOUNTER — TELEPHONE (OUTPATIENT)
Dept: CARDIOLOGY | Facility: CLINIC | Age: 59
End: 2024-07-30

## 2024-07-30 ENCOUNTER — ANCILLARY PROCEDURE (OUTPATIENT)
Dept: GENERAL RADIOLOGY | Facility: CLINIC | Age: 59
End: 2024-07-30
Attending: FAMILY MEDICINE
Payer: COMMERCIAL

## 2024-07-30 VITALS
HEART RATE: 70 BPM | BODY MASS INDEX: 38.05 KG/M2 | OXYGEN SATURATION: 98 % | SYSTOLIC BLOOD PRESSURE: 144 MMHG | HEIGHT: 75 IN | RESPIRATION RATE: 16 BRPM | TEMPERATURE: 98 F | DIASTOLIC BLOOD PRESSURE: 71 MMHG | WEIGHT: 306 LBS

## 2024-07-30 DIAGNOSIS — R05.1 ACUTE COUGH: ICD-10-CM

## 2024-07-30 DIAGNOSIS — R05.1 ACUTE COUGH: Primary | ICD-10-CM

## 2024-07-30 PROBLEM — M79.673 ACUTE FOOT PAIN: Status: ACTIVE | Noted: 2021-07-16

## 2024-07-30 LAB
BASOPHILS # BLD AUTO: 0 10E3/UL (ref 0–0.2)
BASOPHILS NFR BLD AUTO: 0 %
EOSINOPHIL # BLD AUTO: 0.2 10E3/UL (ref 0–0.7)
EOSINOPHIL NFR BLD AUTO: 2 %
ERYTHROCYTE [DISTWIDTH] IN BLOOD BY AUTOMATED COUNT: 14.2 % (ref 10–15)
FLUAV RNA SPEC QL NAA+PROBE: NEGATIVE
FLUBV RNA RESP QL NAA+PROBE: NEGATIVE
HCT VFR BLD AUTO: 50.4 % (ref 40–53)
HGB BLD-MCNC: 16.4 G/DL (ref 13.3–17.7)
IMM GRANULOCYTES # BLD: 0 10E3/UL
IMM GRANULOCYTES NFR BLD: 0 %
LYMPHOCYTES # BLD AUTO: 2.4 10E3/UL (ref 0.8–5.3)
LYMPHOCYTES NFR BLD AUTO: 34 %
MCH RBC QN AUTO: 30.8 PG (ref 26.5–33)
MCHC RBC AUTO-ENTMCNC: 32.5 G/DL (ref 31.5–36.5)
MCV RBC AUTO: 95 FL (ref 78–100)
MONOCYTES # BLD AUTO: 0.6 10E3/UL (ref 0–1.3)
MONOCYTES NFR BLD AUTO: 9 %
NEUTROPHILS # BLD AUTO: 3.7 10E3/UL (ref 1.6–8.3)
NEUTROPHILS NFR BLD AUTO: 54 %
PLATELET # BLD AUTO: 223 10E3/UL (ref 150–450)
RBC # BLD AUTO: 5.32 10E6/UL (ref 4.4–5.9)
RSV RNA SPEC NAA+PROBE: NEGATIVE
SARS-COV-2 RNA RESP QL NAA+PROBE: NEGATIVE
WBC # BLD AUTO: 6.9 10E3/UL (ref 4–11)

## 2024-07-30 PROCEDURE — 71046 X-RAY EXAM CHEST 2 VIEWS: CPT | Mod: TC | Performed by: RADIOLOGY

## 2024-07-30 PROCEDURE — G2211 COMPLEX E/M VISIT ADD ON: HCPCS | Performed by: FAMILY MEDICINE

## 2024-07-30 PROCEDURE — 99213 OFFICE O/P EST LOW 20 MIN: CPT | Performed by: FAMILY MEDICINE

## 2024-07-30 PROCEDURE — 87637 SARSCOV2&INF A&B&RSV AMP PRB: CPT | Performed by: FAMILY MEDICINE

## 2024-07-30 PROCEDURE — 36415 COLL VENOUS BLD VENIPUNCTURE: CPT | Performed by: FAMILY MEDICINE

## 2024-07-30 PROCEDURE — 85025 COMPLETE CBC W/AUTO DIFF WBC: CPT | Performed by: FAMILY MEDICINE

## 2024-07-30 RX ORDER — AZITHROMYCIN 250 MG/1
TABLET, FILM COATED ORAL
Qty: 6 TABLET | Refills: 0 | Status: SHIPPED | OUTPATIENT
Start: 2024-07-30 | End: 2024-08-04

## 2024-07-30 RX ORDER — BENZONATATE 100 MG/1
100 CAPSULE ORAL 3 TIMES DAILY PRN
Qty: 20 CAPSULE | Refills: 0 | Status: SHIPPED | OUTPATIENT
Start: 2024-07-30

## 2024-07-30 ASSESSMENT — ENCOUNTER SYMPTOMS
COUGH: 1
FATIGUE: 1

## 2024-07-30 NOTE — TELEPHONE ENCOUNTER
Pt is set CV 7/30  Pt calling in to  report for 2 weeks cold bad cough no temp no covid using cough syrup and sudafed cough sounds dry bacjk gets sore from coughing  Rest CV? To next week  His pre op is good till 8/21 get him reset before that  Please get back to me when reset . tx

## 2024-07-30 NOTE — TELEPHONE ENCOUNTER
Pt calling, he had a pre op last week.  He has procedure scheduled for tomorrow, this will be rescheduled.    Pt has had cough for 3 weeks. It has worsened and is productive and he just does not feel well. He did a covid test towards the beginning of his sx and it was negative. Advised in clinic appointment to determine what he has and treatment plan. No appointment's at [place] or [place]. Advised I could send him to scheduling to look for an appointment, if no appointment's then advised to go to urgent care.  Pt verbalized understanding then phone went dead. Attempted to call pt back and got vm.  Advised to call back and let  know that nurse said he needed in clinic appointment and if none available to go to urgent care.  Karla COPELANDN, RN

## 2024-07-30 NOTE — PROGRESS NOTES
Assessment & Plan     Acute cough  URI x 2 weeks now with persistent/worsening cough    - Symptomatic Influenza A/B, RSV, & SARS-CoV2 PCR (COVID-19) Nasopharyngeal  - CBC with platelets and differential  - azithromycin (ZITHROMAX) 250 MG tablet; Take 2 tablets (500 mg) by mouth daily for 1 day, THEN 1 tablet (250 mg) daily for 4 days.  - benzonatate (TESSALON) 100 MG capsule; Take 1 capsule (100 mg) by mouth 3 times daily as needed for cough      Cardioversion rescheduled to 8/8/2024.  Should be seen prior to this to ensure infection cleared    Return sooner if issues arise or not improving      Subjective   Karl is a 58 year old, presenting for the following health issues:  Cough Dry cough- sxs started 2wks ago- feels like it is getting worse- Sudafed and Dayquil help with a little relief- took a COVID test about 10 days ago and results were negative- had a fever when his sxs started but has not had one in a while, Nasal Congestion, and Fatigue    No edema.  No SOB.      Was scheduled for cardioversion 8/1 but rescheduled to 8/8.    Cough    Fatigue  Associated symptoms include coughing and fatigue.   History of Present Illness       Reason for visit:  Severe cough  Symptom onset:  1-2 weeks ago  Symptom intensity:  Severe  Symptom progression:  Worsening  Had these symptoms before:  Yes  Has tried/received treatment for these symptoms:  Yes  Previous treatment was successful:  Yes  Prior treatment description:  Antibiotics    He eats 2-3 servings of fruits and vegetables daily.He consumes 4 sweetened beverage(s) daily.He exercises with enough effort to increase his heart rate 9 or less minutes per day.  He exercises with enough effort to increase his heart rate 3 or less days per week.   He is taking medications regularly.       Review of Systems  Constitutional, HEENT, cardiovascular, pulmonary, gi and gu systems are negative, except as otherwise noted.      Objective    BP (!) 144/71   Pulse 70   Temp 98  F  "(36.7  C) (Tympanic)   Resp 16   Ht 1.905 m (6' 3\")   Wt 138.8 kg (306 lb)   SpO2 98%   BMI 38.25 kg/m    Body mass index is 38.25 kg/m .  Physical Exam   GENERAL: alert and no distress  EYES: Eyes grossly normal to inspection, PERRL and conjunctivae and sclerae normal  HENT: ear canals and TM's normal, nose and mouth without ulcers or lesions  NECK: no adenopathy, no asymmetry, masses, or scars  RESP: lungs clear to auscultation - no rales, rhonchi or wheezes  CV: regular rate and rhythm, normal S1 S2, no S3 or S4, no murmur, click or rub, no peripheral edema  ABDOMEN: soft, nontender, no hepatosplenomegaly, no masses and bowel sounds normal  MS: no gross musculoskeletal defects noted, no edema    Results for orders placed or performed in visit on 07/30/24 (from the past 24 hour(s))   Symptomatic Influenza A/B, RSV, & SARS-CoV2 PCR (COVID-19) Nasopharyngeal    Specimen: Nasopharyngeal; Swab   Result Value Ref Range    Influenza A PCR Negative Negative    Influenza B PCR Negative Negative    RSV PCR Negative Negative    SARS CoV2 PCR Negative Negative    Narrative    Testing was performed using the Xpert Xpress CoV2/Flu/RSV Assay on the [employer] GeneXpert Instrument. This test should be ordered for the detection of SARS-CoV2, influenza, and RSV viruses in individuals with signs and symptoms of respiratory tract infection. This test is for in vitro diagnostic use under the [place] FDA for laboratories certified under CLIA to perform high or moderate complexity testing. This test has been [place] FDA cleared. A negative result does not rule out the presence of PCR inhibitors in the specimen or target RNA in concentration below the limit of detection for the assay. If only one viral target is positive but coinfection with multiple targets is suspected, the sample should be re-tested with another FDA cleared, approved, or authorized test, if coninfection would change clinical management. This test was validated by the [facility]. These laboratories are certified under the Clinical Laboratory Improvement Amendments of 1988 (CLIA-88) as qualified to perfom high complexity laboratory testing.   CBC with platelets and differential    Narrative    The following orders were created for panel order CBC with platelets and differential.  Procedure                               Abnormality         Status                     ---------                               -----------         ------                     CBC with platelets and d...[[id number]]                      Final result                 Please view results for these tests on the individual orders.   CBC with platelets and differential   Result Value Ref Range    WBC Count 6.9 4.0 - 11.0 10e3/uL    RBC Count 5.32 4.40 - 5.90 10e6/uL    Hemoglobin 16.4 13.3 - 17.7 g/dL    Hematocrit 50.4 40.0 - 53.0 %    MCV 95 78 - 100 fL    MCH 30.8 26.5 - 33.0 pg    MCHC 32.5 31.5 - 36.5 g/dL    RDW 14.2 10.0 - 15.0 %    Platelet Count 223 150 - 450 10e3/uL    % Neutrophils 54 %    % Lymphocytes 34 %    % Monocytes 9 %    % Eosinophils 2 %    % Basophils 0 %    % Immature Granulocytes 0 %    Absolute Neutrophils 3.7 1.6 - 8.3 10e3/uL    Absolute Lymphocytes 2.4 0.8 - 5.3 10e3/uL    Absolute Monocytes 0.6 0.0 - 1.3 10e3/uL    Absolute Eosinophils 0.2 0.0 - 0.7 10e3/uL    Absolute Basophils 0.0 0.0 - 0.2 10e3/uL    Absolute Immature Granulocytes 0.0 <=0.4 10e3/uL       CXR- NAD    Signed Electronically by: Mary Jeff MD

## 2024-07-30 NOTE — TELEPHONE ENCOUNTER
Pt called back and was sent to . He will go to urgent care if no appointment's available.  Karla Dickey BSN, RN

## 2024-07-30 NOTE — PATIENT INSTRUCTIONS
Take the zpak as directed    Tessalon perles are for the cough- you can take up to 3 times per day as needed    Call/return with concerns

## 2024-07-31 ENCOUNTER — DOCUMENTATION ONLY (OUTPATIENT)
Dept: SLEEP MEDICINE | Facility: CLINIC | Age: 59
End: 2024-07-31
Payer: COMMERCIAL

## 2024-07-31 NOTE — PROGRESS NOTES
14 day Sleep therapy management telephone visit    Diagnostic AHI:    HST: 54.2        LEFT VOICE MESSAGE FOR PATIENT TO RETURN CALL      Objective measures: 14 day rolling measures   COMPLIANCE LEAK AHI AVERAGE USE IN MINUTES   21 % 33.1 1.23 172   GOAL >70% GOAL < 24 LPM GOAL <5 GOAL >240          Device settings:  CPAP MIN CPAP MAX EPR RESMED SOFT RESPONSE SETTING   7.0 cm  H20 17.0 cm  H20 THREE OFF         Patient has the following upcoming sleep appts:      Replacement device: No  STM ordered by provider: Yes     Total time spent on accessing and  interpreting remote patient PAP therapy data  10 minutes    Total time spent counseling, coaching  and reviewing PAP therapy data with patient  1 minutes

## 2024-08-07 ENCOUNTER — TELEPHONE (OUTPATIENT)
Dept: CARDIOLOGY | Facility: CLINIC | Age: 59
End: 2024-08-07
Payer: COMMERCIAL

## 2024-08-07 ENCOUNTER — PREP FOR PROCEDURE (OUTPATIENT)
Dept: CARDIOLOGY | Facility: CLINIC | Age: 59
End: 2024-08-07
Payer: COMMERCIAL

## 2024-08-07 DIAGNOSIS — I10 HTN, GOAL BELOW 140/90: ICD-10-CM

## 2024-08-07 DIAGNOSIS — I48.91 ATRIAL FIBRILLATION, UNSPECIFIED TYPE (H): Primary | ICD-10-CM

## 2024-08-07 RX ORDER — POTASSIUM CHLORIDE 1500 MG/1
20 TABLET, EXTENDED RELEASE ORAL
Status: CANCELLED | OUTPATIENT
Start: 2024-08-07 | End: 2024-08-07

## 2024-08-07 RX ORDER — MAGNESIUM SULFATE HEPTAHYDRATE 40 MG/ML
2 INJECTION, SOLUTION INTRAVENOUS
Status: CANCELLED | OUTPATIENT
Start: 2024-08-07

## 2024-08-07 RX ORDER — SODIUM CHLORIDE 9 MG/ML
INJECTION, SOLUTION INTRAVENOUS CONTINUOUS
Status: DISCONTINUED | OUTPATIENT
Start: 2024-08-07 | End: 2024-08-07 | Stop reason: HOSPADM

## 2024-08-07 NOTE — TELEPHONE ENCOUNTER
Pre-Procedure Education    Procedure: DCCV with Meg Lara NP on 8/8/2024 with arrival time 8:30 am    RETEACH FOR CV    PT IS ON ELIQUIS AND NO MISSED  DOSES     Orders: Orderset for procedure verified signed/held    COVID: COVID policy- if pt develops COVID like symptoms prior to procedure, he/she would need to complete an at home with a rapid antigen COVID test 1-2 days prior to your procedure date. If COVID + pt is aware the procedure will need to be rescheduled, and to contact CV scheduling as soon as possible    Pre-Op H&P: Completed- Available in Epic DONE 7/22 DR RARIETA    Education:   PT HAS A  FOR PROCEDURE THAT WILL STAY WITH HIM    PT HAS PROCEDURE LETTER  PT HAS MY CHART    PT INSTRUCTED TO HOLD ANY VITAMINS MINERALS CALCIUM IRON OR SUPPLEMENTS THE MORNING OF CV  PT INSTRUCTED NO GUM CHEWING MINTS OR CANDY THE MORNING OF CV   PT INSTRUCTED TO LEAVE JEWELRY AT HOME  PT INSTRUCTED TO BATHE OR SHOWER BEFORE COMING IN  PT IS ON ELIQUIS  PT INSTRUCTED PER JERRICA QUINTERO CNP TO HOLD MORNING DOSE OF COREG DAY OF CV  PT HAS A POST CV FOLLOW UP WITH KENN 8/28       Contact: Reviewed via phone with pt    Pre-Procedure Instruction: NPO after midnight pre procedure, Defined NPO with pt, Remove all jewelry and leave all valuables at home, Shower prior to arrival, Sedation plan/orders, Transportation requirements and arrangements post procedure, Post-procedure follow up process, Post-procedure restrictions/expectations, and Pre-procedure letter sent- letter tab  Risks:      Medication:   Instructions regarding anticoagulants: Eliquis- To continue anticoagulation uninterrupted through their procedure    Instructions regarding antiarrhythmic medication: Beta Blocker;  PT INSTRUCTED TO HOLD HIS MORNING DOSE OF COREG DAY OF CV    Instructions given to pt regarding diuretics medication: NA for DCCV    Instructions given to pt regarding DM/GLP-1 medication:   DM- None  GLP-1- None    Instructions for medication,  other than anticoagulants and antiarrhythmics listed above, given to pt: Take all medication AM of procedure with small sips of water       Important patient information for staff:  PT IS JIM/CPAP  8/7/2024 2:30 PM  Neema Grijalva LPN

## 2024-08-08 ENCOUNTER — ANESTHESIA (OUTPATIENT)
Dept: CARDIOLOGY | Facility: HOSPITAL | Age: 59
End: 2024-08-08
Payer: COMMERCIAL

## 2024-08-08 ENCOUNTER — HOSPITAL ENCOUNTER (OUTPATIENT)
Dept: CARDIOLOGY | Facility: HOSPITAL | Age: 59
Discharge: HOME | End: 2024-08-08
Attending: INTERNAL MEDICINE | Admitting: INTERNAL MEDICINE
Payer: COMMERCIAL

## 2024-08-08 ENCOUNTER — ANESTHESIA EVENT (OUTPATIENT)
Dept: CARDIOLOGY | Facility: HOSPITAL | Age: 59
End: 2024-08-08
Payer: COMMERCIAL

## 2024-08-08 VITALS
WEIGHT: 310 LBS | DIASTOLIC BLOOD PRESSURE: 72 MMHG | TEMPERATURE: 98 F | SYSTOLIC BLOOD PRESSURE: 131 MMHG | HEIGHT: 75 IN | HEART RATE: 80 BPM | BODY MASS INDEX: 38.54 KG/M2 | RESPIRATION RATE: 19 BRPM | OXYGEN SATURATION: 94 %

## 2024-08-08 DIAGNOSIS — I48.11 LONGSTANDING PERSISTENT ATRIAL FIBRILLATION (H): Primary | ICD-10-CM

## 2024-08-08 DIAGNOSIS — I48.91 ATRIAL FIBRILLATION, UNSPECIFIED TYPE (H): ICD-10-CM

## 2024-08-08 PROCEDURE — 92960 CARDIOVERSION ELECTRIC EXT: CPT | Performed by: NURSE PRACTITIONER

## 2024-08-08 PROCEDURE — 370N000017 HC ANESTHESIA TECHNICAL FEE, PER MIN

## 2024-08-08 PROCEDURE — 92960 CARDIOVERSION ELECTRIC EXT: CPT | Performed by: NURSE ANESTHETIST, CERTIFIED REGISTERED

## 2024-08-08 PROCEDURE — 93005 ELECTROCARDIOGRAM TRACING: CPT

## 2024-08-08 PROCEDURE — 999N000054 HC STATISTIC EKG NON-CHARGEABLE

## 2024-08-08 PROCEDURE — 92960 CARDIOVERSION ELECTRIC EXT: CPT | Performed by: ANESTHESIOLOGY

## 2024-08-08 PROCEDURE — 250N000009 HC RX 250: Performed by: NURSE ANESTHETIST, CERTIFIED REGISTERED

## 2024-08-08 PROCEDURE — 92960 CARDIOVERSION ELECTRIC EXT: CPT

## 2024-08-08 RX ORDER — POTASSIUM CHLORIDE 1500 MG/1
20 TABLET, EXTENDED RELEASE ORAL
Status: DISCONTINUED | OUTPATIENT
Start: 2024-08-08 | End: 2024-08-08 | Stop reason: HOSPADM

## 2024-08-08 RX ORDER — METHOHEXITAL IN WATER/PF 100MG/10ML
SYRINGE (ML) INTRAVENOUS PRN
Status: DISCONTINUED | OUTPATIENT
Start: 2024-08-08 | End: 2024-08-08

## 2024-08-08 RX ORDER — LOSARTAN POTASSIUM 25 MG/1
25 TABLET ORAL AT BEDTIME
Qty: 90 TABLET | Refills: 0 | Status: SHIPPED | OUTPATIENT
Start: 2024-08-08

## 2024-08-08 RX ORDER — MAGNESIUM SULFATE HEPTAHYDRATE 40 MG/ML
2 INJECTION, SOLUTION INTRAVENOUS
Status: DISCONTINUED | OUTPATIENT
Start: 2024-08-08 | End: 2024-08-08 | Stop reason: HOSPADM

## 2024-08-08 RX ORDER — SOTALOL HYDROCHLORIDE 80 MG/1
80 TABLET ORAL 2 TIMES DAILY
Qty: 180 TABLET | Refills: 1 | Status: SHIPPED | OUTPATIENT
Start: 2024-08-08

## 2024-08-08 RX ADMIN — Medication 90 MG: at 09:38

## 2024-08-08 ASSESSMENT — ENCOUNTER SYMPTOMS: DYSRHYTHMIAS: 1

## 2024-08-08 ASSESSMENT — ACTIVITIES OF DAILY LIVING (ADL)
ADLS_ACUITY_SCORE: 35

## 2024-08-08 NOTE — ANESTHESIA CARE TRANSFER NOTE
Patient: Angel Etienne    Procedure: * No procedures listed *  Cardioversion External    Diagnosis: * No pre-op diagnosis entered *  Diagnosis Additional Information: No value filed.    Anesthesia Type:   No value filed.     Note:    Oropharynx: oropharynx clear of all foreign objects  Level of Consciousness: awake  Oxygen Supplementation: nasal cannula  Level of Supplemental Oxygen (L/min / FiO2): 4  Independent Airway: airway patency satisfactory and stable  Dentition: dentition unchanged    Report to RN Given: handoff report given  Patient transferred to: Cardiac Special Care          Vitals:  Vitals Value Taken Time   /80 08/08/24 0938   Temp     Pulse 62 08/08/24 0937   Resp 18 08/08/24 0937   SpO2 99 % 08/08/24 0937   Vitals shown include unfiled device data.    Electronically Signed By: LUCY Bobby CRNA  August 8, 2024  9:39 AM

## 2024-08-08 NOTE — PROCEDURES
[facility]    Procedure: Cardioversion    Date/Time: 8/8/2024 10:40 AM    Performed by: Meg Lara APRN CNP  Authorized by: Patrick Garcia MD      UNIVERSAL PROTOCOL   Site Marked: NA  Prior Images Obtained and Reviewed:  Yes  Required items: Required blood products, implants, devices and special equipment available    Patient identity confirmed:  Verbally with patient, arm band and provided demographic data  Patient was reevaluated immediately before administering moderate or deep sedation or anesthesia  Confirmation Checklist:  Patient's identity using two indicators, relevant allergies, procedure was appropriate and matched the consent or emergent situation and correct equipment/implants were available  Time out: Immediately prior to the procedure a time out was called    Universal Protocol: the Joint Commission Universal Protocol was followed       ANESTHESIA    Anesthesia was administered and monitored by anesthesiology.  See anesthesia documentation for details.    PROCEDURE DETAILS  Pre-procedure rhythm: atrial fibrillation  Patient position: patient was placed in a supine position  Chest area: chest area exposed  Electrodes: pads  Electrodes placed: anterior-posterior  Number of attempts: 1    Details of Attempts:  At 0940, after administration of IV Brevital by MD and confirmation of adequate sedation, he received a single synchronous shock at 300 J with prompt restoration of sinus rhythm in the 70s with a first-degree AV delay.  Post cardioversion EKG was personally reviewed, shows sinus rhythm at 75 bpm, first-degree AV delay with WI interval 272 ms, QRS 98 ms, QT/QTc 400/446 ms.  Post-procedure rhythm: normal sinus rhythm  Complications: no complications      PROCEDURE  Describe Procedure: Persistent atrial fibrillation diagnosed 1/29/2024.  Echo 2/5/2024 shows severe left atrial enlargement consistent with a longer history of AF.  He has now started on CPAP for  JIM.  [id number] score of at least 2 for HTN and DM.  He is on Eliquis for stroke prophylaxis and denies missing any doses in the last 3 weeks, though was late on a dose prior to that.  Successful cardioversion to sinus rhythm  Unlikely to maintain sinus rhythm without use of AAD due to severe LAE.  Start sotalol 80 mg twice daily and discontinue carvedilol.  EKG on Monday for QT interval monitoring.  Follow-up with Alondra Miller CNP 8/28/2024  Discharge to home 1 hour after cardioversion as he is fully awake and stable  Patient Tolerance:  Patient tolerated the procedure well with no immediate complications  Length of time physician/provider present for 1:1 monitoring during sedation: 10

## 2024-08-08 NOTE — ANESTHESIA POSTPROCEDURE EVALUATION
Patient: Angel Etienne    Procedure: * No procedures listed *  Cardioversion External    Anesthesia Type:  General    Note:  Disposition: Outpatient   Postop Pain Control: Uneventful            Sign Out: Well controlled pain   PONV: No   Neuro/Psych: Uneventful            Sign Out: Acceptable/Baseline neuro status   Airway/Respiratory: Uneventful            Sign Out: Acceptable/Baseline resp. status   CV/Hemodynamics: Uneventful            Sign Out: Acceptable CV status; No obvious hypovolemia; No obvious fluid overload   Other NRE: NONE   DID A NON-ROUTINE EVENT OCCUR? No           Last vitals:  Vitals:    08/08/24 0955 08/08/24 0959 08/08/24 1000   BP: (!) 140/77  135/80   Pulse: 76 81 79   Resp: 20 27 29   Temp:      SpO2: 94%         Electronically Signed By: Lisa Dowell MD  August 8, 2024  10:53 AM

## 2024-08-08 NOTE — INTERVAL H&P NOTE
"I have reviewed the surgical (or preoperative) H&P that is linked to this encounter, and examined the patient. There are no significant changes.  Cardioversion delayed due to URI which has resolved.  He was late on a dose of Eliquis, but is certain that it was >3 weeks ago; he denies missing any doses in the past 3 weeks.    Clinical Conditions Present on Arrival:  Clinically Significant Risk Factors Present on Admission                # Drug Induced Coagulation Defect: home medication list includes an anticoagulant medication       # Obesity: Estimated body mass index is 38.75 kg/m  as calculated from the following:    Height as of this encounter: 1.905 m (6' 3\").    Weight as of this encounter: 140.6 kg (310 lb).       "

## 2024-08-08 NOTE — ANESTHESIA PREPROCEDURE EVALUATION
Anesthesia Pre-Procedure Evaluation    Patient: Angel Etienne   MRN: [id number] : 1965        Procedure : * No procedures listed *  Cardioversion External       Past Medical History:   Diagnosis Date    Atrial fibrillation (H)     High cholesterol       History reviewed. No pertinent surgical history.   Allergies   Allergen Reactions    Clobetasol Other (See Comments)     Allergy testing done - Clobetasol-77-Propionate    Inhaled Anticholinergic Agents Other (See Comments)     Stearyl Alcohol - Allergy testing done    No Clinical Screening - See Comments Other (See Comments)     Stearyl Alcohol - Allergy testing done    Octylisothiazolinone; Phenylenediamine - allergy testing done    Shellac Other (See Comments)     Allergy testing done    Trolamine (Triethanolamine) Other (See Comments)     Allergy testing done    Urea Other (See Comments)     Allergy testing done      Social History     Tobacco Use    Smoking status: Every Day     Current packs/day: 1.00     Average packs/day: 1.5 packs/day for 41.8 years (62.5 ttl pk-yrs)     Types: Cigarettes     Start date: 1982    Smokeless tobacco: Never   Substance Use Topics    Alcohol use: Yes      Wt Readings from Last 1 Encounters:   24 140.6 kg (310 lb)        Anesthesia Evaluation   Pt has had prior anesthetic.     History of anesthetic complications       ROS/MED HX  ENT/Pulmonary:     (+) sleep apnea,                                       Neurologic:       Cardiovascular:     (+)  hypertension- -   -  - -                        dysrhythmias,              METS/Exercise Tolerance:     Hematologic:       Musculoskeletal:       GI/Hepatic:       Renal/Genitourinary:       Endo:     (+)               Obesity,       Psychiatric/Substance Use:       Infectious Disease:       Malignancy:       Other:            Physical Exam    Airway        Mallampati: II       Respiratory Devices and Support         Dental       (+) Minor Abnormalities - some  "fillings, tiny chips      Cardiovascular          Rhythm and rate: irregular     Pulmonary   pulmonary exam normal                OUTSIDE LABS:  CBC:   Lab Results   Component Value Date    WBC 6.9 07/30/2024    WBC 7.7 07/22/2024    HGB 16.4 07/30/2024    HGB 16.3 07/22/2024    HCT 50.4 07/30/2024    HCT 49.6 07/22/2024     07/30/2024     07/22/2024     BMP:   Lab Results   Component Value Date     07/22/2024     01/29/2024    POTASSIUM 3.9 07/22/2024    POTASSIUM 4.3 01/29/2024    CHLORIDE 105 07/22/2024    CHLORIDE 106 01/29/2024    CO2 25 07/22/2024    CO2 22 01/29/2024    BUN 13.9 07/22/2024    BUN 17.5 01/29/2024    CR 0.91 07/22/2024    CR 0.80 01/29/2024    GLC 96 07/22/2024    GLC 88 01/29/2024     COAGS:   Lab Results   Component Value Date    INR 1.10 01/29/2024     POC: No results found for: \"BGM\", \"HCG\", \"HCGS\"  HEPATIC:   Lab Results   Component Value Date    ALBUMIN 4.3 07/22/2024    PROTTOTAL 7.3 07/22/2024    ALT 33 07/22/2024    AST 30 07/22/2024    ALKPHOS 71 07/22/2024    BILITOTAL 0.5 07/22/2024     OTHER:   Lab Results   Component Value Date    A1C 5.8 (H) 11/16/2023    LEESA 9.4 07/22/2024    TSH 2.28 07/22/2024       Anesthesia Plan    ASA Status:  3       Anesthesia Type: General.     - Airway: Mask Only              Consents    Anesthesia Plan(s) and associated risks, benefits, and realistic alternatives discussed. Questions answered and patient/representative(s) expressed understanding.     - Discussed: Risks, Benefits and Alternatives for the PROCEDURE were discussed     - Discussed with:  Patient      - Extended Intubation/Ventilatory Support Discussed: No.      - Patient is DNR/DNI Status: No     Use of blood products discussed: No .     Postoperative Care            Comments:               Lisa Dowell MD    I have reviewed the pertinent notes and labs in the chart from the past 30 days and (re)examined the patient.  Any updates or changes from those notes are " "reflected in this note.            # Drug Induced Coagulation Defect: home medication list includes an anticoagulant medication   # Obesity: Estimated body mass index is 38.75 kg/m  as calculated from the following:    Height as of this encounter: 1.905 m (6' 3\").    Weight as of this encounter: 140.6 kg (310 lb).      "

## 2024-08-08 NOTE — PLAN OF CARE
Goal Outcome Evaluation:         Pt converted to 1st degree AVB after 300J cardioversion. Pt taking food and fluids without problem. Pt up ambulated to bathroom.     Discharge instructions given to pt and wife. EKG and follow up appointment made for pt. Pt discharged home with wife.      Sierra Garnett RN

## 2024-08-09 LAB
ATRIAL RATE - MUSE: 75 BPM
DIASTOLIC BLOOD PRESSURE - MUSE: NORMAL MMHG
INTERPRETATION ECG - MUSE: NORMAL
P AXIS - MUSE: 56 DEGREES
PR INTERVAL - MUSE: 272 MS
QRS DURATION - MUSE: 98 MS
QT - MUSE: 400 MS
QTC - MUSE: 446 MS
R AXIS - MUSE: -20 DEGREES
SYSTOLIC BLOOD PRESSURE - MUSE: NORMAL MMHG
T AXIS - MUSE: 37 DEGREES
VENTRICULAR RATE- MUSE: 75 BPM

## 2024-08-09 PROCEDURE — 93010 ELECTROCARDIOGRAM REPORT: CPT | Performed by: STUDENT IN AN ORGANIZED HEALTH CARE EDUCATION/TRAINING PROGRAM

## 2024-08-14 ENCOUNTER — ALLIED HEALTH/NURSE VISIT (OUTPATIENT)
Dept: CARDIOLOGY | Facility: CLINIC | Age: 59
End: 2024-08-14
Payer: COMMERCIAL

## 2024-08-14 VITALS
HEART RATE: 79 BPM | DIASTOLIC BLOOD PRESSURE: 70 MMHG | HEIGHT: 75 IN | SYSTOLIC BLOOD PRESSURE: 124 MMHG | BODY MASS INDEX: 38.3 KG/M2 | RESPIRATION RATE: 16 BRPM | WEIGHT: 308 LBS

## 2024-08-14 DIAGNOSIS — I48.11 LONGSTANDING PERSISTENT ATRIAL FIBRILLATION (H): Primary | ICD-10-CM

## 2024-08-14 LAB
ATRIAL RATE - MUSE: 79 BPM
DIASTOLIC BLOOD PRESSURE - MUSE: NORMAL MMHG
INTERPRETATION ECG - MUSE: NORMAL
P AXIS - MUSE: 67 DEGREES
PR INTERVAL - MUSE: 290 MS
QRS DURATION - MUSE: 94 MS
QT - MUSE: 392 MS
QTC - MUSE: 449 MS
R AXIS - MUSE: -10 DEGREES
SYSTOLIC BLOOD PRESSURE - MUSE: NORMAL MMHG
T AXIS - MUSE: 25 DEGREES
VENTRICULAR RATE- MUSE: 79 BPM

## 2024-08-14 PROCEDURE — 93000 ELECTROCARDIOGRAM COMPLETE: CPT

## 2024-08-14 PROCEDURE — 93000 ELECTROCARDIOGRAM COMPLETE: CPT | Mod: 77 | Performed by: INTERNAL MEDICINE

## 2024-08-14 PROCEDURE — 99207 PR NO CHARGE NURSE ONLY: CPT

## 2024-08-14 NOTE — NURSING NOTE
[facility] Intake Information for EKG    Reason for EKG: Started Sotalol 80mg BID on 8/08/2024  Verification of Anticoagulation/Medication: Medication list current below  Reported symptoms/concerns on intake/form: Pt denies any symptoms or concerns  Next planned Follow up: Pt has apt with ian Miller  on 8/28/2024  Communication: Pt was advised per EP RN results are stable/WNL, and would be contacted if further changes were needed upon provider review. Results routed to EP RN.    Minoo Cooper MA      Current Outpatient Medications:     apixaban ANTICOAGULANT (ELIQUIS) 5 MG tablet, Take 1 tablet (5 mg) by mouth 2 times daily, Disp: 60 tablet, Rfl: 11    atorvastatin (LIPITOR) 10 MG tablet, Take 1 tablet (10 mg) by mouth daily, Disp: 90 tablet, Rfl: 3    benzonatate (TESSALON) 100 MG capsule, Take 1 capsule (100 mg) by mouth 3 times daily as needed for cough, Disp: 20 capsule, Rfl: 0    dupilumab (DUPIXENT) 300 MG/2ML prefilled syringe, Inject 300 mg Subcutaneous every 14 days, Disp: , Rfl:     empagliflozin (JARDIANCE) 10 MG TABS tablet, Take 1 tablet (10 mg) by mouth daily, Disp: 30 tablet, Rfl: 11    fenofibrate (TRIGLIDE/LOFIBRA) 160 MG tablet, Take 1 tablet (160 mg) by mouth daily, Disp: 90 tablet, Rfl: 3    losartan (COZAAR) 25 MG tablet, TAKE 1 TABLET BY MOUTH AT BEDTIME, Disp: 90 tablet, Rfl: 0    nicotine (NICORETTE) 2 MG gum, Place 2 mg inside cheek every hour as needed for nicotine withdrawal symptoms, Disp: , Rfl:     sotalol (BETAPACE) 80 MG tablet, Take 1 tablet (80 mg) by mouth 2 times daily, Disp: 180 tablet, Rfl: 1    testosterone (ANDROGEL 1.62 % PUMP) 20.25 MG/ACT gel, Place 1 Pump (20.25 mg) onto the skin daily Apply from dispenser to clean, dry, intact skin of the upper arms and shoulders., Disp: 75 g, Rfl: 0    triamcinolone (KENALOG) 0.1 % external cream, , Disp: , Rfl:   Allergies   Allergen Reactions    Clobetasol Other (See Comments)     Allergy testing done - Clobetasol-77-Propionate     Inhaled Anticholinergic Agents Other (See Comments)     Stearyl Alcohol - Allergy testing done    No Clinical Screening - See Comments Other (See Comments)     Stearyl Alcohol - Allergy testing done    Octylisothiazolinone; Phenylenediamine - allergy testing done    Shellac Other (See Comments)     Allergy testing done    Trolamine (Triethanolamine) Other (See Comments)     Allergy testing done    Urea Other (See Comments)     Allergy testing done

## 2024-08-14 NOTE — PROGRESS NOTES
"EKG Visit    See additional CMA documentation at visit    EKG (refer to EKG in MUSE 8/14/2024) shows QTc within acceptable limits, pts EKG was compared to previous EKG in EMR, EKG is stable, and there has been minimal change in QTc    Vitals were reviewed and are within normal limits  /70 (BP Location: Right arm, Patient Position: Sitting, Cuff Size: Adult Large)   Pulse 79   Resp 16   Ht 1.905 m (6' 3\")   Wt 139.7 kg (308 lb)   BMI 38.50 kg/m      Results sent to  Gaye Laboy NP for further review and recommendations if necessary  8/14/2024 1:29 PM  Ivette Diamond RN       "

## 2024-08-22 DIAGNOSIS — R79.89 LOW TESTOSTERONE LEVEL IN MALE: ICD-10-CM

## 2024-08-23 RX ORDER — TESTOSTERONE 20.25 MG/1.25G
GEL TOPICAL
Qty: 75 G | Refills: 0 | Status: SHIPPED | OUTPATIENT
Start: 2024-08-23

## 2024-08-27 PROBLEM — I48.19 PERSISTENT ATRIAL FIBRILLATION (H): Status: ACTIVE | Noted: 2024-01-29

## 2024-08-27 PROBLEM — R29.818 SUSPECTED SLEEP APNEA: Status: ACTIVE | Noted: 2024-08-27

## 2024-08-27 NOTE — PROGRESS NOTES
Thank you, Dr. Garcia, for asking the [facility] team to see Mr. Angel Etienne to evaluate persistent AF.    Assessment/Recommendations     Assessment/Plan:    Diagnoses and all orders for this visit:  Persistent atrial fibrillation (H)  Dx/date: diagnosed with new onset AF 1/29/24 during pre op evaluation, rate 65 bpm  Sx: irregular HR, onset 2022, intermittent SOBOE with certain activities. Denies palpitations, CP, syncope.   DCCV: 8/8/24, he noted symptomatic improved reporting less fatigue and SOB following DCCV. He suspects recurrence of AF 12 days after DCCV due to return of  intermittent fatigue, SOBOE and irregular HR   Sotalol 80 mg BID   Home ECG monitoring: None   we discussed the ongoing importance of lifestyle modification (maintaining a healthy weight, sleep apnea diagnosis and management, alcohol avoidance) as part of a long term strategy for atrial fibrillation management  We reviewed the pathophysiology of atrial fibrillation and management considerations including stroke risk and anticoagulation, rate control, cardioversion, antiarrhythmic drug therapy, and catheter ablation.  Antiarrhythmic drug options discussed.   We discussed atrial fibrillation ablation procedures, anticipated success rates, the potential need for re-do ablation vs addition of anti-arrhythmic drugs, procedural risks (including groin bleeding, tamponade, phrenic or esophageal injury, stroke, pulmonary vein stenosis) and recovery expectations.     [id number] score of 1 due to HTN and on Eliquis.  No missed doses x3 weeks, no bleeding issues.     Essential hypertension  --well controlled, 126/75 (Losartan)    JIM on CPAP  -sleep study completed June, AHI 54.2  -Has been utilizing CPAP x6 weeks, has been dealing with sealing issues with his mask, averages 1-6 hours of use per night. He plans on following up with sleep medicine to trial other masks/ discuss other options of treatment. He is also dealing  with increased urinary frequency and will be seeing urology specialty tomorrow.     PLAN:   Continue with Sotalol 80 mg BID despite being in AF today, rates remain controlled (severe LA enlargement, RA moderately enlarged on ECHO, low likelihood of repeat DCCV being successful) Recommend ablation consult ASAP  Continue with Eliquis BID for stroke prevention  Continue with CPAP use  Recommend interval ECG monitoring with Kardia device   Proceed with ablation consult, Dr Saldaña, patient prefers [facility] location     History of Present Illness/Subjective     Angel Etienne is a very pleasant 58 year old male who comes in today for EP consult, persistent AF, referred by Dr Garcia    Angel Etienne has a known history of persistent atrial fibrillation, hypertension, suspected JIM, obesity, hypercholesterolemia, peripheral venous insufficiency, tobacco use.    Arrhythmia hx  Dx/date: diagnosed with new onset AF 1/29/24 during pre op evaluation, rate 65 bpm  Sx: irregular HR, onset 2022, intermittent SOBOE with certain activities. Denies palpitations, CP, syncope.   Prior AAD, AV bigg blocking agents: Sotalol  OAC: Eliquis  Procedures  DCCV: 8/8/24 (ERAF)  Ablation: jose ramon Barajas presents with his wife today for follow-up post DCCV.  DCCV completed on 8/8/2024.  He notes symptomatic improvement following his cardioversion reporting increased stamina and resolution of his shortness of breath.  He experienced return of fatigue and shortness of breath on exertion starting on 8/20 and has persisted since that time.  Denies palpitations, chest pain or syncope.  He remains on sotalol 80 mg twice daily.  Twelve-lead ECG in clinic today confirms atrial fibrillation with ventricular rate of 54 bpm, QTc 438 ms.  Per review of his last TTE in February of this year, LV preserved between 60 and 65%, left atrium severely dilated with moderate dilation of the right atrium.  He does have mild mitral valve digitation.  He remains on  Eliquis twice daily for stroke prevention, denies any missed doses in the last 21 days or bleeding episodes.  He has been utilizing CPAP for the last 6 weeks but is running into issues with the seal on his mask, he has only been able to tolerate his mask for 1 to 6 hours per night, he is also dealing with increased urinary frequency so he removes his mask quite often throughout the night.  He is following up with urology tomorrow to further evaluate the cause of his urinary frequency.  Blood pressure remains well-controlled at 126/75 and his current dosing of losartan.  He was recently evaluated by vascular specialty due to his peripheral venous insufficiency, he is a long-term smoker.  He has some concerns regarding clot formation in the legs which will soon be further evaluated.  Due to his recurrence of atrial fibrillation, he would like to proceed with catheter ablation as soon as possible.    Cardiographics (reviewed):  ECG  8/28/24 AF with slow ventricular response 54 bpm QT/QTC: 462/438 ms  8/14/24 NSR with first degree AV block 79 bpm QT/QTC: 392/449 ms  8/8/24 (post DCCV) NSR with first degree AV block 75 bpm QT/QTC: 400/446 ms  1/29/24 AF 65 bpm    Holter Monitor 3/7/24  Holter monitoring (duration 24hrs).  Continuous atrial fibrillation, 48 to 99bpm, average 69bpm.  There were no pauses of greater than 5 seconds. Nocturnal bradycardia was noted.  Rare premature ventricular contractions (<1%).  Symptom triggers - none.    TTE 2/5/24  The left ventricle is normal in size.  Left ventricular function is normal.The ejection fraction is 60-65%.  There is mild concentric left ventricular hypertrophy.  Normal right ventricle size and systolic function.  The left atrium is severely dilated.  The right atrium is moderately dilated.  There is mild (1+) mitral regurgitation.     Problem List:  Patient Active Problem List   Diagnosis    Hidradenitis suppurativa    Intrinsic atopic dermatitis    Edema of both lower  extremities due to peripheral venous insufficiency    Dermatitis, seborrheic    Benign prostatic hyperplasia (BPH) with urinary urgency    Tobacco use disorder    Persistent atrial fibrillation (H)    Obstructive sleep apnea    Class 2 severe obesity due to excess calories with serious comorbidity in adult (H)    Tobacco dependence    Hypercholesterolemia    Essential hypertension    Anxiety    Acute foot pain     Revi  e  Physical Examination Review of Systems   w fystems  There were no vitals taken for this visit.  There is no height or weight on file to calculate BMI.  Wt Readings from Last 3 Encounters:   08/14/24 139.7 kg (308 lb)   08/08/24 140.6 kg (310 lb)   07/30/24 138.8 kg (306 lb)     General Appearance:   Alert, well-appearing and in no acute distress.   HEENT: Atraumatic, normocephalic.  No scleral icterus, normal conjunctivae; mucous membranes pink and moist.     Chest: Chest symmetric, spine straight.   Lungs:   Respirations unlabored: Lungs with faint wheezing in lower lobes.    Cardiovascular:   Normal first and second heart sounds with no murmurs, rubs, or gallops.  Irregular, bradycardic.   Normal JVD, 1-2+ BLE edema.       Extremities: No cyanosis or clubbing   Musculoskeletal: Moves all extremities   Skin: Warm, dry, intact.  Venous stasis discoloration of the lower extremities   Neurologic: Mood and affect are appropriate, alert and oriented to person, place, time, and situation     ROS: 10 point ROS neg other than the symptoms noted above in the HPI.     Medical History  Surgical History Family History Social History     Past Medical History:   Diagnosis Date    Atrial fibrillation (H)     High cholesterol     No past surgical history on file. Family History   Problem Relation Age of Onset    Varicose Veins Father     History   Smoking Status    Every Day    Types: Cigarettes   Smokeless Tobacco    Never     Social History    Substance and Sexual Activity      Alcohol use: Yes        Medications  Allergies     Current Outpatient Medications   Medication Sig Dispense Refill    apixaban ANTICOAGULANT (ELIQUIS) 5 MG tablet Take 1 tablet (5 mg) by mouth 2 times daily 60 tablet 11    atorvastatin (LIPITOR) 10 MG tablet Take 1 tablet (10 mg) by mouth daily 90 tablet 3    benzonatate (TESSALON) 100 MG capsule Take 1 capsule (100 mg) by mouth 3 times daily as needed for cough 20 capsule 0    dupilumab (DUPIXENT) 300 MG/2ML prefilled syringe Inject 300 mg Subcutaneous every 14 days      empagliflozin (JARDIANCE) 10 MG TABS tablet Take 1 tablet (10 mg) by mouth daily 30 tablet 11    fenofibrate (TRIGLIDE/LOFIBRA) 160 MG tablet Take 1 tablet (160 mg) by mouth daily 90 tablet 3    losartan (COZAAR) 25 MG tablet TAKE 1 TABLET BY MOUTH AT BEDTIME 90 tablet 0    nicotine (NICORETTE) 2 MG gum Place 2 mg inside cheek every hour as needed for nicotine withdrawal symptoms      sotalol (BETAPACE) 80 MG tablet Take 1 tablet (80 mg) by mouth 2 times daily 180 tablet 1    Testosterone 1.62 % GEL PLACE 1 PUMP (20.25 MG) ONTO THE SKIN DAILY APPLY FROM DISPENSER TO CLEAN, DRY, INTACT SKIN OF THE UPPER ARMS AND SHOULDERS 75 g 0    triamcinolone (KENALOG) 0.1 % external cream         Allergies   Allergen Reactions    Clobetasol Other (See Comments)     Allergy testing done - Clobetasol-77-Propionate    Inhaled Anticholinergic Agents Other (See Comments)     Stearyl Alcohol - Allergy testing done    No Clinical Screening - See Comments Other (See Comments)     Stearyl Alcohol - Allergy testing done    Octylisothiazolinone; Phenylenediamine - allergy testing done    Shellac Other (See Comments)     Allergy testing done    Trolamine (Triethanolamine) Other (See Comments)     Allergy testing done    Urea Other (See Comments)     Allergy testing done      Medical, surgical, family, social history, and medications were all reviewed and updated as necessary.   Lab Results    Chemistry/lipid CBC Cardiac Enzymes/BNP/TSH/INR  "  Recent Labs   Lab Test 11/16/23  1137   CHOL 185   HDL 36*   LDL 88   TRIG 446*     Recent Labs   Lab Test 11/16/23  1137   LDL 88     Recent Labs   Lab Test 07/22/24  1717      POTASSIUM 3.9   CHLORIDE 105   CO2 25   GLC 96   BUN 13.9   CR 0.91   GFRESTIMATED >90   LEESA 9.4     Recent Labs   Lab Test 07/22/24  1717 01/29/24  1555 11/16/23  1137   CR 0.91 0.80 0.77     Recent Labs   Lab Test 11/16/23  1137 12/14/22  1310 11/10/22  1640   A1C 5.8* 5.8* 6.0*          Recent Labs   Lab Test 07/30/24  1340   WBC 6.9   HGB 16.4   HCT 50.4   MCV 95        Recent Labs   Lab Test 07/30/24  1340 07/22/24  1717 01/29/24  1555   HGB 16.4 16.3 15.5    No results for input(s): \"TROPONINI\" in the last 87022 hours.  No results for input(s): \"BNP\", \"NTBNPI\", \"NTBNP\" in the last 30086 hours.  Recent Labs   Lab Test 07/22/24  1717   TSH 2.28     Recent Labs   Lab Test 01/29/24  1555   INR 1.10          Total Time- 37 minutes spent on date of encounter doing chart review, history and exam, documentation and further activities as noted above.  This note has been dictated using voice recognition software. Any grammatical, typographical, or context distortions are unintentional and inherent to the software.    Alondra Miller [facility]  [phone]                       "

## 2024-08-28 ENCOUNTER — OFFICE VISIT (OUTPATIENT)
Dept: CARDIOLOGY | Facility: CLINIC | Age: 59
End: 2024-08-28
Payer: COMMERCIAL

## 2024-08-28 VITALS
SYSTOLIC BLOOD PRESSURE: 126 MMHG | OXYGEN SATURATION: 96 % | WEIGHT: 302 LBS | BODY MASS INDEX: 37.75 KG/M2 | DIASTOLIC BLOOD PRESSURE: 75 MMHG | HEART RATE: 54 BPM

## 2024-08-28 DIAGNOSIS — I10 ESSENTIAL HYPERTENSION: ICD-10-CM

## 2024-08-28 DIAGNOSIS — G47.33 OSA ON CPAP: ICD-10-CM

## 2024-08-28 DIAGNOSIS — I48.19 PERSISTENT ATRIAL FIBRILLATION (H): Primary | ICD-10-CM

## 2024-08-28 LAB
ATRIAL RATE - MUSE: 241 BPM
DIASTOLIC BLOOD PRESSURE - MUSE: NORMAL MMHG
INTERPRETATION ECG - MUSE: NORMAL
P AXIS - MUSE: NORMAL DEGREES
PR INTERVAL - MUSE: NORMAL MS
QRS DURATION - MUSE: 98 MS
QT - MUSE: 462 MS
QTC - MUSE: 438 MS
R AXIS - MUSE: -17 DEGREES
SYSTOLIC BLOOD PRESSURE - MUSE: NORMAL MMHG
T AXIS - MUSE: 1 DEGREES
VENTRICULAR RATE- MUSE: 54 BPM

## 2024-08-28 PROCEDURE — 99214 OFFICE O/P EST MOD 30 MIN: CPT | Performed by: NURSE PRACTITIONER

## 2024-08-28 PROCEDURE — 93000 ELECTROCARDIOGRAM COMPLETE: CPT | Performed by: INTERNAL MEDICINE

## 2024-08-28 NOTE — LETTER
8/28/2024    Torsten Thomas MD  [address]  [place] [address]    RE: Angel Etienne       Dear Colleague,     I had the pleasure of seeing Angel Etienne in the [facility].    Thank you, Dr. Garcia, for asking the [facility] team to see Mr. Angel Etienne to evaluate persistent AF.    Assessment/Recommendations     Assessment/Plan:    Diagnoses and all orders for this visit:  Persistent atrial fibrillation (H)  Dx/date: diagnosed with new onset AF 1/29/24 during pre op evaluation, rate 65 bpm  Sx: irregular HR, onset 2022, intermittent SOBOE with certain activities. Denies palpitations, CP, syncope.   DCCV: 8/8/24, he noted symptomatic improved reporting less fatigue and SOB following DCCV. He suspects recurrence of AF 12 days after DCCV due to return of  intermittent fatigue, SOBOE and irregular HR   Sotalol 80 mg BID   Home ECG monitoring: None   we discussed the ongoing importance of lifestyle modification (maintaining a healthy weight, sleep apnea diagnosis and management, alcohol avoidance) as part of a long term strategy for atrial fibrillation management  We reviewed the pathophysiology of atrial fibrillation and management considerations including stroke risk and anticoagulation, rate control, cardioversion, antiarrhythmic drug therapy, and catheter ablation.  Antiarrhythmic drug options discussed.   We discussed atrial fibrillation ablation procedures, anticipated success rates, the potential need for re-do ablation vs addition of anti-arrhythmic drugs, procedural risks (including groin bleeding, tamponade, phrenic or esophageal injury, stroke, pulmonary vein stenosis) and recovery expectations.     [id number] score of 1 due to HTN and on Eliquis.  No missed doses x3 weeks, no bleeding issues.     Essential hypertension  --well controlled, 126/75 (Losartan)    Suspected JIM  -Has been utilizing CPAP x6 weeks, has been dealing with sealing  issues with his mask, averages 1-6 hours of use per night. He plans on following up with sleep medicine to trial other masks/ discuss other options of treatment. He is also dealing with increased urinary frequency and will be seeing urology specialty tomorrow.     PLAN:   Continue with Sotalol 80 mg BID despite being in AF today, rates remain controlled (severe LA enlargement, RA moderately enlarged on ECHO, low likelihood of repeat DCCV being successful) Recommend ablation consult ASAP  Continue with Eliquis BID for stroke prevention  Continue with CPAP use  Recommend interval ECG monitoring with Kardia device   Proceed with ablation consult, Dr Saldaña, patient prefers [facility] location     History of Present Illness/Subjective     Angel Etienne is a very pleasant 58 year old male who comes in today for EP consult, persistent AF, referred by Dr Garcia    Angel Etienne has a known history of persistent atrial fibrillation, hypertension, suspected JIM, obesity, hypercholesterolemia, peripheral venous insufficiency, tobacco use.    Arrhythmia hx  Dx/date: diagnosed with new onset AF 1/29/24 during pre op evaluation, rate 65 bpm  Sx: irregular HR, onset 2022, intermittent SOBOE with certain activities. Denies palpitations, CP, syncope.   Prior AAD, AV bigg blocking agents: Sotalol  OAC: Eliquis  Procedures  DCCV: 8/8/24 (ERAF)  Ablation: no    Karl presents with his wife today for follow-up post DCCV.  DCCV completed on 8/8/2024.  He notes symptomatic improvement following his cardioversion reporting increased stamina and resolution of his shortness of breath.  He experienced return of fatigue and shortness of breath on exertion starting on 8/20 and has persisted since that time.  Denies palpitations, chest pain or syncope.  He remains on sotalol 80 mg twice daily.  Twelve-lead ECG in clinic today confirms atrial fibrillation with ventricular rate of 54 bpm, QTc 438 ms.  Per review of his last TTE in February  of this year, LV preserved between 60 and 65%, left atrium severely dilated with moderate dilation of the right atrium.  He does have mild mitral valve digitation.  He remains on Eliquis twice daily for stroke prevention, denies any missed doses in the last 21 days or bleeding episodes.  He has been utilizing CPAP for the last 6 weeks but is running into issues with the seal on his mask, he has only been able to tolerate his mask for 1 to 6 hours per night, he is also dealing with increased urinary frequency so he removes his mask quite often throughout the night.  He is following up with urology tomorrow to further evaluate the cause of his urinary frequency.  Blood pressure remains well-controlled at 126/75 and his current dosing of losartan.  He was recently evaluated by vascular specialty due to his peripheral venous insufficiency, he is a long-term smoker.  He has some concerns regarding clot formation in the legs which will soon be further evaluated.  Due to his recurrence of atrial fibrillation, he would like to proceed with catheter ablation as soon as possible.    Cardiographics (reviewed):  ECG  8/28/24 AF with slow ventricular response 54 bpm QT/QTC: 462/438 ms  8/14/24 NSR with first degree AV block 79 bpm QT/QTC: 392/449 ms  8/8/24 (post DCCV) NSR with first degree AV block 75 bpm QT/QTC: 400/446 ms  1/29/24 AF 65 bpm    Holter Monitor 3/7/24  Holter monitoring (duration 24hrs).  Continuous atrial fibrillation, 48 to 99bpm, average 69bpm.  There were no pauses of greater than 5 seconds. Nocturnal bradycardia was noted.  Rare premature ventricular contractions (<1%).  Symptom triggers - none.    TTE 2/5/24  The left ventricle is normal in size.  Left ventricular function is normal.The ejection fraction is 60-65%.  There is mild concentric left ventricular hypertrophy.  Normal right ventricle size and systolic function.  The left atrium is severely dilated.  The right atrium is moderately dilated.  There  is mild (1+) mitral regurgitation.     Problem List:  Patient Active Problem List   Diagnosis     Hidradenitis suppurativa     Intrinsic atopic dermatitis     Edema of both lower extremities due to peripheral venous insufficiency     Dermatitis, seborrheic     Benign prostatic hyperplasia (BPH) with urinary urgency     Tobacco use disorder     Persistent atrial fibrillation (H)     Obstructive sleep apnea     Class 2 severe obesity due to excess calories with serious comorbidity in adult (H)     Tobacco dependence     Hypercholesterolemia     Essential hypertension     Anxiety     Acute foot pain     Revi  e  Physical Examination Review of Systems   w fystems  There were no vitals taken for this visit.  There is no height or weight on file to calculate BMI.  Wt Readings from Last 3 Encounters:   08/14/24 139.7 kg (308 lb)   08/08/24 140.6 kg (310 lb)   07/30/24 138.8 kg (306 lb)     General Appearance:   Alert, well-appearing and in no acute distress.   HEENT: Atraumatic, normocephalic.  No scleral icterus, normal conjunctivae; mucous membranes pink and moist.     Chest: Chest symmetric, spine straight.   Lungs:   Respirations unlabored: Lungs with faint wheezing in lower lobes.    Cardiovascular:   Normal first and second heart sounds with no murmurs, rubs, or gallops.  Irregular, bradycardic.   Normal JVD, 1-2+ BLE edema.       Extremities: No cyanosis or clubbing   Musculoskeletal: Moves all extremities   Skin: Warm, dry, intact.  Venous stasis discoloration of the lower extremities   Neurologic: Mood and affect are appropriate, alert and oriented to person, place, time, and situation     ROS: 10 point ROS neg other than the symptoms noted above in the HPI.     Medical History  Surgical History Family History Social History     Past Medical History:   Diagnosis Date     Atrial fibrillation (H)      High cholesterol     No past surgical history on file. Family History   Problem Relation Age of Onset     Varicose  Veins Father     History   Smoking Status     Every Day     Types: Cigarettes   Smokeless Tobacco     Never     Social History    Substance and Sexual Activity      Alcohol use: Yes       Medications  Allergies     Current Outpatient Medications   Medication Sig Dispense Refill     apixaban ANTICOAGULANT (ELIQUIS) 5 MG tablet Take 1 tablet (5 mg) by mouth 2 times daily 60 tablet 11     atorvastatin (LIPITOR) 10 MG tablet Take 1 tablet (10 mg) by mouth daily 90 tablet 3     benzonatate (TESSALON) 100 MG capsule Take 1 capsule (100 mg) by mouth 3 times daily as needed for cough 20 capsule 0     dupilumab (DUPIXENT) 300 MG/2ML prefilled syringe Inject 300 mg Subcutaneous every 14 days       empagliflozin (JARDIANCE) 10 MG TABS tablet Take 1 tablet (10 mg) by mouth daily 30 tablet 11     fenofibrate (TRIGLIDE/LOFIBRA) 160 MG tablet Take 1 tablet (160 mg) by mouth daily 90 tablet 3     losartan (COZAAR) 25 MG tablet TAKE 1 TABLET BY MOUTH AT BEDTIME 90 tablet 0     nicotine (NICORETTE) 2 MG gum Place 2 mg inside cheek every hour as needed for nicotine withdrawal symptoms       sotalol (BETAPACE) 80 MG tablet Take 1 tablet (80 mg) by mouth 2 times daily 180 tablet 1     Testosterone 1.62 % GEL PLACE 1 PUMP (20.25 MG) ONTO THE SKIN DAILY APPLY FROM DISPENSER TO CLEAN, DRY, INTACT SKIN OF THE UPPER ARMS AND SHOULDERS 75 g 0     triamcinolone (KENALOG) 0.1 % external cream         Allergies   Allergen Reactions     Clobetasol Other (See Comments)     Allergy testing done - Clobetasol-77-Propionate     Inhaled Anticholinergic Agents Other (See Comments)     Stearyl Alcohol - Allergy testing done     No Clinical Screening - See Comments Other (See Comments)     Stearyl Alcohol - Allergy testing done    Octylisothiazolinone; Phenylenediamine - allergy testing done     Shellac Other (See Comments)     Allergy testing done     Trolamine (Triethanolamine) Other (See Comments)     Allergy testing done     Urea Other (See Comments)  "    Allergy testing done      Medical, surgical, family, social history, and medications were all reviewed and updated as necessary.   Lab Results    Chemistry/lipid CBC Cardiac Enzymes/BNP/TSH/INR   Recent Labs   Lab Test 11/16/23  1137   CHOL 185   HDL 36*   LDL 88   TRIG 446*     Recent Labs   Lab Test 11/16/23  1137   LDL 88     Recent Labs   Lab Test 07/22/24  1717      POTASSIUM 3.9   CHLORIDE 105   CO2 25   GLC 96   BUN 13.9   CR 0.91   GFRESTIMATED >90   LEESA 9.4     Recent Labs   Lab Test 07/22/24  1717 01/29/24  1555 11/16/23  1137   CR 0.91 0.80 0.77     Recent Labs   Lab Test 11/16/23  1137 12/14/22  1310 11/10/22  1640   A1C 5.8* 5.8* 6.0*          Recent Labs   Lab Test 07/30/24  1340   WBC 6.9   HGB 16.4   HCT 50.4   MCV 95        Recent Labs   Lab Test 07/30/24  1340 07/22/24  1717 01/29/24  1555   HGB 16.4 16.3 15.5    No results for input(s): \"TROPONINI\" in the last 82786 hours.  No results for input(s): \"BNP\", \"NTBNPI\", \"NTBNP\" in the last 74732 hours.  Recent Labs   Lab Test 07/22/24  1717   TSH 2.28     Recent Labs   Lab Test 01/29/24  1555   INR 1.10          Total Time- 37 minutes spent on date of encounter doing chart review, history and exam, documentation and further activities as noted above.  This note has been dictated using voice recognition software. Any grammatical, typographical, or context distortions are unintentional and inherent to the software.    Alondra Miller [facility]  [phone]                         Thank you for allowing me to participate in the care of your patient.      Sincerely,     Alondra Miller NP     [facility]  cc:   Alondra Miller NP  [address]  [place] [address]      "

## 2024-08-28 NOTE — PATIENT INSTRUCTIONS
Angel Etienne,    It was a pleasure to see you today at the [facility].     My recommendations after this visit include:    Continue with Sotalol 80 mg every 12 hours despite being in AF today, rates remain controlled (severe LA enlargement, RA moderately enlarged on ECHO, low likelihood of repeat cardioversion being successful)  Continue with Eliquisevery 12 hours for stroke prevention  Continue with CPAP use  Recommend interval ECG monitoring with Kardia device   Proceed with ablation consult with Dr Lev Saldaña at [facility]     Alondra Miller CNP  [facility], Electrophysiology  [phone]  EP nurses [phone]

## 2024-08-29 ENCOUNTER — TRANSFERRED RECORDS (OUTPATIENT)
Dept: HEALTH INFORMATION MANAGEMENT | Facility: CLINIC | Age: 59
End: 2024-08-29
Payer: COMMERCIAL

## 2024-09-04 ENCOUNTER — DOCUMENTATION ONLY (OUTPATIENT)
Dept: CARDIOLOGY | Facility: CLINIC | Age: 59
End: 2024-09-04
Payer: COMMERCIAL

## 2024-09-04 ENCOUNTER — TELEPHONE (OUTPATIENT)
Dept: CARDIOLOGY | Facility: CLINIC | Age: 59
End: 2024-09-04
Payer: COMMERCIAL

## 2024-09-04 DIAGNOSIS — I48.19 PERSISTENT ATRIAL FIBRILLATION (H): Primary | ICD-10-CM

## 2024-09-04 NOTE — TELEPHONE ENCOUNTER
----- Message from Sylvia SMITH sent at 9/3/2024 12:34 PM CDT -----  Regarding: RE: ablation consult  Hi Junai! Unfortunately Dr. Looney next RAC spot isnt until 10/1/2024 at 11:20 am, at the  office!     Delicia  ----- Message -----  From: Juani Bahena RN  Sent: 9/3/2024  11:48 AM CDT  To: Renetta Sutherland; Sylvia Dubose  Subject: RE: ablation consult                             What is the soonest? I can call the patient and see what he thinks, thank you!  Juani  ----- Message -----  From: Renetta Sutherland  Sent: 8/30/2024   2:55 PM CDT  To: Juani Bahena RN; Sylvia Dubose  Subject: RE: ablation consult                             Sorry, nothing available in the next week or 2.    Seda  ----- Message -----  From: Juani Bahena RN  Sent: 8/29/2024  12:43 PM CDT  To: Renetta Sutherland; Sylvia Dubose  Subject: FW: ablation consult                             Hi there!  Is there a RAC spot available for this pt with ADW in the next week or 2?  Thank you!  Juani  ----- Message -----  From: Paramjit Saldaña MD  Sent: 8/29/2024  11:42 AM CDT  To: Alondra Miller NP; #  Subject: FW: ablation consult                               ----- Message -----  From: Paramjit Saldaña MD  Sent: 8/29/2024  11:41 AM CDT  To: Alondra Miller NP  Subject: RE: ablation consult                             Hi Alondra!    Thanks for the note - would be very happy to.  EP team in [place] - can use an urgent spot.  If it would work better for him, we can also proceed with coordination for PVI as below, and I can meet him day of procedure.    - PVI, general anesthesia, continue apixaban, hold sotalol 3 days prior with plan to resume for 6-12 weeks post ablation.    Thank you!  Lev  ----- Message -----  From: Alondra Miller NP  Sent: 8/29/2024  11:31 AM CDT  To: Paramjit Saldaña MD  Subject: ablation consult                                 Patient will be seeing you early October 2 for ablation  consult  Failed DCCV after 12 days on Sotalol  LVEF preserved 60-65%  Long time smoker, PFT's were normal back in 2014, no repeat since  On Sotalol currently while in AF with controlled rates in the low 50s so no room to increase dose, LA severely enlarged. RA moderately enlarged  He is experiencing fatigue with some SOBOE   JIM on CPAP, AHI 54 with hypoxia down to 73%  He would like to proceed with ablation ASAP  We did explore the option of Sotalol washout and loading on amiodarone  for repeat DCCV but due to long standing smoking history and young age, did not go this route.   He requested that I put a word in with you to see if you would review his case prior to meeting with you to see if you would consider seeing him sooner in order to get him to ablation sooner, so I am just keeping my word to him. I know you are a busy jeb so thanks for considering. Him and his wife are pleasantly persistent BRANDON :)    Alondra

## 2024-09-04 NOTE — PROGRESS NOTES
H&P:  Card OV  Date:  EP HARLEY [] PVI  Order Case Req Y  Order Set Y Lab/EKG   Orders [] Imaging Order None     AC Eliquis-CONTINUE   AAD Sotalol-Hold 3 days prior   PPI/H2 Blocker Start Protonix 40mg Daily 3 days prior, 6wk post   Diuretics None   DM/GLP-1 DM Meds-  Jardiance  GLP-1- None   - PVI, general anesthesia, continue apixaban, hold sotalol 3 days prior with plan to resume for 6-12 weeks post ablation.     Angel Etienne, 1965, [id number]  Home:[phone] (home) Cell:[phone] (mobile)  Emergency Contact: Kathie Napier [phone]  PCP: Torsten Thomas, [phone]    Important patient information for CSC/Cath Lab staff : None    [facility] EP Cath Lab Procedure Order   Ablation Type:  Atrial Fibrillation (Persistent)  Ordering Provider: Dr Piotr Hill Ordered and Prepped: 9/4/2024 Juani Bahena RN    Scheduling Information:  Anticipated Case Duration:  Standard ( Case per day SA 2:1, DW 5:1, KA 3:1)   Scheduling Timeframe:  Has apt with ADW on 9-24, would like to use this as H&P and if possible to schedule PVI soon after!  Scheduling Restrictions: None  Scheduling Contact: Please contact pt to schedule, if you are unable to schedule date within the next 24 hours please contact pt to update on scheduling process  EP RN Follow Up Apt: Schedule EP RN PC visit 3-4 days s/p PVI  MD Preference: Scheduling with ordering provider  Current Device/Device Co Needed for Procedure: None NoneNone  Pre-Procedural Testing needed: None  Mapping System Required:  Carto (Dr Saldaña and Dr Duarte)  ICE Needed:  Yes  Anesthesia:General Anesthesia    [facility] EP Cath Lab Prep   H&P:   9-24-24 with ADW  Pre-op Labs: CBC, BMP, Beta HcG if appropriate, and INR if on Warfarin will be ordered AM of procedure, if not completed at pre-op H&P within 7 days of procedure.  T&S Pre-Procedure Review: Does not need for PVI procedures  Medical Records Pertinent for Procedure:  None  Iodinated Contrast Dye Allergies (Does  not include Shellfish, Egg, and/or Iodine Allergy): NA  GLP-1 Protocol: If on Dulaglutide (Trulicity) (weekly)- Injection hold 7 days prior to procedure  , Exenatide extended release (Bydureon bcise) (weekly)- Injection hold 7 days prior to procedure, Exenatide (Byetta) (twice daily)- Oral Tablet hold day prior and morning of procedure and for Injection hold 7 days prior to procedure, Semaglutide (Ozempic) (weekly)- Injection and Oral hold 7 days prior to procedure, Liraglutide (Victoza, Saxenda) (daily)- Injection hold day prior and morning of procedure  Follow Up S/P: EP RN 3-4 PC Visit and EP HARLEY 6wk (To be scheduled at time of case scheduling)    Allergies   Allergen Reactions    Clobetasol Other (See Comments)     Allergy testing done - Clobetasol-77-Propionate    Inhaled Anticholinergic Agents Other (See Comments)     Stearyl Alcohol - Allergy testing done    No Clinical Screening - See Comments Other (See Comments)     Stearyl Alcohol - Allergy testing done    Octylisothiazolinone; Phenylenediamine - allergy testing done    Shellac Other (See Comments)     Allergy testing done    Trolamine (Triethanolamine) Other (See Comments)     Allergy testing done    Urea Other (See Comments)     Allergy testing done       Current Outpatient Medications:     apixaban ANTICOAGULANT (ELIQUIS) 5 MG tablet, Take 1 tablet (5 mg) by mouth 2 times daily, Disp: 60 tablet, Rfl: 11    atorvastatin (LIPITOR) 10 MG tablet, Take 1 tablet (10 mg) by mouth daily, Disp: 90 tablet, Rfl: 3    benzonatate (TESSALON) 100 MG capsule, Take 1 capsule (100 mg) by mouth 3 times daily as needed for cough, Disp: 20 capsule, Rfl: 0    dupilumab (DUPIXENT) 300 MG/2ML prefilled syringe, Inject 300 mg Subcutaneous every 14 days, Disp: , Rfl:     empagliflozin (JARDIANCE) 10 MG TABS tablet, Take 1 tablet (10 mg) by mouth daily, Disp: 30 tablet, Rfl: 11    fenofibrate (TRIGLIDE/LOFIBRA) 160 MG tablet, Take 1 tablet (160 mg) by mouth daily, Disp: 90  tablet, Rfl: 3    losartan (COZAAR) 25 MG tablet, TAKE 1 TABLET BY MOUTH AT BEDTIME, Disp: 90 tablet, Rfl: 0    nicotine (NICORETTE) 2 MG gum, Place 2 mg inside cheek every hour as needed for nicotine withdrawal symptoms, Disp: , Rfl:     sotalol (BETAPACE) 80 MG tablet, Take 1 tablet (80 mg) by mouth 2 times daily, Disp: 180 tablet, Rfl: 1    Testosterone 1.62 % GEL, PLACE 1 PUMP (20.25 MG) ONTO THE SKIN DAILY APPLY FROM DISPENSER TO CLEAN, DRY, INTACT SKIN OF THE UPPER ARMS AND SHOULDERS, Disp: 75 g, Rfl: 0    triamcinolone (KENALOG) 0.1 % external cream, , Disp: , Rfl:     Documentation Date:9/4/2024 12:53 PM  Juani Bahena RN

## 2024-09-04 NOTE — TELEPHONE ENCOUNTER
Noted.  Phone call to patient, reviewed the below information.  He actually has moved his apt with ADW to 9-24-24.  Explained our usual process for scheduling ablations.  He states his significant other is very interested with meeting Dr. Saldaña prior to the ablation, the patient is very interested in getting the ablation scheduled as soon as possible.  Suggested that he use his apt on 9-24-24 as his preop physical and schedule a date with Dr. Saldaña shortly after for his PVI.   He appreciates this and is very agreeable.  Chart prepped, orders placed and he will be contacted to schedule PVI.

## 2024-09-06 DIAGNOSIS — I48.19 PERSISTENT ATRIAL FIBRILLATION (H): Primary | ICD-10-CM

## 2024-09-19 ENCOUNTER — TRANSFERRED RECORDS (OUTPATIENT)
Dept: HEALTH INFORMATION MANAGEMENT | Facility: CLINIC | Age: 59
End: 2024-09-19
Payer: COMMERCIAL

## 2024-09-24 ENCOUNTER — LAB (OUTPATIENT)
Dept: CARDIOLOGY | Facility: CLINIC | Age: 59
End: 2024-09-24
Payer: COMMERCIAL

## 2024-09-24 ENCOUNTER — ALLIED HEALTH/NURSE VISIT (OUTPATIENT)
Dept: CARDIOLOGY | Facility: CLINIC | Age: 59
End: 2024-09-24
Payer: COMMERCIAL

## 2024-09-24 ENCOUNTER — OFFICE VISIT (OUTPATIENT)
Dept: CARDIOLOGY | Facility: CLINIC | Age: 59
End: 2024-09-24
Attending: NURSE PRACTITIONER
Payer: COMMERCIAL

## 2024-09-24 VITALS
SYSTOLIC BLOOD PRESSURE: 118 MMHG | RESPIRATION RATE: 16 BRPM | BODY MASS INDEX: 37.92 KG/M2 | WEIGHT: 305 LBS | HEIGHT: 75 IN | DIASTOLIC BLOOD PRESSURE: 60 MMHG | HEART RATE: 61 BPM

## 2024-09-24 DIAGNOSIS — I48.19 PERSISTENT ATRIAL FIBRILLATION (H): ICD-10-CM

## 2024-09-24 DIAGNOSIS — I48.19 PERSISTENT ATRIAL FIBRILLATION (H): Primary | ICD-10-CM

## 2024-09-24 LAB
ANION GAP SERPL CALCULATED.3IONS-SCNC: 8 MMOL/L (ref 7–15)
ATRIAL RATE - MUSE: 119 BPM
BUN SERPL-MCNC: 17.8 MG/DL (ref 6–20)
CALCIUM SERPL-MCNC: 9.4 MG/DL (ref 8.8–10.4)
CHLORIDE SERPL-SCNC: 105 MMOL/L (ref 98–107)
CREAT SERPL-MCNC: 0.86 MG/DL (ref 0.67–1.17)
DIASTOLIC BLOOD PRESSURE - MUSE: NORMAL MMHG
EGFRCR SERPLBLD CKD-EPI 2021: >90 ML/MIN/1.73M2
ERYTHROCYTE [DISTWIDTH] IN BLOOD BY AUTOMATED COUNT: 14.4 % (ref 10–15)
GLUCOSE SERPL-MCNC: 108 MG/DL (ref 70–99)
HCO3 SERPL-SCNC: 26 MMOL/L (ref 22–29)
HCT VFR BLD AUTO: 50.6 % (ref 40–53)
HGB BLD-MCNC: 16.7 G/DL (ref 13.3–17.7)
INTERPRETATION ECG - MUSE: NORMAL
MCH RBC QN AUTO: 31.7 PG (ref 26.5–33)
MCHC RBC AUTO-ENTMCNC: 33 G/DL (ref 31.5–36.5)
MCV RBC AUTO: 96 FL (ref 78–100)
P AXIS - MUSE: NORMAL DEGREES
PLATELET # BLD AUTO: 206 10E3/UL (ref 150–450)
POTASSIUM SERPL-SCNC: 5.1 MMOL/L (ref 3.4–5.3)
PR INTERVAL - MUSE: NORMAL MS
QRS DURATION - MUSE: 92 MS
QT - MUSE: 464 MS
QTC - MUSE: 467 MS
R AXIS - MUSE: -8 DEGREES
RBC # BLD AUTO: 5.27 10E6/UL (ref 4.4–5.9)
SODIUM SERPL-SCNC: 139 MMOL/L (ref 135–145)
SYSTOLIC BLOOD PRESSURE - MUSE: NORMAL MMHG
T AXIS - MUSE: 26 DEGREES
VENTRICULAR RATE- MUSE: 61 BPM
WBC # BLD AUTO: 5.9 10E3/UL (ref 4–11)

## 2024-09-24 PROCEDURE — 85027 COMPLETE CBC AUTOMATED: CPT

## 2024-09-24 PROCEDURE — 99245 OFF/OP CONSLTJ NEW/EST HI 55: CPT | Performed by: INTERNAL MEDICINE

## 2024-09-24 PROCEDURE — 99207 PR NO CHARGE NURSE ONLY: CPT

## 2024-09-24 PROCEDURE — 93000 ELECTROCARDIOGRAM COMPLETE: CPT | Performed by: INTERNAL MEDICINE

## 2024-09-24 PROCEDURE — 36415 COLL VENOUS BLD VENIPUNCTURE: CPT

## 2024-09-24 PROCEDURE — 80048 BASIC METABOLIC PNL TOTAL CA: CPT

## 2024-09-24 RX ORDER — METHOCARBAMOL 750 MG/1
TABLET, FILM COATED ORAL
COMMUNITY
Start: 2024-09-22

## 2024-09-24 RX ORDER — CLOTRIMAZOLE AND BETAMETHASONE DIPROPIONATE 10; .64 MG/G; MG/G
CREAM TOPICAL
COMMUNITY
Start: 2024-09-06

## 2024-09-24 RX ORDER — PANTOPRAZOLE SODIUM 40 MG/1
40 TABLET, DELAYED RELEASE ORAL DAILY
Qty: 45 TABLET | Refills: 0 | Status: SHIPPED | OUTPATIENT
Start: 2024-09-24

## 2024-09-24 NOTE — PROGRESS NOTES
[facility]  Cardiac Electrophysiology  [address]  [place] [address]   Office: [phone]  Fax: [phone]     Patient: Angel Etienne   : 1965       CHIEF COMPLAINT/REASON FOR VISIT  Persistent atrial fibrillation      Assessment/Recommendations     Stage 3B/Persistent atrial fibrillation - symptomatic with palpitations, intermittent dyspnea with activity  [id number] 1  We reviewed atrial fibrillation physiology, managing stroke risk, cardioversion, antiarrhythmic drug therapy, and catheter ablation.  We discussed atrial fibrillation ablation procedures, anticipated success rates, the potential need for re-do ablation vs addition of anti-arrhythmic drugs, procedural risks (including groin bleeding, vascular injury, tamponade, phrenic or esophageal injury, stroke, pulmonary vein stenosis) and recovery expectations.  He would prefer to proceed with ablation, which is scheduled 2024  - PVI, general anesthesia, continue apixaban, hold sotalol 3 days prior with plan to resume for 6-12 weeks post ablation  - continue sotalol 80mg twice daily  - continue apixaban 5mg twice daily  - we discussed the ongoing importance of lifestyle modification (maintaining a healthy weight, aerobic activity, continued sleep apnea management, alcohol avoidance) as part of a long term strategy for atrial fibrillation management    Follow up: as above           History of Present Illness   Angel Etienne is a 58 year old male with persistent atrial fibrillation, HTN, JIM on CPAP referred by Alondra Miller CNP for consultation regarding atrial fibrillation.    Mr. Etienne' atrial fibrillation history is as summarized below:  Symptoms: palpitations, intermittent dyspnea with activity  Symptom onset date:   Diagnosis date: 2024 at pre-operative evaluation  Admissions/ER visits: none  Prior medical therapies: sotalol (2024-present)  Prior DCCVs: 2024 (AF  "recurrence around 8/20/2024)  Prior ablations: none  Percutaneous left atrial appendage occlusion: none    He notes improved symptoms after his DCCV prior to recurrence.  He denies chest pain, syncope.       Physical Examination  Review of Systems   VITALS: /60 (BP Location: Right arm, Patient Position: Sitting, Cuff Size: Adult Regular)   Pulse 61   Resp 16   Ht 1.905 m (6' 3\")   Wt 138.3 kg (305 lb)   BMI 38.12 kg/m    Wt Readings from Last 3 Encounters:   08/28/24 137 kg (302 lb)   08/14/24 139.7 kg (308 lb)   08/08/24 140.6 kg (310 lb)     CONSTITUTIONAL: well nourished, comfortable, no distress  EYES:  Conjunctivae pink, sclerae clear.    E/N/T:  Oral mucosa pink  RESPIRATORY:  Respiratory effort is normal  CARDIOVASCULAR:  irregular, normal S1 and S2  GASTROINTESTINAL:  Abdomen without masses or tenderness  EXTREMITIES:  No clubbing or cyanosis.    MUSCULOSKELETAL:  Overall grossly normal muscle strength  SKIN:  Overall, skin warm and dry, no lesions.  NEURO/PSYCH:  Oriented x 3 with normal affect.   Constitutional:  No weight loss or loss of appetite    Eyes:  No difficulty with vision, no double vision, no dry eyes  ENT:  No sore throat, difficulty swallowing; changes in hearing or tinnitus  Cardiovascular: As detailed above  Respiratory:  No cough  Musculoskeletal  No joint pain, muscle aches  Neurologic:  No syncope, lightheadedness, fainting spells   Hematologic: No easy bruising, excessive bleeding tendency   Gastrointestinal:  No jaundice, abdominal pain or abdominal bloating  Genitourinary: No changes in urinary habits, no trouble urinating    Psychiatric: No anxiety or depression      Medical History  Surgical History   Past Medical History:   Diagnosis Date    Atrial fibrillation (H)     High cholesterol     No past surgical history on file.      Family History Social History   Family History   Problem Relation Age of Onset    Varicose Veins Father         Social History     Tobacco Use    " Smoking status: Every Day     Current packs/day: 1.00     Average packs/day: 1.5 packs/day for 41.9 years (62.6 ttl pk-yrs)     Types: Cigarettes     Start date: 11/1/1982    Smokeless tobacco: Never   Vaping Use    Vaping status: Never Used   Substance Use Topics    Alcohol use: Yes    Drug use: Yes     Types: Marijuana     Comment: 3x times a week- night time for sleep and back pain         Medications  Allergies     Current Outpatient Medications:     apixaban ANTICOAGULANT (ELIQUIS) 5 MG tablet, Take 1 tablet (5 mg) by mouth 2 times daily, Disp: 60 tablet, Rfl: 11    atorvastatin (LIPITOR) 10 MG tablet, Take 1 tablet (10 mg) by mouth daily, Disp: 90 tablet, Rfl: 3    benzonatate (TESSALON) 100 MG capsule, Take 1 capsule (100 mg) by mouth 3 times daily as needed for cough, Disp: 20 capsule, Rfl: 0    dupilumab (DUPIXENT) 300 MG/2ML prefilled syringe, Inject 300 mg Subcutaneous every 14 days, Disp: , Rfl:     empagliflozin (JARDIANCE) 10 MG TABS tablet, Take 1 tablet (10 mg) by mouth daily, Disp: 30 tablet, Rfl: 11    fenofibrate (TRIGLIDE/LOFIBRA) 160 MG tablet, Take 1 tablet (160 mg) by mouth daily, Disp: 90 tablet, Rfl: 3    losartan (COZAAR) 25 MG tablet, TAKE 1 TABLET BY MOUTH AT BEDTIME, Disp: 90 tablet, Rfl: 0    nicotine (NICORETTE) 2 MG gum, Place 2 mg inside cheek every hour as needed for nicotine withdrawal symptoms, Disp: , Rfl:     sotalol (BETAPACE) 80 MG tablet, Take 1 tablet (80 mg) by mouth 2 times daily, Disp: 180 tablet, Rfl: 1    Testosterone 1.62 % GEL, PLACE 1 PUMP (20.25 MG) ONTO THE SKIN DAILY APPLY FROM DISPENSER TO CLEAN, DRY, INTACT SKIN OF THE UPPER ARMS AND SHOULDERS, Disp: 75 g, Rfl: 0    triamcinolone (KENALOG) 0.1 % external cream, , Disp: , Rfl:      Allergies   Allergen Reactions    Clobetasol Other (See Comments)     Allergy testing done - Clobetasol-77-Propionate    Inhaled Anticholinergic Agents Other (See Comments)     Stearyl Alcohol - Allergy testing done    No Clinical  "Screening - See Comments Other (See Comments)     Stearyl Alcohol - Allergy testing done    Octylisothiazolinone; Phenylenediamine - allergy testing done    Shellac Other (See Comments)     Allergy testing done    Trolamine (Triethanolamine) Other (See Comments)     Allergy testing done    Urea Other (See Comments)     Allergy testing done          Lab Results    Chemistry CBC Cardiac Enzymes/BNP/TSH/INR   Recent Labs   Lab Test 07/22/24  1717      POTASSIUM 3.9   CHLORIDE 105   CO2 25   GLC 96   BUN 13.9   CR 0.91   GFRESTIMATED >90   LEESA 9.4     Recent Labs   Lab Test 07/22/24  1717 01/29/24  1555 11/16/23  1137   CR 0.91 0.80 0.77          Recent Labs   Lab Test 07/30/24  1340   WBC 6.9   HGB 16.4   HCT 50.4   MCV 95        Recent Labs   Lab Test 07/30/24  1340 07/22/24  1717 01/29/24  1555   HGB 16.4 16.3 15.5    No results for input(s): \"TROPONINI\" in the last 06479 hours.  No results for input(s): \"BNP\", \"NTBNPI\", \"NTBNP\" in the last 77042 hours.  Recent Labs   Lab Test 07/22/24  1717   TSH 2.28     Recent Labs   Lab Test 01/29/24  1555   INR 1.10         Data Review    ECGs (tracings independently reviewed)  8/28/24 - AF, 54bpm, QT//438 ms  8/14/24 - SR 79bpm, first degree AV block, QT//449 ms  8/8/24 (post DCCV) - SR 75bpm, first degree AV block, QT//446 ms  1/29/24 - AF, 65bpm     Holter Monitor 3/7/24  Holter monitoring (duration 24hrs).  Continuous atrial fibrillation, 48 to 99bpm, average 69bpm.  There were no pauses of greater than 5 seconds. Nocturnal bradycardia was noted.  Rare premature ventricular contractions (<1%).  Symptom triggers - none.    2/5/2024 TTE  The left ventricle is normal in size.  Left ventricular function is normal.The ejection fraction is 60-65%.  There is mild concentric left ventricular hypertrophy.  Normal right ventricle size and systolic function.  The left atrium is severely dilated.  The right atrium is moderately dilated.  There is mild " (1+) mitral regurgitation.       Cc: Alondra Miller CNP, Vocal, MD Paramjit Ray MD  9/24/2024  12:05 PM

## 2024-09-24 NOTE — PROGRESS NOTES
Pre-Procedure Pulmonary Vein Ablation (AF) Education    Procedure: PVI with Dr Saldaña on 9/30/24 with arrival time 6:00 am    COVID: Pt denies COVID like symptoms, and is aware if he/she develops COVID like symptoms they would need to complete an at home with a rapid antigen COVID test 1-2 days prior to your procedure date. If COVID + pt is aware the procedure will need to be rescheduled, and to contact CV scheduling as soon as possible    Type & Screen: Is not required for PVI Ablation    Pre-Op H&P: Completed today with EP HARLEY- See record in Epic    Education:   Reviewed with pt in Clinic today  Pre-Procedure Instruction: NPO after midnight pre procedure, Defined NPO, Remove all jewelry and leave all valuables at home, Shower prior to arrival, Anesthesia and intubation plan/orders, Intra-procedure PVI process, Post- PVI procedure expectations/recovery, Transportation requirements and arrangements post procedure, Post-procedure follow up process, Letter sent to pt via [employer] and mail with written instructions (Refer to letters tab), Lab results would be called to pt if abnormal  Risks:   Atrial Fibrillation Ablation/Left Atrial Ablation  Cardiac Ablation  <1% Hypotension, Hemorrhage, Thrombophlebitis, Systemic or pulmonic emboli, Cardiac perforation (tamponade), Infection, Pneumothorax, Arrhythmias, Proarrhythmic effects of drugs, Radiation exposure, Catheter entrapment  <1 % Vascular injury including perforation of vein, artery or heart  1-2% Tamponade and Aortic puncture with left sided transeptal approach  1% CVA   <1% MI  <0.1% death  If external defibrillation or CV is needed, 25% risk for superficial burn  Risks associated with general anesthesia will be addressed by the Anesthesiology Department  Radiofrequency Risks:  In addition to standard risks for Radiofrequency Ablation, there is:  <2% Significant pulmonary vein stenosis  <2% Embolic events  <1% Esophageal fistula  <1% Phrenic nerve paralysis    Cryoablation Risks:  In addition to standard risks for Cryoablation Ablation, there is:  <1% Phrenic nerve paralysis  <1% Pulmonary vein stenosis  <1% Esophageal fistula    Medication:   Instructions regarding anticoagulants: Eliquis- Continue anticoagulation uninterrupted through their procedure, do not miss any doses of AC prior to procedure, importance of taking AC for stroke prevention, taking AC as prescribed, to call prior to PVI if missed a dose of AC, and if upon arrival pt reports missing a dose of AC PVI will potentially be cnx/postponed  Instructions given to pt regarding antiarrhythmic medication: Sotalol- hold 3 days prior to procedure, and will be started on 9/27/24  Instructions given to pt regarding PPI medication: Start Protonix 40mg Daily 3 days prior, 6wk post  Instructions given to pt regarding diuretics medication: None  Instructions given to pt regarding DM/GLP-1 medication:   DM- Hold all oral diabetic medications and short acting insulin the morning of the procedure, and take 1/2 of pts scheduled doses of long acting insulin the PM prior and/or AM of procedure JARDIANCE  GLP-1- None  Instructions for medication, other than anticoagulants and antiarrhythmics listed above, given to pt: Take all medication AM of procedure with small sips of water     Important patient information for staff: None    9/24/2024 11:09 AM  Savana Garcia RN

## 2024-09-24 NOTE — H&P (VIEW-ONLY)
[facility]  Cardiac Electrophysiology  [address]  [place] [address]   Office: [phone]  Fax: [phone]     Patient: Angel Etienne   : 1965       CHIEF COMPLAINT/REASON FOR VISIT  Persistent atrial fibrillation      Assessment/Recommendations     Stage 3B/Persistent atrial fibrillation - symptomatic with palpitations, intermittent dyspnea with activity  [id number]  We reviewed atrial fibrillation physiology, managing stroke risk, cardioversion, antiarrhythmic drug therapy, and catheter ablation.  We discussed atrial fibrillation ablation procedures, anticipated success rates, the potential need for re-do ablation vs addition of anti-arrhythmic drugs, procedural risks (including groin bleeding, vascular injury, tamponade, phrenic or esophageal injury, stroke, pulmonary vein stenosis) and recovery expectations.  He would prefer to proceed with ablation, which is scheduled 2024  - PVI, general anesthesia, continue apixaban, hold sotalol 3 days prior with plan to resume for 6-12 weeks post ablation  - continue sotalol 80mg twice daily  - continue apixaban 5mg twice daily  - we discussed the ongoing importance of lifestyle modification (maintaining a healthy weight, aerobic activity, continued sleep apnea management, alcohol avoidance) as part of a long term strategy for atrial fibrillation management    Follow up: as above           History of Present Illness   Angel Eteinne is a 58 year old male with persistent atrial fibrillation, HTN, JIM on CPAP referred by Alondra Miller CNP for consultation regarding atrial fibrillation.    Mr. Etienne' atrial fibrillation history is as summarized below:  Symptoms: palpitations, intermittent dyspnea with activity  Symptom onset date:   Diagnosis date: 2024 at pre-operative evaluation  Admissions/ER visits: none  Prior medical therapies: sotalol (2024-present)  Prior DCCVs: 2024 (AF  "recurrence around 8/20/2024)  Prior ablations: none  Percutaneous left atrial appendage occlusion: none    He notes improved symptoms after his DCCV prior to recurrence.  He denies chest pain, syncope.       Physical Examination  Review of Systems   VITALS: /60 (BP Location: Right arm, Patient Position: Sitting, Cuff Size: Adult Regular)   Pulse 61   Resp 16   Ht 1.905 m (6' 3\")   Wt 138.3 kg (305 lb)   BMI 38.12 kg/m    Wt Readings from Last 3 Encounters:   08/28/24 137 kg (302 lb)   08/14/24 139.7 kg (308 lb)   08/08/24 140.6 kg (310 lb)     CONSTITUTIONAL: well nourished, comfortable, no distress  EYES:  Conjunctivae pink, sclerae clear.    E/N/T:  Oral mucosa pink  RESPIRATORY:  Respiratory effort is normal  CARDIOVASCULAR:  irregular, normal S1 and S2  GASTROINTESTINAL:  Abdomen without masses or tenderness  EXTREMITIES:  No clubbing or cyanosis.    MUSCULOSKELETAL:  Overall grossly normal muscle strength  SKIN:  Overall, skin warm and dry, no lesions.  NEURO/PSYCH:  Oriented x 3 with normal affect.   Constitutional:  No weight loss or loss of appetite    Eyes:  No difficulty with vision, no double vision, no dry eyes  ENT:  No sore throat, difficulty swallowing; changes in hearing or tinnitus  Cardiovascular: As detailed above  Respiratory:  No cough  Musculoskeletal  No joint pain, muscle aches  Neurologic:  No syncope, lightheadedness, fainting spells   Hematologic: No easy bruising, excessive bleeding tendency   Gastrointestinal:  No jaundice, abdominal pain or abdominal bloating  Genitourinary: No changes in urinary habits, no trouble urinating    Psychiatric: No anxiety or depression      Medical History  Surgical History   Past Medical History:   Diagnosis Date    Atrial fibrillation (H)     High cholesterol     No past surgical history on file.      Family History Social History   Family History   Problem Relation Age of Onset    Varicose Veins Father         Social History     Tobacco Use    " Smoking status: Every Day     Current packs/day: 1.00     Average packs/day: 1.5 packs/day for 41.9 years (62.6 ttl pk-yrs)     Types: Cigarettes     Start date: 11/1/1982    Smokeless tobacco: Never   Vaping Use    Vaping status: Never Used   Substance Use Topics    Alcohol use: Yes    Drug use: Yes     Types: Marijuana     Comment: 3x times a week- night time for sleep and back pain         Medications  Allergies     Current Outpatient Medications:     apixaban ANTICOAGULANT (ELIQUIS) 5 MG tablet, Take 1 tablet (5 mg) by mouth 2 times daily, Disp: 60 tablet, Rfl: 11    atorvastatin (LIPITOR) 10 MG tablet, Take 1 tablet (10 mg) by mouth daily, Disp: 90 tablet, Rfl: 3    benzonatate (TESSALON) 100 MG capsule, Take 1 capsule (100 mg) by mouth 3 times daily as needed for cough, Disp: 20 capsule, Rfl: 0    dupilumab (DUPIXENT) 300 MG/2ML prefilled syringe, Inject 300 mg Subcutaneous every 14 days, Disp: , Rfl:     empagliflozin (JARDIANCE) 10 MG TABS tablet, Take 1 tablet (10 mg) by mouth daily, Disp: 30 tablet, Rfl: 11    fenofibrate (TRIGLIDE/LOFIBRA) 160 MG tablet, Take 1 tablet (160 mg) by mouth daily, Disp: 90 tablet, Rfl: 3    losartan (COZAAR) 25 MG tablet, TAKE 1 TABLET BY MOUTH AT BEDTIME, Disp: 90 tablet, Rfl: 0    nicotine (NICORETTE) 2 MG gum, Place 2 mg inside cheek every hour as needed for nicotine withdrawal symptoms, Disp: , Rfl:     sotalol (BETAPACE) 80 MG tablet, Take 1 tablet (80 mg) by mouth 2 times daily, Disp: 180 tablet, Rfl: 1    Testosterone 1.62 % GEL, PLACE 1 PUMP (20.25 MG) ONTO THE SKIN DAILY APPLY FROM DISPENSER TO CLEAN, DRY, INTACT SKIN OF THE UPPER ARMS AND SHOULDERS, Disp: 75 g, Rfl: 0    triamcinolone (KENALOG) 0.1 % external cream, , Disp: , Rfl:      Allergies   Allergen Reactions    Clobetasol Other (See Comments)     Allergy testing done - Clobetasol-77-Propionate    Inhaled Anticholinergic Agents Other (See Comments)     Stearyl Alcohol - Allergy testing done    No Clinical  "Screening - See Comments Other (See Comments)     Stearyl Alcohol - Allergy testing done    Octylisothiazolinone; Phenylenediamine - allergy testing done    Shellac Other (See Comments)     Allergy testing done    Trolamine (Triethanolamine) Other (See Comments)     Allergy testing done    Urea Other (See Comments)     Allergy testing done          Lab Results    Chemistry CBC Cardiac Enzymes/BNP/TSH/INR   Recent Labs   Lab Test 07/22/24  1717      POTASSIUM 3.9   CHLORIDE 105   CO2 25   GLC 96   BUN 13.9   CR 0.91   GFRESTIMATED >90   LEESA 9.4     Recent Labs   Lab Test 07/22/24  1717 01/29/24  1555 11/16/23  1137   CR 0.91 0.80 0.77          Recent Labs   Lab Test 07/30/24  1340   WBC 6.9   HGB 16.4   HCT 50.4   MCV 95        Recent Labs   Lab Test 07/30/24  1340 07/22/24  1717 01/29/24  1555   HGB 16.4 16.3 15.5    No results for input(s): \"TROPONINI\" in the last 31347 hours.  No results for input(s): \"BNP\", \"NTBNPI\", \"NTBNP\" in the last 75495 hours.  Recent Labs   Lab Test 07/22/24  1717   TSH 2.28     Recent Labs   Lab Test 01/29/24  1555   INR 1.10         Data Review    ECGs (tracings independently reviewed)  8/28/24 - AF, 54bpm, QT//438 ms  8/14/24 - SR 79bpm, first degree AV block, QT//449 ms  8/8/24 (post DCCV) - SR 75bpm, first degree AV block, QT//446 ms  1/29/24 - AF, 65bpm     Holter Monitor 3/7/24  Holter monitoring (duration 24hrs).  Continuous atrial fibrillation, 48 to 99bpm, average 69bpm.  There were no pauses of greater than 5 seconds. Nocturnal bradycardia was noted.  Rare premature ventricular contractions (<1%).  Symptom triggers - none.    2/5/2024 TTE  The left ventricle is normal in size.  Left ventricular function is normal.The ejection fraction is 60-65%.  There is mild concentric left ventricular hypertrophy.  Normal right ventricle size and systolic function.  The left atrium is severely dilated.  The right atrium is moderately dilated.  There is mild " (1+) mitral regurgitation.       Cc: Alondra Miller CNP, Vocal, MD Paramjit Ray MD  9/24/2024  12:05 PM

## 2024-09-24 NOTE — PATIENT INSTRUCTIONS
[facility]  Cardiac Electrophysiology  [address]  [place] [address]   Office: [phone]  Fax: [phone]     Thank you for seeing us in clinic today - it is a pleasure to be a part of your care team.  Below is a summary of our plan from today's visit.       You have atrial fibrillation.  We reviewed atrial fibrillation, treatment options (ablation vs antiarrhythmic drug therapy), and long term stroke risk prevention (blood thinner).    We will plan for the following:  - we will see you on 9/30/2024 for you atrial fibrillation ablation  - continue sotalol 80mg twice daily  - continue apixaban 5mg twice daily     Please do not hesitate to be in touch with our office at [phone] with any questions that may arise.       Thank you for trusting us with your care,    Paramjit Saldaña MD  Clinical Cardiac Electrophysiology  [facility]  [address]  [place] [address]   Office: [phone]  Fax: [phone]        ATRIAL FIBRILLATION: Patient Information    What is atrial fibrillation?  Atrial fibrillation (AF, A-fib) is a common heart rhythm problem (arrhythmia) occurring within the upper chambers of the heart (the atria).  In normal rhythm, the upper and lower chambers of the heart are electrically driven to contract in a coordinated sequence.  In atrial fibrillation, the atria lose their ability to contract because of rapid and chaotic electrical activity.  The lower chambers of the heart (the ventricles) continue to pump blood throughout the body, though with irregular and often faster rate due to the chaotic activity within the atria.        How do I know if I have atrial fibrillation?   Some people may feel their heart beating faster, harder, or irregularly while in atrial fibrillation.  Others may be lightheaded, fatigued, feel weak or tired or become more short of breath especially with activities.  Some patients  have no symptoms at all.  Atrial fibrillation may be found due to an irregular pulse or on an electrocardiogram (ECG). Atrial fibrillation can start and stop on its own, and episodes can last from seconds to several months.      How common is atrial fibrillation?   An estimated 3-6 million people in the [place] have atrial fibrillation.  Atrial fibrillation is a common heart rhythm problem for older persons, affecting as estimated 12-15% of people over the age of 65 years of age.    What causes atrial fibrillation?   Age is the most important risk factor for atrial fibrillation.  Atrial fibrillation is more common in people with other heart disease, high blood pressure, diabetes, obesity, sleep apnea and in people who regularly consume alcohol.  Surgery, lung disease, or thyroid problems can lead to atrial fibrillation.  Atrial fibrillation has multiple possible causes, and in most cases a single cause cannot be found.  Atrial fibrillation is a progressive condition, usually starting with at an early stage with short and infrequent episodes.  In later stages of disease, more frequent and longer lasting episodes of atrial fibrillation occur, ultimately culminating in episodes which do not spontaneously terminate.  Generally, more enlargement and scarring within the upper chambers of the heart is observed as atrial fibrillation progresses from early to late-stage disease.    How is atrial fibrillation diagnosed and evaluated?    Because of its start-stop nature, atrial fibrillation can be challenging to diagnose.  Atrial fibrillation is most commonly diagnosed via cardiac rhythm recordings - either an ECG or wearable cardiac rhythm monitor.  For patients with pacemakers, defibrillators or implantable loop recorders, atrial fibrillation may be recorded via these devices.  Recently, commercially available devices (eg. Apple Watch, [employer] ink device, certain FitBit devices, others) can allow patients to take 30 second  cardiac rhythm recordings which may document atrial fibrillation.  Once atrial fibrillation is diagnosed, additional tests include blood tests and an echocardiogram.  The echocardiogram uses ultrasound to look at your heart to assess your cardiac function and evaluate for other heart disease.  Additional evaluation may include CT or MRI studies.    Is atrial fibrillation dangerous?   Atrial fibrillation is not usually a life-threatening arrhythmia.  The most serious consequences of atrial fibrillation including stroke and worsening of overall cardiac function.  While in atrial fibrillation, the upper cardiac chambers do not contract normally, resulting in slower blood flow and increased risk of clot formation.  If this blood clot becomes detached from the heart a stroke can occur.  Unfortunately, stroke can be the first sign of atrial fibrillation for some people.  With a stroke, you may notice abnormal sensation, weakness on one side of the body or face, changes in your vision or speech.  If you have any of these signs, you should contact EMS and be evaluated in an emergency room as soon as possible.      How is atrial fibrillation treated?     Several treatment options exist for suppressing atrial fibrillation - however, it is not an easily curable arrhythmia.  The first goal in managing atrial fibrillation is to minimize stroke risk.  The second goal is to improve symptoms associated with atrial fibrillation.  Finally, in patients with reduced cardiac function, maintaining normal rhythm can help improve cardiac function.      Blood thinners are used to reduce the risk of stroke in patients with high estimated stroke risk related to atrial fibrillation.  For patients at higher risk of bleeding related to blood thinner use, implantable devices may be an option to reduce stroke risk without the need for long term blood thinner use.      Atrial fibrillation can be managed via two strategies: rate control and rhythm  control.  In a rate control strategy, continued atrial fibrillation is accepted and medications (eg. beta-blockers or calcium channel blockers) are used to control the lower chamber rate.  In a rhythm control strategy, anti-arrhythmic medications or catheter ablation are used to maintain normal cardiac rhythm and slow disease progression by suppressing atrial fibrillation.  A procedure called a cardioversion, in which an electric shock is delivered through patches placed on the chest wall while under deep sedation, can be performed to temporarily restore normal cardiac rhythm, though does not address the chance of atrial fibrillation recurrence.  Treatments are more effective for earlier rather than later stage atrial fibrillation.  Lifestyle modifications (maintaining a healthy weight, aerobic exercise, diagnosing and treating sleep apnea, and minimizing alcohol intake) are important elements of atrial fibrillation rhythm control.     What is catheter ablation for atrial fibrillation?  Cardiac catheter ablation is a commonly performed, minimally invasive procedure performed by a cardiac electrophysiologist to treat many different cardiac rhythm abnormalities.  During catheter ablation, long, thin, flexible tubes are advanced into the heart via small sheaths inserted into the femoral veins and thermal energy (either heating or cooling) is applied within the heart to disrupt abnormal electrical activity.  Atrial fibrillation ablation is performed under general anesthesia, with procedures generally taking approximately 2-3 hours.  Patients are typically observed for 3-5 hours after the ablation, and in most cases can be discharged home the same day.  Atrial fibrillation ablation is associated with better outcomes (mortality, cardiovascular hospitalizations, atrial arrhythmia recurrences) compared to antiarrhythmic drug therapy.  However, atrial fibrillation recurrences are not uncommon, and repeat catheter ablation  may be offered.  Your electrophysiology team can review atrial fibrillation ablation, anticipated success rates, risks, and recovery expectations with you.    What are anti-arrhythmic medications?  Anti-arrhythmic medications are specialized drugs which alter cardiac electrical functioning to suppress arrhythmias.  There are several anti-arrhythmic medications available, each with its own success rate and side effects.  Some anti-arrhythmic medications are less effective though safer to use, others are more effective though have serious potential toxicities.  Atrial fibrillation recurrences are common and may require dose adjustment or change in antiarrhythmic therapy.  Your electrophysiology team will carefully consider which medication would be the best and safest for your particular case.      Can I live a normal life?    The goal of atrial fibrillation management is for patients to live normal lives without being limited by symptoms related to atrial fibrillation.    Are any additional educational resources available?  There are a number of excellent atrial fibrillation education resources available to you online.  A few options you may wish to review include:  hrsonline.org/guide-atrial-fibrillation  afibmatters.org  getsmartaboutafib.com  stopaf.com    What comes next?    Consider your management options and let us know how we can help in your decision process.  Please take medications as they have been prescribed.  You should also get any tests that may have been ordered for you.      When to Call Your Doctor or seek emergency care:  Call your doctor or seek emergency care if you have any significant changes with the following:  Weakness  Dizziness  Fainting  Fatigue  Shortness of breath  Chest pain with increased activity  If you are concerned that your heart rate is too fast or too slow  Bleeding that does not stop in 10 minutes  Coughing or throwing up blood  Bloody diarrhea or bleeding  hemorrhoids  Dark-colored urine or black stool  Allergic reactions:  Rash  Itching  Swelling  Trouble breathing or swallowing      Please call the [facility] at [phone] if you have concerns about your symptoms, your medicines, or your follow-up appointments.

## 2024-09-24 NOTE — H&P (VIEW-ONLY)
[facility]  Cardiac Electrophysiology  [address]  [place] [address]   Office: [phone]  Fax: [phone]     Patient: Angel Etienne   : 1965       CHIEF COMPLAINT/REASON FOR VISIT  Persistent atrial fibrillation      Assessment/Recommendations     Stage 3B/Persistent atrial fibrillation - symptomatic with palpitations, intermittent dyspnea with activity  [id number] 1  We reviewed atrial fibrillation physiology, managing stroke risk, cardioversion, antiarrhythmic drug therapy, and catheter ablation.  We discussed atrial fibrillation ablation procedures, anticipated success rates, the potential need for re-do ablation vs addition of anti-arrhythmic drugs, procedural risks (including groin bleeding, vascular injury, tamponade, phrenic or esophageal injury, stroke, pulmonary vein stenosis) and recovery expectations.  He would prefer to proceed with ablation, which is scheduled 2024  - PVI, general anesthesia, continue apixaban, hold sotalol 3 days prior with plan to resume for 6-12 weeks post ablation  - continue sotalol 80mg twice daily  - continue apixaban 5mg twice daily  - we discussed the ongoing importance of lifestyle modification (maintaining a healthy weight, aerobic activity, continued sleep apnea management, alcohol avoidance) as part of a long term strategy for atrial fibrillation management    Follow up: as above           History of Present Illness   Angel Etienne is a 58 year old male with persistent atrial fibrillation, HTN, JIM on CPAP referred by Alondra Miller CNP for consultation regarding atrial fibrillation.    Mr. Etienne' atrial fibrillation history is as summarized below:  Symptoms: palpitations, intermittent dyspnea with activity  Symptom onset date:   Diagnosis date: 2024 at pre-operative evaluation  Admissions/ER visits: none  Prior medical therapies: sotalol (2024-present)  Prior DCCVs: 2024 (AF  "recurrence around 8/20/2024)  Prior ablations: none  Percutaneous left atrial appendage occlusion: none    He notes improved symptoms after his DCCV prior to recurrence.  He denies chest pain, syncope.       Physical Examination  Review of Systems   VITALS: /60 (BP Location: Right arm, Patient Position: Sitting, Cuff Size: Adult Regular)   Pulse 61   Resp 16   Ht 1.905 m (6' 3\")   Wt 138.3 kg (305 lb)   BMI 38.12 kg/m    Wt Readings from Last 3 Encounters:   08/28/24 137 kg (302 lb)   08/14/24 139.7 kg (308 lb)   08/08/24 140.6 kg (310 lb)     CONSTITUTIONAL: well nourished, comfortable, no distress  EYES:  Conjunctivae pink, sclerae clear.    E/N/T:  Oral mucosa pink  RESPIRATORY:  Respiratory effort is normal  CARDIOVASCULAR:  irregular, normal S1 and S2  GASTROINTESTINAL:  Abdomen without masses or tenderness  EXTREMITIES:  No clubbing or cyanosis.    MUSCULOSKELETAL:  Overall grossly normal muscle strength  SKIN:  Overall, skin warm and dry, no lesions.  NEURO/PSYCH:  Oriented x 3 with normal affect.   Constitutional:  No weight loss or loss of appetite    Eyes:  No difficulty with vision, no double vision, no dry eyes  ENT:  No sore throat, difficulty swallowing; changes in hearing or tinnitus  Cardiovascular: As detailed above  Respiratory:  No cough  Musculoskeletal  No joint pain, muscle aches  Neurologic:  No syncope, lightheadedness, fainting spells   Hematologic: No easy bruising, excessive bleeding tendency   Gastrointestinal:  No jaundice, abdominal pain or abdominal bloating  Genitourinary: No changes in urinary habits, no trouble urinating    Psychiatric: No anxiety or depression      Medical History  Surgical History   Past Medical History:   Diagnosis Date    Atrial fibrillation (H)     High cholesterol     No past surgical history on file.      Family History Social History   Family History   Problem Relation Age of Onset    Varicose Veins Father         Social History     Tobacco Use    " Smoking status: Every Day     Current packs/day: 1.00     Average packs/day: 1.5 packs/day for 41.9 years (62.6 ttl pk-yrs)     Types: Cigarettes     Start date: 11/1/1982    Smokeless tobacco: Never   Vaping Use    Vaping status: Never Used   Substance Use Topics    Alcohol use: Yes    Drug use: Yes     Types: Marijuana     Comment: 3x times a week- night time for sleep and back pain         Medications  Allergies     Current Outpatient Medications:     apixaban ANTICOAGULANT (ELIQUIS) 5 MG tablet, Take 1 tablet (5 mg) by mouth 2 times daily, Disp: 60 tablet, Rfl: 11    atorvastatin (LIPITOR) 10 MG tablet, Take 1 tablet (10 mg) by mouth daily, Disp: 90 tablet, Rfl: 3    benzonatate (TESSALON) 100 MG capsule, Take 1 capsule (100 mg) by mouth 3 times daily as needed for cough, Disp: 20 capsule, Rfl: 0    dupilumab (DUPIXENT) 300 MG/2ML prefilled syringe, Inject 300 mg Subcutaneous every 14 days, Disp: , Rfl:     empagliflozin (JARDIANCE) 10 MG TABS tablet, Take 1 tablet (10 mg) by mouth daily, Disp: 30 tablet, Rfl: 11    fenofibrate (TRIGLIDE/LOFIBRA) 160 MG tablet, Take 1 tablet (160 mg) by mouth daily, Disp: 90 tablet, Rfl: 3    losartan (COZAAR) 25 MG tablet, TAKE 1 TABLET BY MOUTH AT BEDTIME, Disp: 90 tablet, Rfl: 0    nicotine (NICORETTE) 2 MG gum, Place 2 mg inside cheek every hour as needed for nicotine withdrawal symptoms, Disp: , Rfl:     sotalol (BETAPACE) 80 MG tablet, Take 1 tablet (80 mg) by mouth 2 times daily, Disp: 180 tablet, Rfl: 1    Testosterone 1.62 % GEL, PLACE 1 PUMP (20.25 MG) ONTO THE SKIN DAILY APPLY FROM DISPENSER TO CLEAN, DRY, INTACT SKIN OF THE UPPER ARMS AND SHOULDERS, Disp: 75 g, Rfl: 0    triamcinolone (KENALOG) 0.1 % external cream, , Disp: , Rfl:      Allergies   Allergen Reactions    Clobetasol Other (See Comments)     Allergy testing done - Clobetasol-77-Propionate    Inhaled Anticholinergic Agents Other (See Comments)     Stearyl Alcohol - Allergy testing done    No Clinical  "Screening - See Comments Other (See Comments)     Stearyl Alcohol - Allergy testing done    Octylisothiazolinone; Phenylenediamine - allergy testing done    Shellac Other (See Comments)     Allergy testing done    Trolamine (Triethanolamine) Other (See Comments)     Allergy testing done    Urea Other (See Comments)     Allergy testing done          Lab Results    Chemistry CBC Cardiac Enzymes/BNP/TSH/INR   Recent Labs   Lab Test 07/22/24  1717      POTASSIUM 3.9   CHLORIDE 105   CO2 25   GLC 96   BUN 13.9   CR 0.91   GFRESTIMATED >90   LEESA 9.4     Recent Labs   Lab Test 07/22/24  1717 01/29/24  1555 11/16/23  1137   CR 0.91 0.80 0.77          Recent Labs   Lab Test 07/30/24  1340   WBC 6.9   HGB 16.4   HCT 50.4   MCV 95        Recent Labs   Lab Test 07/30/24  1340 07/22/24  1717 01/29/24  1555   HGB 16.4 16.3 15.5    No results for input(s): \"TROPONINI\" in the last 74423 hours.  No results for input(s): \"BNP\", \"NTBNPI\", \"NTBNP\" in the last 91433 hours.  Recent Labs   Lab Test 07/22/24  1717   TSH 2.28     Recent Labs   Lab Test 01/29/24  1555   INR 1.10         Data Review    ECGs (tracings independently reviewed)  8/28/24 - AF, 54bpm, QT//438 ms  8/14/24 - SR 79bpm, first degree AV block, QT//449 ms  8/8/24 (post DCCV) - SR 75bpm, first degree AV block, QT//446 ms  1/29/24 - AF, 65bpm     Holter Monitor 3/7/24  Holter monitoring (duration 24hrs).  Continuous atrial fibrillation, 48 to 99bpm, average 69bpm.  There were no pauses of greater than 5 seconds. Nocturnal bradycardia was noted.  Rare premature ventricular contractions (<1%).  Symptom triggers - none.    2/5/2024 TTE  The left ventricle is normal in size.  Left ventricular function is normal.The ejection fraction is 60-65%.  There is mild concentric left ventricular hypertrophy.  Normal right ventricle size and systolic function.  The left atrium is severely dilated.  The right atrium is moderately dilated.  There is mild " (1+) mitral regurgitation.       Cc: Alondra Miller CNP, Vocal, MD Paramjit Ray MD  9/24/2024  12:05 PM

## 2024-09-24 NOTE — LETTER
2024    Torsten Thomas MD  [address]  [place] [address]    RE: Angel Etienne       Dear Colleague,     I had the [facility] of seeing Angel Etienne in the [facility].     [facility]  Cardiac Electrophysiology  [address]  [place] [address]   Office: [phone]  Fax: [phone]     Patient: Angel Etienne   : 1965       CHIEF COMPLAINT/REASON FOR VISIT  Persistent atrial fibrillation      Assessment/Recommendations     Stage 3B/Persistent atrial fibrillation - symptomatic with palpitations, intermittent dyspnea with activity  [id number] 1  We reviewed atrial fibrillation physiology, managing stroke risk, cardioversion, antiarrhythmic drug therapy, and catheter ablation.  We discussed atrial fibrillation ablation procedures, anticipated success rates, the potential need for re-do ablation vs addition of anti-arrhythmic drugs, procedural risks (including groin bleeding, vascular injury, tamponade, phrenic or esophageal injury, stroke, pulmonary vein stenosis) and recovery expectations.  He would prefer to proceed with ablation, which is scheduled 2024  - PVI, general anesthesia, continue apixaban, hold sotalol 3 days prior with plan to resume for 6-12 weeks post ablation  - continue sotalol 80mg twice daily  - continue apixaban 5mg twice daily  - we discussed the ongoing importance of lifestyle modification (maintaining a healthy weight, aerobic activity, continued sleep apnea management, alcohol avoidance) as part of a long term strategy for atrial fibrillation management    Follow up: as above           History of Present Illness   Angel Etienne is a 58 year old male with persistent atrial fibrillation, HTN, JIM on CPAP referred by Alondra Miller CNP for consultation regarding atrial fibrillation.    Mr. Etienne' atrial fibrillation history is as summarized below:  Symptoms: palpitations,  "intermittent dyspnea with activity  Symptom onset date: 2022  Diagnosis date: 1/29/2024 at pre-operative evaluation  Admissions/ER visits: none  Prior medical therapies: sotalol (8/2024-present)  Prior DCCVs: 8/8/2024 (AF recurrence around 8/20/2024)  Prior ablations: none  Percutaneous left atrial appendage occlusion: none    He notes improved symptoms after his DCCV prior to recurrence.  He denies chest pain, syncope.       Physical Examination  Review of Systems   VITALS: /60 (BP Location: Right arm, Patient Position: Sitting, Cuff Size: Adult Regular)   Pulse 61   Resp 16   Ht 1.905 m (6' 3\")   Wt 138.3 kg (305 lb)   BMI 38.12 kg/m    Wt Readings from Last 3 Encounters:   08/28/24 137 kg (302 lb)   08/14/24 139.7 kg (308 lb)   08/08/24 140.6 kg (310 lb)     CONSTITUTIONAL: well nourished, comfortable, no distress  EYES:  Conjunctivae pink, sclerae clear.    E/N/T:  Oral mucosa pink  RESPIRATORY:  Respiratory effort is normal  CARDIOVASCULAR:  irregular, normal S1 and S2  GASTROINTESTINAL:  Abdomen without masses or tenderness  EXTREMITIES:  No clubbing or cyanosis.    MUSCULOSKELETAL:  Overall grossly normal muscle strength  SKIN:  Overall, skin warm and dry, no lesions.  NEURO/PSYCH:  Oriented x 3 with normal affect.   Constitutional:  No weight loss or loss of appetite    Eyes:  No difficulty with vision, no double vision, no dry eyes  ENT:  No sore throat, difficulty swallowing; changes in hearing or tinnitus  Cardiovascular: As detailed above  Respiratory:  No cough  Musculoskeletal  No joint pain, muscle aches  Neurologic:  No syncope, lightheadedness, fainting spells   Hematologic: No easy bruising, excessive bleeding tendency   Gastrointestinal:  No jaundice, abdominal pain or abdominal bloating  Genitourinary: No changes in urinary habits, no trouble urinating    Psychiatric: No anxiety or depression      Medical History  Surgical History   Past Medical History:   Diagnosis Date     Atrial " fibrillation (H)      High cholesterol     No past surgical history on file.      Family History Social History   Family History   Problem Relation Age of Onset     Varicose Veins Father         Social History     Tobacco Use     Smoking status: Every Day     Current packs/day: 1.00     Average packs/day: 1.5 packs/day for 41.9 years (62.6 ttl pk-yrs)     Types: Cigarettes     Start date: 11/1/1982     Smokeless tobacco: Never   Vaping Use     Vaping status: Never Used   Substance Use Topics     Alcohol use: Yes     Drug use: Yes     Types: Marijuana     Comment: 3x times a week- night time for sleep and back pain         Medications  Allergies     Current Outpatient Medications:      apixaban ANTICOAGULANT (ELIQUIS) 5 MG tablet, Take 1 tablet (5 mg) by mouth 2 times daily, Disp: 60 tablet, Rfl: 11     atorvastatin (LIPITOR) 10 MG tablet, Take 1 tablet (10 mg) by mouth daily, Disp: 90 tablet, Rfl: 3     benzonatate (TESSALON) 100 MG capsule, Take 1 capsule (100 mg) by mouth 3 times daily as needed for cough, Disp: 20 capsule, Rfl: 0     dupilumab (DUPIXENT) 300 MG/2ML prefilled syringe, Inject 300 mg Subcutaneous every 14 days, Disp: , Rfl:      empagliflozin (JARDIANCE) 10 MG TABS tablet, Take 1 tablet (10 mg) by mouth daily, Disp: 30 tablet, Rfl: 11     fenofibrate (TRIGLIDE/LOFIBRA) 160 MG tablet, Take 1 tablet (160 mg) by mouth daily, Disp: 90 tablet, Rfl: 3     losartan (COZAAR) 25 MG tablet, TAKE 1 TABLET BY MOUTH AT BEDTIME, Disp: 90 tablet, Rfl: 0     nicotine (NICORETTE) 2 MG gum, Place 2 mg inside cheek every hour as needed for nicotine withdrawal symptoms, Disp: , Rfl:      sotalol (BETAPACE) 80 MG tablet, Take 1 tablet (80 mg) by mouth 2 times daily, Disp: 180 tablet, Rfl: 1     Testosterone 1.62 % GEL, PLACE 1 PUMP (20.25 MG) ONTO THE SKIN DAILY APPLY FROM DISPENSER TO CLEAN, DRY, INTACT SKIN OF THE UPPER ARMS AND SHOULDERS, Disp: 75 g, Rfl: 0     triamcinolone (KENALOG) 0.1 % external cream, , Disp: ,  "Rfl:      Allergies   Allergen Reactions     Clobetasol Other (See Comments)     Allergy testing done - Clobetasol-77-Propionate     Inhaled Anticholinergic Agents Other (See Comments)     Stearyl Alcohol - Allergy testing done     No Clinical Screening - See Comments Other (See Comments)     Stearyl Alcohol - Allergy testing done    Octylisothiazolinone; Phenylenediamine - allergy testing done     Shellac Other (See Comments)     Allergy testing done     Trolamine (Triethanolamine) Other (See Comments)     Allergy testing done     Urea Other (See Comments)     Allergy testing done          Lab Results    Chemistry CBC Cardiac Enzymes/BNP/TSH/INR   Recent Labs   Lab Test 07/22/24  1717      POTASSIUM 3.9   CHLORIDE 105   CO2 25   GLC 96   BUN 13.9   CR 0.91   GFRESTIMATED >90   LEESA 9.4     Recent Labs   Lab Test 07/22/24  1717 01/29/24  1555 11/16/23  1137   CR 0.91 0.80 0.77          Recent Labs   Lab Test 07/30/24  1340   WBC 6.9   HGB 16.4   HCT 50.4   MCV 95        Recent Labs   Lab Test 07/30/24  1340 07/22/24  1717 01/29/24  1555   HGB 16.4 16.3 15.5    No results for input(s): \"TROPONINI\" in the last 35904 hours.  No results for input(s): \"BNP\", \"NTBNPI\", \"NTBNP\" in the last 28099 hours.  Recent Labs   Lab Test 07/22/24  1717   TSH 2.28     Recent Labs   Lab Test 01/29/24  1555   INR 1.10         Data Review    ECGs (tracings independently reviewed)  8/28/24 - AF, 54bpm, QT//438 ms  8/14/24 - SR 79bpm, first degree AV block, QT//449 ms  8/8/24 (post DCCV) - SR 75bpm, first degree AV block, QT//446 ms  1/29/24 - AF, 65bpm     Holter Monitor 3/7/24  Holter monitoring (duration 24hrs).  Continuous atrial fibrillation, 48 to 99bpm, average 69bpm.  There were no pauses of greater than 5 seconds. Nocturnal bradycardia was noted.  Rare premature ventricular contractions (<1%).  Symptom triggers - none.    2/5/2024 TTE  The left ventricle is normal in size.  Left ventricular function " is normal.The ejection fraction is 60-65%.  There is mild concentric left ventricular hypertrophy.  Normal right ventricle size and systolic function.  The left atrium is severely dilated.  The right atrium is moderately dilated.  There is mild (1+) mitral regurgitation.       Cc: Alondra Miller CNP, Vocal, MD Paramjit Ray MD  9/24/2024  12:05 PM        Thank you for allowing me to participate in the care of your patient.      Sincerely,     Paramjit Saldaña MD     [facility]  cc:   Alondra Miller, NP  [address] [place] [address]

## 2024-09-27 ENCOUNTER — ANESTHESIA EVENT (OUTPATIENT)
Dept: CARDIOLOGY | Facility: HOSPITAL | Age: 59
End: 2024-09-27
Payer: COMMERCIAL

## 2024-09-27 ENCOUNTER — PREP FOR PROCEDURE (OUTPATIENT)
Dept: CARDIOLOGY | Facility: CLINIC | Age: 59
End: 2024-09-27
Payer: COMMERCIAL

## 2024-09-27 DIAGNOSIS — I48.19 PERSISTENT ATRIAL FIBRILLATION (H): Primary | ICD-10-CM

## 2024-09-27 RX ORDER — DEXMEDETOMIDINE HYDROCHLORIDE 4 UG/ML
.1-1.5 INJECTION, SOLUTION INTRAVENOUS CONTINUOUS
Status: CANCELLED | OUTPATIENT
Start: 2024-09-27

## 2024-09-27 RX ORDER — SODIUM CHLORIDE 9 MG/ML
100 INJECTION, SOLUTION INTRAVENOUS CONTINUOUS
Status: CANCELLED | OUTPATIENT
Start: 2024-09-27

## 2024-09-27 RX ORDER — FENTANYL CITRATE 50 UG/ML
25 INJECTION, SOLUTION INTRAMUSCULAR; INTRAVENOUS
Status: CANCELLED | OUTPATIENT
Start: 2024-09-27

## 2024-09-27 RX ORDER — NICOTINE POLACRILEX 4 MG
15-30 LOZENGE BUCCAL
Status: CANCELLED | OUTPATIENT
Start: 2024-09-27

## 2024-09-27 RX ORDER — DEXTROSE MONOHYDRATE 25 G/50ML
25-50 INJECTION, SOLUTION INTRAVENOUS
Status: CANCELLED | OUTPATIENT
Start: 2024-09-27

## 2024-09-30 ENCOUNTER — ANESTHESIA (OUTPATIENT)
Dept: CARDIOLOGY | Facility: HOSPITAL | Age: 59
End: 2024-09-30
Payer: COMMERCIAL

## 2024-09-30 ENCOUNTER — HOSPITAL ENCOUNTER (OUTPATIENT)
Facility: HOSPITAL | Age: 59
Discharge: HOME | End: 2024-09-30
Attending: INTERNAL MEDICINE | Admitting: INTERNAL MEDICINE
Payer: COMMERCIAL

## 2024-09-30 VITALS
WEIGHT: 305 LBS | OXYGEN SATURATION: 93 % | RESPIRATION RATE: 20 BRPM | HEART RATE: 84 BPM | DIASTOLIC BLOOD PRESSURE: 85 MMHG | HEIGHT: 75 IN | TEMPERATURE: 98.4 F | BODY MASS INDEX: 37.92 KG/M2 | SYSTOLIC BLOOD PRESSURE: 169 MMHG

## 2024-09-30 DIAGNOSIS — I48.19 PERSISTENT ATRIAL FIBRILLATION (H): ICD-10-CM

## 2024-09-30 LAB
ACT BLD: 341 SECONDS (ref 74–150)
ATRIAL RATE - MUSE: 83 BPM
DIASTOLIC BLOOD PRESSURE - MUSE: NORMAL MMHG
INTERPRETATION ECG - MUSE: NORMAL
P AXIS - MUSE: 65 DEGREES
PR INTERVAL - MUSE: 286 MS
QRS DURATION - MUSE: 102 MS
QT - MUSE: 420 MS
QTC - MUSE: 493 MS
R AXIS - MUSE: -17 DEGREES
SYSTOLIC BLOOD PRESSURE - MUSE: NORMAL MMHG
T AXIS - MUSE: 45 DEGREES
VENTRICULAR RATE- MUSE: 83 BPM

## 2024-09-30 PROCEDURE — 250N000011 HC RX IP 250 OP 636: Performed by: INTERNAL MEDICINE

## 2024-09-30 PROCEDURE — 93656 COMPRE EP EVAL ABLTJ ATR FIB: CPT | Performed by: INTERNAL MEDICINE

## 2024-09-30 PROCEDURE — C1730 CATH, EP, 19 OR FEW ELECT: HCPCS | Performed by: INTERNAL MEDICINE

## 2024-09-30 PROCEDURE — 272N000001 HC OR GENERAL SUPPLY STERILE: Performed by: INTERNAL MEDICINE

## 2024-09-30 PROCEDURE — 710N000010 HC RECOVERY PHASE 1, LEVEL 2, PER MIN

## 2024-09-30 PROCEDURE — 93623 PRGRMD STIMJ&PACG IV RX NFS: CPT | Mod: 26 | Performed by: INTERNAL MEDICINE

## 2024-09-30 PROCEDURE — 93010 ELECTROCARDIOGRAM REPORT: CPT | Performed by: INTERNAL MEDICINE

## 2024-09-30 PROCEDURE — C1732 CATH, EP, DIAG/ABL, 3D/VECT: HCPCS | Performed by: INTERNAL MEDICINE

## 2024-09-30 PROCEDURE — 85347 COAGULATION TIME ACTIVATED: CPT

## 2024-09-30 PROCEDURE — 93623 PRGRMD STIMJ&PACG IV RX NFS: CPT | Performed by: INTERNAL MEDICINE

## 2024-09-30 PROCEDURE — C1769 GUIDE WIRE: HCPCS | Performed by: INTERNAL MEDICINE

## 2024-09-30 PROCEDURE — C1733 CATH, EP, OTHR THAN COOL-TIP: HCPCS | Performed by: INTERNAL MEDICINE

## 2024-09-30 PROCEDURE — C1766 INTRO/SHEATH,STRBLE,NON-PEEL: HCPCS | Performed by: INTERNAL MEDICINE

## 2024-09-30 PROCEDURE — 93005 ELECTROCARDIOGRAM TRACING: CPT

## 2024-09-30 PROCEDURE — 999N000054 HC STATISTIC EKG NON-CHARGEABLE

## 2024-09-30 PROCEDURE — 250N000009 HC RX 250: Performed by: INTERNAL MEDICINE

## 2024-09-30 PROCEDURE — 250N000011 HC RX IP 250 OP 636: Performed by: NURSE ANESTHETIST, CERTIFIED REGISTERED

## 2024-09-30 PROCEDURE — 258N000003 HC RX IP 258 OP 636: Performed by: NURSE ANESTHETIST, CERTIFIED REGISTERED

## 2024-09-30 PROCEDURE — 93615 ESOPHAGEAL RECORDING: CPT | Performed by: INTERNAL MEDICINE

## 2024-09-30 PROCEDURE — C1759 CATH, INTRA ECHOCARDIOGRAPHY: HCPCS | Performed by: INTERNAL MEDICINE

## 2024-09-30 PROCEDURE — 370N000017 HC ANESTHESIA TECHNICAL FEE, PER MIN: Performed by: INTERNAL MEDICINE

## 2024-09-30 PROCEDURE — 258N000003 HC RX IP 258 OP 636: Performed by: INTERNAL MEDICINE

## 2024-09-30 PROCEDURE — 250N000009 HC RX 250: Performed by: NURSE ANESTHETIST, CERTIFIED REGISTERED

## 2024-09-30 PROCEDURE — 93615 ESOPHAGEAL RECORDING: CPT | Mod: 26 | Performed by: INTERNAL MEDICINE

## 2024-09-30 PROCEDURE — C1887 CATHETER, GUIDING: HCPCS | Performed by: INTERNAL MEDICINE

## 2024-09-30 RX ORDER — ONDANSETRON 4 MG/1
4 TABLET, ORALLY DISINTEGRATING ORAL EVERY 6 HOURS PRN
Status: DISCONTINUED | OUTPATIENT
Start: 2024-09-30 | End: 2024-09-30 | Stop reason: HOSPADM

## 2024-09-30 RX ORDER — SODIUM CHLORIDE 9 MG/ML
100 INJECTION, SOLUTION INTRAVENOUS CONTINUOUS
Status: DISCONTINUED | OUTPATIENT
Start: 2024-09-30 | End: 2024-09-30 | Stop reason: HOSPADM

## 2024-09-30 RX ORDER — ACETAMINOPHEN 325 MG/1
650 TABLET ORAL EVERY 4 HOURS PRN
Status: DISCONTINUED | OUTPATIENT
Start: 2024-09-30 | End: 2024-09-30 | Stop reason: HOSPADM

## 2024-09-30 RX ORDER — ADENOSINE 3 MG/ML
INJECTION, SOLUTION INTRAVENOUS
Status: DISCONTINUED | OUTPATIENT
Start: 2024-09-30 | End: 2024-09-30 | Stop reason: HOSPADM

## 2024-09-30 RX ORDER — SODIUM CHLORIDE 9 MG/ML
INJECTION, SOLUTION INTRAVENOUS CONTINUOUS PRN
Status: DISCONTINUED | OUTPATIENT
Start: 2024-09-30 | End: 2024-09-30

## 2024-09-30 RX ORDER — ONDANSETRON 2 MG/ML
INJECTION INTRAMUSCULAR; INTRAVENOUS PRN
Status: DISCONTINUED | OUTPATIENT
Start: 2024-09-30 | End: 2024-09-30

## 2024-09-30 RX ORDER — FENTANYL CITRATE 50 UG/ML
25 INJECTION, SOLUTION INTRAMUSCULAR; INTRAVENOUS
Status: DISCONTINUED | OUTPATIENT
Start: 2024-09-30 | End: 2024-09-30 | Stop reason: HOSPADM

## 2024-09-30 RX ORDER — DEXTROSE MONOHYDRATE 25 G/50ML
25-50 INJECTION, SOLUTION INTRAVENOUS
Status: DISCONTINUED | OUTPATIENT
Start: 2024-09-30 | End: 2024-09-30 | Stop reason: HOSPADM

## 2024-09-30 RX ORDER — NICOTINE POLACRILEX 4 MG
15-30 LOZENGE BUCCAL
Status: DISCONTINUED | OUTPATIENT
Start: 2024-09-30 | End: 2024-09-30 | Stop reason: HOSPADM

## 2024-09-30 RX ORDER — LIDOCAINE HYDROCHLORIDE AND EPINEPHRINE 10; 10 MG/ML; UG/ML
INJECTION, SOLUTION INFILTRATION; PERINEURAL
Status: DISCONTINUED | OUTPATIENT
Start: 2024-09-30 | End: 2024-09-30 | Stop reason: HOSPADM

## 2024-09-30 RX ORDER — PROPOFOL 10 MG/ML
INJECTION, EMULSION INTRAVENOUS PRN
Status: DISCONTINUED | OUTPATIENT
Start: 2024-09-30 | End: 2024-09-30

## 2024-09-30 RX ORDER — DEXMEDETOMIDINE HYDROCHLORIDE 4 UG/ML
.1-1.5 INJECTION, SOLUTION INTRAVENOUS CONTINUOUS
Status: DISCONTINUED | OUTPATIENT
Start: 2024-09-30 | End: 2024-09-30 | Stop reason: HOSPADM

## 2024-09-30 RX ORDER — IBUPROFEN 600 MG/1
600 TABLET, FILM COATED ORAL EVERY 6 HOURS PRN
Status: DISCONTINUED | OUTPATIENT
Start: 2024-09-30 | End: 2024-09-30 | Stop reason: HOSPADM

## 2024-09-30 RX ORDER — FENTANYL CITRATE 50 UG/ML
INJECTION, SOLUTION INTRAMUSCULAR; INTRAVENOUS PRN
Status: DISCONTINUED | OUTPATIENT
Start: 2024-09-30 | End: 2024-09-30

## 2024-09-30 RX ORDER — HEPARIN SODIUM 10000 [USP'U]/100ML
INJECTION, SOLUTION INTRAVENOUS CONTINUOUS PRN
Status: COMPLETED | OUTPATIENT
Start: 2024-09-30 | End: 2024-09-30

## 2024-09-30 RX ORDER — PROTAMINE SULFATE 10 MG/ML
INJECTION, SOLUTION INTRAVENOUS PRN
Status: DISCONTINUED | OUTPATIENT
Start: 2024-09-30 | End: 2024-09-30

## 2024-09-30 RX ORDER — LABETALOL HYDROCHLORIDE 5 MG/ML
INJECTION, SOLUTION INTRAVENOUS PRN
Status: DISCONTINUED | OUTPATIENT
Start: 2024-09-30 | End: 2024-09-30

## 2024-09-30 RX ORDER — KETAMINE HYDROCHLORIDE 10 MG/ML
INJECTION INTRAMUSCULAR; INTRAVENOUS PRN
Status: DISCONTINUED | OUTPATIENT
Start: 2024-09-30 | End: 2024-09-30

## 2024-09-30 RX ORDER — ONDANSETRON 2 MG/ML
4 INJECTION INTRAMUSCULAR; INTRAVENOUS EVERY 6 HOURS PRN
Status: DISCONTINUED | OUTPATIENT
Start: 2024-09-30 | End: 2024-09-30 | Stop reason: HOSPADM

## 2024-09-30 RX ORDER — DEXAMETHASONE SODIUM PHOSPHATE 10 MG/ML
INJECTION, SOLUTION INTRAMUSCULAR; INTRAVENOUS PRN
Status: DISCONTINUED | OUTPATIENT
Start: 2024-09-30 | End: 2024-09-30

## 2024-09-30 RX ORDER — HEPARIN SODIUM 1000 [USP'U]/ML
INJECTION, SOLUTION INTRAVENOUS; SUBCUTANEOUS
Status: DISCONTINUED | OUTPATIENT
Start: 2024-09-30 | End: 2024-09-30 | Stop reason: HOSPADM

## 2024-09-30 RX ADMIN — SODIUM CHLORIDE: 0.9 INJECTION, SOLUTION INTRAVENOUS at 08:45

## 2024-09-30 RX ADMIN — SODIUM CHLORIDE: 9 INJECTION, SOLUTION INTRAVENOUS at 08:54

## 2024-09-30 RX ADMIN — DEXAMETHASONE SODIUM PHOSPHATE 10 MG: 10 INJECTION, SOLUTION INTRAMUSCULAR; INTRAVENOUS at 08:56

## 2024-09-30 RX ADMIN — KETAMINE HYDROCHLORIDE 50 MG: 10 INJECTION INTRAMUSCULAR; INTRAVENOUS at 08:47

## 2024-09-30 RX ADMIN — LABETALOL HYDROCHLORIDE 10 MG: 5 INJECTION, SOLUTION INTRAVENOUS at 09:45

## 2024-09-30 RX ADMIN — FENTANYL CITRATE 50 MCG: 50 INJECTION INTRAMUSCULAR; INTRAVENOUS at 08:41

## 2024-09-30 RX ADMIN — DEXMEDETOMIDINE HYDROCHLORIDE 8 MCG: 100 INJECTION, SOLUTION INTRAVENOUS at 09:18

## 2024-09-30 RX ADMIN — Medication 100 MG: at 08:47

## 2024-09-30 RX ADMIN — SODIUM CHLORIDE: 9 INJECTION, SOLUTION INTRAVENOUS at 09:49

## 2024-09-30 RX ADMIN — LABETALOL HYDROCHLORIDE 10 MG: 5 INJECTION, SOLUTION INTRAVENOUS at 09:35

## 2024-09-30 RX ADMIN — PROTAMINE SULFATE 50 MG: 10 INJECTION, SOLUTION INTRAVENOUS at 09:35

## 2024-09-30 RX ADMIN — FENTANYL CITRATE 50 MCG: 50 INJECTION INTRAMUSCULAR; INTRAVENOUS at 08:58

## 2024-09-30 RX ADMIN — ONDANSETRON 4 MG: 2 INJECTION INTRAMUSCULAR; INTRAVENOUS at 09:33

## 2024-09-30 RX ADMIN — PROPOFOL 200 MG: 10 INJECTION, EMULSION INTRAVENOUS at 08:47

## 2024-09-30 ASSESSMENT — ACTIVITIES OF DAILY LIVING (ADL)
ADLS_ACUITY_SCORE: 35

## 2024-09-30 ASSESSMENT — ENCOUNTER SYMPTOMS: DYSRHYTHMIAS: 1

## 2024-09-30 NOTE — Clinical Note
[facility] Med system 12 lead EKG, hemodynamics 5 lead, pulse oximetery, NIBP, [employer] hands off defibrillator/external pacer, with 3 monitoring leads to patient. Baseline assessment done.

## 2024-09-30 NOTE — Clinical Note
Arrhythmia Type: atrial fibrillation.   Method of Cardioversion: synchronous.   Energy shock delivered: 300 joules.   Time shock delivered: 09:05 CDT.   Post cardioversion rhythm: atrial fibrillation.   Early return to atrial fibrillation (ERAF).

## 2024-09-30 NOTE — ANESTHESIA CARE TRANSFER NOTE
Patient: Angel Etienne    Procedure: Procedure(s):  Ablation Atrial Fibrillation       Diagnosis: Persistent atrial fibrillation  Diagnosis Additional Information: No value filed.    Anesthesia Type:   No value filed.     Note:    Oropharynx: oropharynx clear of all foreign objects  Level of Consciousness: awake  Oxygen Supplementation: face mask  Level of Supplemental Oxygen (L/min / FiO2): 8  Independent Airway: airway patency satisfactory and stable  Dentition: dentition unchanged  Vital Signs Stable: post-procedure vital signs reviewed and stable  Report to RN Given: handoff report given  Patient transferred to: Cardiac Special Care          Vitals:  Vitals Value Taken Time   /73 09/30/24 0952   Temp     Pulse 80 09/30/24 0957   Resp 22 09/30/24 0957   SpO2 90 % 09/30/24 0957   Vitals shown include unfiled device data.    Electronically Signed By: LUCY Fitch CRNA  September 30, 2024  9:58 AM

## 2024-09-30 NOTE — Clinical Note
Arrhythmia Type: atrial fibrillation.   Method of Cardioversion: synchronous.   Energy shock delivered: 360 joules.   Time shock delivered: 09:06 CDT.   Early return to atrial fibrillation (ERAF).

## 2024-09-30 NOTE — ANESTHESIA POSTPROCEDURE EVALUATION
Patient: Angel Etienne    Procedure: Procedure(s):  Ablation Atrial Fibrillation       Anesthesia Type:  General    Note:  Disposition: Outpatient   Postop Pain Control: Uneventful            Sign Out: Well controlled pain   PONV: No   Neuro/Psych: Uneventful            Sign Out: Acceptable/Baseline neuro status   Airway/Respiratory: Uneventful            Sign Out: Acceptable/Baseline resp. status   CV/Hemodynamics: Uneventful            Sign Out: Acceptable CV status; No obvious hypovolemia; No obvious fluid overload   Other NRE: NONE   DID A NON-ROUTINE EVENT OCCUR? No           Last vitals:  Vitals Value Taken Time   /85 09/30/24 1200   Temp     Pulse 87 09/30/24 1221   Resp 19 09/30/24 1206   SpO2 95 % 09/30/24 1221   Vitals shown include unfiled device data.    Electronically Signed By: Lisa Dowell MD  September 30, 2024  4:28 PM

## 2024-09-30 NOTE — ANESTHESIA PROCEDURE NOTES
Airway         Procedure Start/Stop Times: 9/30/2024 8:52 AM  Staff -        Anesthesiologist:  Lisa Dowell MD       CRNA: Gaye Burks APRN CRNA       Performed By: CRNAIndications and Patient Condition       Indications for airway management: doug-procedural         Mask difficulty assessment: 2 - vent by mask + OA or adjuvant +/- NMBA    Final Airway Details       Final airway type: endotracheal airway       Successful airway: ETT - single  Endotracheal Airway Details        ETT size (mm): 8.5       Cuffed: yes       Successful intubation technique: video laryngoscopy       VL Blade Size: Glidescope 4       Grade View of Cords: 1       Adjucts: stylet       Position: Right       Measured from: lips       Secured at (cm): 23       Bite block used: Soft    Post intubation assessment        Placement verified by: capnometry, equal breath sounds and chest rise        Number of attempts at approach: 1       Secured with: tape       Ease of procedure: easy       Dentition: Intact and Unchanged    Medication(s) Administered   Medication Administration Time: 9/30/2024 8:52 AM

## 2024-09-30 NOTE — ANESTHESIA PREPROCEDURE EVALUATION
Anesthesia Pre-Procedure Evaluation    Patient: Angel Etienne   MRN: [id number] : 1965        Procedure : Procedure(s):  Ablation Atrial Fibrillation          Past Medical History:   Diagnosis Date    Atrial fibrillation (H)     High cholesterol       History reviewed. No pertinent surgical history.   Allergies   Allergen Reactions    Clobetasol Other (See Comments)     Allergy testing done - Clobetasol-77-Propionate    Inhaled Anticholinergic Agents Other (See Comments)     Stearyl Alcohol - Allergy testing done    No Clinical Screening - See Comments Other (See Comments)     Stearyl Alcohol - Allergy testing done    Octylisothiazolinone; Phenylenediamine - allergy testing done    Shellac Other (See Comments)     Allergy testing done    Trolamine (Triethanolamine) Other (See Comments)     Allergy testing done    Urea Other (See Comments)     Allergy testing done      Social History     Tobacco Use    Smoking status: Every Day     Current packs/day: 1.00     Average packs/day: 1.5 packs/day for 41.9 years (62.6 ttl pk-yrs)     Types: Cigarettes     Start date: 1982    Smokeless tobacco: Never   Substance Use Topics    Alcohol use: Yes      Wt Readings from Last 1 Encounters:   24 138.3 kg (305 lb)        Anesthesia Evaluation   Pt has had prior anesthetic.     No history of anesthetic complications       ROS/MED HX  ENT/Pulmonary:     (+) sleep apnea,                                       Neurologic:       Cardiovascular:     (+)  hypertension- -   -  - -                        dysrhythmias,              METS/Exercise Tolerance:     Hematologic:       Musculoskeletal:       GI/Hepatic:       Renal/Genitourinary:       Endo:     (+)               Obesity,       Psychiatric/Substance Use:       Infectious Disease:       Malignancy:       Other:            Physical Exam    Airway        Mallampati: II    Neck ROM: full     Respiratory Devices and Support         Dental       (+) Minor  "Abnormalities - some fillings, tiny chips      Cardiovascular          Rhythm and rate: irregular     Pulmonary   pulmonary exam normal                OUTSIDE LABS:  CBC:   Lab Results   Component Value Date    WBC 5.9 09/24/2024    WBC 6.9 07/30/2024    HGB 16.7 09/24/2024    HGB 16.4 07/30/2024    HCT 50.6 09/24/2024    HCT 50.4 07/30/2024     09/24/2024     07/30/2024     BMP:   Lab Results   Component Value Date     09/24/2024     07/22/2024    POTASSIUM 5.1 09/24/2024    POTASSIUM 3.9 07/22/2024    CHLORIDE 105 09/24/2024    CHLORIDE 105 07/22/2024    CO2 26 09/24/2024    CO2 25 07/22/2024    BUN 17.8 09/24/2024    BUN 13.9 07/22/2024    CR 0.86 09/24/2024    CR 0.91 07/22/2024     (H) 09/24/2024    GLC 96 07/22/2024     COAGS:   Lab Results   Component Value Date    INR 1.10 01/29/2024     POC: No results found for: \"BGM\", \"HCG\", \"HCGS\"  HEPATIC:   Lab Results   Component Value Date    ALBUMIN 4.3 07/22/2024    PROTTOTAL 7.3 07/22/2024    ALT 33 07/22/2024    AST 30 07/22/2024    ALKPHOS 71 07/22/2024    BILITOTAL 0.5 07/22/2024     OTHER:   Lab Results   Component Value Date    A1C 5.8 (H) 11/16/2023    LEESA 9.4 09/24/2024    TSH 2.28 07/22/2024       Anesthesia Plan    ASA Status:  3       Anesthesia Type: General.     - Airway: ETT              Consents    Anesthesia Plan(s) and associated risks, benefits, and realistic alternatives discussed. Questions answered and patient/representative(s) expressed understanding.     - Discussed: Risks, Benefits and Alternatives for the PROCEDURE were discussed     - Discussed with:  Patient      - Extended Intubation/Ventilatory Support Discussed: No.      - Patient is DNR/DNI Status: No     Use of blood products discussed: No .     Postoperative Care    Pain management: Multi-modal analgesia.   PONV prophylaxis: Ondansetron (or other 5HT-3), Dexamethasone or Solumedrol     Comments:               Lisa Dowell MD    I have reviewed the " "pertinent notes and labs in the chart from the past 30 days and (re)examined the patient.  Any updates or changes from those notes are reflected in this note.            # Drug Induced Coagulation Defect: home medication list includes an anticoagulant medication   # Obesity: Estimated body mass index is 38.12 kg/m  as calculated from the following:    Height as of this encounter: 1.905 m (6' 3\").    Weight as of this encounter: 138.3 kg (305 lb).      "

## 2024-09-30 NOTE — DISCHARGE INSTRUCTIONS
[facility]  Cardiac Electrophysiology  [address]  [place] [address]   Office: [phone]  Fax: [phone]       Cardiac Electrophysiology - Post Ablation Discharge Instructions      PROCEDURE   Atrial fibrillation ablation         MEDICATION INSTRUCTIONS   Continue taking your prior to procedure medications, including sotalol for now - we will assess for stopping sotalol at your follow-up visits.  Continue taking your blood thinner apixaban (Eliquis).          DISCHARGE INSTRUCTIONS   Medications   Take your medications as prescribed.   It is important for you to continue your blood thinner without interruption, unless your electrophysiologist instructs you otherwise.     General instructions   Have an adult stay with you until tomorrow.   You may resume your normal diet.     You may shower tomorrow.  Do not take a bath, or use a hot tub or pool for at least 1 week.    Activity recommendations   Do not drive for 3 days.   Avoid stooping or squatting more than 90 degrees at the hips for 7 days.   Avoid repetitive motions such as loading , vacuuming, raking or shoveling for 7 days.   Avoid heavy lifting (greater than 25lbs) for 7 days.       Groin care instructions   For the first 24 hours after your ablation, check the groin access sites every 1-2 hours while awake.   You may keep a bandaid over the puncture sites for 1 or 2 days post-procedure and thereafter may keep these sites uncovered.  Change the bandaid daily.  If there is minor oozing, apply another bandaid and remove it after 12 hours.   For 2 days, when you cough, sneeze, laugh or move your bowels, hold your hand over the puncture sites and press firmly.   Do not scrub the groin access sites.   Do not use lotion or powder near the groin access sites.       Arrhythmias following ablation  Recurrent atrial fibrillation and palpitations are common within the first 3 months post ablation while your  heart recovers from the procedure.  These are usually more frequent in the first few weeks following ablation and should occur less frequently over time.  Please contact us if you are having frequent or long lasting atrial fibrillation episodes following ablation.    Common findings after ablation  Bruising and a dime or pea size lump at the access sites is common.  If you notice increased swelling, external bleeding, or have other concerns regarding your groin access sites please call your electrophysiology team's office, and if after hours consider emergency department evaluation.   Soreness and mild pain at your groin sites is normal, to help relieve this pain you can apply ice/cold packs to the sites for 20min 3-4 times per day.   Pleuritic chest discomfort (chest pain worse with taking deep breaths, worse with laying flat on your back) can occur after ablation, usually coming about within the first 24-72hrs post ablation.  If this occurs and is severe enough to be troublesome to you, please call us and consider starting a course of ibuprofen 400mg three times daily for 5 to 7 days.     Things to watch for   As with any type of procedure, please be more attentive to unusual symptoms post ablation (eg. fever, neurologic changes, pain with swallowing, loss of consciousness, etc) - we recommend ER evaluation for any such symptoms in the first few weeks post procedure.       Contact the EP Nurse line with any of the following.  Contact the cardiology on-call number after business hours.    Consider ER evaluation for severe symptoms.   Groin pain, swelling, or growing hard lump around the puncture sites.   Groin redness, tenderness, swelling, or drainage (blood or pus).   Neurological changes (for example: leg, arm or face weakness or numbness, difficulties with speech or word finding, problems walking or with your balance, vision changes).   Any numbness, coolness or changes in color in your extremities.  Sudden  onset severe abdominal or back pain.  Moderate or severe chest pain not relieved by Tylenol or Ibuprofen, particularly after the first 48 hours.   Shortness of breath.   Chills or fever greater than 100 F.   Difficulty swallowing food or liquids.  Coughing up blood.   Blood in your stool.  Nausea and vomiting.  Difficulty urinating.  Recurrent atrial fibrillation associated with prolonged rapid heart rates (for example, heart rates over 140bpm for greater than 4 hours) or associated with additional concerning symptoms (for example, chest pain, lightheadedness, loss of consciousness, sweating).     If you are being evaluated at an emergency department, please tell your ER doctor that you have recently underwent an atrial fibrillation ablation and ask the ER to contact our office.  Many special considerations apply following ablation - these may be overlooked during an ER evaluation.      Our office will have a follow-up visit scheduled for you in approximately 6 weeks.  Please do not hesitate to call us before that time should issues arise.         [facility]      To reach the  nurses working with Dr. Saldaña:   [phone]        To reach the general [facility] line or after hours service:   [phone]   If you are calling after hours, please listen to the entire voicemail,    a live  will answer at the end of the message.

## 2024-09-30 NOTE — PLAN OF CARE
Goal Outcome Evaluation:       Pt admitted for an ablation due to his atrial fibrillation. Pt prepped and ready for procedure. Pt's significant other Kathie is here for support.    Sierra Garnett RN

## 2024-09-30 NOTE — INTERVAL H&P NOTE
"I have reviewed the surgical (or preoperative) H&P that is linked to this encounter, and examined the patient. There are no significant changes    Clinical Conditions Present on Arrival:  Clinically Significant Risk Factors Present on Admission                # Drug Induced Coagulation Defect: home medication list includes an anticoagulant medication       # Obesity: Estimated body mass index is 38.12 kg/m  as calculated from the following:    Height as of this encounter: 1.905 m (6' 3\").    Weight as of this encounter: 138.3 kg (305 lb).       "

## 2024-10-01 ENCOUNTER — ALLIED HEALTH/NURSE VISIT (OUTPATIENT)
Dept: CARDIOLOGY | Facility: CLINIC | Age: 59
End: 2024-10-01
Payer: COMMERCIAL

## 2024-10-01 VITALS
BODY MASS INDEX: 37.82 KG/M2 | HEIGHT: 75 IN | DIASTOLIC BLOOD PRESSURE: 80 MMHG | WEIGHT: 304.2 LBS | SYSTOLIC BLOOD PRESSURE: 126 MMHG | HEART RATE: 70 BPM | OXYGEN SATURATION: 16 %

## 2024-10-01 DIAGNOSIS — I48.19 PERSISTENT ATRIAL FIBRILLATION (H): Primary | ICD-10-CM

## 2024-10-01 DIAGNOSIS — Z98.890 STATUS POST ABLATION OF ATRIAL FIBRILLATION: ICD-10-CM

## 2024-10-01 DIAGNOSIS — Z86.79 STATUS POST ABLATION OF ATRIAL FIBRILLATION: ICD-10-CM

## 2024-10-01 PROCEDURE — 99207 PR NO CHARGE NURSE ONLY: CPT

## 2024-10-01 NOTE — PROGRESS NOTES
Pt comes to the clinic for groin site evaluation and suture removal post PVI with Dr. Saldaña on 9-30-24.  Pt had some oozing post procedure and suture was placed with expected removal today, POD #!.    Pt has a dressing in place, dressing removed. Pt right groin has 1 puncture sites, is C/D/I, small amount of bloody  drainage noted on dressing, slight bruising noted around puncture site, additionally some very dark raised bruising noted around the edges of the dressing, 1 suture intact, groin is soft, and pt denies pain at the site.    Suture removed fromthe right groin site without complication and sites were left open to air.    Discussed site care as well as reviewing post procedure instructions for no lifting more than 25 pounds, to sit at a recline and to avoid bending at the waist.    Pt states he is feeling well, better than he expected.  Will follow up with post procedure phone call on Thursday 10-3-24.  Reviewed contact for any additional questions or concerns.

## 2024-10-03 ENCOUNTER — VIRTUAL VISIT (OUTPATIENT)
Dept: CARDIOLOGY | Facility: CLINIC | Age: 59
End: 2024-10-03
Payer: COMMERCIAL

## 2024-10-03 DIAGNOSIS — Z86.79 STATUS POST ABLATION OF ATRIAL FIBRILLATION: Primary | ICD-10-CM

## 2024-10-03 DIAGNOSIS — Z98.890 STATUS POST ABLATION OF ATRIAL FIBRILLATION: Primary | ICD-10-CM

## 2024-10-03 PROCEDURE — 99207 PR NO CHARGE NURSE ONLY: CPT | Mod: 93

## 2024-10-03 NOTE — PROGRESS NOTES
Post PVI Procedural Follow Up Call    Pt is s/p PVI from 9/30 with Dr Saldaña  PC was placed to pt, spoke to pt    General Assessment:     Weight: Pt reports pt is unable to report weight, but denies s/s of fluid retention    Pain: Pt denies generalized or localized pain abnormal to healing s/p     /GI: Pt denies difficulty swallowing, denies constipation, denies urinary retention/difficulty, reports no s/s of infection, report normal appetite, and reports staying hydrated.    Neurological: Pt Denies any neurological changes, or s/s of CVA    Respiratory: Pt denies SOB, denies difficulty breathing, denies throat pain, denies changes/abnormal sputum, and denies any further symptoms abnormal to normal healing process s/p PVI.    Activity: Pt is tolerating advancement in activity while following physical restrictions, staying well hydrated, and gradually working into baseline activity.     Rhythm Assessment:   Pt  notes that he thinks that he is going and out of AF. He is not able to tell just on symptoms, he says when he palpated his pulse every now and then can feel that its is irregular. He also noted fatigue, but feels this may be due to recovery from the procedure. He is still taking Sotalol as prescribed. Pt is in the process of ordering a CBA [employer] Mobile, and will be getting this. Discussed with pt normal AF breakthrough durign recovery vs not. Advised pt that he should call if he notices an increase in frequency, duration, or sustained episode. He will do this, as well as monitor with CBA [employer] addy when he gets this. .    Procedure Site Assessment:   Pts no visible/physical changes in groin sites, some bruising around sites without significant change from hospital discharge and normal to PVI recovery, and small pea/dime size hardening under suture sites normal to PVI recovery    Anticoagulation/Medication:  Pt remain on Eliquis without interruption  Per guidelines by Dr Saldaña no ASA needed upon  discharge    Education completed with pt at this visit:  Reviewed normal post-op PVI healing process, when to contact EP-RN/EP-MD, contact information was given to the pt for further concerns or questions, and pt verbalized understanding    Follow up  Pts AVS was printed and mailed to pt by scheduling team and pt will be seen by EP NP at 6 wks, monitor will be ordered at this follow-up if indicated as well as 3mo follow-up with either EP HARLEY or LAURA EVANGELISTA    10/3/2024 2:02 PM  Ivette Diamond RN

## 2024-10-03 NOTE — PATIENT INSTRUCTIONS
Instructions Following your Ablation Procedure    Your anticoagulation medication Eliquis:  It is important to remain on your anticoagulation medication uninterrupted after your ablation to reduce your risk of a stroke or heart attack, do not stop this medication  Please contact me if you have any questions regarding your anticoagulation medication    Groin care instructions  Keep the site clean and dry, do not place a bandage over the site. If there is minor oozing, apply another bandaid and remove it after 12 hours.  Mild bruising at the access sites is normal. If you notice increased swelling, external bleeding, or have other concerns regarding your access sites please consider emergency department evaluation for significant changes and call your electrophysiology team's office.  You may experience mild discomfort at your groin sites, applying ice packs 20min 3-4 times a day can help alleviate this discomfort.    Activity recommendations  You can resume driving.  Avoid stooping or squatting more than 90 degrees at the hips, repetitive motions such as loading , vacuuming, raking or shoveling, and heavy lifting (greater than 25lbs) for 1 week  Increase your activity gradually over the next 5-10 days, working back to your normal daily activity/routine.    Post ablation instructions  Stay well hydrated, and increase your fluid intake during this recovery period  High protein foods aide in your bodies healing process  You may have some irregular heartbeats and/or atrial fibrillation following your ablation which is normal to recovery, these episodes should occur less frequently over time.   Recurrent atrial fibrillation can occur within the first 3 months post ablation while your heart recovers from the procedure. Please call the electrophysiology team's office if you have an episode lasting greater than 4 hours, or if you notice the episodes are increasing in frequency or duration  Pleuritic chest  discomfort (chest pain worse with taking deep breaths, worse with laying flat on your back) can occur after ablation, usually coming about within the first 24-48hrs post ablation. If this occurs and is severe enough to be troublesome to you, please call us and consider starting a course of ibuprofen 400mg three times daily for 5 to 7 days    Things to watch for  As with any type of procedure, please be more attentive to unusual symptoms post ablation (eg. fever, neurologic changes, pain with swallowing, loss of consciousness, etc) - we recommend ER evaluation for any such symptoms in the first few weeks post procedure.    Consider ER evaluation for the following:  Severe chest pain not relieved by Tylenol or Ibuprofen  You have chills or a fever greater than 101 F (38 C)  Neurologic changes (eg. leg, arm or face weakness or numbness, difficulties with speech or word finding, problems  walking or with your balance, vision changes)  Severe difficulty swallowing and/or you are coughing up blood  Shortness of breath  Increased groin pain or a large or growing hard lump around the site  Groin is red, swollen, hot or tender  Blood or fluid is draining from the groin site  Any numbness, coolness or changes in color in your extremities  Groin pain not relieved by Tylenol or Advil  Recurrent atrial fibrillation associated with sustained rapid heart rates or associated with additional concerning  symptoms.    Your follow up appointments are as follows:  You will be seen by the electrophysiologist nurse practitioner at 6 weeks after your ablation  At your 6 week appointment, a 3 month follow-up appointment will be arranged with either the Nurse Practitioner or the Electrophysiology provider     Sincerely,  Ivette Diamond RN [phone]    After hours please contact the on call service at # [phone]

## 2024-10-07 ENCOUNTER — TELEPHONE (OUTPATIENT)
Dept: CARDIOLOGY | Facility: CLINIC | Age: 59
End: 2024-10-07
Payer: COMMERCIAL

## 2024-10-07 DIAGNOSIS — I48.19 PERSISTENT ATRIAL FIBRILLATION (H): ICD-10-CM

## 2024-10-07 DIAGNOSIS — Z98.890 STATUS POST ABLATION OF ATRIAL FIBRILLATION: Primary | ICD-10-CM

## 2024-10-07 DIAGNOSIS — Z86.79 STATUS POST ABLATION OF ATRIAL FIBRILLATION: Primary | ICD-10-CM

## 2024-10-07 NOTE — TELEPHONE ENCOUNTER
"Phone call from patient who is s/p PVI with ADW on 9-30-24.  He states that he bought a Inventalator Mobile late last week and has noticed that he has been in \"possible afib\" every time he has used it.  He noted a reading for bradycardia in the 40's on Saturday morning and called the on call cardiologist, due to no symptoms he was instructed to monitor.  He had a few afib readings prior to Saturday and has checked multiple times since that event.  He states his Kardia is reading heart rates in the 50's and 60's in afib, this is consistent with previous ekgs on file.    Pt with a hx of persistent afib.    Pt continues Sotalol 80 mg bid and Eliquis 5 mg bid. He has his usual symptoms in afib that include fatigue.  He denies shortness of breath, dizziness or chest pain.    Will review with Dr. Saldaña and call patient with recommendations.  "

## 2024-10-09 ENCOUNTER — ALLIED HEALTH/NURSE VISIT (OUTPATIENT)
Dept: CARDIOLOGY | Facility: CLINIC | Age: 59
End: 2024-10-09
Payer: COMMERCIAL

## 2024-10-09 VITALS
WEIGHT: 303 LBS | SYSTOLIC BLOOD PRESSURE: 120 MMHG | DIASTOLIC BLOOD PRESSURE: 70 MMHG | HEART RATE: 52 BPM | RESPIRATION RATE: 16 BRPM | BODY MASS INDEX: 37.67 KG/M2 | HEIGHT: 75 IN

## 2024-10-09 DIAGNOSIS — Z86.79 STATUS POST ABLATION OF ATRIAL FIBRILLATION: ICD-10-CM

## 2024-10-09 DIAGNOSIS — I48.11 LONGSTANDING PERSISTENT ATRIAL FIBRILLATION (H): ICD-10-CM

## 2024-10-09 DIAGNOSIS — Z98.890 STATUS POST ABLATION OF ATRIAL FIBRILLATION: ICD-10-CM

## 2024-10-09 DIAGNOSIS — I48.19 PERSISTENT ATRIAL FIBRILLATION (H): ICD-10-CM

## 2024-10-09 LAB
ALBUMIN SERPL BCG-MCNC: 4.1 G/DL (ref 3.5–5.2)
ALP SERPL-CCNC: 75 U/L (ref 40–150)
ALT SERPL W P-5'-P-CCNC: 26 U/L (ref 0–70)
ANION GAP SERPL CALCULATED.3IONS-SCNC: 13 MMOL/L (ref 7–15)
AST SERPL W P-5'-P-CCNC: 21 U/L (ref 0–45)
ATRIAL RATE - MUSE: NORMAL BPM
BILIRUB SERPL-MCNC: 0.4 MG/DL
BUN SERPL-MCNC: 19 MG/DL (ref 6–20)
CALCIUM SERPL-MCNC: 9.4 MG/DL (ref 8.8–10.4)
CHLORIDE SERPL-SCNC: 106 MMOL/L (ref 98–107)
CREAT SERPL-MCNC: 0.79 MG/DL (ref 0.67–1.17)
CRP SERPL-MCNC: 7.7 MG/L
DIASTOLIC BLOOD PRESSURE - MUSE: NORMAL MMHG
EGFRCR SERPLBLD CKD-EPI 2021: >90 ML/MIN/1.73M2
ERYTHROCYTE [SEDIMENTATION RATE] IN BLOOD BY WESTERGREN METHOD: 7 MM/HR (ref 0–20)
GLUCOSE SERPL-MCNC: 108 MG/DL (ref 70–99)
HCO3 SERPL-SCNC: 22 MMOL/L (ref 22–29)
INTERPRETATION ECG - MUSE: NORMAL
NT-PROBNP SERPL-MCNC: 833 PG/ML (ref 0–900)
P AXIS - MUSE: NORMAL DEGREES
POTASSIUM SERPL-SCNC: 5 MMOL/L (ref 3.4–5.3)
PR INTERVAL - MUSE: NORMAL MS
PROT SERPL-MCNC: 7 G/DL (ref 6.4–8.3)
QRS DURATION - MUSE: 84 MS
QT - MUSE: 440 MS
QTC - MUSE: 409 MS
R AXIS - MUSE: -7 DEGREES
SODIUM SERPL-SCNC: 141 MMOL/L (ref 135–145)
SYSTOLIC BLOOD PRESSURE - MUSE: NORMAL MMHG
T AXIS - MUSE: 26 DEGREES
VENTRICULAR RATE- MUSE: 52 BPM

## 2024-10-09 PROCEDURE — 80053 COMPREHEN METABOLIC PANEL: CPT

## 2024-10-09 PROCEDURE — 36415 COLL VENOUS BLD VENIPUNCTURE: CPT

## 2024-10-09 PROCEDURE — 93000 ELECTROCARDIOGRAM COMPLETE: CPT | Performed by: INTERNAL MEDICINE

## 2024-10-09 PROCEDURE — 86140 C-REACTIVE PROTEIN: CPT

## 2024-10-09 PROCEDURE — 85652 RBC SED RATE AUTOMATED: CPT

## 2024-10-09 PROCEDURE — 83880 ASSAY OF NATRIURETIC PEPTIDE: CPT

## 2024-10-09 PROCEDURE — 99207 PR NO CHARGE NURSE ONLY: CPT

## 2024-10-09 RX ORDER — SOTALOL HYDROCHLORIDE 80 MG/1
120 TABLET ORAL 2 TIMES DAILY
Qty: 180 TABLET | Refills: 1 | Status: SHIPPED | OUTPATIENT
Start: 2024-10-09 | End: 2024-10-31 | Stop reason: ALTCHOICE

## 2024-10-09 NOTE — TELEPHONE ENCOUNTER
Paramjit Saldaña MD  You23 minutes ago (11:05 AM)     AW  Thanks for the note,    Can we check labs (ESR, CRP, CMP, NT-proBNP) and ECG?  If in AF and QTC okay, let's plan to increase sotalol to 120mg twice daily and coordinate DCCV for 3 days thereafter.    Lev

## 2024-10-09 NOTE — TELEPHONE ENCOUNTER
Return call from patient, reviewed recommendations.  Pt states he is able to come in today for labs and EKG.  Will place orders and ask our scheduling team to reach out to him with a time/location.

## 2024-10-09 NOTE — PATIENT INSTRUCTIONS
Increase Sotalol starting Saturday 10/12 to 120mg twice daily.  Monday please come into the [facility] for an EKG at 11:30am.  We will call you with any abnormal blood work from your appointment today, and can review normal results with you on Monday.  If you are still in Atrial Fibrillation on Monday, we will plan for Cardioversion later that week, ideally Wednesday with your schedule.  If you go back into regular rhythm please call us at [phone].  Thank you,  Bertha COTTRELL

## 2024-10-09 NOTE — PROGRESS NOTES
"EKG Visit    See additional CMA documentation at visit    EKG (refer to EKG in MUSE 10/9/2024) shows QTc within acceptable limits, pts EKG was compared to previous EKG in EMR, EKG is stable, and there has been minimal change in QTc    Pt HR was at 52bpm, pt denies s/s of bradycardia.  Per Dr Saldaña will increase Sotalol to 120mg BID, pt could not have DCCV this Friday.  Plan will be to Start increase of Sotalol on Saturday on 10/12, EKG Monday 11:30am, if still in AF plan for DCCV Wednesday which is the soonest pt is available for DCCV.  DCCV spot is being held for pt, pending EKG.    Vitals were reviewed and are stable  /70 (BP Location: Right arm, Patient Position: Sitting, Cuff Size: Adult Large)   Pulse 52   Resp 16   Ht 1.905 m (6' 3\")   Wt 137.4 kg (303 lb)   BMI 37.87 kg/m      Results sent to  Dr Saldaña for further review and recommendations if necessary  10/9/2024 3:52 PM  Ivette Diamond RN       "

## 2024-10-09 NOTE — NURSING NOTE
[facility] Intake Information for EKG    Reason for EKG: Rhythm verification due to pts reported symptoms/concerns-see additional documentation note  Verification of Anticoagulation/Medication: Medication list current below  Reported symptoms/concerns on intake/form: Pt reporting concerns/symptoms of afib and low heart rates  Next planned Follow up: Pt has scheduled DCCV on 10/11/2024, and will continue with DCCV as planned  Communication: EP RN notified to see pt during OV to address pt concerns/questions.    Minoo Cooper MA      Current Outpatient Medications:     apixaban ANTICOAGULANT (ELIQUIS) 5 MG tablet, Take 1 tablet (5 mg) by mouth 2 times daily, Disp: 60 tablet, Rfl: 11    atorvastatin (LIPITOR) 10 MG tablet, Take 1 tablet (10 mg) by mouth daily, Disp: 90 tablet, Rfl: 3    benzonatate (TESSALON) 100 MG capsule, Take 1 capsule (100 mg) by mouth 3 times daily as needed for cough, Disp: 20 capsule, Rfl: 0    clotrimazole-betamethasone (LOTRISONE) 1-0.05 % external cream, , Disp: , Rfl:     dupilumab (DUPIXENT) 300 MG/2ML prefilled syringe, Inject 300 mg Subcutaneous every 14 days, Disp: , Rfl:     empagliflozin (JARDIANCE) 10 MG TABS tablet, Take 1 tablet (10 mg) by mouth daily, Disp: 30 tablet, Rfl: 11    fenofibrate (TRIGLIDE/LOFIBRA) 160 MG tablet, Take 1 tablet (160 mg) by mouth daily, Disp: 90 tablet, Rfl: 3    losartan (COZAAR) 25 MG tablet, TAKE 1 TABLET BY MOUTH AT BEDTIME, Disp: 90 tablet, Rfl: 0    methocarbamol (ROBAXIN) 750 MG tablet, , Disp: , Rfl:     nicotine (NICORETTE) 2 MG gum, Place 2 mg inside cheek every hour as needed for nicotine withdrawal symptoms, Disp: , Rfl:     pantoprazole (PROTONIX) 40 MG EC tablet, Take 1 tablet (40 mg) by mouth daily., Disp: 45 tablet, Rfl: 0    sotalol (BETAPACE) 80 MG tablet, Take 1 tablet (80 mg) by mouth 2 times daily, Disp: 180 tablet, Rfl: 1    Testosterone 1.62 % GEL, PLACE 1 PUMP (20.25 MG) ONTO THE SKIN DAILY APPLY FROM DISPENSER TO CLEAN, DRY, INTACT  SKIN OF THE UPPER ARMS AND SHOULDERS, Disp: 75 g, Rfl: 0    triamcinolone (KENALOG) 0.1 % external cream, , Disp: , Rfl:   Allergies   Allergen Reactions    Clobetasol Other (See Comments)     Allergy testing done - Clobetasol-77-Propionate    Inhaled Anticholinergic Agents Other (See Comments)     Stearyl Alcohol - Allergy testing done    No Clinical Screening - See Comments Other (See Comments)     Stearyl Alcohol - Allergy testing done    Octylisothiazolinone; Phenylenediamine - allergy testing done    Shellac Other (See Comments)     Allergy testing done    Trolamine (Triethanolamine) Other (See Comments)     Allergy testing done    Urea Other (See Comments)     Allergy testing done

## 2024-10-11 NOTE — PROGRESS NOTES
Pt was called, corresponding information/recommendations reviewed, verbalized understanding, has no further questions at this time, and contact information was given for further concerns/questions   Pt will again increase Sotalol on Saturday, EKG Monday, and pending EKG will proceed with DCCV where spot is being help on Wed  10/11/2024 1:50 PM  SIMBA Cardona, Paramjit Ohara MD  [facility] Ep Support Pool - [facility]  Hello,    ECG reviewed - AF, 52bpm, QTC 409ms.  Plan for sotalol increase Sunday, ECG next week and possible DCCV noted.  Labs show normal ESR, mildly elevated CRP.  Can we please have him take ibuprofen 400mg three times daily for 5 days, then discontinue.  If possible, let's plan to recheck ESR and CRP at time of repeat ECG or at time of DCCV.    Thank you,    Lev

## 2024-10-14 ENCOUNTER — DOCUMENTATION ONLY (OUTPATIENT)
Dept: CARDIOLOGY | Facility: CLINIC | Age: 59
End: 2024-10-14

## 2024-10-14 ENCOUNTER — ALLIED HEALTH/NURSE VISIT (OUTPATIENT)
Dept: CARDIOLOGY | Facility: CLINIC | Age: 59
End: 2024-10-14
Payer: COMMERCIAL

## 2024-10-14 ENCOUNTER — PREP FOR PROCEDURE (OUTPATIENT)
Dept: CARDIOLOGY | Facility: CLINIC | Age: 59
End: 2024-10-14

## 2024-10-14 ENCOUNTER — TRANSFERRED RECORDS (OUTPATIENT)
Dept: HEALTH INFORMATION MANAGEMENT | Facility: CLINIC | Age: 59
End: 2024-10-14

## 2024-10-14 VITALS
RESPIRATION RATE: 16 BRPM | DIASTOLIC BLOOD PRESSURE: 60 MMHG | HEIGHT: 75 IN | BODY MASS INDEX: 37.92 KG/M2 | WEIGHT: 305 LBS | HEART RATE: 52 BPM | SYSTOLIC BLOOD PRESSURE: 98 MMHG

## 2024-10-14 DIAGNOSIS — Z98.890 STATUS POST ABLATION OF ATRIAL FIBRILLATION: ICD-10-CM

## 2024-10-14 DIAGNOSIS — I48.19 PERSISTENT ATRIAL FIBRILLATION (H): Primary | ICD-10-CM

## 2024-10-14 DIAGNOSIS — I48.19 PERSISTENT ATRIAL FIBRILLATION (H): ICD-10-CM

## 2024-10-14 DIAGNOSIS — Z86.79 STATUS POST ABLATION OF ATRIAL FIBRILLATION: ICD-10-CM

## 2024-10-14 LAB
ATRIAL RATE - MUSE: NORMAL BPM
CRP SERPL-MCNC: 8.2 MG/L
DIASTOLIC BLOOD PRESSURE - MUSE: NORMAL MMHG
ERYTHROCYTE [SEDIMENTATION RATE] IN BLOOD BY WESTERGREN METHOD: 8 MM/HR (ref 0–20)
INTERPRETATION ECG - MUSE: NORMAL
P AXIS - MUSE: NORMAL DEGREES
PR INTERVAL - MUSE: NORMAL MS
QRS DURATION - MUSE: 96 MS
QT - MUSE: 466 MS
QTC - MUSE: 433 MS
R AXIS - MUSE: -14 DEGREES
SYSTOLIC BLOOD PRESSURE - MUSE: NORMAL MMHG
T AXIS - MUSE: -14 DEGREES
VENTRICULAR RATE- MUSE: 52 BPM

## 2024-10-14 PROCEDURE — 86140 C-REACTIVE PROTEIN: CPT

## 2024-10-14 PROCEDURE — 36415 COLL VENOUS BLD VENIPUNCTURE: CPT

## 2024-10-14 PROCEDURE — 99207 PR NO CHARGE NURSE ONLY: CPT

## 2024-10-14 PROCEDURE — 85652 RBC SED RATE AUTOMATED: CPT

## 2024-10-14 PROCEDURE — 93000 ELECTROCARDIOGRAM COMPLETE: CPT | Performed by: GENERAL ACUTE CARE HOSPITAL

## 2024-10-14 RX ORDER — SODIUM CHLORIDE 9 MG/ML
INJECTION, SOLUTION INTRAVENOUS CONTINUOUS
Status: DISCONTINUED | OUTPATIENT
Start: 2024-10-14 | End: 2024-10-14 | Stop reason: HOSPADM

## 2024-10-14 NOTE — PROGRESS NOTES
H&P  PMD: []  Date: Card OV: []  Date:  Sent for Teach []   Orders: I [x] P  [x]      AC: Eliquis NP Med Review: NO changes- Pt only on AAD, and/or not on AVN blocking agents, and/or has Device    DM Meds:  Jardiance  GLP-1:None       Angel Etienne, 1965, [id number]  Home:[phone] (home) Cell:[phone] (mobile)  Emergency Contact: Kathie Napier [phone]  PCP: Torsten Thomas, [phone]    +++Important patient information for CSC/Cath Lab staff : None+++    [facility] EP Cath Lab Procedure Order   Procedure: Cardioversion for AF  Ordering Provider: Dr Piotr Hill Ordered and Prepped: 10/14/2024 Savana Garcia RN  Anticipated Case Duration:  Standard  Scheduling Needs/Timeframe:   10/16  Scheduling Contact: Please contact pt to schedule  Cardiology Follow Up Apt s/p: Standard- EP HARLEY @ 4-6 wks or previously scheduled General Card apt  Current Device/Device Co Needed for Procedure: None NoneNone  Pre-Procedural Testing needed: None  Anesthesia:  General    [facility] EP Cath Lab Prep   H&P:  Compled by cardiology on 9/24/24 if scheduled within 30 days, pt to schedule with PMD if procedure outside of this timeframe  Pre-op Labs: K if pt taking diuretic medication or hx of low Potassium, Beta HcG if appropriate, and INR if on Warfarin will be ordered AM of procedure and Review of most recent labs, WEL for procedure  T&S Pre-Procedure Review: T&S is not required for DCCV/DFT Testing  Medical Records Pertinent for Procedure:  None  Iodinated Contrast Dye Allergies (Does not include Shellfish, Egg, and/or Iodine Allergy): NA  GLP-1 Protocol: If on Dulaglutide (Trulicity) (weekly)- Injection hold 7 days prior to procedure  , Exenatide extended release (Bydureon bcise) (weekly)- Injection hold 7 days prior to procedure, Exenatide (Byetta) (twice daily)- Oral Tablet hold day prior and morning of procedure and for Injection hold 7 days prior to procedure, Semaglutide (Ozempic) (weekly)- Injection and Oral  hold 7 days prior to procedure, Liraglutide (Victoza, Saxenda) (daily)- Injection hold day prior and morning of procedure    Allergies   Allergen Reactions    Clobetasol Other (See Comments)     Allergy testing done - Clobetasol-77-Propionate    Inhaled Anticholinergic Agents Other (See Comments)     Stearyl Alcohol - Allergy testing done    No Clinical Screening - See Comments Other (See Comments)     Stearyl Alcohol - Allergy testing done    Octylisothiazolinone; Phenylenediamine - allergy testing done    Shellac Other (See Comments)     Allergy testing done    Trolamine (Triethanolamine) Other (See Comments)     Allergy testing done    Urea Other (See Comments)     Allergy testing done       Current Outpatient Medications:     apixaban ANTICOAGULANT (ELIQUIS) 5 MG tablet, Take 1 tablet (5 mg) by mouth 2 times daily, Disp: 60 tablet, Rfl: 11    atorvastatin (LIPITOR) 10 MG tablet, Take 1 tablet (10 mg) by mouth daily, Disp: 90 tablet, Rfl: 3    benzonatate (TESSALON) 100 MG capsule, Take 1 capsule (100 mg) by mouth 3 times daily as needed for cough, Disp: 20 capsule, Rfl: 0    clotrimazole-betamethasone (LOTRISONE) 1-0.05 % external cream, , Disp: , Rfl:     dupilumab (DUPIXENT) 300 MG/2ML prefilled syringe, Inject 300 mg Subcutaneous every 14 days, Disp: , Rfl:     empagliflozin (JARDIANCE) 10 MG TABS tablet, Take 1 tablet (10 mg) by mouth daily, Disp: 30 tablet, Rfl: 11    fenofibrate (TRIGLIDE/LOFIBRA) 160 MG tablet, Take 1 tablet (160 mg) by mouth daily, Disp: 90 tablet, Rfl: 3    losartan (COZAAR) 25 MG tablet, TAKE 1 TABLET BY MOUTH AT BEDTIME, Disp: 90 tablet, Rfl: 0    methocarbamol (ROBAXIN) 750 MG tablet, , Disp: , Rfl:     nicotine (NICORETTE) 2 MG gum, Place 2 mg inside cheek every hour as needed for nicotine withdrawal symptoms, Disp: , Rfl:     pantoprazole (PROTONIX) 40 MG EC tablet, Take 1 tablet (40 mg) by mouth daily., Disp: 45 tablet, Rfl: 0    sotalol (BETAPACE) 80 MG tablet, Take 1.5 tablets  (120 mg) by mouth 2 times daily., Disp: 180 tablet, Rfl: 1    Testosterone 1.62 % GEL, PLACE 1 PUMP (20.25 MG) ONTO THE SKIN DAILY APPLY FROM DISPENSER TO CLEAN, DRY, INTACT SKIN OF THE UPPER ARMS AND SHOULDERS, Disp: 75 g, Rfl: 0    triamcinolone (KENALOG) 0.1 % external cream, , Disp: , Rfl:     Documentation Date:10/14/2024 12:12 PM  Savana Garcia RN

## 2024-10-14 NOTE — PROGRESS NOTES
"EKG Visit    EKG: Confirming patient is in Atrial Fibrillation PT WILL HAVE DCCV 10/16    Vitals were reviewed and are stable  BP 98/60 (BP Location: Right arm, Patient Position: Sitting, Cuff Size: Adult Regular)   Pulse 52   Resp 16   Ht 1.905 m (6' 3\")   Wt 138.3 kg (305 lb)   BMI 38.12 kg/m      RN Discussion: pt tolerating medication without complaint, reassurance provided to pt that symptoms are common with initiation of medication and to continue trial of medication for a minimum of 2 week, common to see improvement or resolution of symptoms in this timeframe, reviewed with pt reasoning for medication, reviewed and confirmed pt understands current dose, administration, and frequency of medication, most common potential side effects from the medication, s/s to call the clinic with, and when to seek emergency medical care    Instruction given to pt: to continue current medication as prescribed, results would be sent to ordering provider for review, pt will be contacted with further changes if necessary, and pt verbalized understanding     Results sent to  Dr Saldaña for further review and recommendations if necessary  10/14/2024 12:15 PM  Savana Garcia RN    "

## 2024-10-15 ENCOUNTER — TELEPHONE (OUTPATIENT)
Dept: CARDIOLOGY | Facility: CLINIC | Age: 59
End: 2024-10-15
Payer: COMMERCIAL

## 2024-10-15 ENCOUNTER — TRANSFERRED RECORDS (OUTPATIENT)
Dept: HEALTH INFORMATION MANAGEMENT | Facility: CLINIC | Age: 59
End: 2024-10-15
Payer: COMMERCIAL

## 2024-10-15 NOTE — TELEPHONE ENCOUNTER
Pre-Procedure Education    Procedure: DCCV with Quin Lara NP on 10/16/2024 with arrival time 8:30 am      PT IS ON ELIQUIS AND NO MISSED DOSES     Orders: Orderset for procedure verified signed/held    COVID: COVID policy- if pt develops COVID like symptoms prior to procedure, he/she would need to complete an at home with a rapid antigen COVID test 1-2 days prior to your procedure date. If COVID + pt is aware the procedure will need to be rescheduled, and to contact CV scheduling as soon as possible    Pre-Op H&P: Completed- Available in Epic    Education:    PT HAS A  FOR PROCEDURE THAT WILL STAY WITH HIM    PT HAS  MY CHART    PT INSTRUCTED TO HOLD ANY VITAMINS MINERALS CALCIUM IRON OR SUPPLEMENTS THE MORNING OF CV  PT INSTRUCTED NO GUM CHEWING MINTS OR CANDY THE MORNING OF CV  PT  INSTRUCTED TO BATHE OR SHOWER BEFORE COMING IN  PT INSTRUCTED  TO LEAVE JEWELRY AT HOME  PT IS ON ELIQUIS  PT IS ON SOTALOL  NO MEDICATION CHANGES WERE MADE  PT IS JIM/CPAP  PT HAS A POST CV FOLLOW UP WITH QUIN 11/12    Contact: Reviewed via phone with pt    Pre-Procedure Instruction: NPO after midnight pre procedure, Defined NPO with pt, Remove all jewelry and leave all valuables at home, Shower prior to arrival, Sedation plan/orders, Transportation requirements and arrangements post procedure, Post-procedure follow up process, Post-procedure restrictions/expectations, and Pre-procedure letter sent- letter tab  Risks:      Medication:   Instructions regarding anticoagulants: Eliquis- To continue anticoagulation uninterrupted through their procedure    Instructions regarding antiarrhythmic medication: Sotalol; continue medication prior to procedure as prescribed    Instructions given to pt regarding diuretics medication: NA for DCCV    Instructions given to pt regarding DM/GLP-1 medication:   DM-  PT INSTRUCTED TO HOLD HIS JARDIANCE THE MORNING OF CV  GLP-1- None    Instructions for medication, other than anticoagulants and  antiarrhythmics listed above, given to pt: Take all medication AM of procedure with small sips of water     Important patient information for staff:  PT IS ON JARDIANCE PT IS ON SOTALOL PT IS JIM/CPAP    10/15/2024 8:53 AM  Neema Grijalva LPN

## 2024-10-16 ENCOUNTER — ANESTHESIA EVENT (OUTPATIENT)
Dept: CARDIOLOGY | Facility: HOSPITAL | Age: 59
End: 2024-10-16
Payer: COMMERCIAL

## 2024-10-16 ENCOUNTER — HOSPITAL ENCOUNTER (OUTPATIENT)
Dept: CARDIOLOGY | Facility: HOSPITAL | Age: 59
Discharge: HOME | End: 2024-10-16
Attending: INTERNAL MEDICINE | Admitting: INTERNAL MEDICINE
Payer: COMMERCIAL

## 2024-10-16 ENCOUNTER — ANESTHESIA (OUTPATIENT)
Dept: CARDIOLOGY | Facility: HOSPITAL | Age: 59
End: 2024-10-16
Payer: COMMERCIAL

## 2024-10-16 VITALS
SYSTOLIC BLOOD PRESSURE: 141 MMHG | HEART RATE: 66 BPM | BODY MASS INDEX: 37.92 KG/M2 | OXYGEN SATURATION: 96 % | DIASTOLIC BLOOD PRESSURE: 84 MMHG | HEIGHT: 75 IN | TEMPERATURE: 97.4 F | WEIGHT: 305 LBS | RESPIRATION RATE: 19 BRPM

## 2024-10-16 DIAGNOSIS — I48.19 PERSISTENT ATRIAL FIBRILLATION (H): ICD-10-CM

## 2024-10-16 LAB
ATRIAL RATE - MUSE: 60 BPM
DIASTOLIC BLOOD PRESSURE - MUSE: NORMAL MMHG
GLUCOSE BLDC GLUCOMTR-MCNC: 94 MG/DL (ref 70–99)
INTERPRETATION ECG - MUSE: NORMAL
P AXIS - MUSE: 67 DEGREES
PR INTERVAL - MUSE: 262 MS
QRS DURATION - MUSE: 90 MS
QT - MUSE: 470 MS
QTC - MUSE: 470 MS
R AXIS - MUSE: -9 DEGREES
SYSTOLIC BLOOD PRESSURE - MUSE: NORMAL MMHG
T AXIS - MUSE: 22 DEGREES
VENTRICULAR RATE- MUSE: 60 BPM

## 2024-10-16 PROCEDURE — 92960 CARDIOVERSION ELECTRIC EXT: CPT | Performed by: NURSE ANESTHETIST, CERTIFIED REGISTERED

## 2024-10-16 PROCEDURE — 93010 ELECTROCARDIOGRAM REPORT: CPT | Performed by: INTERNAL MEDICINE

## 2024-10-16 PROCEDURE — 250N000009 HC RX 250: Performed by: NURSE ANESTHETIST, CERTIFIED REGISTERED

## 2024-10-16 PROCEDURE — 92960 CARDIOVERSION ELECTRIC EXT: CPT | Performed by: ANESTHESIOLOGY

## 2024-10-16 PROCEDURE — 82962 GLUCOSE BLOOD TEST: CPT

## 2024-10-16 PROCEDURE — 92960 CARDIOVERSION ELECTRIC EXT: CPT

## 2024-10-16 PROCEDURE — 370N000017 HC ANESTHESIA TECHNICAL FEE, PER MIN

## 2024-10-16 PROCEDURE — 92960 CARDIOVERSION ELECTRIC EXT: CPT | Performed by: NURSE PRACTITIONER

## 2024-10-16 PROCEDURE — 999N000054 HC STATISTIC EKG NON-CHARGEABLE

## 2024-10-16 PROCEDURE — 93005 ELECTROCARDIOGRAM TRACING: CPT

## 2024-10-16 RX ORDER — METHOHEXITAL IN WATER/PF 100MG/10ML
SYRINGE (ML) INTRAVENOUS PRN
Status: DISCONTINUED | OUTPATIENT
Start: 2024-10-16 | End: 2024-10-16

## 2024-10-16 RX ADMIN — Medication 100 MG: at 10:05

## 2024-10-16 ASSESSMENT — LIFESTYLE VARIABLES: TOBACCO_USE: 1

## 2024-10-16 ASSESSMENT — ACTIVITIES OF DAILY LIVING (ADL)
ADLS_ACUITY_SCORE: 35

## 2024-10-16 ASSESSMENT — ENCOUNTER SYMPTOMS: DYSRHYTHMIAS: 1

## 2024-10-16 NOTE — DISCHARGE INSTRUCTIONS
Sedation: Care Instructions  Overview     Sedation is the use of medicine to help you feel relaxed and comfortable during a procedure. The medicine is usually given in a vein (by I.V.). It may be used with numbing medicines.  There are different levels of sedation. They range from being awake but relaxed to being completely unconscious. Which level you have will depend on the procedure and your needs. You will be watched closely by a doctor or nurse during sedation.  Common side effects from sedation include:  Feeling sleepy or tired. (Your doctors and nurses will make sure you aren't too sleepy to go home.)  Feeling dizzy or unsteady.  Follow-up care is a key part of your treatment and safety. Be sure to make and go to all appointments, and call your doctor if you are having problems. It's also a good idea to know your test results and keep a list of the medicines you take.  How can you care for yourself at home?  Activity    Don't do anything that requires attention to detail until you recover. This includes going to work or school, making important decisions, and signing any legal documents. It takes time for the medicine effects to completely wear off.     For at least 24 hours, do not drive or operate any machinery.     When you get home, make sure to rest until the anesthesia has worn off. Some people will feel drowsy or dizzy for up to a few hours after leaving the hospital.     Take your time and walk slowly. Sudden changes in position may cause nausea.     Rest when you feel tired. Getting enough sleep will help you recover.     If you have sleep apnea and you have a CPAP machine, be sure to use it.   Diet    Don't drink alcohol for 24 hours.     You can eat your normal diet, unless your doctor gives you other instructions. If your stomach is upset, try clear liquids and bland, low-fat foods like plain toast or rice.     Drink plenty of fluids (unless your doctor tells you not to).   When should you call  "for help?   Call 911 anytime you think you may need emergency care. For example, call if:    You have trouble breathing.     You passed out (lost consciousness).   Call your doctor now or seek immediate medical care if:    You have nausea or vomiting that gets worse or won't stop.     You have a fever.     You have a new or worse headache.     The medicine is not wearing off and you can't think clearly.   Watch closely for changes in your health, and be sure to contact your doctor if:    You do not get better as expected.   Where can you learn more?  Go to https://www.[employer].net/patiented  Enter G817 in the search box to learn more about \"Sedation: Care Instructions.\"  Current as of: June 24, 2023  Content Version: 14.2 2024 [employer].   Care instructions adapted under license by your healthcare professional. If you have questions about a medical condition or this instruction, always ask your healthcare professional. [employer] disclaims any warranty or liability for your use of this information.  Cardioversion  Cardioversion is a procedure to restore your heart's normal rhythm from a fast or irregular rhythm (arrhythmia) in the top or bottom chambers of your heart. You may have the procedure in a hospital or surgery center. It's often done on an outpatient (same day) basis. During the procedure, your doctor will give you medication to keep you free from pain. Then the doctor gives you a brief electric shock. This helps your heartbeat become normal again. In most cases, you can go home within hours of the procedure.    Before Your Procedure  Tell your doctor what over-the-counter and prescription medications, herbs, and supplements you are taking.  Take medication as directed. Your doctor may prescribe anticoagulants (blood thinners), depending on your situation. They help prevent blood clots from forming.  Ask your doctor about the risks and benefits of cardioversion.  Sign your " consent form.  Don t eat or drink anything for 8  hours before your procedure.  Follow any other instructions your doctor gives you.   Arrange for an adult to drive you home after the procedure.     During Your Procedure  Your health care provider will place small pads (electrodes) on your chest to record your heartbeat at all times.  Your health care provider will place an intravenous (IV) line in your arm. This gives you medication (sedation) that keeps you free of pain. You ll feel sleepy.      After Your Procedure  Your health care provider will monitor you until you are fully awake. Then you ll be able to sit up, walk, and eat.  In most cases, you ll be able to go home after the sedation wears off. This usually takes a few hours.  For a few days, the skin on your chest may feel a little sore, like a mild sunburn.  DO NOT  drive or operate heavy machinery for 24 hours after the procedure.  The day after your procedure, try to take it easy. Take medication as directed.  Call your doctor if you notice skipped beats, a rapid heartbeat, or chest tightness. These may be signs that an irregular heartbeat has returned.

## 2024-10-16 NOTE — ANESTHESIA CARE TRANSFER NOTE
Patient: Angel Etienne    Procedure: * No procedures listed *  Cardioversion External    Diagnosis: * No pre-op diagnosis entered *  Diagnosis Additional Information: No value filed.    Anesthesia Type:   General     Note:    Oropharynx: oropharynx clear of all foreign objects  Level of Consciousness: awake  Oxygen Supplementation: nasal cannula  Level of Supplemental Oxygen (L/min / FiO2): 4  Independent Airway: airway patency satisfactory and stable  Dentition: dentition unchanged  Vital Signs Stable: post-procedure vital signs reviewed and stable  Report to RN Given: handoff report given  Patient transferred to: Cardiac Special Care          Vitals:  Vitals Value Taken Time   /83 10/16/24 1006   Temp     Pulse 72 10/16/24 1006   Resp 23 10/16/24 1006   SpO2 78 % 10/16/24 1006   Vitals shown include unfiled device data.    Electronically Signed By: LUCY Bobby CRNA  October 16, 2024  10:08 AM

## 2024-10-16 NOTE — ANESTHESIA POSTPROCEDURE EVALUATION
Patient: Angel Etienne    Procedure: * No procedures listed *  Cardioversion External    Anesthesia Type:  General    Note:  Disposition: Outpatient   Postop Pain Control: Uneventful            Sign Out: Well controlled pain   PONV: No   Neuro/Psych: Uneventful            Sign Out: Acceptable/Baseline neuro status   Airway/Respiratory: Uneventful            Sign Out: Acceptable/Baseline resp. status   CV/Hemodynamics: Uneventful            Sign Out: Acceptable CV status; No obvious hypovolemia; No obvious fluid overload   Other NRE: NONE   DID A NON-ROUTINE EVENT OCCUR? No       Last vitals:  Vitals:    10/16/24 1016 10/16/24 1018 10/16/24 1022   BP: 124/69 126/70 127/70   Pulse: 58 58 58   Resp: 17 17 16   Temp:  36.3  C (97.4  F)    SpO2: 96% 96% 96%       Electronically Signed By: Demetrius Mendoza MD  October 16, 2024  10:30 AM

## 2024-10-16 NOTE — ANESTHESIA PREPROCEDURE EVALUATION
Anesthesia Pre-Procedure Evaluation    Patient: Angel Etienne   MRN: [id number] : 1965        Procedure :   Cardioversion External       Past Medical History:   Diagnosis Date     Atrial fibrillation (H)      High cholesterol       Past Surgical History:   Procedure Laterality Date     EP ABLATION PULMONARY VEIN ISOLATION N/A 2024    Procedure: Ablation Atrial Fibrillation;  Surgeon: Paramjit Saldaña MD;  Location: [facility] CV      Allergies   Allergen Reactions     Clobetasol Other (See Comments)     Allergy testing done - Clobetasol-77-Propionate     Inhaled Anticholinergic Agents Other (See Comments)     Stearyl Alcohol - Allergy testing done     No Clinical Screening - See Comments Other (See Comments)     Stearyl Alcohol - Allergy testing done    Octylisothiazolinone; Phenylenediamine - allergy testing done     Shellac Other (See Comments)     Allergy testing done     Trolamine (Triethanolamine) Other (See Comments)     Allergy testing done     Urea Other (See Comments)     Allergy testing done      Social History     Tobacco Use     Smoking status: Every Day     Current packs/day: 1.00     Average packs/day: 1.5 packs/day for 42.0 years (62.7 ttl pk-yrs)     Types: Cigarettes     Start date: 1982     Smokeless tobacco: Never   Substance Use Topics     Alcohol use: Yes      Wt Readings from Last 1 Encounters:   10/16/24 138.3 kg (305 lb)        Anesthesia Evaluation   Pt has had prior anesthetic. Type: General.    No history of anesthetic complications       ROS/MED HX  ENT/Pulmonary:     (+) sleep apnea,               tobacco use, Current use,                       Neurologic:       Cardiovascular:     (+) Dyslipidemia hypertension- -   -  - -                        dysrhythmias, a-fib,             METS/Exercise Tolerance:     Hematologic:       Musculoskeletal:   (+)  arthritis,             GI/Hepatic:  - neg GI/hepatic ROS     Renal/Genitourinary:     (+)        BPH,    "   Endo:     (+)               Obesity,       Psychiatric/Substance Use:     (+) psychiatric history anxiety   Recreational drug usage: Cannabis.    Infectious Disease:  - neg infectious disease ROS     Malignancy:  - neg malignancy ROS     Other:  - neg other ROS        Physical Exam    Airway      Comment: Thin, full beard. Large, round face.    Mallampati: III   TM distance: > 3 FB   Neck ROM: full   Mouth opening: > 3 cm    Respiratory Devices and Support         Dental       (+) Modest Abnormalities - crowns, retainers, 1 or 2 missing teeth and Removable bridges or other hardware      Cardiovascular          Rhythm and rate: irregular and normal     Pulmonary   pulmonary exam normal            OUTSIDE LABS:  CBC:   Lab Results   Component Value Date    WBC 5.9 09/24/2024    WBC 6.9 07/30/2024    HGB 16.7 09/24/2024    HGB 16.4 07/30/2024    HCT 50.6 09/24/2024    HCT 50.4 07/30/2024     09/24/2024     07/30/2024     BMP:   Lab Results   Component Value Date     10/09/2024     09/24/2024    POTASSIUM 5.0 10/09/2024    POTASSIUM 5.1 09/24/2024    CHLORIDE 106 10/09/2024    CHLORIDE 105 09/24/2024    CO2 22 10/09/2024    CO2 26 09/24/2024    BUN 19.0 10/09/2024    BUN 17.8 09/24/2024    CR 0.79 10/09/2024    CR 0.86 09/24/2024    GLC 94 10/16/2024     (H) 10/09/2024     COAGS:   Lab Results   Component Value Date    INR 1.10 01/29/2024     POC: No results found for: \"BGM\", \"HCG\", \"HCGS\"  HEPATIC:   Lab Results   Component Value Date    ALBUMIN 4.1 10/09/2024    PROTTOTAL 7.0 10/09/2024    ALT 26 10/09/2024    AST 21 10/09/2024    ALKPHOS 75 10/09/2024    BILITOTAL 0.4 10/09/2024     OTHER:   Lab Results   Component Value Date    A1C 5.8 (H) 11/16/2023    LEESA 9.4 10/09/2024    TSH 2.28 07/22/2024    SED 8 10/14/2024       Anesthesia Plan    ASA Status:  4    NPO Status:  NPO Appropriate    Anesthesia Type: General.   Induction: Intravenous.   Maintenance: TIVA.    " "    Consents    Anesthesia Plan(s) and associated risks, benefits, and realistic alternatives discussed. Questions answered and patient/representative(s) expressed understanding.     - Discussed:     - Discussed with:  Patient      - Extended Intubation/Ventilatory Support Discussed: No.      - Patient is DNR/DNI Status: No     Use of blood products discussed: No .     Postoperative Care            Comments:    Other Comments: Brevital, oxygen.  PE, Ephedrine, Atropine PRN.         Demetrius Mendoza MD    I have reviewed the pertinent notes and labs in the chart from the past 30 days and (re)examined the patient.  Any updates or changes from those notes are reflected in this note.            # Drug Induced Coagulation Defect: home medication list includes an anticoagulant medication    # Hypertension: Noted on problem list         # Obesity: Estimated body mass index is 38.12 kg/m  as calculated from the following:    Height as of this encounter: 1.905 m (6' 3\").    Weight as of this encounter: 138.3 kg (305 lb).             "

## 2024-10-16 NOTE — INTERVAL H&P NOTE
"I have reviewed the surgical (or preoperative) H&P that is linked to this encounter, and examined the patient. Noted changes include: recurrent AF post ablation. Sotalol increased to 120 mg BID    Clinical Conditions Present on Arrival:  Clinically Significant Risk Factors Present on Admission                 # Drug Induced Coagulation Defect: home medication list includes an anticoagulant medication       # Obesity: Estimated body mass index is 38.12 kg/m  as calculated from the following:    Height as of this encounter: 1.905 m (6' 3\").    Weight as of this encounter: 138.3 kg (305 lb).       "

## 2024-10-16 NOTE — PROCEDURES
[facility]    Procedure: Cardioversion External    Date/Time: 10/16/2024 10:41 AM    Performed by: Meg Lara APRN CNP  Authorized by: Paramjit Saldaña MD      UNIVERSAL PROTOCOL   Site Marked: NA  Prior Images Obtained and Reviewed:  Yes  Required items: Required blood products, implants, devices and special equipment available    Patient identity confirmed:  Verbally with patient, arm band and provided demographic data  Patient was reevaluated immediately before administering moderate or deep sedation or anesthesia  Confirmation Checklist:  Patient's identity using two indicators, relevant allergies, procedure was appropriate and matched the consent or emergent situation and correct equipment/implants were available  Time out: Immediately prior to the procedure a time out was called    Universal Protocol: the Joint Commission Universal Protocol was followed       ANESTHESIA    Anesthesia was administered and monitored by anesthesiology.  See anesthesia documentation for details.    SEDATION  Patient Sedated: Yes    Vital signs: Vital signs monitored during sedation      PROCEDURE DETAILS  Cardioversion basis: elective  Pre-procedure rhythm: atrial fibrillation  Patient position: patient was placed in a supine position  Chest area: chest area exposed  Electrodes: pads  Electrodes placed: anterior-posterior  Number of attempts: 1    Details of Attempts:  At 1008, after administration of IV Brevital by MD and confirmation of adequate sedation, he received a synchronous shock at 300 J with prompt restoration of sinus rhythm in the 60's.  Post cardioversion ECG pending.   Post-procedure rhythm: normal sinus rhythm  Complications: no complications      PROCEDURE  Describe Procedure: Persistent atrial fibrillation originally diagnosed 1/29/24, DCCV 8/8/24 with recurrence 8/20/24, s/p ablation 9/30/24. Symptoms include palpitations, intermittent HANEY with activity. Sotalol recently  increased to 120 mg BID (10/12), will continue at current dose. Continue Eliquis 5 mg BID for stroke prophylaxis. CHADs 2 for HTN and DM. Follow up with me 11/12.   Patient Tolerance:  Patient tolerated the procedure well with no immediate complications

## 2024-10-21 ENCOUNTER — TELEPHONE (OUTPATIENT)
Dept: CARDIOLOGY | Facility: CLINIC | Age: 59
End: 2024-10-21
Payer: COMMERCIAL

## 2024-10-21 NOTE — TELEPHONE ENCOUNTER
Patient had cardioversion completed on 10/16/2024.  Patient calling in today on 10/21 to report he is back in atrial fibrillation as of 10/20. Pt confirmed this on his DWNLD addy x3 yesterday and once again today.     Patient takes sotalol 120 mg twice daily.  No missed doses of Eliquis.  Symptoms are lack of energy/exhaustion with minimal activity.    Patient would like recommendations on how to proceed at this time.    Pt reports HR in AF 60's    Savana Garcia RN

## 2024-10-24 ENCOUNTER — PREP FOR PROCEDURE (OUTPATIENT)
Dept: CARDIOLOGY | Facility: CLINIC | Age: 59
End: 2024-10-24
Payer: COMMERCIAL

## 2024-10-24 ENCOUNTER — DOCUMENTATION ONLY (OUTPATIENT)
Dept: CARDIOLOGY | Facility: CLINIC | Age: 59
End: 2024-10-24
Payer: COMMERCIAL

## 2024-10-24 DIAGNOSIS — I48.19 PERSISTENT ATRIAL FIBRILLATION (H): Primary | ICD-10-CM

## 2024-10-24 NOTE — TELEPHONE ENCOUNTER
Roberto Duarte MD  P [facility] Ep Support [facility]  Caller: Unspecified (3 days ago, 10:56 AM)  Recommend another cardioversion given he is symptomatic with his AF.

## 2024-10-24 NOTE — PROGRESS NOTES
H&P  PMD: []  Date: Card OV: []  Date:  Sent for Teach []   Orders: I [x] P  [x]      AC: Eliquis NP Med Review: NEEDS review    DM Meds:  Jardiance,   GLP-1:None     Roberto Duarte MD  [facility] Ep Support Pool - Lhe22 minutes ago (11:16 AM)     Recommend another cardioversion given he is symptomatic with his AF.       Angel Etienne, 1965, [id number]  Home:[phone] (home) Cell:[phone] (mobile)  Emergency Contact: Kathie Napier [phone]  PCP: Torsten Thomas, [phone]    +++Important patient information for CSC/Cath Lab staff : None+++    [facility] EP Cath Lab Procedure Order   Procedure: Cardioversion for AF  Ordering Provider: Dr Duarte  Date Ordered and Prepped: 10/24/2024 Savana Garcia RN  Anticipated Case Duration:  Standard  Scheduling Needs/Timeframe:  Next Available  Scheduling Contact: Please contact pt to schedule  Cardiology Follow Up Apt s/p: Standard- EP HARLEY @ 4-6 wks or previously scheduled General Card apt  Current Device/Device Co Needed for Procedure: None NoneNone  Pre-Procedural Testing needed: None  Anesthesia:  General    [facility] EP Cath Lab Prep   H&P:  Pt to schedule with PMD to complete  Pre-op Labs: K if pt taking diuretic medication or hx of low Potassium, Beta HcG if appropriate, and INR if on Warfarin will be ordered AM of procedure and Review of most recent labs, WEL for procedure  T&S Pre-Procedure Review: T&S is not required for DCCV/DFT Testing  Medical Records Pertinent for Procedure:  None  Iodinated Contrast Dye Allergies (Does not include Shellfish, Egg, and/or Iodine Allergy): NA  GLP-1 Protocol: If on Dulaglutide (Trulicity) (weekly)- Injection hold 7 days prior to procedure  , Exenatide extended release (Bydureon bcise) (weekly)- Injection hold 7 days prior to procedure, Exenatide (Byetta) (twice daily)- Oral Tablet hold day prior and morning of procedure and for Injection hold 7 days prior to procedure, Semaglutide (Ozempic) (weekly)- Injection and  Oral hold 7 days prior to procedure, Liraglutide (Victoza, Saxenda) (daily)- Injection hold day prior and morning of procedure    Allergies   Allergen Reactions    Clobetasol Other (See Comments)     Allergy testing done - Clobetasol-77-Propionate    Inhaled Anticholinergic Agents Other (See Comments)     Stearyl Alcohol - Allergy testing done    No Clinical Screening - See Comments Other (See Comments)     Stearyl Alcohol - Allergy testing done    Octylisothiazolinone; Phenylenediamine - allergy testing done    Shellac Other (See Comments)     Allergy testing done    Trolamine (Triethanolamine) Other (See Comments)     Allergy testing done    Urea Other (See Comments)     Allergy testing done       Current Outpatient Medications:     apixaban ANTICOAGULANT (ELIQUIS) 5 MG tablet, Take 1 tablet (5 mg) by mouth 2 times daily, Disp: 60 tablet, Rfl: 11    atorvastatin (LIPITOR) 10 MG tablet, Take 1 tablet (10 mg) by mouth daily, Disp: 90 tablet, Rfl: 3    benzonatate (TESSALON) 100 MG capsule, Take 1 capsule (100 mg) by mouth 3 times daily as needed for cough, Disp: 20 capsule, Rfl: 0    clotrimazole-betamethasone (LOTRISONE) 1-0.05 % external cream, , Disp: , Rfl:     dupilumab (DUPIXENT) 300 MG/2ML prefilled syringe, Inject 300 mg Subcutaneous every 14 days, Disp: , Rfl:     empagliflozin (JARDIANCE) 10 MG TABS tablet, Take 1 tablet (10 mg) by mouth daily, Disp: 30 tablet, Rfl: 11    fenofibrate (TRIGLIDE/LOFIBRA) 160 MG tablet, Take 1 tablet (160 mg) by mouth daily, Disp: 90 tablet, Rfl: 3    losartan (COZAAR) 25 MG tablet, TAKE 1 TABLET BY MOUTH AT BEDTIME, Disp: 90 tablet, Rfl: 0    methocarbamol (ROBAXIN) 750 MG tablet, , Disp: , Rfl:     nicotine (NICORETTE) 2 MG gum, Place 2 mg inside cheek every hour as needed for nicotine withdrawal symptoms, Disp: , Rfl:     pantoprazole (PROTONIX) 40 MG EC tablet, Take 1 tablet (40 mg) by mouth daily., Disp: 45 tablet, Rfl: 0    sotalol (BETAPACE) 80 MG tablet, Take 1.5  tablets (120 mg) by mouth 2 times daily., Disp: 180 tablet, Rfl: 1    Testosterone 1.62 % GEL, PLACE 1 PUMP (20.25 MG) ONTO THE SKIN DAILY APPLY FROM DISPENSER TO CLEAN, DRY, INTACT SKIN OF THE UPPER ARMS AND SHOULDERS, Disp: 75 g, Rfl: 0    triamcinolone (KENALOG) 0.1 % external cream, , Disp: , Rfl:     Documentation Date:10/24/2024 11:42 AM  Savana Garcia RN

## 2024-10-24 NOTE — TELEPHONE ENCOUNTER
Spoke with pt regarding recommendations. Pt initially wanted to wait longer to hear back from Dr. Saldaña, but did end up agreeing to proceed with DCCV in the meantime.     He reports Mondays or Tuesdays would work best for him if possible as he would not have to take time off.      Savana Garcia RN

## 2024-10-24 NOTE — TELEPHONE ENCOUNTER
Amanuel Carey, PA-C  P [facility] Ep Support Pool - [facility]  Caller: Unspecified (Today, 11:33 AM)  No med changes necessary, continue sotalol and eliquis as ordered.    Thanks,  Virgilio

## 2024-10-24 NOTE — TELEPHONE ENCOUNTER
Pt calling in to see if there were further recommendation regarding the below.  Pt again tolerating his symptoms and HR are controlled.  Advised pt we would try and reach out to him by the end of the week.  10/24/2024 10:14 AM  Ivette Diamond RN

## 2024-10-25 ENCOUNTER — TELEPHONE (OUTPATIENT)
Dept: FAMILY MEDICINE | Facility: CLINIC | Age: 59
End: 2024-10-25
Payer: COMMERCIAL

## 2024-10-25 NOTE — TELEPHONE ENCOUNTER
Reason for Call:  Appointment Request    Patient requesting this type of appt: Pre-op    Requested provider: Torsten Thomas    Reason patient unable to be scheduled: Not within requested timeframe    When does patient want to be seen/preferred time:  Before 11/7/24    Comments: Patient needs a pre-op. He is having another cardia procedure. Please advise on appt request. No same day slots available until 11/7/4    Could we send this information to you in [facility] [place] or would you prefer to receive a phone call?:   Patient would prefer a phone call   Okay to leave a detailed message?: Yes at Cell number on file:    Telephone Information:   Mobile [phone]       Call taken on 10/25/2024 at 3:04 PM by Jena Dahl

## 2024-10-28 ENCOUNTER — OFFICE VISIT (OUTPATIENT)
Dept: FAMILY MEDICINE | Facility: CLINIC | Age: 59
End: 2024-10-28
Payer: COMMERCIAL

## 2024-10-28 ENCOUNTER — TELEPHONE (OUTPATIENT)
Dept: CARDIOLOGY | Facility: CLINIC | Age: 59
End: 2024-10-28

## 2024-10-28 VITALS
OXYGEN SATURATION: 97 % | BODY MASS INDEX: 36.93 KG/M2 | WEIGHT: 297 LBS | RESPIRATION RATE: 22 BRPM | TEMPERATURE: 97.3 F | HEIGHT: 75 IN | DIASTOLIC BLOOD PRESSURE: 83 MMHG | SYSTOLIC BLOOD PRESSURE: 129 MMHG | HEART RATE: 48 BPM

## 2024-10-28 DIAGNOSIS — J01.90 ACUTE NON-RECURRENT SINUSITIS, UNSPECIFIED LOCATION: ICD-10-CM

## 2024-10-28 DIAGNOSIS — E66.01 CLASS 2 SEVERE OBESITY DUE TO EXCESS CALORIES WITH SERIOUS COMORBIDITY AND BODY MASS INDEX (BMI) OF 37.0 TO 37.9 IN ADULT (H): ICD-10-CM

## 2024-10-28 DIAGNOSIS — Z01.818 PREOP GENERAL PHYSICAL EXAM: Primary | ICD-10-CM

## 2024-10-28 DIAGNOSIS — R73.03 PREDIABETES: ICD-10-CM

## 2024-10-28 DIAGNOSIS — J20.9 ACUTE BRONCHITIS, UNSPECIFIED ORGANISM: ICD-10-CM

## 2024-10-28 DIAGNOSIS — E78.5 HYPERLIPIDEMIA LDL GOAL <70: ICD-10-CM

## 2024-10-28 DIAGNOSIS — E66.812 CLASS 2 SEVERE OBESITY DUE TO EXCESS CALORIES WITH SERIOUS COMORBIDITY AND BODY MASS INDEX (BMI) OF 37.0 TO 37.9 IN ADULT (H): ICD-10-CM

## 2024-10-28 PROCEDURE — 99214 OFFICE O/P EST MOD 30 MIN: CPT | Performed by: INTERNAL MEDICINE

## 2024-10-28 RX ORDER — CEFDINIR 300 MG/1
300 CAPSULE ORAL 2 TIMES DAILY
Qty: 14 CAPSULE | Refills: 1 | Status: SHIPPED | OUTPATIENT
Start: 2024-10-28

## 2024-10-28 NOTE — TELEPHONE ENCOUNTER
Pt called to report missed dose of eliquis 10/22  Scheduled for DCCV 11/7.  Will move out DCCV 11/12 or later.  Will push out follow up as well.  Pt aware of plan.  JW

## 2024-10-28 NOTE — PROGRESS NOTES
Preoperative Evaluation  [address] [address]  Phone: [phone]  Primary Provider: Torsten Thomas MD  Pre-op Performing Provider: Torsten Thomas MD  Oct 28, 2024   {Provider  Link to PREOP SmartSet  REQUIRED to apply standard patient instructions and medication directions to the AVS :229280}  {ROOMER review and update patient entered surgical information if needed :677337}  { After Pre-op is completed, use lists to pull documentation into note Link to complete Pre-Op    :477234}  {Pull Surgical Information into note after flowsheet completed:346448}  Fax number for surgical facility: Note does not need to be faxed, will be available electronically in Epic.    {Provider Charting Preference for Preop :970941}    Boby Barajas is a 58 year old, presenting for the following:  Pre-Op Exam          10/28/2024     3:51 PM   Additional Questions   Roomed by Tova   Accompanied by S/o     HPI related to upcoming procedure: ***  {Pull Pre-Op Questionnaire into note after flowsheet completed:349167}  Health Care Directive  Patient does not have a Health Care Directive: {ADVANCE_DIRECTIVE_STATUS:205980}    Preoperative Review of   {Mnpmpreport:944701}  {Review MNPMP for all patients per ICSI MNPMP Profile:655376}    {Chronic problem details (Optional) :440696}    Patient Active Problem List    Diagnosis Date Noted    Suspected sleep apnea 08/27/2024     Priority: Medium    Class 2 severe obesity due to excess calories with serious comorbidity in adult (H) 07/22/2024     Priority: Medium    JIM on CPAP 05/24/2024     Priority: Medium    Benign prostatic hyperplasia (BPH) with urinary urgency 01/29/2024     Priority: Medium    Tobacco use disorder 01/29/2024     Priority: Medium    Persistent atrial fibrillation (H) 01/29/2024     Priority: Medium    Hidradenitis suppurativa 11/10/2022     Priority: Medium    Intrinsic atopic  dermatitis 11/10/2022     Priority: Medium    Edema of both lower extremities due to peripheral venous insufficiency 11/10/2022     Priority: Medium    Dermatitis, seborrheic 11/10/2022     Priority: Medium    Acute foot pain 07/16/2021     Priority: Medium    Hypercholesterolemia 09/13/2016     Priority: Medium    Tobacco dependence 10/02/2012     Priority: Medium    Essential hypertension 07/24/2012     Priority: Medium    Anxiety 08/05/2010     Priority: Medium      Past Medical History:   Diagnosis Date    Atrial fibrillation (H)     High cholesterol      Past Surgical History:   Procedure Laterality Date    EP ABLATION PULMONARY VEIN ISOLATION N/A 9/30/2024    Procedure: Ablation Atrial Fibrillation;  Surgeon: Paramjit Saldaña MD;  Location: [facility] CV     Current Outpatient Medications   Medication Sig Dispense Refill    apixaban ANTICOAGULANT (ELIQUIS) 5 MG tablet Take 1 tablet (5 mg) by mouth 2 times daily 60 tablet 11    atorvastatin (LIPITOR) 10 MG tablet Take 1 tablet (10 mg) by mouth daily 90 tablet 3    benzonatate (TESSALON) 100 MG capsule Take 1 capsule (100 mg) by mouth 3 times daily as needed for cough 20 capsule 0    clotrimazole-betamethasone (LOTRISONE) 1-0.05 % external cream       dupilumab (DUPIXENT) 300 MG/2ML prefilled syringe Inject 300 mg Subcutaneous every 14 days      empagliflozin (JARDIANCE) 10 MG TABS tablet Take 1 tablet (10 mg) by mouth daily 30 tablet 11    fenofibrate (TRIGLIDE/LOFIBRA) 160 MG tablet Take 1 tablet (160 mg) by mouth daily 90 tablet 3    losartan (COZAAR) 25 MG tablet TAKE 1 TABLET BY MOUTH AT BEDTIME 90 tablet 0    methocarbamol (ROBAXIN) 750 MG tablet       nicotine (NICORETTE) 2 MG gum Place 2 mg inside cheek every hour as needed for nicotine withdrawal symptoms      pantoprazole (PROTONIX) 40 MG EC tablet Take 1 tablet (40 mg) by mouth daily. 45 tablet 0    sotalol (BETAPACE) 80 MG tablet Take 1.5 tablets (120 mg) by mouth 2 times daily. 180  "tablet 1    Testosterone 1.62 % GEL PLACE 1 PUMP (20.25 MG) ONTO THE SKIN DAILY APPLY FROM DISPENSER TO CLEAN, DRY, INTACT SKIN OF THE UPPER ARMS AND SHOULDERS 75 g 0    triamcinolone (KENALOG) 0.1 % external cream          Allergies   Allergen Reactions    Clobetasol Other (See Comments)     Allergy testing done - Clobetasol-77-Propionate    Inhaled Anticholinergic Agents Other (See Comments)     Stearyl Alcohol - Allergy testing done    No Clinical Screening - See Comments Other (See Comments)     Stearyl Alcohol - Allergy testing done    Octylisothiazolinone; Phenylenediamine - allergy testing done    Shellac Other (See Comments)     Allergy testing done    Trolamine (Triethanolamine) Other (See Comments)     Allergy testing done    Urea Other (See Comments)     Allergy testing done        Social History     Tobacco Use    Smoking status: Every Day     Current packs/day: 1.00     Average packs/day: 1.5 packs/day for 42.0 years (62.7 ttl pk-yrs)     Types: Cigarettes     Start date: 11/1/1982    Smokeless tobacco: Never   Substance Use Topics    Alcohol use: Yes     {FAMILY HISTORY (Optional):588437393}  History   Drug Use    Types: Marijuana     Comment: 3x times a week- night time for sleep and back pain           {ROS Picklists (Optional):245318}    Objective    BP (!) 152/87 (BP Location: Left arm, Patient Position: Sitting, Cuff Size: Adult Large)   Pulse 55   Temp 97.3  F (36.3  C) (Temporal)   Resp 22   Ht 1.905 m (6' 3\")   Wt 134.7 kg (297 lb)   SpO2 97%   BMI 37.12 kg/m     Estimated body mass index is 37.12 kg/m  as calculated from the following:    Height as of this encounter: 1.905 m (6' 3\").    Weight as of this encounter: 134.7 kg (297 lb).  Physical Exam  {Exam List :246172}    Recent Labs   Lab Test 10/09/24  1723 09/24/24  1110 07/30/24  1340 07/22/24  1717 01/29/24  1555 11/16/23  1137   HGB  --  16.7 16.4   < > 15.5  --    PLT  --  206 223   < > 194  --    INR  --   --   --   --  1.10  -- "     139  --    < > 140 139   POTASSIUM 5.0 5.1  --    < > 4.3 4.5   CR 0.79 0.86  --    < > 0.80 0.77   A1C  --   --   --   --   --  5.8*    < > = values in this interval not displayed.        Diagnostics  {LABS:397656}   {EK}    Revised Cardiac Risk Index (RCRI)  The patient has the following serious cardiovascular risks for perioperative complications:  {PREOP REVISED CARDIAC RISK INDEX (RCRI) :268865}     RCRI Interpretation: {REVISED CARDIAC RISK INTERPRETATION :511685}         Signed Electronically by: Torsten Thomas MD  A copy of this evaluation report is provided to the requesting physician.    {Provider Resources  Preop [facility] Preop Guidelines  Revised Cardiac Risk Index :783975}   {Email feedback regarding this note to [email or web address]   :971647}

## 2024-10-28 NOTE — PROGRESS NOTES
Preoperative Evaluation  [address] [address]  Phone: [phone]  Primary Provider: Torsten Thomas MD  Pre-op Performing Provider: Torsten Thomas MD  Oct 28, 2024         10/28/2024   Surgical Information   What procedure is being done? External Cardioversion   Facility or Hospital where procedure/surgery will be performed: [facility].   Who is doing the procedure / surgery? Paramjit Saldaña MD   Date of surgery / procedure: November 17, 2024   Time of surgery / procedure:  8:30 AM   Where do you plan to recover after surgery? Home   Fax number for surgical facility: Note does not need to be faxed, will be available electronically in Epic.    HPI related to upcoming procedure:           This is a 58 year old male who comes in today for a preoperative evaluation. Mr. Etienne has persistent atrial fibrillation that is not amenable to pharmacotherapy. He is scheduled for another external cardioversion on November 17, 2024.                  10/28/2024   Pre-Op Questionnaire   Have you ever had a heart attack or stroke? No    Have you ever had surgery on your heart or blood vessels, such as a stent placement, a coronary artery bypass, or surgery on an artery in your head, neck, heart, or legs? (!) YES    Do you have chest pain with activity? (!) YES    Do you have a history of heart failure? No    Do you currently have a cold, bronchitis or symptoms of other infection? (!) YES    Do you have a cough, shortness of breath, or wheezing? (!) YES    Do you or anyone in your family have previous history of blood clots? No    Do you or does anyone in your family have a serious bleeding problem such as prolonged bleeding following surgeries or cuts? (!) UNKNOWN    Have you ever had problems with anemia or been told to take iron pills? No    Have you had any abnormal blood loss such as black, tarry or bloody stools? No     Have you ever had a blood transfusion? No    Are you willing to have a blood transfusion if it is medically needed before, during, or after your surgery? Yes    Have you or any of your relatives ever had problems with anesthesia? (!) UNKNOWN    Do you have sleep apnea, excessive snoring or daytime drowsiness? (!) YES    Do you have a CPAP machine? Yes    Do you have any artifical heart valves or other implanted medical devices like a pacemaker, defibrillator, or continuous glucose monitor? No    Do you have artificial joints? No    Are you allergic to latex? No       Health Care Directive  Patient does not have a Health Care Directive: Patient wants FULL CODE.    Preoperative Review of    reviewed - controlled substances reflected in medication list.(Testosterone)      Patient Active Problem List    Diagnosis Date Noted    Suspected sleep apnea 08/27/2024     Priority: Medium    Class 2 severe obesity due to excess calories with serious comorbidity in adult (H) 07/22/2024     Priority: Medium    JIM on CPAP 05/24/2024     Priority: Medium    Benign prostatic hyperplasia (BPH) with urinary urgency 01/29/2024     Priority: Medium    Tobacco use disorder 01/29/2024     Priority: Medium    Persistent atrial fibrillation (H) 01/29/2024     Priority: Medium    Hidradenitis suppurativa 11/10/2022     Priority: Medium    Intrinsic atopic dermatitis 11/10/2022     Priority: Medium    Edema of both lower extremities due to peripheral venous insufficiency 11/10/2022     Priority: Medium    Dermatitis, seborrheic 11/10/2022     Priority: Medium    Acute foot pain 07/16/2021     Priority: Medium    Hypercholesterolemia 09/13/2016     Priority: Medium    Tobacco dependence 10/02/2012     Priority: Medium    Essential hypertension 07/24/2012     Priority: Medium    Anxiety 08/05/2010     Priority: Medium      Past Medical History:   Diagnosis Date    Atrial fibrillation (H)     High cholesterol      Past Surgical History:    Procedure Laterality Date    EP ABLATION PULMONARY VEIN ISOLATION N/A 9/30/2024    Procedure: Ablation Atrial Fibrillation;  Surgeon: Paramjit Saldaña MD;  Location: [facility] CV     Current Outpatient Medications   Medication Sig Dispense Refill    apixaban ANTICOAGULANT (ELIQUIS) 5 MG tablet Take 1 tablet (5 mg) by mouth 2 times daily 60 tablet 11    atorvastatin (LIPITOR) 10 MG tablet Take 1 tablet (10 mg) by mouth daily 90 tablet 3    benzonatate (TESSALON) 100 MG capsule Take 1 capsule (100 mg) by mouth 3 times daily as needed for cough 20 capsule 0    clotrimazole-betamethasone (LOTRISONE) 1-0.05 % external cream       dupilumab (DUPIXENT) 300 MG/2ML prefilled syringe Inject 300 mg Subcutaneous every 14 days      empagliflozin (JARDIANCE) 10 MG TABS tablet Take 1 tablet (10 mg) by mouth daily 30 tablet 11    fenofibrate (TRIGLIDE/LOFIBRA) 160 MG tablet Take 1 tablet (160 mg) by mouth daily 90 tablet 3    losartan (COZAAR) 25 MG tablet TAKE 1 TABLET BY MOUTH AT BEDTIME 90 tablet 0    methocarbamol (ROBAXIN) 750 MG tablet       nicotine (NICORETTE) 2 MG gum Place 2 mg inside cheek every hour as needed for nicotine withdrawal symptoms      pantoprazole (PROTONIX) 40 MG EC tablet Take 1 tablet (40 mg) by mouth daily. 45 tablet 0    sotalol (BETAPACE) 80 MG tablet Take 1.5 tablets (120 mg) by mouth 2 times daily. 180 tablet 1    Testosterone 1.62 % GEL PLACE 1 PUMP (20.25 MG) ONTO THE SKIN DAILY APPLY FROM DISPENSER TO CLEAN, DRY, INTACT SKIN OF THE UPPER ARMS AND SHOULDERS 75 g 0    triamcinolone (KENALOG) 0.1 % external cream          Allergies   Allergen Reactions    Clobetasol Other (See Comments)     Allergy testing done - Clobetasol-77-Propionate    Inhaled Anticholinergic Agents Other (See Comments)     Stearyl Alcohol - Allergy testing done    No Clinical Screening - See Comments Other (See Comments)     Stearyl Alcohol - Allergy testing done    Octylisothiazolinone; Phenylenediamine -  "allergy testing done    Shellac Other (See Comments)     Allergy testing done    Trolamine (Triethanolamine) Other (See Comments)     Allergy testing done    Urea Other (See Comments)     Allergy testing done        Social History     Tobacco Use    Smoking status: Every Day     Current packs/day: 1.00     Average packs/day: 1.5 packs/day for 42.0 years (62.7 ttl pk-yrs)     Types: Cigarettes     Start date: 11/1/1982    Smokeless tobacco: Never   Substance Use Topics    Alcohol use: Yes        REVIEW OF SYSTEMS  CONSTITUTIONAL:POSITIVE  for OBESITY.  INTEGUMENTARY/SKIN: NEGATIVE for worrisome rashes, moles or lesions  EYES: NEGATIVE for vision changes or irritation  ENT/MOUTH: POSITIVE for sinus congestion.  RESP:POSITIVE for productive cough.  CV: NEGATIVE for chest pain, palpitations or peripheral edema  GI: NEGATIVE for nausea, abdominal pain, heartburn, or change in bowel habits  : NEGATIVE for frequency, dysuria, or hematuria  MUSCULOSKELETAL: NEGATIVE for significant arthralgias or myalgia  NEURO: NEGATIVE for weakness, dizziness or paresthesias  ENDOCRINE: POSITIVE  for prediabetes and hyperlipidemia.  HEME: NEGATIVE for bleeding problems  PSYCHIATRIC: NEGATIVE for changes in mood or affect    OBJECTIVE   /83 (BP Location: Left arm, Patient Position: Sitting, Cuff Size: Adult Large)   Pulse (!) 48   Temp 97.3  F (36.3  C) (Temporal)   Resp 22   Ht 1.905 m (6' 3\")   Wt 134.7 kg (297 lb)   SpO2 97%   BMI 37.12 kg/m     Estimated body mass index is 37.12 kg/m  as calculated from the following:    Height as of this encounter: 1.905 m (6' 3\").    Weight as of this encounter: 134.7 kg (297 lb).  Physical Exam  GENERAL: alert and no distress  EYES: Eyes grossly normal to inspection and conjunctivae and sclerae normal  HENT: normal cephalic/atraumatic and oral mucous membranes moist  NECK: no adenopathy and thyroid normal to palpation  RESP: lungs clear to auscultation - no rales, rhonchi or " wheezes  CV: regular rates and rhythm and no peripheral edema  ABDOMEN: soft, nontender and bowel sounds normal  MS: no gross musculoskeletal defects noted, no edema  NEURO: Normal strength and tone, mentation intact and speech normal  PSYCH: mentation appears normal, affect normal/bright    Diagnostics  Available in EPIC    ASSESSMENT/PLAN  Preop general physical exam  - CBC with platelets and differential; Future  - Comprehensive metabolic panel; Future  - INR; Future  Revised Cardiac Risk Index (RCRI)  The patient has the following serious cardiovascular risks for perioperative complications:   - Congestive Heart Failure (pulmonary edema, PND, s3 jose, CXR with pulmonary congestion, basilar rales) = 1 point   RCRI Interpretation: 1 point: Class II (low risk - 0.9% complication rate)    Acute bronchitis, unspecified organism  - cefdinir (OMNICEF) 300 MG capsule; Take 1 capsule (300 mg) by mouth 2 times daily.    Acute non-recurrent sinusitis, unspecified location  - cefdinir (OMNICEF) 300 MG capsule; Take 1 capsule (300 mg) by mouth 2 times daily.    Prediabetes  - Hemoglobin A1c; Future  - Comprehensive metabolic panel; Future    Hyperlipidemia LDL goal <70  - Comprehensive metabolic panel; Future  - Lipid panel reflex to direct LDL Fasting; Future    Class 2 severe obesity due to excess calories with serious comorbidity and body mass index (BMI) of 37.0 to 37.9 in adult (H)  - Tirzepatide 2.5 MG/0.5ML SOAJ; Inject 0.5 mLs (2.5 mg) subcutaneously every 7 days.       Signed Electronically by: Torsten Thomas MD  A copy of this evaluation report is provided to the requesting physician.

## 2024-10-28 NOTE — H&P (VIEW-ONLY)
Preoperative Evaluation  [address] [address]  Phone: [phone]  Primary Provider: Torsten Thomas MD  Pre-op Performing Provider: Torsten Thomas MD  Oct 28, 2024         10/28/2024   Surgical Information   What procedure is being done? External Cardioversion   Facility or Hospital where procedure/surgery will be performed: [facility].   Who is doing the procedure / surgery? Paramjit Saldaña MD   Date of surgery / procedure: November 17, 2024   Time of surgery / procedure:  8:30 AM   Where do you plan to recover after surgery? Home   Fax number for surgical facility: Note does not need to be faxed, will be available electronically in Epic.    HPI related to upcoming procedure:           This is a 58 year old male who comes in today for a preoperative evaluation. Mr. Etienne has persistent atrial fibrillation that is not amenable to pharmacotherapy. He is scheduled for another external cardioversion on November 17, 2024.                  10/28/2024   Pre-Op Questionnaire   Have you ever had a heart attack or stroke? No    Have you ever had surgery on your heart or blood vessels, such as a stent placement, a coronary artery bypass, or surgery on an artery in your head, neck, heart, or legs? (!) YES    Do you have chest pain with activity? (!) YES    Do you have a history of heart failure? No    Do you currently have a cold, bronchitis or symptoms of other infection? (!) YES    Do you have a cough, shortness of breath, or wheezing? (!) YES    Do you or anyone in your family have previous history of blood clots? No    Do you or does anyone in your family have a serious bleeding problem such as prolonged bleeding following surgeries or cuts? (!) UNKNOWN    Have you ever had problems with anemia or been told to take iron pills? No    Have you had any abnormal blood loss such as black, tarry or bloody stools? No     Have you ever had a blood transfusion? No    Are you willing to have a blood transfusion if it is medically needed before, during, or after your surgery? Yes    Have you or any of your relatives ever had problems with anesthesia? (!) UNKNOWN    Do you have sleep apnea, excessive snoring or daytime drowsiness? (!) YES    Do you have a CPAP machine? Yes    Do you have any artifical heart valves or other implanted medical devices like a pacemaker, defibrillator, or continuous glucose monitor? No    Do you have artificial joints? No    Are you allergic to latex? No       Health Care Directive  Patient does not have a Health Care Directive: Patient wants FULL CODE.    Preoperative Review of    reviewed - controlled substances reflected in medication list.(Testosterone)      Patient Active Problem List    Diagnosis Date Noted    Suspected sleep apnea 08/27/2024     Priority: Medium    Class 2 severe obesity due to excess calories with serious comorbidity in adult (H) 07/22/2024     Priority: Medium    JIM on CPAP 05/24/2024     Priority: Medium    Benign prostatic hyperplasia (BPH) with urinary urgency 01/29/2024     Priority: Medium    Tobacco use disorder 01/29/2024     Priority: Medium    Persistent atrial fibrillation (H) 01/29/2024     Priority: Medium    Hidradenitis suppurativa 11/10/2022     Priority: Medium    Intrinsic atopic dermatitis 11/10/2022     Priority: Medium    Edema of both lower extremities due to peripheral venous insufficiency 11/10/2022     Priority: Medium    Dermatitis, seborrheic 11/10/2022     Priority: Medium    Acute foot pain 07/16/2021     Priority: Medium    Hypercholesterolemia 09/13/2016     Priority: Medium    Tobacco dependence 10/02/2012     Priority: Medium    Essential hypertension 07/24/2012     Priority: Medium    Anxiety 08/05/2010     Priority: Medium      Past Medical History:   Diagnosis Date    Atrial fibrillation (H)     High cholesterol      Past Surgical History:    Procedure Laterality Date    EP ABLATION PULMONARY VEIN ISOLATION N/A 9/30/2024    Procedure: Ablation Atrial Fibrillation;  Surgeon: Paramjit Saldaña MD;  Location: [facility]     Current Outpatient Medications   Medication Sig Dispense Refill    apixaban ANTICOAGULANT (ELIQUIS) 5 MG tablet Take 1 tablet (5 mg) by mouth 2 times daily 60 tablet 11    atorvastatin (LIPITOR) 10 MG tablet Take 1 tablet (10 mg) by mouth daily 90 tablet 3    benzonatate (TESSALON) 100 MG capsule Take 1 capsule (100 mg) by mouth 3 times daily as needed for cough 20 capsule 0    clotrimazole-betamethasone (LOTRISONE) 1-0.05 % external cream       dupilumab (DUPIXENT) 300 MG/2ML prefilled syringe Inject 300 mg Subcutaneous every 14 days      empagliflozin (JARDIANCE) 10 MG TABS tablet Take 1 tablet (10 mg) by mouth daily 30 tablet 11    fenofibrate (TRIGLIDE/LOFIBRA) 160 MG tablet Take 1 tablet (160 mg) by mouth daily 90 tablet 3    losartan (COZAAR) 25 MG tablet TAKE 1 TABLET BY MOUTH AT BEDTIME 90 tablet 0    methocarbamol (ROBAXIN) 750 MG tablet       nicotine (NICORETTE) 2 MG gum Place 2 mg inside cheek every hour as needed for nicotine withdrawal symptoms      pantoprazole (PROTONIX) 40 MG EC tablet Take 1 tablet (40 mg) by mouth daily. 45 tablet 0    sotalol (BETAPACE) 80 MG tablet Take 1.5 tablets (120 mg) by mouth 2 times daily. 180 tablet 1    Testosterone 1.62 % GEL PLACE 1 PUMP (20.25 MG) ONTO THE SKIN DAILY APPLY FROM DISPENSER TO CLEAN, DRY, INTACT SKIN OF THE UPPER ARMS AND SHOULDERS 75 g 0    triamcinolone (KENALOG) 0.1 % external cream          Allergies   Allergen Reactions    Clobetasol Other (See Comments)     Allergy testing done - Clobetasol-77-Propionate    Inhaled Anticholinergic Agents Other (See Comments)     Stearyl Alcohol - Allergy testing done    No Clinical Screening - See Comments Other (See Comments)     Stearyl Alcohol - Allergy testing done    Octylisothiazolinone; Phenylenediamine -  "allergy testing done    Shellac Other (See Comments)     Allergy testing done    Trolamine (Triethanolamine) Other (See Comments)     Allergy testing done    Urea Other (See Comments)     Allergy testing done        Social History     Tobacco Use    Smoking status: Every Day     Current packs/day: 1.00     Average packs/day: 1.5 packs/day for 42.0 years (62.7 ttl pk-yrs)     Types: Cigarettes     Start date: 11/1/1982    Smokeless tobacco: Never   Substance Use Topics    Alcohol use: Yes        REVIEW OF SYSTEMS  CONSTITUTIONAL:POSITIVE  for OBESITY.  INTEGUMENTARY/SKIN: NEGATIVE for worrisome rashes, moles or lesions  EYES: NEGATIVE for vision changes or irritation  ENT/MOUTH: POSITIVE for sinus congestion.  RESP:POSITIVE for productive cough.  CV: NEGATIVE for chest pain, palpitations or peripheral edema  GI: NEGATIVE for nausea, abdominal pain, heartburn, or change in bowel habits  : NEGATIVE for frequency, dysuria, or hematuria  MUSCULOSKELETAL: NEGATIVE for significant arthralgias or myalgia  NEURO: NEGATIVE for weakness, dizziness or paresthesias  ENDOCRINE: POSITIVE  for prediabetes and hyperlipidemia.  HEME: NEGATIVE for bleeding problems  PSYCHIATRIC: NEGATIVE for changes in mood or affect    OBJECTIVE   /83 (BP Location: Left arm, Patient Position: Sitting, Cuff Size: Adult Large)   Pulse (!) 48   Temp 97.3  F (36.3  C) (Temporal)   Resp 22   Ht 1.905 m (6' 3\")   Wt 134.7 kg (297 lb)   SpO2 97%   BMI 37.12 kg/m     Estimated body mass index is 37.12 kg/m  as calculated from the following:    Height as of this encounter: 1.905 m (6' 3\").    Weight as of this encounter: 134.7 kg (297 lb).  Physical Exam  GENERAL: alert and no distress  EYES: Eyes grossly normal to inspection and conjunctivae and sclerae normal  HENT: normal cephalic/atraumatic and oral mucous membranes moist  NECK: no adenopathy and thyroid normal to palpation  RESP: lungs clear to auscultation - no rales, rhonchi or " wheezes  CV: regular rates and rhythm and no peripheral edema  ABDOMEN: soft, nontender and bowel sounds normal  MS: no gross musculoskeletal defects noted, no edema  NEURO: Normal strength and tone, mentation intact and speech normal  PSYCH: mentation appears normal, affect normal/bright    Diagnostics  Available in EPIC    ASSESSMENT/PLAN  Preop general physical exam  - CBC with platelets and differential; Future  - Comprehensive metabolic panel; Future  - INR; Future  Revised Cardiac Risk Index (RCRI)  The patient has the following serious cardiovascular risks for perioperative complications:   - Congestive Heart Failure (pulmonary edema, PND, s3 jose, CXR with pulmonary congestion, basilar rales) = 1 point   RCRI Interpretation: 1 point: Class II (low risk - 0.9% complication rate)    Acute bronchitis, unspecified organism  - cefdinir (OMNICEF) 300 MG capsule; Take 1 capsule (300 mg) by mouth 2 times daily.    Acute non-recurrent sinusitis, unspecified location  - cefdinir (OMNICEF) 300 MG capsule; Take 1 capsule (300 mg) by mouth 2 times daily.    Prediabetes  - Hemoglobin A1c; Future  - Comprehensive metabolic panel; Future    Hyperlipidemia LDL goal <70  - Comprehensive metabolic panel; Future  - Lipid panel reflex to direct LDL Fasting; Future    Class 2 severe obesity due to excess calories with serious comorbidity and body mass index (BMI) of 37.0 to 37.9 in adult (H)  - Tirzepatide 2.5 MG/0.5ML SOAJ; Inject 0.5 mLs (2.5 mg) subcutaneously every 7 days.       Signed Electronically by: Torsten Thomas MD  A copy of this evaluation report is provided to the requesting physician.

## 2024-10-28 NOTE — PATIENT INSTRUCTIONS
At [facility], we strive to deliver an exceptional experience to you, every time we see you. If you receive a survey, please let us know what we are doing well and/or what we could improve upon, as we do value your feedback.  If you have MyChart, you can expect to receive results automatically within 24 hours of their completion.  Your provider will send a note interpreting your results as well.   If you do not have MyChart, you should receive your results in about a week by mail.    Your care team:                            Family Medicine Internal Medicine   MD Torsten Apple, MD Amirah Chahal, MD Vincent Stubbs, MD Shalonda Soto, PABolivarC    Campbell Perry, MD Pediatrics   Sindhu Fonseca, MD Zina Silva, MD Erna Hoang, APRN CNP Suzie Lazo APRN CNP   Bhavani Zelaya, MD Concepcion Thurston, MD Joyce Coffey, CNP     Seferino Doty, CNP Same-Day Provider (No follow-up visits)   LUCY Holland, DNP Gretel Ovalles, LUCY Mac, FNP, BC JOSE CampbellC     Clinic hours: Monday - Thursday 7 am-6 pm; Fridays 7 am-5 pm.   Urgent care: Monday - Friday 10 am- 8 pm; Saturday and Sunday 9 am-5 pm.    Clinic: [phone]       [facility]: Monday - Thursday 8 am - 7 pm; Friday 8 am - 6 pm  [facility]: [phone]   Patient Education   Preparing for Your Surgery  For Adults  Getting started  In most cases, a nurse will call to review your health history and instructions. They will give you an arrival time based on your scheduled surgery time. Please be ready to share:  Your doctor's clinic name and phone number  Your medical, surgical, and anesthesia history  A list of allergies and sensitivities  A list of medicines, including herbal treatments and over-the-counter drugs  Whether the patient has a legal guardian (ask how to send us the papers in advance)  Note: You  may not receive a call if you were seen at our PAC (Preoperative Assessment Center).  Please tell us if you're pregnant--or if there's any chance you might be pregnant. Some surgeries may injure a fetus (unborn baby), so they require a pregnancy test. Surgeries that are safe for a fetus don't always need a test, and you can choose whether to have one.   Preparing for surgery  Within 10 to 30 days of surgery: Have a pre-op exam (sometimes called an H&P, or History and Physical). This can be done at a clinic or pre-operative center.  If you're having a , you may not need this exam. Talk to your care team.  At your pre-op exam, talk to your care team about all medicines you take. (This includes CBD oil and any drugs, such as THC, marijuana, and other forms of cannabis.) If you need to stop any medicine before surgery, ask when to start taking it again.  This is for your safety. Many medicines and drugs can make you bleed too much during surgery. Some change how well surgery (anesthesia) drugs work.  Call your insurance company to let them know you're having surgery. (If you don't have insurance, call [phone].)  Call your clinic if there's any change in your health. This includes a scrape or scratch near the surgery site, or any signs of a cold (sore throat, runny nose, cough, rash, fever).  Eating and drinking guidelines  For your safety: Unless your surgeon tells you otherwise, follow the guidelines below.  Eat and drink as normal until 8 hours before you arrive for surgery. After that, no food or milk. You can spit out gum when you arrive.  Drink clear liquids until 2 hours before you arrive. These are liquids you can see through, like water, Gatorade, and Propel Water. They also include plain black coffee and tea (no cream or milk).  No alcohol for 24 hours before you arrive. The night before surgery, stop any drinks that contain THC.  If your care team tells you to take medicine on the morning of  surgery, it's okay to take it with a sip of water. No other medicines or drugs are allowed (including CBD oil)--follow your care team's instructions.  If you have questions the day of surgery, call your hospital or surgery center.   Preventing infection  Shower or bathe the night before and the morning of surgery. Follow the instructions your clinic gave you. (If no instructions, use regular soap.)  Don't shave or clip hair near your surgery site. We'll remove the hair if needed.  Don't smoke or vape the morning of surgery. No chewing tobacco for 6 hours before you arrive. A nicotine patch is okay. You may spit out nicotine gum when you arrive.  For some surgeries, the surgeon will tell you to fully quit smoking and nicotine.  We will make every effort to keep you safe from infection. We will:  Clean our hands often with soap and water (or an alcohol-based hand rub).  Clean the skin at your surgery site with a special soap that kills germs.  Give you a special gown to keep you warm. (Cold raises the risk of infection.)  Wear hair covers, masks, gowns, and gloves during surgery.  Give antibiotic medicine, if prescribed. Not all surgeries need this medicine.  What to bring on the day of surgery  Photo ID and insurance card  Copy of your health care directive, if you have one  Glasses and hearing aids (bring cases)  You can't wear contacts during surgery  Inhaler and eye drops, if you use them (tell us about these when you arrive)  CPAP machine or breathing device, if you use them  A few personal items, if spending the night  If you have . . .  A pacemaker, ICD (cardiac defibrillator), or other implant: Bring the ID card.  An implanted stimulator: Bring the remote control.  A legal guardian: Bring a copy of the certified (court-stamped) guardianship papers.  Please remove any jewelry, including body piercings. Leave jewelry and other valuables at home.  If you're going home the day of surgery  You must have a  responsible adult drive you home. They should stay with you overnight as well.  If you don't have someone to stay with you, and you aren't safe to go home alone, we may keep you overnight. Insurance often won't pay for this.  After surgery  If it's hard to control your pain or you need more pain medicine, please call your surgeon's office.  Questions?   If you have any questions for your care team, list them here:   ____________________________________________________________________________________________________________________________________________________________________________________________________________________________________________________________  For informational purposes only. Not to replace the advice of your health care provider. Copyright   2003, 2019 [employer]. All rights reserved. Clinically reviewed by Rafy Wiseman MD. [employer] [id number] - REV 08/24.

## 2024-10-29 ENCOUNTER — TELEPHONE (OUTPATIENT)
Dept: FAMILY MEDICINE | Facility: CLINIC | Age: 59
End: 2024-10-29

## 2024-10-30 NOTE — TELEPHONE ENCOUNTER
Central Prior Authorization Team   Phone: [phone]    PA Initiation    Medication: ZEPBOUND  Insurance Company: [facility] - Phone [phone] Fax [phone]  Pharmacy Filling the Rx: [facility] [address] [place] [address] - [address]  Filling Pharmacy Phone: [phone]  Filling Pharmacy Fax:    Start Date: 10/30/2024

## 2024-10-31 RX ORDER — AMIODARONE HYDROCHLORIDE 200 MG/1
TABLET ORAL
Qty: 100 TABLET | Refills: 0 | Status: SHIPPED | OUTPATIENT
Start: 2024-10-31 | End: 2025-01-09

## 2024-10-31 NOTE — PROGRESS NOTES
Paramjit Saldaña MD  You6 minutes ago (1:58 PM)     AYAH Sawant,    Thanks for the note.  When last assessed, ESR was normal and CRP was slightly elevated.  Can we offer the following:  - stop sotalol  - start amiodarone as a short term strategy, 400mg twice daily for 10 days, then 200mg daily  - proceed with DCCV any time after completion of amiodarone loading phase    If he would like to review with me prior, we can coordinate a time to do.    Thank you,  Lev

## 2024-10-31 NOTE — TELEPHONE ENCOUNTER
PRIOR AUTHORIZATION DENIED    Medication: ZEPBOUND-PA DENIED     Denial Date: 10/30/2024    Denial Rational:         Appeal Information:

## 2024-10-31 NOTE — TELEPHONE ENCOUNTER
Spoke with pt regarding DCCV and medication changes. Pt will have his last dose of Sotalol today and will start Amiodarone load on Sunday. He understands medication instructions and duration for loading. He verbalized understanding for medication switch from Sotalol to Amiodarone and this was recommended by Dr. Saldaña.     Savana Garcia RN

## 2024-11-04 ENCOUNTER — LAB (OUTPATIENT)
Dept: LAB | Facility: CLINIC | Age: 59
End: 2024-11-04
Payer: COMMERCIAL

## 2024-11-04 DIAGNOSIS — Z01.818 PREOP GENERAL PHYSICAL EXAM: ICD-10-CM

## 2024-11-04 DIAGNOSIS — R73.03 PREDIABETES: ICD-10-CM

## 2024-11-04 DIAGNOSIS — E78.5 HYPERLIPIDEMIA LDL GOAL <70: ICD-10-CM

## 2024-11-04 LAB
ALBUMIN SERPL BCG-MCNC: 4.3 G/DL (ref 3.5–5.2)
ALP SERPL-CCNC: 75 U/L (ref 40–150)
ALT SERPL W P-5'-P-CCNC: 28 U/L (ref 0–70)
ANION GAP SERPL CALCULATED.3IONS-SCNC: 11 MMOL/L (ref 7–15)
AST SERPL W P-5'-P-CCNC: 28 U/L (ref 0–45)
BASOPHILS # BLD AUTO: 0 10E3/UL (ref 0–0.2)
BASOPHILS NFR BLD AUTO: 1 %
BILIRUB SERPL-MCNC: 0.3 MG/DL
BUN SERPL-MCNC: 11.6 MG/DL (ref 6–20)
CALCIUM SERPL-MCNC: 9.6 MG/DL (ref 8.8–10.4)
CHLORIDE SERPL-SCNC: 107 MMOL/L (ref 98–107)
CHOLEST SERPL-MCNC: 184 MG/DL
CREAT SERPL-MCNC: 0.81 MG/DL (ref 0.67–1.17)
EGFRCR SERPLBLD CKD-EPI 2021: >90 ML/MIN/1.73M2
EOSINOPHIL # BLD AUTO: 0.1 10E3/UL (ref 0–0.7)
EOSINOPHIL NFR BLD AUTO: 2 %
ERYTHROCYTE [DISTWIDTH] IN BLOOD BY AUTOMATED COUNT: 13.5 % (ref 10–15)
EST. AVERAGE GLUCOSE BLD GHB EST-MCNC: 120 MG/DL
FASTING STATUS PATIENT QL REPORTED: YES
FASTING STATUS PATIENT QL REPORTED: YES
GLUCOSE SERPL-MCNC: 91 MG/DL (ref 70–99)
HBA1C MFR BLD: 5.8 % (ref 0–5.6)
HCO3 SERPL-SCNC: 26 MMOL/L (ref 22–29)
HCT VFR BLD AUTO: 54.2 % (ref 40–53)
HDLC SERPL-MCNC: 37 MG/DL
HGB BLD-MCNC: 17.9 G/DL (ref 13.3–17.7)
IMM GRANULOCYTES # BLD: 0 10E3/UL
IMM GRANULOCYTES NFR BLD: 0 %
INR PPP: 1.14 (ref 0.85–1.15)
LDLC SERPL CALC-MCNC: 109 MG/DL
LYMPHOCYTES # BLD AUTO: 2.2 10E3/UL (ref 0.8–5.3)
LYMPHOCYTES NFR BLD AUTO: 45 %
MCH RBC QN AUTO: 31.5 PG (ref 26.5–33)
MCHC RBC AUTO-ENTMCNC: 33 G/DL (ref 31.5–36.5)
MCV RBC AUTO: 95 FL (ref 78–100)
MONOCYTES # BLD AUTO: 0.4 10E3/UL (ref 0–1.3)
MONOCYTES NFR BLD AUTO: 9 %
NEUTROPHILS # BLD AUTO: 2.2 10E3/UL (ref 1.6–8.3)
NEUTROPHILS NFR BLD AUTO: 44 %
NONHDLC SERPL-MCNC: 147 MG/DL
PLATELET # BLD AUTO: 205 10E3/UL (ref 150–450)
POTASSIUM SERPL-SCNC: 4.3 MMOL/L (ref 3.4–5.3)
PROT SERPL-MCNC: 7.3 G/DL (ref 6.4–8.3)
RBC # BLD AUTO: 5.68 10E6/UL (ref 4.4–5.9)
SODIUM SERPL-SCNC: 144 MMOL/L (ref 135–145)
TRIGL SERPL-MCNC: 189 MG/DL
WBC # BLD AUTO: 5 10E3/UL (ref 4–11)

## 2024-11-04 PROCEDURE — 80053 COMPREHEN METABOLIC PANEL: CPT

## 2024-11-04 PROCEDURE — 80061 LIPID PANEL: CPT

## 2024-11-04 PROCEDURE — 85610 PROTHROMBIN TIME: CPT

## 2024-11-04 PROCEDURE — 85025 COMPLETE CBC W/AUTO DIFF WBC: CPT

## 2024-11-04 PROCEDURE — 83036 HEMOGLOBIN GLYCOSYLATED A1C: CPT

## 2024-11-04 PROCEDURE — 36415 COLL VENOUS BLD VENIPUNCTURE: CPT

## 2024-11-05 ENCOUNTER — TELEPHONE (OUTPATIENT)
Dept: CARDIOLOGY | Facility: CLINIC | Age: 59
End: 2024-11-05
Payer: COMMERCIAL

## 2024-11-05 NOTE — TELEPHONE ENCOUNTER
Pre-Procedure Education    Procedure: DCCV with Gaye Laboy NP on 11/14/2024 with arrival time 8:30 am    Orders: Orderset for procedure verified signed/held    COVID: COVID policy- if pt develops COVID like symptoms prior to procedure, he/she would need to complete an at home with a rapid antigen COVID test 1-2 days prior to your procedure date. If COVID + pt is aware the procedure will need to be rescheduled, and to contact CV scheduling as soon as possible    Pre-Op H&P: Completed- Available in Epic    Education:   Contact:  ALL  INSTRUCTIONS REVIEWED WITH PT AND WIFE    PT HAS MY CHART  PT AWARE PROCEDURE LETTER SENT    ALL INSTRUCTIONS REVIEWED WITH PT AND WIFE    PT INSTRUCTED TO HOLD ANY VITAMINS MINERALS CALCIUM IRON OR SUPPLEMENTS THE MORNING OF CV  PT INSTRUCTED NO GUM CHEWING MINTS OR CANDY THE MORNING OF CV  PT INSTRUCTED TO BATHE OR SHOWER BEFORE COMING IN  PT INSTRUCTED TO LEAVE JEWELRY AT HOME  PT IS ON ELIQUIS  PT IS  ON AMIODARONE  PT IS ON JARDIANCE AND WILL HOLD MORNING OF CV   PT IS JIM/CPAP  PT HAS A POST CV FOLLOW UP WITH KENN 12/17/2024     Pre-Procedure Instruction: NPO after midnight pre procedure, Defined NPO with pt, Remove all jewelry and leave all valuables at home, Shower prior to arrival, Sedation plan/orders, Transportation requirements and arrangements post procedure, Post-procedure follow up process, Post-procedure restrictions/expectations, and Pre-procedure letter sent- letter tab  Risks:      Medication:   Instructions regarding anticoagulants: Eliquis- To continue anticoagulation uninterrupted through their procedure    Instructions regarding antiarrhythmic medication: Amiodarone; continue medication prior to procedure as prescribed    Instructions given to pt regarding diuretics medication: NA for DCCV    Instructions given to pt regarding DM/GLP-1 medication:   DM-  PT INSTRUCTED TO HOLD HIS JARDIANCE THE MORNING OF CV  GLP-1- None  Instructions for medication, other  than anticoagulants and antiarrhythmics listed above, given to pt: Take all medication AM of procedure with small sips of water     Important patient information for staff:  PT IS JIM/CPAP PT NEW ON AMIODARONE PT IS ON JARDIANCE    11/5/2024 10:05 AM  Neema Grijalva LPN

## 2024-11-07 DIAGNOSIS — I10 HTN, GOAL BELOW 140/90: ICD-10-CM

## 2024-11-08 RX ORDER — LOSARTAN POTASSIUM 25 MG/1
25 TABLET ORAL AT BEDTIME
Qty: 90 TABLET | Refills: 0 | Status: SHIPPED | OUTPATIENT
Start: 2024-11-08

## 2024-11-12 ENCOUNTER — TELEPHONE (OUTPATIENT)
Dept: CARDIOLOGY | Facility: CLINIC | Age: 59
End: 2024-11-12

## 2024-11-12 NOTE — TELEPHONE ENCOUNTER
Phone call from patient wanting to discuss the dosing for amiodarone, he wants to make sure he is ok to decrease to one 200 mg tablet tomorrow after his current load.  Reviewed instructions, also reviewed and updated his entire medication list at his request.

## 2024-11-14 ENCOUNTER — HOSPITAL ENCOUNTER (OUTPATIENT)
Dept: CARDIOLOGY | Facility: HOSPITAL | Age: 59
Discharge: HOME | End: 2024-11-14
Attending: INTERNAL MEDICINE | Admitting: INTERNAL MEDICINE
Payer: COMMERCIAL

## 2024-11-14 ENCOUNTER — ANESTHESIA EVENT (OUTPATIENT)
Dept: CARDIOLOGY | Facility: HOSPITAL | Age: 59
End: 2024-11-14
Payer: COMMERCIAL

## 2024-11-14 ENCOUNTER — ANESTHESIA (OUTPATIENT)
Dept: CARDIOLOGY | Facility: HOSPITAL | Age: 59
End: 2024-11-14
Payer: COMMERCIAL

## 2024-11-14 VITALS
HEIGHT: 74 IN | DIASTOLIC BLOOD PRESSURE: 95 MMHG | OXYGEN SATURATION: 95 % | WEIGHT: 292 LBS | SYSTOLIC BLOOD PRESSURE: 160 MMHG | BODY MASS INDEX: 37.47 KG/M2 | TEMPERATURE: 97.6 F | HEART RATE: 80 BPM | RESPIRATION RATE: 18 BRPM

## 2024-11-14 DIAGNOSIS — I48.19 PERSISTENT ATRIAL FIBRILLATION (H): ICD-10-CM

## 2024-11-14 LAB
ATRIAL RATE - MUSE: 68 BPM
DIASTOLIC BLOOD PRESSURE - MUSE: NORMAL MMHG
INTERPRETATION ECG - MUSE: NORMAL
P AXIS - MUSE: 62 DEGREES
PR INTERVAL - MUSE: 302 MS
QRS DURATION - MUSE: 98 MS
QT - MUSE: 456 MS
QTC - MUSE: 484 MS
R AXIS - MUSE: -20 DEGREES
SYSTOLIC BLOOD PRESSURE - MUSE: NORMAL MMHG
T AXIS - MUSE: 30 DEGREES
VENTRICULAR RATE- MUSE: 68 BPM

## 2024-11-14 PROCEDURE — 93010 ELECTROCARDIOGRAM REPORT: CPT | Performed by: INTERNAL MEDICINE

## 2024-11-14 PROCEDURE — 258N000003 HC RX IP 258 OP 636: Performed by: NURSE ANESTHETIST, CERTIFIED REGISTERED

## 2024-11-14 PROCEDURE — 93005 ELECTROCARDIOGRAM TRACING: CPT

## 2024-11-14 PROCEDURE — 250N000009 HC RX 250: Performed by: NURSE ANESTHETIST, CERTIFIED REGISTERED

## 2024-11-14 PROCEDURE — 370N000017 HC ANESTHESIA TECHNICAL FEE, PER MIN

## 2024-11-14 PROCEDURE — 92960 CARDIOVERSION ELECTRIC EXT: CPT | Performed by: NURSE PRACTITIONER

## 2024-11-14 PROCEDURE — 999N000054 HC STATISTIC EKG NON-CHARGEABLE

## 2024-11-14 PROCEDURE — 92960 CARDIOVERSION ELECTRIC EXT: CPT

## 2024-11-14 RX ORDER — AMIODARONE HYDROCHLORIDE 200 MG/1
200 TABLET ORAL DAILY
Status: SHIPPED
Start: 2024-11-14 | End: 2025-01-13

## 2024-11-14 RX ORDER — SODIUM CHLORIDE 9 MG/ML
INJECTION, SOLUTION INTRAVENOUS CONTINUOUS PRN
Status: DISCONTINUED | OUTPATIENT
Start: 2024-11-14 | End: 2024-11-14

## 2024-11-14 RX ADMIN — SODIUM CHLORIDE: 9 INJECTION, SOLUTION INTRAVENOUS at 09:52

## 2024-11-14 RX ADMIN — METHOHEXITAL SODIUM 100 MG: 500 INJECTION, POWDER, LYOPHILIZED, FOR SOLUTION INTRAMUSCULAR; INTRAVENOUS; RECTAL at 09:52

## 2024-11-14 ASSESSMENT — LIFESTYLE VARIABLES: TOBACCO_USE: 1

## 2024-11-14 ASSESSMENT — ACTIVITIES OF DAILY LIVING (ADL)
ADLS_ACUITY_SCORE: 0
ADLS_ACUITY_SCORE: 0

## 2024-11-14 ASSESSMENT — ENCOUNTER SYMPTOMS: DYSRHYTHMIAS: 1

## 2024-11-14 NOTE — PLAN OF CARE
Pt alert and oriented. Vss on room air. Pt tolerated cardioversion well. Nsr post cardioversion. Pt ate post procedure, tolerated well. Pt ready to be discharged to home via family. All belongings remain with patient and family. Discharge teaching given to patient and family. Pt and family understand discharge teaching.

## 2024-11-14 NOTE — ANESTHESIA POSTPROCEDURE EVALUATION
Patient: Angel Etienne    Procedure: * No procedures listed *  Cardioversion External    Anesthesia Type:  General    Note:  Disposition: Outpatient   Postop Pain Control: Uneventful            Sign Out: Well controlled pain   PONV: No   Neuro/Psych: Uneventful            Sign Out: Acceptable/Baseline neuro status   Airway/Respiratory: Uneventful            Sign Out: Acceptable/Baseline resp. status   CV/Hemodynamics: Uneventful            Sign Out: Acceptable CV status; No obvious hypovolemia; No obvious fluid overload   Other NRE: NONE   DID A NON-ROUTINE EVENT OCCUR? No           Last vitals:  Vitals:    11/14/24 1023 11/14/24 1027 11/14/24 1033   BP:      Pulse:      Resp: 18 18 18   Temp:      SpO2:          Electronically Signed By: Joana Valenzuela MD  November 14, 2024  11:19 AM

## 2024-11-14 NOTE — ANESTHESIA PREPROCEDURE EVALUATION
Anesthesia Pre-Procedure Evaluation    Patient: Angel Etienne   MRN: [id number] : 1965        Procedure :   Cardioversion External       Past Medical History:   Diagnosis Date    Atrial fibrillation (H)     High cholesterol       Past Surgical History:   Procedure Laterality Date    EP ABLATION PULMONARY VEIN ISOLATION N/A 2024    Procedure: Ablation Atrial Fibrillation;  Surgeon: Paramjit Saldaña MD;  Location: [facility]      Allergies   Allergen Reactions    Clobetasol Other (See Comments)     Allergy testing done - Clobetasol-77-Propionate    Inhaled Anticholinergic Agents Other (See Comments)     Stearyl Alcohol - Allergy testing done    No Clinical Screening - See Comments Other (See Comments)     Stearyl Alcohol - Allergy testing done    Octylisothiazolinone; Phenylenediamine - allergy testing done    Shellac Other (See Comments)     Allergy testing done    Trolamine (Triethanolamine) Other (See Comments)     Allergy testing done    Urea Other (See Comments)     Allergy testing done      Social History     Tobacco Use    Smoking status: Every Day     Current packs/day: 1.00     Average packs/day: 1.5 packs/day for 42.0 years (62.7 ttl pk-yrs)     Types: Cigarettes     Start date: 1982    Smokeless tobacco: Never   Substance Use Topics    Alcohol use: Yes      Wt Readings from Last 1 Encounters:   24 132.5 kg (292 lb)        Anesthesia Evaluation   Pt has had prior anesthetic. Type: General.    No history of anesthetic complications       ROS/MED HX  ENT/Pulmonary:     (+) sleep apnea,               tobacco use, Current use,                       Neurologic:       Cardiovascular: Comment: TTE   EFT VENTRICLE:Normal left ventricular chamber size by volume. Normal left ventricular geometry. Calculated 2-D monoplane volumetric left ventricular ejection fraction 65%. No regional wall motion abnormalities. Indeterminate left ventricular filling   pressure.   RIGHT  VENTRICLE:Mildly enlarged right ventricular chamber size. Normal right ventricular systolic function. Estimated right ventricular systolic pressure 34 mmHg (right atrial pressure of 5 mmHg).   ATRIA:Mildly enlarged left atrial size. Left atrial volume index 36 ml/m2. Enlarged right atrial size by visual estimate.   CARDIAC VALVES:Trileaflet aortic valve. Sclerotic aortic valve. Mild aortic valve regurgitation. Normal mitral valve. Trivial mitral valve regurgitation. Normal pulmonary valve. Trivial pulmonary valve regurgitation. Normal tricuspid valve. Trivial   tricuspid valve regurgitation.   OTHER ECHO FINDINGS:Normal inferior vena cava size with normal inspiratory collapse (>50%). Enlarged mid ascending aorta diameter of 44 mm. Upper limit of normal of the mid ascending aorta, for age, sex and BSA is 42 mm. Abdominal aorta incompletely   visualized. Normal abdominal aorta Doppler flow pattern. Positive for atrial level shunt by color flow imaging. Patent foramen ovale with left-to-right shunt. No intracardiac mass or thrombus, but the left atrial appendage cannot be visualized adequately   with transthoracic echo to exclude thrombus in this location. No  pericardial effusion.         (+) Dyslipidemia hypertension- -   -  - -                        dysrhythmias, a-fib,             METS/Exercise Tolerance:     Hematologic:    (-) anemia   Musculoskeletal:   (+)  arthritis,             GI/Hepatic:  - neg GI/hepatic ROS     Renal/Genitourinary:     (+)        BPH,   (-) renal disease   Endo:     (+)               Obesity,       Psychiatric/Substance Use:     (+) psychiatric history anxiety   Recreational drug usage: Cannabis.    Infectious Disease:  - neg infectious disease ROS     Malignancy:  - neg malignancy ROS     Other:  - neg other ROS          Physical Exam    Airway      Comment: Thin, full beard. Large, round face.    Mallampati: III   TM distance: > 3 FB   Neck ROM: full   Mouth opening: > 3  "cm    Respiratory Devices and Support         Dental       (+) Modest Abnormalities - crowns, retainers, 1 or 2 missing teeth and Removable bridges or other hardware      Cardiovascular          Rhythm and rate: irregular and normal     Pulmonary   pulmonary exam normal              OUTSIDE LABS:  CBC:   Lab Results   Component Value Date    WBC 5.0 11/04/2024    WBC 5.9 09/24/2024    HGB 17.9 (H) 11/04/2024    HGB 16.7 09/24/2024    HCT 54.2 (H) 11/04/2024    HCT 50.6 09/24/2024     11/04/2024     09/24/2024     BMP:   Lab Results   Component Value Date     11/04/2024     10/09/2024    POTASSIUM 4.3 11/04/2024    POTASSIUM 5.0 10/09/2024    CHLORIDE 107 11/04/2024    CHLORIDE 106 10/09/2024    CO2 26 11/04/2024    CO2 22 10/09/2024    BUN 11.6 11/04/2024    BUN 19.0 10/09/2024    CR 0.81 11/04/2024    CR 0.79 10/09/2024    GLC 91 11/04/2024    GLC 94 10/16/2024     COAGS:   Lab Results   Component Value Date    INR 1.14 11/04/2024     POC: No results found for: \"BGM\", \"HCG\", \"HCGS\"  HEPATIC:   Lab Results   Component Value Date    ALBUMIN 4.3 11/04/2024    PROTTOTAL 7.3 11/04/2024    ALT 28 11/04/2024    AST 28 11/04/2024    ALKPHOS 75 11/04/2024    BILITOTAL 0.3 11/04/2024     OTHER:   Lab Results   Component Value Date    A1C 5.8 (H) 11/04/2024    LEESA 9.6 11/04/2024    TSH 2.28 07/22/2024    SED 8 10/14/2024       Anesthesia Plan    ASA Status:  3    NPO Status:  NPO Appropriate    Anesthesia Type: General.   Induction: Intravenous.   Maintenance: TIVA.        Consents    Anesthesia Plan(s) and associated risks, benefits, and realistic alternatives discussed. Questions answered and patient/representative(s) expressed understanding.     - Discussed:     - Discussed with:  Patient      - Extended Intubation/Ventilatory Support Discussed: No.      - Patient is DNR/DNI Status: No     Use of blood products discussed: No .     Postoperative Care            Comments:    Other Comments: " "Brevital, oxygen.  PE, Ephedrine, Atropine PRN.           Joana Valenzuela MD    I have reviewed the pertinent notes and labs in the chart from the past 30 days and (re)examined the patient.  Any updates or changes from those notes are reflected in this note.            # Drug Induced Coagulation Defect: home medication list includes an anticoagulant medication    # Hypertension: Noted on problem list         # Obesity: Estimated body mass index is 37.49 kg/m  as calculated from the following:    Height as of this encounter: 1.88 m (6' 2\").    Weight as of this encounter: 132.5 kg (292 lb).             "

## 2024-11-14 NOTE — ANESTHESIA CARE TRANSFER NOTE
Patient: Angel Etienne    Procedure: * No procedures listed *  Cardioversion External    Diagnosis: * No pre-op diagnosis entered *  Diagnosis Additional Information: No value filed.    Anesthesia Type:   General     Note:    Oropharynx: oropharynx clear of all foreign objects and spontaneously breathing  Level of Consciousness: awake  Oxygen Supplementation: nasal cannula  Level of Supplemental Oxygen (L/min / FiO2): 4  Independent Airway: airway patency satisfactory and stable  Dentition: dentition unchanged  Vital Signs Stable: post-procedure vital signs reviewed and stable  Report to RN Given: handoff report given  Patient transferred to: Cardiac Special Care          Vitals:  Vitals Value Taken Time   /100 11/14/24 0957   Temp 36.4  C (97.6  F) 11/14/24 0947   Pulse 78 11/14/24 0957   Resp 19 11/14/24 0957   SpO2 96 % 11/14/24 0957   Vitals shown include unfiled device data.    Electronically Signed By: LUCY Villa CRNA  November 14, 2024  9:58 AM

## 2024-11-14 NOTE — INTERVAL H&P NOTE
"I have reviewed the surgical (or preoperative) H&P that is linked to this encounter, and examined the patient. There are no significant changes    Clinical Conditions Present on Arrival:  Clinically Significant Risk Factors Present on Admission                 # Drug Induced Coagulation Defect: home medication list includes an anticoagulant medication       # Obesity: Estimated body mass index is 37.49 kg/m  as calculated from the following:    Height as of this encounter: 1.88 m (6' 2\").    Weight as of this encounter: 132.5 kg (292 lb).       "

## 2024-11-14 NOTE — PROCEDURES
[facility]    Procedure: Cardioversion External    Date/Time: 11/14/2024 11:32 AM    Performed by: Gaye Laboy APRN CNP  Authorized by: Roberto Duarte MD      UNIVERSAL PROTOCOL   Site Marked: NA  Prior Images Obtained and Reviewed:  Yes  Required items: Required blood products, implants, devices and special equipment available    Patient identity confirmed:  Verbally with patient, arm band, provided demographic data and hospital-assigned identification number  Patient was reevaluated immediately before administering moderate or deep sedation or anesthesia  Confirmation Checklist:  Patient's identity using two indicators, relevant allergies, procedure was appropriate and matched the consent or emergent situation and correct equipment/implants were available  Time out: Immediately prior to the procedure a time out was called    Universal Protocol: the Joint Commission Universal Protocol was followed       ANESTHESIA    Anesthesia was administered and monitored by anesthesiology.  See anesthesia documentation for details.    SEDATION  Patient Sedated: Yes    Vital signs: Vital signs monitored during sedation      PROCEDURE DETAILS  Cardioversion basis: elective  Pre-procedure rhythm: atrial fibrillation  Patient position: patient was placed in a supine position  Chest area: chest area exposed  Electrodes: pads  Electrodes placed: anterior-posterior  Number of attempts: 1    Details of Attempts:  At 0956 after administration of IV Brevital by MDA and confirmation of adequate sedation he received a single synchronous shock at 300 J with prompt restoration of sinus rhythm with AV delay.  Post cardioversion EKG shows SR 60 bpm, AV delay  ms, QRS 90 ms, QT/QTc 456/484 ms  Post-procedure rhythm: normal sinus rhythm  Complications: no complications      PROCEDURE  Describe Procedure: History of persistent atrial fibrillation, HTN, JIM.  He was incidentally diagnosed with AF earlier  this year at preoperative evaluation, symptomatic with palpitations and intermittent dyspnea on exertion. He underwent DCCV 8/8/2024 and was started on sotalol 80mg twice a day with early recurrence and ultimately underwent PVI 9/30/2024. He developed recurrent AF early in recovery with mildly elevated CRP, normal ESR briefly treated with ibuprofen, requiring cardioversion on sotalol 120mg twice a day 10/16/2024. He had early recurrence and was loaded on amiodarone prior to successful cardioversion today  Continue amiodarone 200 mg daily  Continue Eliquis 5 mg twice a day for stroke prophylaxis  Follow-up with Alondra Miller CNP 12/17/2024  Patient Tolerance:  Patient tolerated the procedure well with no immediate complications

## 2024-11-22 ENCOUNTER — TRANSFERRED RECORDS (OUTPATIENT)
Dept: HEALTH INFORMATION MANAGEMENT | Facility: CLINIC | Age: 59
End: 2024-11-22
Payer: COMMERCIAL

## 2024-11-27 ENCOUNTER — TELEPHONE (OUTPATIENT)
Dept: CARDIOLOGY | Facility: CLINIC | Age: 59
End: 2024-11-27
Payer: COMMERCIAL

## 2024-11-27 ENCOUNTER — ALLIED HEALTH/NURSE VISIT (OUTPATIENT)
Dept: CARDIOLOGY | Facility: CLINIC | Age: 59
End: 2024-11-27
Payer: COMMERCIAL

## 2024-11-27 VITALS
HEART RATE: 64 BPM | DIASTOLIC BLOOD PRESSURE: 94 MMHG | RESPIRATION RATE: 16 BRPM | BODY MASS INDEX: 38.39 KG/M2 | SYSTOLIC BLOOD PRESSURE: 145 MMHG | WEIGHT: 299 LBS

## 2024-11-27 DIAGNOSIS — I48.19 PERSISTENT ATRIAL FIBRILLATION (H): ICD-10-CM

## 2024-11-27 DIAGNOSIS — I48.19 PERSISTENT ATRIAL FIBRILLATION (H): Primary | ICD-10-CM

## 2024-11-27 PROCEDURE — 99207 PR NO CHARGE NURSE ONLY: CPT

## 2024-11-27 NOTE — PROGRESS NOTES
From: Alondra Miller NP  Sent: 11/27/2024   4:26 PM CST  To: [facility] Ep Support Pool - [facility]    Sounds good to me!    Thank you!  Alondra      Message sent to Alondra to see if she would still like medication increased or ok to wait as message below came prior to discussion with pt in clinic was reviewed.    Will wait for confirmation from Alondra and call pt with recommendations if needed, otherwise will await Dr. Saldaña response.    Bridgette

## 2024-11-27 NOTE — PROGRESS NOTES
From: Alondra Miller NP  Sent: 11/27/2024   4:17 PM CST  To: [facility] Ep Support Pool - [facility]    EKG reviewed, AFib 64 bpm. Please increase his amiodarone 200 mg to twice daily to see if this kicks him out of it through the weekend. Have him call us on Monday with an update when Dr Saldaña is back to address further. May need to consider repeat ablation since he has failed multiple DCCVs since ablation. Are his weights stable? Is he retaining any fluid currently?? Known history of diastolic HF and on Jardiance. Has he been compliant with watching his sodium intake?     Thanks,   Alondra    (Above copied from previous telephone encounter)

## 2024-11-27 NOTE — TELEPHONE ENCOUNTER
Spoke with pt and discussed request for EKG. Pt confirmed he is able to come in today. Message sent to scheduling team to set up.    Savana Garcia RN

## 2024-11-27 NOTE — TELEPHONE ENCOUNTER
Pt LVM after hours to report he went back into AF on 11/26. Pt had his 3rd DCCV on 11/14/24.     Returned phone call to pt. Confirms he is still in AF. He is doing well but can feel he is not his normal self. He has had intermittent moments of feeling winded.     Amiodarone 200mg daily and compliant with Eliquis 5mg BID.     Savana Garcia RN

## 2024-11-28 LAB
ATRIAL RATE - MUSE: NORMAL BPM
DIASTOLIC BLOOD PRESSURE - MUSE: NORMAL MMHG
INTERPRETATION ECG - MUSE: NORMAL
P AXIS - MUSE: NORMAL DEGREES
PR INTERVAL - MUSE: NORMAL MS
QRS DURATION - MUSE: 108 MS
QT - MUSE: 448 MS
QTC - MUSE: 462 MS
R AXIS - MUSE: -27 DEGREES
SYSTOLIC BLOOD PRESSURE - MUSE: NORMAL MMHG
T AXIS - MUSE: 15 DEGREES
VENTRICULAR RATE- MUSE: 64 BPM

## 2024-11-28 NOTE — PROGRESS NOTES
Spoke to Alondra, no med changes at this time. Will wait for Dr. Saldaña recommendations and call pt to discuss.    Bridgette

## 2024-12-03 ENCOUNTER — TELEPHONE (OUTPATIENT)
Dept: FAMILY MEDICINE | Facility: CLINIC | Age: 59
End: 2024-12-03
Payer: COMMERCIAL

## 2024-12-03 NOTE — TELEPHONE ENCOUNTER
Patient Quality Outreach    Patient is due for the following:   Hypertension -  Hypertension follow-up visit  Physical Preventive Adult Physical      Topic Date Due    Pneumococcal Vaccine (1 of 2 - PCV) Never done    Hepatitis B Vaccine (1 of 3 - 19+ 3-dose series) Never done    Zoster (Shingles) Vaccine (1 of 2) Never done    Diptheria Tetanus Pertussis (DTAP/TDAP/TD) Vaccine (2 - Td or Tdap) 12/06/2023    Flu Vaccine (1) 09/01/2024    COVID-19 Vaccine (5 - 2024-25 season) 09/01/2024       Action(s) Taken:   Schedule a Adult Preventative    Type of outreach:    Sent [employer] message.    Questions for provider review:    None           Louis Sanz MA

## 2024-12-05 ENCOUNTER — TELEPHONE (OUTPATIENT)
Dept: VASCULAR SURGERY | Facility: CLINIC | Age: 59
End: 2024-12-05
Payer: COMMERCIAL

## 2024-12-05 DIAGNOSIS — I83.893 SYMPTOMATIC VARICOSE VEINS OF BOTH LOWER EXTREMITIES: ICD-10-CM

## 2024-12-05 DIAGNOSIS — I87.2 VENOUS INSUFFICIENCY OF BOTH LOWER EXTREMITIES: ICD-10-CM

## 2024-12-05 NOTE — TELEPHONE ENCOUNTER
No PA Required.    Payor: [employer]  Procedure: Radiofrequency ablation (RFA) of L GSV  CPTs: 36475 x1  Effective dates: N/A  PA reference #: [id number]  Need 2 days for procedure?: No

## 2024-12-05 NOTE — TELEPHONE ENCOUNTER
Caller: Karl    Provider: MD Carlos Guevara    Detailed reason for call: called to finalle schedule his vein procured. They were not in there in the active requests for me to use to schedule. Can orders be placed again.     Best phone number to contact: [phone]    Best time to contact: any     Ok to leave a detailed message: known     Ok to speak to authorized person if needed: unknown      (Noted to patient if reason is related to wound or incision, to please send a photo via email or [employer]

## 2024-12-10 ENCOUNTER — TELEPHONE (OUTPATIENT)
Dept: CARDIOLOGY | Facility: CLINIC | Age: 59
End: 2024-12-10

## 2024-12-10 ENCOUNTER — LAB (OUTPATIENT)
Dept: CARDIOLOGY | Facility: CLINIC | Age: 59
End: 2024-12-10
Payer: COMMERCIAL

## 2024-12-10 DIAGNOSIS — I48.19 PERSISTENT ATRIAL FIBRILLATION (H): ICD-10-CM

## 2024-12-10 LAB
CRP SERPL-MCNC: 4.9 MG/L
ERYTHROCYTE [SEDIMENTATION RATE] IN BLOOD BY WESTERGREN METHOD: 11 MM/HR (ref 0–20)
NT-PROBNP SERPL-MCNC: 395 PG/ML (ref 0–900)

## 2024-12-10 PROCEDURE — 86140 C-REACTIVE PROTEIN: CPT

## 2024-12-10 PROCEDURE — 36415 COLL VENOUS BLD VENIPUNCTURE: CPT

## 2024-12-10 PROCEDURE — 83880 ASSAY OF NATRIURETIC PEPTIDE: CPT

## 2024-12-10 PROCEDURE — 85652 RBC SED RATE AUTOMATED: CPT

## 2024-12-10 NOTE — TELEPHONE ENCOUNTER
Pre-Procedure Education    Procedure: DCCV with Virgilio Carey on 12/30/2024 with arrival time 8:30 am  REPEAT CV    PT IS ON ELIQUIS AND NO MISSED DOSES AWARE TO CALL IN IF HE DOES  Orders: Orderset for procedure verified signed/held    COVID: COVID policy- if pt develops COVID like symptoms prior to procedure, he/she would need to complete an at home with a rapid antigen COVID test 1-2 days prior to your procedure date. If COVID + pt is aware the procedure will need to be rescheduled, and to contact CV scheduling as soon as possible    Pre-Op H&P: Completed- Available in Epic    Education:     PT HAS A  FOR PROCEDURE T HAT WILL STAY WITH HIM    PT HAS MY CHART  PT AWARE  PROCEDURE LETTER SENT    ALL INSTRUCTIONS REVIEWED WITH WIFE    THIS IS A REPEAT CV    PT INSTRUCTED TO HOLD ANY VITAMINS MINERALS CALCIUM IRON OR SUPPLEMENTS THE MORNING OF CV  PT INSTRUCTED NO GUM CHEWING MINTS OR CANDY THE MORNING OF CV  PT INSTRUCTED TO LEAVE JEWELRY AT H OME  PT INSTRUCTED  TO BATHE OR SHOWER BEFORE COMING IN  PT IS ON ELIQUIS  PT IS ON AMIODARONE  PT IS DIABETIC AND HAS NOT YET STARTED  THE TIRZEPATIDE   NO MEDICATION CHANGES WERE MADE  PT HAS A POST CV FOLLOW UP WITH DR GONZALEZ 1/21       Contact:  ALL INSTRUCTIONS REVIEWED WITH WIFE PHIL  NOT AVAILABLE    Pre-Procedure Instruction: NPO after midnight pre procedure, Defined NPO with pt, Remove all jewelry and leave all valuables at home, Shower prior to arrival, Sedation plan/orders, Transportation requirements and arrangements post procedure, Post-procedure follow up process, Post-procedure restrictions/expectations, and Pre-procedure letter sent- letter tab  Risks:        Medication:   Instructions regarding anticoagulants: Eliquis- To continue anticoagulation uninterrupted through their procedure    Instructions regarding antiarrhythmic medication: Amiodarone; continue medication prior to procedure as prescribed    Instructions given to pt regarding diuretics  medication: NA for DCCV      Instructions given to pt regarding DM/GLP-1 medication:   DM- None    GLP-1-  PT HAS NOT YET STARTED TIRZEPATIDE    Instructions for medication, other than anticoagulants and antiarrhythmics listed above, given to pt: Take all medication AM of procedure with small sips of water     Important patient information for staff:  PT IS ON AMIODARONE THIS IS A REPEAT CV PT IS JIM/CPAP    12/10/2024 2:25 PM  Neema Grijalva LPN

## 2024-12-16 ENCOUNTER — TRANSFERRED RECORDS (OUTPATIENT)
Dept: HEALTH INFORMATION MANAGEMENT | Facility: CLINIC | Age: 59
End: 2024-12-16
Payer: COMMERCIAL

## 2024-12-30 ENCOUNTER — ANESTHESIA (OUTPATIENT)
Dept: CARDIOLOGY | Facility: HOSPITAL | Age: 59
End: 2024-12-30
Payer: COMMERCIAL

## 2024-12-30 ENCOUNTER — HOSPITAL ENCOUNTER (OUTPATIENT)
Dept: CARDIOLOGY | Facility: HOSPITAL | Age: 59
Discharge: HOME | End: 2024-12-30
Attending: INTERNAL MEDICINE | Admitting: INTERNAL MEDICINE
Payer: COMMERCIAL

## 2024-12-30 ENCOUNTER — ANESTHESIA EVENT (OUTPATIENT)
Dept: CARDIOLOGY | Facility: HOSPITAL | Age: 59
End: 2024-12-30
Payer: COMMERCIAL

## 2024-12-30 VITALS
HEART RATE: 72 BPM | TEMPERATURE: 98.4 F | HEIGHT: 74 IN | BODY MASS INDEX: 37.86 KG/M2 | SYSTOLIC BLOOD PRESSURE: 122 MMHG | DIASTOLIC BLOOD PRESSURE: 58 MMHG | WEIGHT: 295 LBS | RESPIRATION RATE: 19 BRPM | OXYGEN SATURATION: 96 %

## 2024-12-30 DIAGNOSIS — I48.19 PERSISTENT ATRIAL FIBRILLATION (H): ICD-10-CM

## 2024-12-30 DIAGNOSIS — E78.5 HYPERLIPIDEMIA LDL GOAL <70: ICD-10-CM

## 2024-12-30 DIAGNOSIS — I10 HTN, GOAL BELOW 140/90: ICD-10-CM

## 2024-12-30 DIAGNOSIS — E78.5 HYPERLIPIDEMIA LDL GOAL <100: ICD-10-CM

## 2024-12-30 LAB
ATRIAL RATE - MUSE: 72 BPM
DIASTOLIC BLOOD PRESSURE - MUSE: NORMAL MMHG
INTERPRETATION ECG - MUSE: NORMAL
P AXIS - MUSE: 66 DEGREES
PR INTERVAL - MUSE: 316 MS
QRS DURATION - MUSE: 108 MS
QT - MUSE: 436 MS
QTC - MUSE: 477 MS
R AXIS - MUSE: -26 DEGREES
SYSTOLIC BLOOD PRESSURE - MUSE: NORMAL MMHG
T AXIS - MUSE: 45 DEGREES
VENTRICULAR RATE- MUSE: 72 BPM

## 2024-12-30 PROCEDURE — 370N000017 HC ANESTHESIA TECHNICAL FEE, PER MIN

## 2024-12-30 PROCEDURE — 93010 ELECTROCARDIOGRAM REPORT: CPT | Performed by: STUDENT IN AN ORGANIZED HEALTH CARE EDUCATION/TRAINING PROGRAM

## 2024-12-30 PROCEDURE — 250N000013 HC RX MED GY IP 250 OP 250 PS 637

## 2024-12-30 PROCEDURE — 92960 CARDIOVERSION ELECTRIC EXT: CPT

## 2024-12-30 PROCEDURE — 250N000009 HC RX 250: Performed by: STUDENT IN AN ORGANIZED HEALTH CARE EDUCATION/TRAINING PROGRAM

## 2024-12-30 PROCEDURE — 99207 PR NO CHARGE LOS: CPT

## 2024-12-30 PROCEDURE — 93005 ELECTROCARDIOGRAM TRACING: CPT

## 2024-12-30 PROCEDURE — 999N000054 HC STATISTIC EKG NON-CHARGEABLE

## 2024-12-30 RX ORDER — POTASSIUM CHLORIDE 1500 MG/1
20 TABLET, EXTENDED RELEASE ORAL
Status: DISCONTINUED | OUTPATIENT
Start: 2024-12-30 | End: 2024-12-30 | Stop reason: HOSPADM

## 2024-12-30 RX ADMIN — METHOHEXITAL SODIUM 70 MG: 500 INJECTION, POWDER, LYOPHILIZED, FOR SOLUTION INTRAMUSCULAR; INTRAVENOUS; RECTAL at 09:31

## 2024-12-30 RX ADMIN — APIXABAN 5 MG: 5 TABLET, FILM COATED ORAL at 09:19

## 2024-12-30 ASSESSMENT — ACTIVITIES OF DAILY LIVING (ADL)
ADLS_ACUITY_SCORE: 41
ADLS_ACUITY_SCORE: 41

## 2024-12-30 NOTE — ANESTHESIA CARE TRANSFER NOTE
Patient: Angel Etienne    Procedure: * No procedures listed *  Cardioversion External    Diagnosis: * No pre-op diagnosis entered *  Diagnosis Additional Information: No value filed.    Anesthesia Type:   No value filed.     Note:    Oropharynx: oropharynx clear of all foreign objects and spontaneously breathing  Level of Consciousness: drowsy  Oxygen Supplementation: nasal cannula  Level of Supplemental Oxygen (L/min / FiO2): 3  Independent Airway: airway patency satisfactory and stable  Dentition: dentition unchanged  Vital Signs Stable: post-procedure vital signs reviewed and stable  Report to RN Given: handoff report given  Patient transferred to: Cardiac Special Care          Vitals:  Vitals Value Taken Time   /88 12/30/24 0930   Temp     Pulse 77 12/30/24 0931   Resp 22 12/30/24 0931   SpO2 99 % 12/30/24 0931   Vitals shown include unfiled device data.    Electronically Signed By: LUCY Arcos CRNA  December 30, 2024  9:33 AM

## 2024-12-30 NOTE — ANESTHESIA PREPROCEDURE EVALUATION
Anesthesia Pre-Procedure Evaluation    Patient: Angel Etienne   MRN: [id number] : 1965        Procedure : * No procedures listed *  Cardioversion External       Past Medical History:   Diagnosis Date    Atrial fibrillation (H)     High cholesterol       Past Surgical History:   Procedure Laterality Date    EP ABLATION PULMONARY VEIN ISOLATION N/A 2024    Procedure: Ablation Atrial Fibrillation;  Surgeon: Paramjit Saldaña MD;  Location: [facility] CV      Allergies   Allergen Reactions    Clobetasol Other (See Comments)     Allergy testing done - Clobetasol-77-Propionate    Inhaled Anticholinergic Agents Other (See Comments)     Stearyl Alcohol - Allergy testing done    No Clinical Screening - See Comments Other (See Comments)     Stearyl Alcohol - Allergy testing done    Octylisothiazolinone; Phenylenediamine - allergy testing done    Shellac Other (See Comments)     Allergy testing done    Trolamine (Triethanolamine) Other (See Comments)     Allergy testing done    Urea Other (See Comments)     Allergy testing done      Social History     Tobacco Use    Smoking status: Every Day     Current packs/day: 1.00     Average packs/day: 1.5 packs/day for 42.2 years (62.9 ttl pk-yrs)     Types: Cigarettes     Start date: 1982    Smokeless tobacco: Never   Substance Use Topics    Alcohol use: Yes      Wt Readings from Last 1 Encounters:   24 133.8 kg (295 lb)        Anesthesia Evaluation            ROS/MED HX  ENT/Pulmonary:     (+) sleep apnea,                                       Neurologic:       Cardiovascular:     (+)  hypertension- -   -  - -                                      METS/Exercise Tolerance:     Hematologic:       Musculoskeletal:       GI/Hepatic:       Renal/Genitourinary:       Endo:     (+)               Obesity,       Psychiatric/Substance Use:       Infectious Disease:       Malignancy:       Other:            Physical Exam    Airway        Mallampati: III  "  TM distance: > 3 FB   Neck ROM: full   Mouth opening: > 3 cm    Respiratory Devices and Support         Dental           Cardiovascular          Rhythm and rate: irregular and tachycardia     Pulmonary   pulmonary exam normal                OUTSIDE LABS:  CBC:   Lab Results   Component Value Date    WBC 5.0 11/04/2024    WBC 5.9 09/24/2024    HGB 17.9 (H) 11/04/2024    HGB 16.7 09/24/2024    HCT 54.2 (H) 11/04/2024    HCT 50.6 09/24/2024     11/04/2024     09/24/2024     BMP:   Lab Results   Component Value Date     11/04/2024     10/09/2024    POTASSIUM 4.3 11/04/2024    POTASSIUM 5.0 10/09/2024    CHLORIDE 107 11/04/2024    CHLORIDE 106 10/09/2024    CO2 26 11/04/2024    CO2 22 10/09/2024    BUN 11.6 11/04/2024    BUN 19.0 10/09/2024    CR 0.81 11/04/2024    CR 0.79 10/09/2024    GLC 91 11/04/2024    GLC 94 10/16/2024     COAGS:   Lab Results   Component Value Date    INR 1.14 11/04/2024     POC: No results found for: \"BGM\", \"HCG\", \"HCGS\"  HEPATIC:   Lab Results   Component Value Date    ALBUMIN 4.3 11/04/2024    PROTTOTAL 7.3 11/04/2024    ALT 28 11/04/2024    AST 28 11/04/2024    ALKPHOS 75 11/04/2024    BILITOTAL 0.3 11/04/2024     OTHER:   Lab Results   Component Value Date    A1C 5.8 (H) 11/04/2024    LEESA 9.6 11/04/2024    TSH 2.28 07/22/2024    SED 11 12/10/2024       Anesthesia Plan    ASA Status:  4       Anesthesia Type: MAC.   Induction: Intravenous.           Consents    Anesthesia Plan(s) and associated risks, benefits, and realistic alternatives discussed. Questions answered and patient/representative(s) expressed understanding.     - Discussed:     - Discussed with:  Patient      - Extended Intubation/Ventilatory Support Discussed: No.      - Patient is DNR/DNI Status: No     Use of blood products discussed: No .     Postoperative Care    Pain management: IV analgesics.        Comments:               Bertram Hi MD    I have reviewed the pertinent notes and labs in " "the chart from the past 30 days and (re)examined the patient.  Any updates or changes from those notes are reflected in this note.            # Drug Induced Coagulation Defect: home medication list includes an anticoagulant medication    # Hypertension: Noted on problem list           # Obesity: Estimated body mass index is 37.88 kg/m  as calculated from the following:    Height as of this encounter: 1.88 m (6' 2\").    Weight as of this encounter: 133.8 kg (295 lb).             "

## 2024-12-30 NOTE — PROCEDURES
[facility]    Procedure: Cardioversion External    Date/Time: 12/30/2024 9:52 AM    Performed by: Amanuel Carey PA-C  Authorized by: Paramjit Saldaña MD      UNIVERSAL PROTOCOL   Site Marked: NA  Prior Images Obtained and Reviewed:  Yes  Required items: Required blood products, implants, devices and special equipment available    Patient identity confirmed:  Verbally with patient, arm band and provided demographic data  Patient was reevaluated immediately before administering moderate or deep sedation or anesthesia  Confirmation Checklist:  Patient's identity using two indicators, relevant allergies, procedure was appropriate and matched the consent or emergent situation and correct equipment/implants were available  Time out: Immediately prior to the procedure a time out was called    Universal Protocol: the Joint Commission Universal Protocol was followed       ANESTHESIA    Anesthesia was administered and monitored by anesthesiology.  See anesthesia documentation for details.    SEDATION  Patient Sedated: Yes    Vital signs: Vital signs monitored during sedation      PROCEDURE DETAILS  Cardioversion basis: elective  Pre-procedure rhythm: atrial fibrillation  Patient position: patient was placed in a supine position  Chest area: chest area exposed  Electrodes: pads  Electrodes placed: anterior-posterior  Number of attempts: 1    Details of Attempts:  At 0934, after administration of IV Brevital by MDA and confirmation of adequate sedation, he received 1 synchronous shock at 300J with prompt restoration of sinus rhythm in the 80's. Post cardioversion EKG pending.    Post-procedure rhythm: normal sinus rhythm  Complications: no complications      PROCEDURE  Describe Procedure: Persistent atrial fibrillation, HTN, and JIM on CPAP. PVI on 9/30/24 with Dr. Saldaña, with recurrence and two cardioversions 10/16 and 11/14. Was started on amiodarone prior to 11/14 DCCV after failing  sotalol. Will continue amiodarone 200 mg daily. Continue eliquis 5 mg BID for stroke prophylaxis. Follow-up in clinic with Dr. Saldaña 1/21/25.      Patient Tolerance:  Patient tolerated the procedure well with no immediate complications

## 2024-12-30 NOTE — H&P
[facility] [address], [place] [address]   [email or web address]   OFFICE: [phone]        Primary Care: Torsten Thomas MD     Assessment/Recommendations     Persistent atrial fibrillation: symptomatic with palpitations, intermittent HANEY. Underwent PVI with Dr. Saldaña 24. He had 2 cardioversions following PVI. He was started on amiodarone after recurrence of afib on sotalol 120 mg BID. He was loaded with 400 mg daily x 10 days, then 200 mg daily.     We discussed DCCV procedure and reviewed the risks of such including but not limited to skin irritation, bradycardia, hypotension, stroke, as well as expected recovery and follow up. His questions were answered to his satisfaction and he is ready to proceed.     Medical, past medical, surgical and social history reviewed and updated. Current medications and allergies reviewed. No personal or family history of adverse reactions to anesthesia or abnormal bleeding with surgery. Labs today significant for WBC 5.0, hgb 17.9, Cr 0.81, electrolytes K4.3.     HTN: continue current medications    JIM on CPAP: continue use of CPAP      Plan:   - plan for DCCV today   - will likely continue amiodarone following DCCV   - Follow-up in clinic with Dr. Saldaña 25  - sees his general cardiologist 25       History of Present Illness/Subjective    Angel Etienne is a 59 year old male with persistent atiral fibirllation, HTN, JIM on CPAP, who is seen today for history and physical prior to DCCV. He has had previous DCCV 24 with recurrence around 24. Underwent PVI with Dr. Saldaña 24 with recurrence and two subsequent DCCV on 10/16 and .     He denies chest discomfort, palpitations, abdominal fullness/bloating or peripheral edema, shortness of breath, paroxysmal nocturnal dyspnea, orthopnea, lightheadedness, dizziness, pre-syncope, or syncope.     Data Review     EK2024:  Atrial fibrillation 64 bpm  11/14/2024: Sinus rhythm with first-degree AV delay 68 bpm,  ms  10/16/2024: Sinus rhythm first-degree AV delay 60 bpm,  ms  10/14/2024: Atrial fibrillation with slow ventricular response 52 bpm  Personally reviewed.     TTE 10/31/24:  1. Normal left ventricular chamber size, no regional wall motion abnormalities, calculated 2-D monoplane volumetric ejection fraction 65%.   2. Indeterminate left ventricular diastolic function , however estimated LA pressure is elevated based on atrial shunt Doppler.   3. Mildly enlarged right ventricular chamber size, normal systolic function, estimated right ventricular systolic pressure 34 mmHg (right atrial pressure of 5 mmHg).   4. Positive for atrial level shunt by color flow imaging with predominant left to right shunt.   5. Mild-moderate bi-atrial enlargement by visual estimation.   6. Sclerotic aortic valve. No aortic valve stenosis. Mild regurgitation.   7. Enlarged mid ascending aorta diameter of 44 mm, upper limit of normal for age, sex and BSA is 42 mm.   8. No  pericardial effusion.   9. There are no previous [facility] echocardiograms available for comparison.     Holter Monitor 3/7/24  Holter monitoring (duration 24hrs).  Continuous atrial fibrillation, 48 to 99bpm, average 69bpm.  There were no pauses of greater than 5 seconds. Nocturnal bradycardia was noted.  Rare premature ventricular contractions (<1%).  Symptom triggers - none.         Physical Examination Review of Systems   BMI= There is no height or weight on file to calculate BMI.    Wt Readings from Last 3 Encounters:   11/27/24 135.6 kg (299 lb)   11/14/24 132.5 kg (292 lb)   10/28/24 134.7 kg (297 lb)       Vitals: There were no vitals taken for this visit.  General   Appearance:   Alert and oriented, in no acute distress.    HEENT:  Normocephalic and atraumatic. Conjunctiva and sclera are clear. Moist oral mucosa.    Neck: No JVP, carotid bruit or obvious  thyromegaly.   Lungs:   Respirations unlabored. Clear bilaterally with no rales, rhonchi, or wheezes.     Cardiovascular:   Rhythm is irregular. S1 and S2 are normal. No significant murmur is present. Lower extremities demonstrate no significant edema. Posterior tibial pulses are intact bilaterally.   Extremities: No cyanosis or clubbing   Skin: Skin is warm, dry, and otherwise intact.   Neurologic: Gait not assessed. Mood and affect appropriate.                                                Medical History  Surgical History Family History Social History   Past Medical History:   Diagnosis Date    Atrial fibrillation (H)     High cholesterol     Past Surgical History:   Procedure Laterality Date    EP ABLATION PULMONARY VEIN ISOLATION N/A 9/30/2024    Procedure: Ablation Atrial Fibrillation;  Surgeon: Paramjit Saldaña MD;  Location: [facility]    Family History   Problem Relation Age of Onset    Varicose Veins Father     Social History     Tobacco Use    Smoking status: Every Day     Current packs/day: 1.00     Average packs/day: 1.5 packs/day for 42.2 years (62.9 ttl pk-yrs)     Types: Cigarettes     Start date: 11/1/1982    Smokeless tobacco: Never   Vaping Use    Vaping status: Never Used   Substance Use Topics    Alcohol use: Yes    Drug use: Yes     Types: Marijuana     Comment: 3x times a week- night time for sleep and back pain          Medications  Allergies   Current Outpatient Medications   Medication Sig Dispense Refill    amiodarone (PACERONE) 200 MG tablet Take 1 tablet (200 mg) by mouth daily.      apixaban ANTICOAGULANT (ELIQUIS) 5 MG tablet Take 1 tablet (5 mg) by mouth 2 times daily 60 tablet 11    atorvastatin (LIPITOR) 10 MG tablet Take 1 tablet (10 mg) by mouth daily 90 tablet 3    benzonatate (TESSALON) 100 MG capsule Take 1 capsule (100 mg) by mouth 3 times daily as needed for cough 20 capsule 0    cefdinir (OMNICEF) 300 MG capsule Take 1 capsule (300 mg) by mouth 2 times  "daily. (Patient not taking: Reported on 11/27/2024) 14 capsule 1    clotrimazole-betamethasone (LOTRISONE) 1-0.05 % external cream       dupilumab (DUPIXENT) 300 MG/2ML prefilled syringe Inject 300 mg Subcutaneous every 14 days      empagliflozin (JARDIANCE) 10 MG TABS tablet Take 1 tablet (10 mg) by mouth daily 30 tablet 11    fenofibrate (TRIGLIDE/LOFIBRA) 160 MG tablet Take 1 tablet (160 mg) by mouth daily 90 tablet 3    losartan (COZAAR) 25 MG tablet TAKE 1 TABLET BY MOUTH AT BEDTIME 90 tablet 0    nicotine (NICORETTE) 2 MG gum Place 2 mg inside cheek every hour as needed for nicotine withdrawal symptoms      Testosterone 1.62 % GEL PLACE 1 PUMP (20.25 MG) ONTO THE SKIN DAILY APPLY FROM DISPENSER TO CLEAN, DRY, INTACT SKIN OF THE UPPER ARMS AND SHOULDERS 75 g 0    Tirzepatide 2.5 MG/0.5ML SOAJ Inject 0.5 mLs (2.5 mg) subcutaneously every 7 days. 2 mL 2    triamcinolone (KENALOG) 0.1 % external cream       Allergies   Allergen Reactions    Clobetasol Other (See Comments)     Allergy testing done - Clobetasol-77-Propionate    Inhaled Anticholinergic Agents Other (See Comments)     Stearyl Alcohol - Allergy testing done    No Clinical Screening - See Comments Other (See Comments)     Stearyl Alcohol - Allergy testing done    Octylisothiazolinone; Phenylenediamine - allergy testing done    Shellac Other (See Comments)     Allergy testing done    Trolamine (Triethanolamine) Other (See Comments)     Allergy testing done    Urea Other (See Comments)     Allergy testing done         Lab Results    Chemistry/lipid CBC Cardiac Enzymes/BNP/TSH/INR   Lab Results   Component Value Date    BUN 11.6 11/04/2024     11/04/2024    CO2 26 11/04/2024     No results found for: \"CREATININE\"    Lab Results   Component Value Date    CHOL 184 11/04/2024    HDL 37 (L) 11/04/2024     (H) 11/04/2024      Lab Results   Component Value Date    WBC 5.0 11/04/2024    HGB 17.9 (H) 11/04/2024    HCT 54.2 (H) 11/04/2024    MCV 95 " 11/04/2024     11/04/2024    Lab Results   Component Value Date    TSH 2.28 07/22/2024    INR 1.14 11/04/2024          This note has been dictated using voice recognition software. Any grammatical, typographical, or context distortions are unintentional and inherent to the software.    Virgilio Carey PA-C  Clinical Cardiac Electrophysiology  [address]  [place] [address]   Office: [phone]  Fax: [phone]

## 2024-12-30 NOTE — INTERVAL H&P NOTE
"I have reviewed the surgical (or preoperative) H&P that is linked to this encounter, and examined the patient. There are no significant changes    Clinical Conditions Present on Arrival:  Clinically Significant Risk Factors Present on Admission                 # Drug Induced Coagulation Defect: home medication list includes an anticoagulant medication       # Obesity: Estimated body mass index is 37.88 kg/m  as calculated from the following:    Height as of this encounter: 1.88 m (6' 2\").    Weight as of this encounter: 133.8 kg (295 lb).       "

## 2024-12-30 NOTE — PROGRESS NOTES
[facility] [address], [place] [address]   [email or web address]   OFFICE: [phone]        Primary Care: Torsten Thomas MD     Assessment/Recommendations     Persistent atrial fibrillation: symptomatic with palpitations, intermittent HANEY. Underwent PVI with Dr. Saldaña 24. He had 2 cardioversions following PVI. He was started on amiodarone after recurrence of afib on sotalol 120 mg BID. He was loaded with 400 mg daily x 10 days, then 200 mg daily.     We discussed DCCV procedure and reviewed the risks of such including but not limited to skin irritation, bradycardia, hypotension, stroke, as well as expected recovery and follow up. His questions were answered to his satisfaction and he is ready to proceed.     Medical, past medical, surgical and social history reviewed and updated. Current medications and allergies reviewed. No personal or family history of adverse reactions to anesthesia or abnormal bleeding with surgery. Labs today significant for WBC 5.0, hgb 17.9, Cr 0.81, electrolytes K4.3.     HTN: continue current medications    JIM on CPAP: continue use of CPAP      Plan:   - plan for DCCV today   - will likely continue amiodarone following DCCV   - Follow-up in clinic with Dr. Saldaña 25  - sees his general cardiologist 25       History of Present Illness/Subjective    Angel Etienne is a 59 year old male with persistent atiral fibirllation, HTN, JIM on CPAP, who is seen today for history and physical prior to DCCV. He has had previous DCCV 24 with recurrence around 24. Underwent PVI with Dr. Saldaña 24 with recurrence and two subsequent DCCV on 10/16 and .     He denies chest discomfort, palpitations, abdominal fullness/bloating or peripheral edema, shortness of breath, paroxysmal nocturnal dyspnea, orthopnea, lightheadedness, dizziness, pre-syncope, or syncope.     Data Review     EK2024:  Atrial fibrillation 64 bpm  11/14/2024: Sinus rhythm with first-degree AV delay 68 bpm,  ms  10/16/2024: Sinus rhythm first-degree AV delay 60 bpm,  ms  10/14/2024: Atrial fibrillation with slow ventricular response 52 bpm  Personally reviewed.     TTE 10/31/24:  1. Normal left ventricular chamber size, no regional wall motion abnormalities, calculated 2-D monoplane volumetric ejection fraction 65%.   2. Indeterminate left ventricular diastolic function , however estimated LA pressure is elevated based on atrial shunt Doppler.   3. Mildly enlarged right ventricular chamber size, normal systolic function, estimated right ventricular systolic pressure 34 mmHg (right atrial pressure of 5 mmHg).   4. Positive for atrial level shunt by color flow imaging with predominant left to right shunt.   5. Mild-moderate bi-atrial enlargement by visual estimation.   6. Sclerotic aortic valve. No aortic valve stenosis. Mild regurgitation.   7. Enlarged mid ascending aorta diameter of 44 mm, upper limit of normal for age, sex and BSA is 42 mm.   8. No  pericardial effusion.   9. There are no previous [facility] echocardiograms available for comparison.     Holter Monitor 3/7/24  Holter monitoring (duration 24hrs).  Continuous atrial fibrillation, 48 to 99bpm, average 69bpm.  There were no pauses of greater than 5 seconds. Nocturnal bradycardia was noted.  Rare premature ventricular contractions (<1%).  Symptom triggers - none.         Physical Examination Review of Systems   BMI= There is no height or weight on file to calculate BMI.    Wt Readings from Last 3 Encounters:   11/27/24 135.6 kg (299 lb)   11/14/24 132.5 kg (292 lb)   10/28/24 134.7 kg (297 lb)       Vitals: There were no vitals taken for this visit.  General   Appearance:   Alert and oriented, in no acute distress.    HEENT:  Normocephalic and atraumatic. Conjunctiva and sclera are clear. Moist oral mucosa.    Neck: No JVP, carotid bruit or obvious  thyromegaly.   Lungs:   Respirations unlabored. Clear bilaterally with no rales, rhonchi, or wheezes.     Cardiovascular:   Rhythm is irregular. S1 and S2 are normal. No significant murmur is present. Lower extremities demonstrate no significant edema. Posterior tibial pulses are intact bilaterally.   Extremities: No cyanosis or clubbing   Skin: Skin is warm, dry, and otherwise intact.   Neurologic: Gait not assessed. Mood and affect appropriate.                                                Medical History  Surgical History Family History Social History   Past Medical History:   Diagnosis Date    Atrial fibrillation (H)     High cholesterol     Past Surgical History:   Procedure Laterality Date    EP ABLATION PULMONARY VEIN ISOLATION N/A 9/30/2024    Procedure: Ablation Atrial Fibrillation;  Surgeon: Paramjit Saldaña MD;  Location: [facility]    Family History   Problem Relation Age of Onset    Varicose Veins Father     Social History     Tobacco Use    Smoking status: Every Day     Current packs/day: 1.00     Average packs/day: 1.5 packs/day for 42.2 years (62.9 ttl pk-yrs)     Types: Cigarettes     Start date: 11/1/1982    Smokeless tobacco: Never   Vaping Use    Vaping status: Never Used   Substance Use Topics    Alcohol use: Yes    Drug use: Yes     Types: Marijuana     Comment: 3x times a week- night time for sleep and back pain          Medications  Allergies   Current Outpatient Medications   Medication Sig Dispense Refill    amiodarone (PACERONE) 200 MG tablet Take 1 tablet (200 mg) by mouth daily.      apixaban ANTICOAGULANT (ELIQUIS) 5 MG tablet Take 1 tablet (5 mg) by mouth 2 times daily 60 tablet 11    atorvastatin (LIPITOR) 10 MG tablet Take 1 tablet (10 mg) by mouth daily 90 tablet 3    benzonatate (TESSALON) 100 MG capsule Take 1 capsule (100 mg) by mouth 3 times daily as needed for cough 20 capsule 0    cefdinir (OMNICEF) 300 MG capsule Take 1 capsule (300 mg) by mouth 2 times  "daily. (Patient not taking: Reported on 11/27/2024) 14 capsule 1    clotrimazole-betamethasone (LOTRISONE) 1-0.05 % external cream       dupilumab (DUPIXENT) 300 MG/2ML prefilled syringe Inject 300 mg Subcutaneous every 14 days      empagliflozin (JARDIANCE) 10 MG TABS tablet Take 1 tablet (10 mg) by mouth daily 30 tablet 11    fenofibrate (TRIGLIDE/LOFIBRA) 160 MG tablet Take 1 tablet (160 mg) by mouth daily 90 tablet 3    losartan (COZAAR) 25 MG tablet TAKE 1 TABLET BY MOUTH AT BEDTIME 90 tablet 0    nicotine (NICORETTE) 2 MG gum Place 2 mg inside cheek every hour as needed for nicotine withdrawal symptoms      Testosterone 1.62 % GEL PLACE 1 PUMP (20.25 MG) ONTO THE SKIN DAILY APPLY FROM DISPENSER TO CLEAN, DRY, INTACT SKIN OF THE UPPER ARMS AND SHOULDERS 75 g 0    Tirzepatide 2.5 MG/0.5ML SOAJ Inject 0.5 mLs (2.5 mg) subcutaneously every 7 days. 2 mL 2    triamcinolone (KENALOG) 0.1 % external cream       Allergies   Allergen Reactions    Clobetasol Other (See Comments)     Allergy testing done - Clobetasol-77-Propionate    Inhaled Anticholinergic Agents Other (See Comments)     Stearyl Alcohol - Allergy testing done    No Clinical Screening - See Comments Other (See Comments)     Stearyl Alcohol - Allergy testing done    Octylisothiazolinone; Phenylenediamine - allergy testing done    Shellac Other (See Comments)     Allergy testing done    Trolamine (Triethanolamine) Other (See Comments)     Allergy testing done    Urea Other (See Comments)     Allergy testing done         Lab Results    Chemistry/lipid CBC Cardiac Enzymes/BNP/TSH/INR   Lab Results   Component Value Date    BUN 11.6 11/04/2024     11/04/2024    CO2 26 11/04/2024     No results found for: \"CREATININE\"    Lab Results   Component Value Date    CHOL 184 11/04/2024    HDL 37 (L) 11/04/2024     (H) 11/04/2024      Lab Results   Component Value Date    WBC 5.0 11/04/2024    HGB 17.9 (H) 11/04/2024    HCT 54.2 (H) 11/04/2024    MCV 95 " 11/04/2024     11/04/2024    Lab Results   Component Value Date    TSH 2.28 07/22/2024    INR 1.14 11/04/2024          This note has been dictated using voice recognition software. Any grammatical, typographical, or context distortions are unintentional and inherent to the software.    Virgilio Carey PA-C  Clinical Cardiac Electrophysiology  [address]  [place] [address]   Office: [phone]  Fax: [phone]

## 2024-12-30 NOTE — DISCHARGE INSTRUCTIONS
Electrical Cardioversion: What to Expect at Home  Your recovery     Electrical cardioversion is a treatment for an abnormal heartbeat, such as atrial fibrillation, supraventricular tachycardia, or ventricular tachycardia (VT). Your doctor used a brief electrical shock to reset your heart's rhythm.  After the procedure, you may have redness, like a sunburn, where the patches were. The medicines you got to make you sleepy may make you feel drowsy for the rest of the day. You may feel soreness or discomfort in your chest wall for a few days.  Your doctor may have you take medicines to help the heart beat normally and to prevent blood clots.  This care sheet gives you a general idea about how long it will take for you to recover. But each person recovers at a different pace. Follow the steps below to feel better as quickly as possible.  How can you care for yourself at home?  Medicines    If the doctor gave you a sedative:  For 24 hours, don't do anything that requires attention to detail. It takes time for the medicine's effects to completely wear off.  For your safety, do not drive or operate any machinery that could be dangerous. Wait until the medicine wears off and you can think clearly and react easily.     Be safe with medicines. Take your medicines exactly as prescribed. Call your doctor if you think you are having a problem with your medicine. You may take one or more of the following medicines:  Rate-control medicines to slow the heart rate.  Rhythm-control medicines that help the heart keep a normal rhythm.  Blood thinners, also called anticoagulants, which help prevent blood clots.  You will get more details on the specific medicines your doctor prescribes. Be sure you know how to take your medicines safely.     Do not take any vitamins, over-the-counter medicines, or herbal products without talking to your doctor first.   Exercise    Talk to your doctor about what type and level of exercise are safe for  you.     When you exercise, watch for signs that your heart is working too hard. You are pushing too hard if you cannot talk while you are exercising. If you become short of breath or dizzy or have chest pain, sit down and rest right away.     Check your pulse regularly. Place two fingers on the artery at the palm side of your wrist in line with your thumb. If your heartbeat seems uneven or fast, talk to your doctor.   Heart-healthy lifestyle    Do not smoke. If you need help quitting, talk to your doctor about stop-smoking programs and medicines. These can increase your chances of quitting for good.     Eat heart-healthy foods. Limit sodium, alcohol, and sugar.     Stay at a healthy weight. Lose weight if you need to.     Manage other health problems. If you think you may have a problem with alcohol or drug use, talk to your doctor.   Follow-up care is a key part of your treatment and safety. Be sure to make and go to all appointments, and call your doctor if you are having problems. It's also a good idea to know your test results and keep a list of the medicines you take.  When should you call for help?   Call 911 anytime you think you may need emergency care. For example, call if:    You passed out (lost consciousness).     You have symptoms of a heart attack. These may include:  Chest pain or pressure, or a strange feeling in the chest.  Sweating.  Shortness of breath.  Nausea or vomiting.  Pain, pressure, or a strange feeling in the back, neck, jaw, or upper belly or in one or both shoulders or arms.  Lightheadedness or sudden weakness.  A fast or irregular heartbeat.     After calling 911, the  may tell you to chew 1 adult-strength or 2 to 4 low-dose aspirin. Wait for an ambulance. Do not try to drive yourself.     You have symptoms of a stroke. These may include:  Sudden numbness, tingling, weakness, or loss of movement in your face, arm, or leg, especially on only one side of your body.  Sudden  "vision changes.  Sudden trouble speaking.  Sudden confusion or trouble understanding simple statements.  Sudden problems with walking or balance.  A sudden, severe headache that is different from past headaches.   Call your doctor now or seek immediate medical care if:    You feel dizzy or lightheaded, or you feel like you may faint.     You have a fast or irregular heartbeat.   Watch closely for any changes in your health, and be sure to contact your doctor if you have any problems.  Where can you learn more?  Go to https://www.[employer].net/patiented  Enter A617 in the search box to learn more about \"Electrical Cardioversion: What to Expect at Home.\"  Current as of: July 31, 2024  Content Version: 14.3    2024 [employer].   Care instructions adapted under license by your healthcare professional. If you have questions about a medical condition or this instruction, always ask your healthcare professional. [employer] disclaims any warranty or liability for your use of this information.    "

## 2024-12-30 NOTE — ANESTHESIA POSTPROCEDURE EVALUATION
Patient: Angel Etienne    Procedure: * No procedures listed *  Cardioversion External    Anesthesia Type:  MAC    Note:  Disposition: Outpatient   Postop Pain Control: Uneventful            Sign Out: Well controlled pain   PONV: No   Neuro/Psych: Uneventful            Sign Out: Acceptable/Baseline neuro status   Airway/Respiratory: Uneventful            Sign Out: Acceptable/Baseline resp. status   CV/Hemodynamics: Uneventful            Sign Out: Acceptable CV status; No obvious hypovolemia; No obvious fluid overload   Other NRE: NONE   DID A NON-ROUTINE EVENT OCCUR? No           Last vitals:  Vitals:    12/30/24 0840 12/30/24 0930 12/30/24 0935   BP: (!) 153/81 (!) 159/88 (!) 157/84   Pulse: 60 61 72   Resp: 15 17 17   Temp: 36.7  C (98  F)  36.9  C (98.4  F)   SpO2: 95% 99% 97%       Electronically Signed By: Bertram Hi MD  December 30, 2024  9:45 AM

## 2024-12-30 NOTE — PROGRESS NOTES
Cardioversion completed and pt discharged to home with instructions. No medication changes at this time. Follow up with Dr Saldaña in January.

## 2024-12-31 ENCOUNTER — TELEPHONE (OUTPATIENT)
Dept: FAMILY MEDICINE | Facility: CLINIC | Age: 59
End: 2024-12-31
Payer: COMMERCIAL

## 2024-12-31 DIAGNOSIS — I48.19 PERSISTENT ATRIAL FIBRILLATION (H): ICD-10-CM

## 2024-12-31 RX ORDER — AMIODARONE HYDROCHLORIDE 200 MG/1
200 TABLET ORAL DAILY
Qty: 90 TABLET | Refills: 3 | Status: SHIPPED | OUTPATIENT
Start: 2024-12-31

## 2024-12-31 RX ORDER — LOSARTAN POTASSIUM 25 MG/1
25 TABLET ORAL AT BEDTIME
Qty: 90 TABLET | Refills: 0 | OUTPATIENT
Start: 2024-12-31

## 2024-12-31 RX ORDER — FENOFIBRATE 160 MG/1
160 TABLET ORAL DAILY
Qty: 90 TABLET | Refills: 0 | OUTPATIENT
Start: 2024-12-31

## 2024-12-31 RX ORDER — ATORVASTATIN CALCIUM 10 MG/1
10 TABLET, FILM COATED ORAL DAILY
Qty: 90 TABLET | Refills: 0 | OUTPATIENT
Start: 2024-12-31

## 2024-12-31 RX ORDER — LOSARTAN POTASSIUM 25 MG/1
25 TABLET ORAL AT BEDTIME
Qty: 90 TABLET | Refills: 0 | Status: SHIPPED | OUTPATIENT
Start: 2024-12-31

## 2024-12-31 RX ORDER — FENOFIBRATE 160 MG/1
160 TABLET ORAL DAILY
Qty: 90 TABLET | Refills: 0 | Status: SHIPPED | OUTPATIENT
Start: 2024-12-31

## 2024-12-31 RX ORDER — ATORVASTATIN CALCIUM 10 MG/1
10 TABLET, FILM COATED ORAL DAILY
Qty: 90 TABLET | Refills: 0 | Status: SHIPPED | OUTPATIENT
Start: 2024-12-31

## 2024-12-31 NOTE — TELEPHONE ENCOUNTER
Nurse SBAR Triage    Situation: Pt calling re: medication refills.    Background: States that he requested three refills today but they were denied.    Assessment: Needs refills of Losartan, Atorvastatin and fenofibrate. Travelling to [place] next week and will be gone for over a month. Would like to have them filled here so he doesn't have to worry about getting them filled out of state. Says he has about 45 day's worth of the meds currently.    Recommendation: Advised that he was denied because we typically don't approve refills until the pt is within 30 days of needing a refill. Since he is going out of town, will send an early refill as medications all pass protocol otherwise. Medications sent for #90 each.    Joana [place], RN, BSN  [facility] Triage

## 2025-01-21 ENCOUNTER — TRANSFERRED RECORDS (OUTPATIENT)
Dept: HEALTH INFORMATION MANAGEMENT | Facility: CLINIC | Age: 60
End: 2025-01-21

## 2025-01-22 NOTE — H&P (VIEW-ONLY)
[facility]  Cardiac Electrophysiology  [address]  [place] [address]   Office: [phone]  Fax: [phone]     Patient: Angel Etienne   : 1965       CHIEF COMPLAINT/REASON FOR VISIT  Persistent atrial fibrillation      Assessment/Recommendations     Stage 3B/Persistent atrial fibrillation and atrial flutter - symptomatic with palpitations, intermittent dyspnea with activity  [id number] 1  PVI 2024 (myself) - moderate LA dilation, mild fibrosis, no inducible AT/AFL/AF post ablation - multiple AF recurrences thereafter.  Typical appearing AFL by 2025 ECG.  Sotalol (2024-2024), amiodarone (2024-present)  We reviewed atrial fibrillation and flutter, managing stroke risk via anticoagulation, managing heart failure risk, cardioversion, antiarrhythmic drug therapy, and catheter ablation.  We discussed atrial fibrillation ablation procedures, anticipated success rates, the potential need for re-do ablation vs addition of anti-arrhythmic drugs, procedural risks (including groin bleeding, vascular injury, tamponade, phrenic or esophageal injury, stroke, pulmonary vein stenosis) and recovery expectations.  He would prefer redo ablation  - redo PVI, CTI ablation (AFL by 2025 ECG), general anesthesia, continue apixaban, hold amiodarone 7 days prior with plan to continue for 6-12 weeks post ablation  - continue amiodarone 200mg daily for now  - continue apixaban 5mg twice daily for now - given his [id number] 1, long term therapeutic anticoagulation is of indeterminate risk/benefit balance.  We can revisit further in future  - we discussed the ongoing importance of lifestyle modification (maintaining a healthy weight, aerobic activity, continued sleep apnea management, alcohol avoidance) as part of a long term strategy for atrial fibrillation management    Notes:   He had inquired regarding weight loss agents.  Zepbound had been prescribed by  his PMD, though had been declined by his insurer.  Re-visiting this would best be deferred until we have a durable interval of sinus rhythm - thereafter, we would certainly support this (though we do not have enough familiarity to prescribe these medications ourselves).  Long term, maintaining a healthy weight will improve the odds of maintenance of sinus rhythm.  He had also inquired regarding an Inspire implant for JIM treatment.  I have very limited familiarity with this, though we can query further with our pulmonary colleagues in future.    Follow up: as above.  The longitudinal plan of care was addressed during this visit. Due to the added complexity in care, our team will remain engaged subsequent management of this condition and ongoing continuity of care           History of Present Illness   Angel Etienne is a very pleasant 59 year old male with persistent atrial fibrillation with PVI 9/30/2024, HTN, JIM on CPAP presenting for follow-up regarding atrial fibrillation.    Mr. Etienne' atrial fibrillation history is as summarized below:  Symptoms: palpitations, intermittent dyspnea with activity  Symptom onset date: 2022  Diagnosis date: 1/29/2024 at pre-operative evaluation  Admissions/ER visits: none  Prior medical therapies: sotalol (8/2024-11/2024), amiodarone (11/2024-present)  Prior DCCVs: 8/8/2024 (AF recurrence around 8/20/2024), 10/16/2024, 11/14/2024, 12/30/2024. Typical appearing AFL by ECG 1/23/2025.  Prior ablations: PVI 9/30/2024 (myself) - moderate LA dilation, mild fibrosis, no inducible AT/AFL/AF post ablation.   Multiple AF recurrences post ablation requiring DCCVs.  Percutaneous left atrial appendage occlusion: none    He was noted to have mildly elevated ESR and CRP post ablation and was treated with ibuprofen.  He had a number of AF recurrences post ablation treated with ongoing sotalol, transitioned to amiodarone, and DCCVs 10/16/2024, 11/14/2024, 12/30/2024.  He notes ongoing  palpitations, intermittent dyspnea, lightheadedness - he has noted improved symptoms briefly post DCCVs.  He denies chest pain, syncope.  He and his wife are planning a trip to [place] around 3/2025.       Physical Examination  Review of Systems   VITALS: /82 (BP Location: Left arm, Patient Position: Sitting, Cuff Size: Adult Large)   Pulse 68   Resp 20   Wt 134.3 kg (296 lb)   BMI 38.00 kg/m    Wt Readings from Last 3 Encounters:   12/30/24 133.8 kg (295 lb)   11/27/24 135.6 kg (299 lb)   11/14/24 132.5 kg (292 lb)     CONSTITUTIONAL: well nourished, comfortable, no distress  EYES:  Conjunctivae pink, sclerae clear.    E/N/T:  Oral mucosa pink  RESPIRATORY:  Respiratory effort is normal  CARDIOVASCULAR:  regular, normal S1 and S2  GASTROINTESTINAL:  Abdomen without masses or tenderness  EXTREMITIES:  No clubbing or cyanosis.    MUSCULOSKELETAL:  Overall grossly normal muscle strength  SKIN:  Overall, skin warm and dry, no lesions.  NEURO/PSYCH:  Oriented x 3 with normal affect.   Constitutional:  No weight loss or loss of appetite    Eyes:  No difficulty with vision, no double vision, no dry eyes  ENT:  No sore throat, difficulty swallowing; changes in hearing or tinnitus  Cardiovascular: As detailed above  Respiratory:  No cough  Musculoskeletal  No joint pain, muscle aches  Neurologic:  No syncope, lightheadedness, fainting spells   Hematologic: No easy bruising, excessive bleeding tendency   Gastrointestinal:  No jaundice, abdominal pain or abdominal bloating  Genitourinary: No changes in urinary habits, no trouble urinating    Psychiatric: No anxiety or depression      Medical History  Surgical History   Past Medical History:   Diagnosis Date    Atrial fibrillation (H)     High cholesterol     Past Surgical History:   Procedure Laterality Date    EP ABLATION PULMONARY VEIN ISOLATION N/A 9/30/2024    Procedure: Ablation Atrial Fibrillation;  Surgeon: Paramjit Saldaña MD;  [facility]  CV         Family History Social History   Family History   Problem Relation Age of Onset    Varicose Veins Father         Social History     Tobacco Use    Smoking status: Every Day     Current packs/day: 1.00     Average packs/day: 1.5 packs/day for 42.2 years (62.9 ttl pk-yrs)     Types: Cigarettes     Start date: 11/1/1982    Smokeless tobacco: Never   Vaping Use    Vaping status: Never Used   Substance Use Topics    Alcohol use: Yes    Drug use: Yes     Types: Marijuana     Comment: 3x times a week- night time for sleep and back pain         Medications  Allergies     Current Outpatient Medications:     amiodarone (PACERONE) 200 MG tablet, Take 1 tablet (200 mg) by mouth daily., Disp: 90 tablet, Rfl: 3    apixaban ANTICOAGULANT (ELIQUIS) 5 MG tablet, Take 1 tablet (5 mg) by mouth 2 times daily, Disp: 60 tablet, Rfl: 11    atorvastatin (LIPITOR) 10 MG tablet, Take 1 tablet (10 mg) by mouth daily., Disp: 90 tablet, Rfl: 0    clotrimazole-betamethasone (LOTRISONE) 1-0.05 % external cream, , Disp: , Rfl:     dupilumab (DUPIXENT) 300 MG/2ML prefilled syringe, Inject 300 mg Subcutaneous every 14 days, Disp: , Rfl:     empagliflozin (JARDIANCE) 10 MG TABS tablet, Take 1 tablet (10 mg) by mouth daily, Disp: 30 tablet, Rfl: 11    fenofibrate (TRIGLIDE/LOFIBRA) 160 MG tablet, Take 1 tablet (160 mg) by mouth daily., Disp: 90 tablet, Rfl: 0    losartan (COZAAR) 25 MG tablet, Take 1 tablet (25 mg) by mouth at bedtime., Disp: 90 tablet, Rfl: 0    nicotine (NICORETTE) 2 MG gum, Place 2 mg inside cheek every hour as needed for nicotine withdrawal symptoms, Disp: , Rfl:     Testosterone 1.62 % GEL, PLACE 1 PUMP (20.25 MG) ONTO THE SKIN DAILY APPLY FROM DISPENSER TO CLEAN, DRY, INTACT SKIN OF THE UPPER ARMS AND SHOULDERS, Disp: 75 g, Rfl: 0    triamcinolone (KENALOG) 0.1 % external cream, , Disp: , Rfl:     Current Facility-Administered Medications:     lidocaine 112 mL, EPINEPHrine (ADRENALIN) 1.12 mg in sodium chloride 0.9 %  "1,113.12 mL (TUMESCENT), , Irrigation, Once, Carlos Guevara MD     Allergies   Allergen Reactions    Clobetasol Other (See Comments)     Allergy testing done - Clobetasol-77-Propionate    Inhaled Anticholinergic Agents Other (See Comments)     Stearyl Alcohol - Allergy testing done    No Clinical Screening - See Comments Other (See Comments)     Stearyl Alcohol - Allergy testing done    Octylisothiazolinone; Phenylenediamine - allergy testing done    Shellac Other (See Comments)     Allergy testing done    Trolamine (Triethanolamine) Other (See Comments)     Allergy testing done    Urea Other (See Comments)     Allergy testing done          Lab Results    Chemistry CBC Cardiac Enzymes/BNP/TSH/INR   Recent Labs   Lab Test 11/04/24  0930      POTASSIUM 4.3   CHLORIDE 107   CO2 26   GLC 91   BUN 11.6   CR 0.81   GFRESTIMATED >90   LEESA 9.6     Recent Labs   Lab Test 11/04/24  0930 10/09/24  1723 09/24/24  1110   CR 0.81 0.79 0.86          Recent Labs   Lab Test 11/04/24  0930   WBC 5.0   HGB 17.9*   HCT 54.2*   MCV 95        Recent Labs   Lab Test 11/04/24  0930 09/24/24  1110 07/30/24  1340   HGB 17.9* 16.7 16.4    No results for input(s): \"TROPONINI\" in the last 23139 hours.  Recent Labs   Lab Test 12/10/24  1509 10/09/24  1723   NTBNP 395 833     Recent Labs   Lab Test 07/22/24  1717   TSH 2.28     Recent Labs   Lab Test 11/04/24  0930 01/29/24  1555   INR 1.14 1.10         Data Review    ECGs (tracings independently reviewed)  1/23/2025 - typical appearing AFL, 73bpm  12/30/2024 - SR 72bpm, IN 316ms, QTC 477ms  11/27/2024 - AF, 64bpm  8/28/24 - AF, 54bpm, QT//438 ms  8/14/24 - SR 79bpm, first degree AV block, QT//449 ms  8/8/24 (post DCCV) - SR 75bpm, first degree AV block, QT//446 ms  1/29/24 - AF, 65bpm     Holter Monitor 3/7/24  Holter monitoring (duration 24hrs).  Continuous atrial fibrillation, 48 to 99bpm, average 69bpm.  There were no pauses of greater than 5 seconds. " Nocturnal bradycardia was noted.  Rare premature ventricular contractions (<1%).  Symptom triggers - none.    2/5/2024 TTE  The left ventricle is normal in size.  Left ventricular function is normal.The ejection fraction is 60-65%.  There is mild concentric left ventricular hypertrophy.  Normal right ventricle size and systolic function.  The left atrium is severely dilated.  The right atrium is moderately dilated.  There is mild (1+) mitral regurgitation.       Cc: Alondra Miller CNP, Vocal, MD Paramjit Ray MD  1/23/2025  4:38 PM

## 2025-01-22 NOTE — PROGRESS NOTES
[facility]  Cardiac Electrophysiology  [address]  [place] [address]   Office: [phone]  Fax: [phone]     Patient: Angel Etienne   : 1965       CHIEF COMPLAINT/REASON FOR VISIT  Persistent atrial fibrillation      Assessment/Recommendations     Stage 3B/Persistent atrial fibrillation and atrial flutter - symptomatic with palpitations, intermittent dyspnea with activity  [id number] 1  PVI 2024 (myself) - moderate LA dilation, mild fibrosis, no inducible AT/AFL/AF post ablation - multiple AF recurrences thereafter.  Typical appearing AFL by 2025 ECG.  Sotalol (2024-2024), amiodarone (2024-present)  We reviewed atrial fibrillation and flutter, managing stroke risk via anticoagulation, managing heart failure risk, cardioversion, antiarrhythmic drug therapy, and catheter ablation.  We discussed atrial fibrillation ablation procedures, anticipated success rates, the potential need for re-do ablation vs addition of anti-arrhythmic drugs, procedural risks (including groin bleeding, vascular injury, tamponade, phrenic or esophageal injury, stroke, pulmonary vein stenosis) and recovery expectations.  He would prefer redo ablation  - redo PVI, CTI ablation (AFL by 2025 ECG), general anesthesia, continue apixaban, hold amiodarone 7 days prior with plan to continue for 6-12 weeks post ablation  - continue amiodarone 200mg daily for now  - continue apixaban 5mg twice daily for now - given his [id number] 1, long term therapeutic anticoagulation is of indeterminate risk/benefit balance.  We can revisit further in future  - we discussed the ongoing importance of lifestyle modification (maintaining a healthy weight, aerobic activity, continued sleep apnea management, alcohol avoidance) as part of a long term strategy for atrial fibrillation management    Notes:   He had inquired regarding weight loss agents.  Zepbound had been prescribed by  his PMD, though had been declined by his insurer.  Re-visiting this would best be deferred until we have a durable interval of sinus rhythm - thereafter, we would certainly support this (though we do not have enough familiarity to prescribe these medications ourselves).  Long term, maintaining a healthy weight will improve the odds of maintenance of sinus rhythm.  He had also inquired regarding an Inspire implant for JIM treatment.  I have very limited familiarity with this, though we can query further with our pulmonary colleagues in future.    Follow up: as above.  The longitudinal plan of care was addressed during this visit. Due to the added complexity in care, our team will remain engaged subsequent management of this condition and ongoing continuity of care           History of Present Illness   Angel Etienne is a very pleasant 59 year old male with persistent atrial fibrillation with PVI 9/30/2024, HTN, JIM on CPAP presenting for follow-up regarding atrial fibrillation.    Mr. Etienne' atrial fibrillation history is as summarized below:  Symptoms: palpitations, intermittent dyspnea with activity  Symptom onset date: 2022  Diagnosis date: 1/29/2024 at pre-operative evaluation  Admissions/ER visits: none  Prior medical therapies: sotalol (8/2024-11/2024), amiodarone (11/2024-present)  Prior DCCVs: 8/8/2024 (AF recurrence around 8/20/2024), 10/16/2024, 11/14/2024, 12/30/2024. Typical appearing AFL by ECG 1/23/2025.  Prior ablations: PVI 9/30/2024 (myself) - moderate LA dilation, mild fibrosis, no inducible AT/AFL/AF post ablation.   Multiple AF recurrences post ablation requiring DCCVs.  Percutaneous left atrial appendage occlusion: none    He was noted to have mildly elevated ESR and CRP post ablation and was treated with ibuprofen.  He had a number of AF recurrences post ablation treated with ongoing sotalol, transitioned to amiodarone, and DCCVs 10/16/2024, 11/14/2024, 12/30/2024.  He notes ongoing  palpitations, intermittent dyspnea, lightheadedness - he has noted improved symptoms briefly post DCCVs.  He denies chest pain, syncope.  He and his wife are planning a trip to [place] around 3/2025.       Physical Examination  Review of Systems   VITALS: /82 (BP Location: Left arm, Patient Position: Sitting, Cuff Size: Adult Large)   Pulse 68   Resp 20   Wt 134.3 kg (296 lb)   BMI 38.00 kg/m    Wt Readings from Last 3 Encounters:   12/30/24 133.8 kg (295 lb)   11/27/24 135.6 kg (299 lb)   11/14/24 132.5 kg (292 lb)     CONSTITUTIONAL: well nourished, comfortable, no distress  EYES:  Conjunctivae pink, sclerae clear.    E/N/T:  Oral mucosa pink  RESPIRATORY:  Respiratory effort is normal  CARDIOVASCULAR:  regular, normal S1 and S2  GASTROINTESTINAL:  Abdomen without masses or tenderness  EXTREMITIES:  No clubbing or cyanosis.    MUSCULOSKELETAL:  Overall grossly normal muscle strength  SKIN:  Overall, skin warm and dry, no lesions.  NEURO/PSYCH:  Oriented x 3 with normal affect.   Constitutional:  No weight loss or loss of appetite    Eyes:  No difficulty with vision, no double vision, no dry eyes  ENT:  No sore throat, difficulty swallowing; changes in hearing or tinnitus  Cardiovascular: As detailed above  Respiratory:  No cough  Musculoskeletal  No joint pain, muscle aches  Neurologic:  No syncope, lightheadedness, fainting spells   Hematologic: No easy bruising, excessive bleeding tendency   Gastrointestinal:  No jaundice, abdominal pain or abdominal bloating  Genitourinary: No changes in urinary habits, no trouble urinating    Psychiatric: No anxiety or depression      Medical History  Surgical History   Past Medical History:   Diagnosis Date    Atrial fibrillation (H)     High cholesterol     Past Surgical History:   Procedure Laterality Date    EP ABLATION PULMONARY VEIN ISOLATION N/A 9/30/2024    Procedure: Ablation Atrial Fibrillation;  Surgeon: Paramjit Saldaña MD;  Location: [facility]  CV         Family History Social History   Family History   Problem Relation Age of Onset    Varicose Veins Father         Social History     Tobacco Use    Smoking status: Every Day     Current packs/day: 1.00     Average packs/day: 1.5 packs/day for 42.2 years (62.9 ttl pk-yrs)     Types: Cigarettes     Start date: 11/1/1982    Smokeless tobacco: Never   Vaping Use    Vaping status: Never Used   Substance Use Topics    Alcohol use: Yes    Drug use: Yes     Types: Marijuana     Comment: 3x times a week- night time for sleep and back pain         Medications  Allergies     Current Outpatient Medications:     amiodarone (PACERONE) 200 MG tablet, Take 1 tablet (200 mg) by mouth daily., Disp: 90 tablet, Rfl: 3    apixaban ANTICOAGULANT (ELIQUIS) 5 MG tablet, Take 1 tablet (5 mg) by mouth 2 times daily, Disp: 60 tablet, Rfl: 11    atorvastatin (LIPITOR) 10 MG tablet, Take 1 tablet (10 mg) by mouth daily., Disp: 90 tablet, Rfl: 0    clotrimazole-betamethasone (LOTRISONE) 1-0.05 % external cream, , Disp: , Rfl:     dupilumab (DUPIXENT) 300 MG/2ML prefilled syringe, Inject 300 mg Subcutaneous every 14 days, Disp: , Rfl:     empagliflozin (JARDIANCE) 10 MG TABS tablet, Take 1 tablet (10 mg) by mouth daily, Disp: 30 tablet, Rfl: 11    fenofibrate (TRIGLIDE/LOFIBRA) 160 MG tablet, Take 1 tablet (160 mg) by mouth daily., Disp: 90 tablet, Rfl: 0    losartan (COZAAR) 25 MG tablet, Take 1 tablet (25 mg) by mouth at bedtime., Disp: 90 tablet, Rfl: 0    nicotine (NICORETTE) 2 MG gum, Place 2 mg inside cheek every hour as needed for nicotine withdrawal symptoms, Disp: , Rfl:     Testosterone 1.62 % GEL, PLACE 1 PUMP (20.25 MG) ONTO THE SKIN DAILY APPLY FROM DISPENSER TO CLEAN, DRY, INTACT SKIN OF THE UPPER ARMS AND SHOULDERS, Disp: 75 g, Rfl: 0    triamcinolone (KENALOG) 0.1 % external cream, , Disp: , Rfl:     Current Facility-Administered Medications:     lidocaine 112 mL, EPINEPHrine (ADRENALIN) 1.12 mg in sodium chloride 0.9 %  "1,113.12 mL (TUMESCENT), , Irrigation, Once, Carlos Guevara MD     Allergies   Allergen Reactions    Clobetasol Other (See Comments)     Allergy testing done - Clobetasol-77-Propionate    Inhaled Anticholinergic Agents Other (See Comments)     Stearyl Alcohol - Allergy testing done    No Clinical Screening - See Comments Other (See Comments)     Stearyl Alcohol - Allergy testing done    Octylisothiazolinone; Phenylenediamine - allergy testing done    Shellac Other (See Comments)     Allergy testing done    Trolamine (Triethanolamine) Other (See Comments)     Allergy testing done    Urea Other (See Comments)     Allergy testing done          Lab Results    Chemistry CBC Cardiac Enzymes/BNP/TSH/INR   Recent Labs   Lab Test 11/04/24  0930      POTASSIUM 4.3   CHLORIDE 107   CO2 26   GLC 91   BUN 11.6   CR 0.81   GFRESTIMATED >90   LEESA 9.6     Recent Labs   Lab Test 11/04/24  0930 10/09/24  1723 09/24/24  1110   CR 0.81 0.79 0.86          Recent Labs   Lab Test 11/04/24  0930   WBC 5.0   HGB 17.9*   HCT 54.2*   MCV 95        Recent Labs   Lab Test 11/04/24  0930 09/24/24  1110 07/30/24  1340   HGB 17.9* 16.7 16.4    No results for input(s): \"TROPONINI\" in the last 81572 hours.  Recent Labs   Lab Test 12/10/24  1509 10/09/24  1723   NTBNP 395 833     Recent Labs   Lab Test 07/22/24  1717   TSH 2.28     Recent Labs   Lab Test 11/04/24  0930 01/29/24  1555   INR 1.14 1.10         Data Review    ECGs (tracings independently reviewed)  1/23/2025 - typical appearing AFL, 73bpm  12/30/2024 - SR 72bpm, ID 316ms, QTC 477ms  11/27/2024 - AF, 64bpm  8/28/24 - AF, 54bpm, QT//438 ms  8/14/24 - SR 79bpm, first degree AV block, QT//449 ms  8/8/24 (post DCCV) - SR 75bpm, first degree AV block, QT//446 ms  1/29/24 - AF, 65bpm     Holter Monitor 3/7/24  Holter monitoring (duration 24hrs).  Continuous atrial fibrillation, 48 to 99bpm, average 69bpm.  There were no pauses of greater than 5 seconds. " Nocturnal bradycardia was noted.  Rare premature ventricular contractions (<1%).  Symptom triggers - none.    2/5/2024 TTE  The left ventricle is normal in size.  Left ventricular function is normal.The ejection fraction is 60-65%.  There is mild concentric left ventricular hypertrophy.  Normal right ventricle size and systolic function.  The left atrium is severely dilated.  The right atrium is moderately dilated.  There is mild (1+) mitral regurgitation.       Cc: Alondra Miller CNP, Vocal, MD Paramjit Ray MD  1/23/2025  4:38 PM

## 2025-01-23 ENCOUNTER — OFFICE VISIT (OUTPATIENT)
Dept: CARDIOLOGY | Facility: CLINIC | Age: 60
End: 2025-01-23
Payer: COMMERCIAL

## 2025-01-23 VITALS
SYSTOLIC BLOOD PRESSURE: 134 MMHG | DIASTOLIC BLOOD PRESSURE: 82 MMHG | WEIGHT: 296 LBS | HEART RATE: 68 BPM | BODY MASS INDEX: 38 KG/M2 | RESPIRATION RATE: 20 BRPM

## 2025-01-23 DIAGNOSIS — I48.19 PERSISTENT ATRIAL FIBRILLATION (H): Primary | ICD-10-CM

## 2025-01-23 LAB
ATRIAL RATE - MUSE: 292 BPM
DIASTOLIC BLOOD PRESSURE - MUSE: NORMAL MMHG
INTERPRETATION ECG - MUSE: NORMAL
P AXIS - MUSE: 64 DEGREES
PR INTERVAL - MUSE: NORMAL MS
QRS DURATION - MUSE: 98 MS
QT - MUSE: 418 MS
QTC - MUSE: 460 MS
R AXIS - MUSE: -40 DEGREES
SYSTOLIC BLOOD PRESSURE - MUSE: NORMAL MMHG
T AXIS - MUSE: 51 DEGREES
VENTRICULAR RATE- MUSE: 73 BPM

## 2025-01-23 RX ORDER — BETAMETHASONE VALERATE 1.2 MG/G
CREAM TOPICAL DAILY PRN
COMMUNITY

## 2025-01-23 NOTE — LETTER
2025    Torsten Thomas MD  [address]  [place] [address]    RE: Angel Etienne       Dear Colleague,     I had the pleasure of seeing Angel Etienne in the [facility].     [facility]  Cardiac Electrophysiology  [address]  [place] [address]   Office: [phone]  Fax: [phone]     Patient: Angel Etienne   : 1965       CHIEF COMPLAINT/REASON FOR VISIT  Persistent atrial fibrillation      Assessment/Recommendations     Stage 3B/Persistent atrial fibrillation and atrial flutter - symptomatic with palpitations, intermittent dyspnea with activity  [id number] 1  PVI 2024 (myself) - moderate LA dilation, mild fibrosis, no inducible AT/AFL/AF post ablation - multiple AF recurrences thereafter.  Typical appearing AFL by 2025 ECG.  Sotalol (2024-2024), amiodarone (2024-present)  We reviewed atrial fibrillation and flutter, managing stroke risk via anticoagulation, managing heart failure risk, cardioversion, antiarrhythmic drug therapy, and catheter ablation.  We discussed atrial fibrillation ablation procedures, anticipated success rates, the potential need for re-do ablation vs addition of anti-arrhythmic drugs, procedural risks (including groin bleeding, vascular injury, tamponade, phrenic or esophageal injury, stroke, pulmonary vein stenosis) and recovery expectations.  He would prefer redo ablation  - redo PVI, CTI ablation (AFL by 2025 ECG), general anesthesia, continue apixaban, hold amiodarone 7 days prior with plan to continue for 6-12 weeks post ablation  - continue amiodarone 200mg daily for now  - continue apixaban 5mg twice daily for now - given his [id number] 1, long term therapeutic anticoagulation is of indeterminate risk/benefit balance.  We can revisit further in future  - we discussed the ongoing importance of lifestyle modification (maintaining a healthy weight, aerobic  activity, continued sleep apnea management, alcohol avoidance) as part of a long term strategy for atrial fibrillation management    Notes:   He had inquired regarding weight loss agents.  Zepbound had been prescribed by his PMD, though had been declined by his insurer.  Re-visiting this would best be deferred until we have a durable interval of sinus rhythm - thereafter, we would certainly support this (though we do not have enough familiarity to prescribe these medications ourselves).  Long term, maintaining a healthy weight will improve the odds of maintenance of sinus rhythm.  He had also inquired regarding an Inspire implant for JIM treatment.  I have very limited familiarity with this, though we can query further with our pulmonary colleagues in future.    Follow up: as above.  The longitudinal plan of care was addressed during this visit. Due to the added complexity in care, our team will remain engaged subsequent management of this condition and ongoing continuity of care           History of Present Illness   Angel Etienne is a very pleasant 59 year old male with persistent atrial fibrillation with PVI 9/30/2024, HTN, JIM on CPAP presenting for follow-up regarding atrial fibrillation.    Mr. Etienne' atrial fibrillation history is as summarized below:  Symptoms: palpitations, intermittent dyspnea with activity  Symptom onset date: 2022  Diagnosis date: 1/29/2024 at pre-operative evaluation  Admissions/ER visits: none  Prior medical therapies: sotalol (8/2024-11/2024), amiodarone (11/2024-present)  Prior DCCVs: 8/8/2024 (AF recurrence around 8/20/2024), 10/16/2024, 11/14/2024, 12/30/2024. Typical appearing AFL by ECG 1/23/2025.  Prior ablations: PVI 9/30/2024 (myself) - moderate LA dilation, mild fibrosis, no inducible AT/AFL/AF post ablation.   Multiple AF recurrences post ablation requiring DCCVs.  Percutaneous left atrial appendage occlusion: none    He was noted to have mildly elevated ESR and CRP  post ablation and was treated with ibuprofen.  He had a number of AF recurrences post ablation treated with ongoing sotalol, transitioned to amiodarone, and DCCVs 10/16/2024, 11/14/2024, 12/30/2024.  He notes ongoing palpitations, intermittent dyspnea, lightheadedness - he has noted improved symptoms briefly post DCCVs.  He denies chest pain, syncope.  He and his wife are planning a trip to [place] around 3/2025.       Physical Examination  Review of Systems   VITALS: /82 (BP Location: Left arm, Patient Position: Sitting, Cuff Size: Adult Large)   Pulse 68   Resp 20   Wt 134.3 kg (296 lb)   BMI 38.00 kg/m    Wt Readings from Last 3 Encounters:   12/30/24 133.8 kg (295 lb)   11/27/24 135.6 kg (299 lb)   11/14/24 132.5 kg (292 lb)     CONSTITUTIONAL: well nourished, comfortable, no distress  EYES:  Conjunctivae pink, sclerae clear.    E/N/T:  Oral mucosa pink  RESPIRATORY:  Respiratory effort is normal  CARDIOVASCULAR:  regular, normal S1 and S2  GASTROINTESTINAL:  Abdomen without masses or tenderness  EXTREMITIES:  No clubbing or cyanosis.    MUSCULOSKELETAL:  Overall grossly normal muscle strength  SKIN:  Overall, skin warm and dry, no lesions.  NEURO/PSYCH:  Oriented x 3 with normal affect.   Constitutional:  No weight loss or loss of appetite    Eyes:  No difficulty with vision, no double vision, no dry eyes  ENT:  No sore throat, difficulty swallowing; changes in hearing or tinnitus  Cardiovascular: As detailed above  Respiratory:  No cough  Musculoskeletal  No joint pain, muscle aches  Neurologic:  No syncope, lightheadedness, fainting spells   Hematologic: No easy bruising, excessive bleeding tendency   Gastrointestinal:  No jaundice, abdominal pain or abdominal bloating  Genitourinary: No changes in urinary habits, no trouble urinating    Psychiatric: No anxiety or depression      Medical History  Surgical History   Past Medical History:   Diagnosis Date     Atrial fibrillation (H)      High cholesterol      Past Surgical History:   Procedure Laterality Date     EP ABLATION PULMONARY VEIN ISOLATION N/A 9/30/2024    Procedure: Ablation Atrial Fibrillation;  Surgeon: Paramjit Saldaña MD;  Location: [facility] CV         Family History Social History   Family History   Problem Relation Age of Onset     Varicose Veins Father         Social History     Tobacco Use     Smoking status: Every Day     Current packs/day: 1.00     Average packs/day: 1.5 packs/day for 42.2 years (62.9 ttl pk-yrs)     Types: Cigarettes     Start date: 11/1/1982     Smokeless tobacco: Never   Vaping Use     Vaping status: Never Used   Substance Use Topics     Alcohol use: Yes     Drug use: Yes     Types: Marijuana     Comment: 3x times a week- night time for sleep and back pain         Medications  Allergies     Current Outpatient Medications:      amiodarone (PACERONE) 200 MG tablet, Take 1 tablet (200 mg) by mouth daily., Disp: 90 tablet, Rfl: 3     apixaban ANTICOAGULANT (ELIQUIS) 5 MG tablet, Take 1 tablet (5 mg) by mouth 2 times daily, Disp: 60 tablet, Rfl: 11     atorvastatin (LIPITOR) 10 MG tablet, Take 1 tablet (10 mg) by mouth daily., Disp: 90 tablet, Rfl: 0     clotrimazole-betamethasone (LOTRISONE) 1-0.05 % external cream, , Disp: , Rfl:      dupilumab (DUPIXENT) 300 MG/2ML prefilled syringe, Inject 300 mg Subcutaneous every 14 days, Disp: , Rfl:      empagliflozin (JARDIANCE) 10 MG TABS tablet, Take 1 tablet (10 mg) by mouth daily, Disp: 30 tablet, Rfl: 11     fenofibrate (TRIGLIDE/LOFIBRA) 160 MG tablet, Take 1 tablet (160 mg) by mouth daily., Disp: 90 tablet, Rfl: 0     losartan (COZAAR) 25 MG tablet, Take 1 tablet (25 mg) by mouth at bedtime., Disp: 90 tablet, Rfl: 0     nicotine (NICORETTE) 2 MG gum, Place 2 mg inside cheek every hour as needed for nicotine withdrawal symptoms, Disp: , Rfl:      Testosterone 1.62 % GEL, PLACE 1 PUMP (20.25 MG) ONTO THE SKIN DAILY APPLY FROM DISPENSER TO CLEAN, DRY, INTACT SKIN OF THE  "UPPER ARMS AND SHOULDERS, Disp: 75 g, Rfl: 0     triamcinolone (KENALOG) 0.1 % external cream, , Disp: , Rfl:     Current Facility-Administered Medications:      lidocaine 112 mL, EPINEPHrine (ADRENALIN) 1.12 mg in sodium chloride 0.9 % 1,113.12 mL (TUMESCENT), , Irrigation, Once, Carlos Guevara MD     Allergies   Allergen Reactions     Clobetasol Other (See Comments)     Allergy testing done - Clobetasol-77-Propionate     Inhaled Anticholinergic Agents Other (See Comments)     Stearyl Alcohol - Allergy testing done     No Clinical Screening - See Comments Other (See Comments)     Stearyl Alcohol - Allergy testing done    Octylisothiazolinone; Phenylenediamine - allergy testing done     Shellac Other (See Comments)     Allergy testing done     Trolamine (Triethanolamine) Other (See Comments)     Allergy testing done     Urea Other (See Comments)     Allergy testing done          Lab Results    Chemistry CBC Cardiac Enzymes/BNP/TSH/INR   Recent Labs   Lab Test 11/04/24  0930      POTASSIUM 4.3   CHLORIDE 107   CO2 26   GLC 91   BUN 11.6   CR 0.81   GFRESTIMATED >90   LEESA 9.6     Recent Labs   Lab Test 11/04/24  0930 10/09/24  1723 09/24/24  1110   CR 0.81 0.79 0.86          Recent Labs   Lab Test 11/04/24  0930   WBC 5.0   HGB 17.9*   HCT 54.2*   MCV 95        Recent Labs   Lab Test 11/04/24  0930 09/24/24  1110 07/30/24  1340   HGB 17.9* 16.7 16.4    No results for input(s): \"TROPONINI\" in the last 77051 hours.  Recent Labs   Lab Test 12/10/24  1509 10/09/24  1723   NTBNP 395 833     Recent Labs   Lab Test 07/22/24  1717   TSH 2.28     Recent Labs   Lab Test 11/04/24  0930 01/29/24  1555   INR 1.14 1.10         Data Review    ECGs (tracings independently reviewed)  1/23/2025 - typical appearing AFL, 73bpm  12/30/2024 - SR 72bpm, MT 316ms, QTC 477ms  11/27/2024 - AF, 64bpm  8/28/24 - AF, 54bpm, QT//438 ms  8/14/24 - SR 79bpm, first degree AV block, QT//449 ms  8/8/24 (post DCCV) - SR " 75bpm, first degree AV block, QT//446 ms  1/29/24 - AF, 65bpm     Holter Monitor 3/7/24  Holter monitoring (duration 24hrs).  Continuous atrial fibrillation, 48 to 99bpm, average 69bpm.  There were no pauses of greater than 5 seconds. Nocturnal bradycardia was noted.  Rare premature ventricular contractions (<1%).  Symptom triggers - none.    2/5/2024 TTE  The left ventricle is normal in size.  Left ventricular function is normal.The ejection fraction is 60-65%.  There is mild concentric left ventricular hypertrophy.  Normal right ventricle size and systolic function.  The left atrium is severely dilated.  The right atrium is moderately dilated.  There is mild (1+) mitral regurgitation.       Cc: Alondra Miller CNP, Vocal, MD Paramjit Ray MD  1/23/2025  4:38 PM          Thank you for allowing me to participate in the care of your patient.      Sincerely,     Paramjit Saldaña MD     [facility]  cc:   Paramjit Saldaña MD  [address] [place] [address]  [place] [address]

## 2025-01-23 NOTE — PATIENT INSTRUCTIONS
[facility]  Cardiac Electrophysiology  [address]  [place] [id number]   Office: [phone]  Fax: [phone]       Thank you for seeing us in clinic today - it is a pleasure to be a part of your care team.  Below is a summary of our plan from today's visit.      You have atrial fibrillation and underwent ablation 9/30/2024.  You have had multiple atrial fibrillation recurrences thereafter, have had multiple cardioversions, and were started on amiodarone 11/2024.  You were in atrial flutter by ECG 1/23/2025.      We will plan for the following:  - our office will work with you to coordinate a redo atrial fibrillation ablation, and atrial flutter ablation  - continue amiodarone 200mg daily for now  - continue apixaban 5mg twice daily for now    Please do not hesitate to be in touch with our office at [phone] with any questions that may arise.      Thank you for trusting us with your care,    Paramjit Saldaña MD  Clinical Cardiac Electrophysiology  [facility]  [address]  [place] [address]   Office: [phone]  Fax: [phone]

## 2025-01-28 ENCOUNTER — TELEPHONE (OUTPATIENT)
Dept: CARDIOLOGY | Facility: CLINIC | Age: 60
End: 2025-01-28
Payer: COMMERCIAL

## 2025-01-28 DIAGNOSIS — I48.19 PERSISTENT ATRIAL FIBRILLATION (H): Primary | ICD-10-CM

## 2025-01-28 NOTE — TELEPHONE ENCOUNTER
Pre PVI Medication Instructions    Attempted to contact pt via phone, VM was left with request that pt return call to review above/below information    Pt has PVI scheduled on 2/6/25 with Dr Saldaña    Per PVI protocol and VORB from performing MD above pt is to:  Anticoagulation Instruction: Eliquis- Continue anticoagulation uninterrupted through their procedure, do not miss any doses of AC prior to procedure, importance of taking AC for stroke prevention, taking AC as prescribed, to call prior to PVI if missed a dose of AC, and if upon arrival pt reports missing a dose of AC PVI will potentially be cnx/postponed  AAD Instructions: Amiodarone- Hold 7 days prior, taking through 1/29/25 then stopping    pt was provided contact for further questions or concerns about above medication changes    1/28/2025 4:20 PM  Savana Garcia RN

## 2025-01-31 ENCOUNTER — APPOINTMENT (OUTPATIENT)
Dept: CARDIOLOGY | Facility: CLINIC | Age: 60
End: 2025-01-31
Payer: COMMERCIAL

## 2025-02-03 ENCOUNTER — TELEPHONE (OUTPATIENT)
Dept: CARDIOLOGY | Facility: CLINIC | Age: 60
End: 2025-02-03
Payer: COMMERCIAL

## 2025-02-03 NOTE — TELEPHONE ENCOUNTER
VM left from patient with concerns about jury duty post upcoming PVI.  1st Attempt to contact pt, voicemail message was left with contact information and instructing pt to call back.  2/3/2025 3:39 PM  Ivette Muro RN

## 2025-02-06 ENCOUNTER — ANESTHESIA (OUTPATIENT)
Dept: CARDIOLOGY | Facility: HOSPITAL | Age: 60
End: 2025-02-06
Payer: COMMERCIAL

## 2025-02-06 ENCOUNTER — ANESTHESIA EVENT (OUTPATIENT)
Dept: CARDIOLOGY | Facility: HOSPITAL | Age: 60
End: 2025-02-06
Payer: COMMERCIAL

## 2025-02-06 ENCOUNTER — HOSPITAL ENCOUNTER (OUTPATIENT)
Facility: HOSPITAL | Age: 60
Discharge: HOME | End: 2025-02-06
Attending: INTERNAL MEDICINE | Admitting: INTERNAL MEDICINE
Payer: COMMERCIAL

## 2025-02-06 VITALS
WEIGHT: 294 LBS | BODY MASS INDEX: 37.73 KG/M2 | HEART RATE: 87 BPM | RESPIRATION RATE: 16 BRPM | HEIGHT: 74 IN | SYSTOLIC BLOOD PRESSURE: 150 MMHG | DIASTOLIC BLOOD PRESSURE: 81 MMHG | OXYGEN SATURATION: 96 % | TEMPERATURE: 98 F

## 2025-02-06 DIAGNOSIS — I48.19 PERSISTENT ATRIAL FIBRILLATION (H): ICD-10-CM

## 2025-02-06 LAB
ACT BLD: 345 SECONDS (ref 74–150)
ACT BLD: 418 SECONDS (ref 74–150)
ATRIAL RATE - MUSE: 85 BPM
DIASTOLIC BLOOD PRESSURE - MUSE: NORMAL MMHG
INTERPRETATION ECG - MUSE: NORMAL
P AXIS - MUSE: 72 DEGREES
PR INTERVAL - MUSE: 296 MS
QRS DURATION - MUSE: 106 MS
QT - MUSE: 396 MS
QTC - MUSE: 471 MS
R AXIS - MUSE: -17 DEGREES
SYSTOLIC BLOOD PRESSURE - MUSE: NORMAL MMHG
T AXIS - MUSE: 54 DEGREES
VENTRICULAR RATE- MUSE: 85 BPM

## 2025-02-06 PROCEDURE — 93005 ELECTROCARDIOGRAM TRACING: CPT

## 2025-02-06 PROCEDURE — C1887 CATHETER, GUIDING: HCPCS | Performed by: INTERNAL MEDICINE

## 2025-02-06 PROCEDURE — 85347 COAGULATION TIME ACTIVATED: CPT

## 2025-02-06 PROCEDURE — C1759 CATH, INTRA ECHOCARDIOGRAPHY: HCPCS | Performed by: INTERNAL MEDICINE

## 2025-02-06 PROCEDURE — 93623 PRGRMD STIMJ&PACG IV RX NFS: CPT | Mod: 26 | Performed by: INTERNAL MEDICINE

## 2025-02-06 PROCEDURE — 258N000003 HC RX IP 258 OP 636: Performed by: INTERNAL MEDICINE

## 2025-02-06 PROCEDURE — C1769 GUIDE WIRE: HCPCS | Performed by: INTERNAL MEDICINE

## 2025-02-06 PROCEDURE — 93656 COMPRE EP EVAL ABLTJ ATR FIB: CPT | Performed by: INTERNAL MEDICINE

## 2025-02-06 PROCEDURE — 93615 ESOPHAGEAL RECORDING: CPT | Mod: 26 | Performed by: INTERNAL MEDICINE

## 2025-02-06 PROCEDURE — 250N000009 HC RX 250: Performed by: NURSE ANESTHETIST, CERTIFIED REGISTERED

## 2025-02-06 PROCEDURE — 258N000003 HC RX IP 258 OP 636: Performed by: NURSE ANESTHETIST, CERTIFIED REGISTERED

## 2025-02-06 PROCEDURE — 999N000054 HC STATISTIC EKG NON-CHARGEABLE

## 2025-02-06 PROCEDURE — C1732 CATH, EP, DIAG/ABL, 3D/VECT: HCPCS | Performed by: INTERNAL MEDICINE

## 2025-02-06 PROCEDURE — 93655 ICAR CATH ABLTJ DSCRT ARRHYT: CPT | Performed by: INTERNAL MEDICINE

## 2025-02-06 PROCEDURE — 93010 ELECTROCARDIOGRAM REPORT: CPT | Performed by: GENERAL ACUTE CARE HOSPITAL

## 2025-02-06 PROCEDURE — 93623 PRGRMD STIMJ&PACG IV RX NFS: CPT | Performed by: INTERNAL MEDICINE

## 2025-02-06 PROCEDURE — 370N000017 HC ANESTHESIA TECHNICAL FEE, PER MIN: Performed by: INTERNAL MEDICINE

## 2025-02-06 PROCEDURE — 272N000001 HC OR GENERAL SUPPLY STERILE: Performed by: INTERNAL MEDICINE

## 2025-02-06 PROCEDURE — 93653 COMPRE EP EVAL TX SVT: CPT | Performed by: INTERNAL MEDICINE

## 2025-02-06 PROCEDURE — C1730 CATH, EP, 19 OR FEW ELECT: HCPCS | Performed by: INTERNAL MEDICINE

## 2025-02-06 PROCEDURE — 93615 ESOPHAGEAL RECORDING: CPT | Performed by: INTERNAL MEDICINE

## 2025-02-06 PROCEDURE — 250N000011 HC RX IP 250 OP 636: Performed by: NURSE ANESTHETIST, CERTIFIED REGISTERED

## 2025-02-06 PROCEDURE — 250N000011 HC RX IP 250 OP 636: Performed by: INTERNAL MEDICINE

## 2025-02-06 PROCEDURE — C1766 INTRO/SHEATH,STRBLE,NON-PEEL: HCPCS | Performed by: INTERNAL MEDICINE

## 2025-02-06 PROCEDURE — C1733 CATH, EP, OTHR THAN COOL-TIP: HCPCS | Performed by: INTERNAL MEDICINE

## 2025-02-06 PROCEDURE — 250N000009 HC RX 250: Performed by: INTERNAL MEDICINE

## 2025-02-06 PROCEDURE — 710N000010 HC RECOVERY PHASE 1, LEVEL 2, PER MIN

## 2025-02-06 PROCEDURE — 250N000013 HC RX MED GY IP 250 OP 250 PS 637: Performed by: INTERNAL MEDICINE

## 2025-02-06 RX ORDER — LIDOCAINE HYDROCHLORIDE 10 MG/ML
INJECTION, SOLUTION INFILTRATION; PERINEURAL PRN
Status: DISCONTINUED | OUTPATIENT
Start: 2025-02-06 | End: 2025-02-06

## 2025-02-06 RX ORDER — DEXAMETHASONE SODIUM PHOSPHATE 10 MG/ML
INJECTION, SOLUTION INTRAMUSCULAR; INTRAVENOUS PRN
Status: DISCONTINUED | OUTPATIENT
Start: 2025-02-06 | End: 2025-02-06

## 2025-02-06 RX ORDER — HEPARIN SODIUM 1000 [USP'U]/ML
INJECTION, SOLUTION INTRAVENOUS; SUBCUTANEOUS
Status: DISCONTINUED | OUTPATIENT
Start: 2025-02-06 | End: 2025-02-06 | Stop reason: HOSPADM

## 2025-02-06 RX ORDER — LIDOCAINE HYDROCHLORIDE AND EPINEPHRINE 10; 10 MG/ML; UG/ML
INJECTION, SOLUTION INFILTRATION; PERINEURAL
Status: DISCONTINUED | OUTPATIENT
Start: 2025-02-06 | End: 2025-02-06 | Stop reason: HOSPADM

## 2025-02-06 RX ORDER — SODIUM CHLORIDE 9 MG/ML
100 INJECTION, SOLUTION INTRAVENOUS CONTINUOUS
Status: DISCONTINUED | OUTPATIENT
Start: 2025-02-06 | End: 2025-02-06 | Stop reason: HOSPADM

## 2025-02-06 RX ORDER — ONDANSETRON 2 MG/ML
INJECTION INTRAMUSCULAR; INTRAVENOUS PRN
Status: DISCONTINUED | OUTPATIENT
Start: 2025-02-06 | End: 2025-02-06

## 2025-02-06 RX ORDER — BUPIVACAINE HYDROCHLORIDE 2.5 MG/ML
INJECTION, SOLUTION EPIDURAL; INFILTRATION; INTRACAUDAL
Status: DISCONTINUED | OUTPATIENT
Start: 2025-02-06 | End: 2025-02-06 | Stop reason: HOSPADM

## 2025-02-06 RX ORDER — ONDANSETRON 2 MG/ML
4 INJECTION INTRAMUSCULAR; INTRAVENOUS EVERY 6 HOURS PRN
Status: DISCONTINUED | OUTPATIENT
Start: 2025-02-06 | End: 2025-02-06 | Stop reason: HOSPADM

## 2025-02-06 RX ORDER — ACETAMINOPHEN 325 MG/1
650 TABLET ORAL EVERY 4 HOURS PRN
Status: DISCONTINUED | OUTPATIENT
Start: 2025-02-06 | End: 2025-02-06 | Stop reason: HOSPADM

## 2025-02-06 RX ORDER — FENTANYL CITRATE 50 UG/ML
INJECTION, SOLUTION INTRAMUSCULAR; INTRAVENOUS PRN
Status: DISCONTINUED | OUTPATIENT
Start: 2025-02-06 | End: 2025-02-06

## 2025-02-06 RX ORDER — ONDANSETRON 4 MG/1
4 TABLET, ORALLY DISINTEGRATING ORAL EVERY 6 HOURS PRN
Status: DISCONTINUED | OUTPATIENT
Start: 2025-02-06 | End: 2025-02-06 | Stop reason: HOSPADM

## 2025-02-06 RX ORDER — KETOROLAC TROMETHAMINE 30 MG/ML
30 INJECTION, SOLUTION INTRAMUSCULAR; INTRAVENOUS EVERY 6 HOURS PRN
Status: DISCONTINUED | OUTPATIENT
Start: 2025-02-06 | End: 2025-02-06 | Stop reason: HOSPADM

## 2025-02-06 RX ORDER — SODIUM CHLORIDE 9 MG/ML
INJECTION, SOLUTION INTRAVENOUS CONTINUOUS PRN
Status: DISCONTINUED | OUTPATIENT
Start: 2025-02-06 | End: 2025-02-06

## 2025-02-06 RX ORDER — FENTANYL CITRATE 50 UG/ML
25 INJECTION, SOLUTION INTRAMUSCULAR; INTRAVENOUS
Status: DISCONTINUED | OUTPATIENT
Start: 2025-02-06 | End: 2025-02-06 | Stop reason: HOSPADM

## 2025-02-06 RX ORDER — IBUPROFEN 600 MG/1
600 TABLET, FILM COATED ORAL EVERY 6 HOURS PRN
Status: DISCONTINUED | OUTPATIENT
Start: 2025-02-06 | End: 2025-02-06 | Stop reason: HOSPADM

## 2025-02-06 RX ORDER — LOSARTAN POTASSIUM 25 MG/1
25 TABLET ORAL ONCE
Status: DISCONTINUED | OUTPATIENT
Start: 2025-02-06 | End: 2025-02-06 | Stop reason: HOSPADM

## 2025-02-06 RX ORDER — PROPOFOL 10 MG/ML
INJECTION, EMULSION INTRAVENOUS PRN
Status: DISCONTINUED | OUTPATIENT
Start: 2025-02-06 | End: 2025-02-06

## 2025-02-06 RX ORDER — ADENOSINE 3 MG/ML
INJECTION, SOLUTION INTRAVENOUS
Status: DISCONTINUED | OUTPATIENT
Start: 2025-02-06 | End: 2025-02-06 | Stop reason: HOSPADM

## 2025-02-06 RX ORDER — HEPARIN SODIUM 10000 [USP'U]/100ML
INJECTION, SOLUTION INTRAVENOUS CONTINUOUS PRN
Status: DISCONTINUED | OUTPATIENT
Start: 2025-02-06 | End: 2025-02-06 | Stop reason: HOSPADM

## 2025-02-06 RX ORDER — PROTAMINE SULFATE 10 MG/ML
INJECTION, SOLUTION INTRAVENOUS PRN
Status: DISCONTINUED | OUTPATIENT
Start: 2025-02-06 | End: 2025-02-06

## 2025-02-06 RX ADMIN — PROPOFOL 200 MG: 10 INJECTION, EMULSION INTRAVENOUS at 12:13

## 2025-02-06 RX ADMIN — SODIUM CHLORIDE: 9 INJECTION, SOLUTION INTRAVENOUS at 12:01

## 2025-02-06 RX ADMIN — PHENYLEPHRINE HYDROCHLORIDE 0.2 MCG/KG/MIN: 10 INJECTION INTRAVENOUS at 12:54

## 2025-02-06 RX ADMIN — ONDANSETRON 4 MG: 2 INJECTION INTRAMUSCULAR; INTRAVENOUS at 12:26

## 2025-02-06 RX ADMIN — ROCURONIUM BROMIDE 50 MG: 50 INJECTION, SOLUTION INTRAVENOUS at 12:45

## 2025-02-06 RX ADMIN — Medication 100 MG: at 12:13

## 2025-02-06 RX ADMIN — LIDOCAINE HYDROCHLORIDE 5 ML: 10 INJECTION, SOLUTION INFILTRATION; PERINEURAL at 12:13

## 2025-02-06 RX ADMIN — FENTANYL CITRATE 100 MCG: 50 INJECTION, SOLUTION INTRAMUSCULAR; INTRAVENOUS at 12:13

## 2025-02-06 RX ADMIN — SODIUM CHLORIDE 100 ML/HR: 9 INJECTION, SOLUTION INTRAVENOUS at 08:34

## 2025-02-06 RX ADMIN — SUGAMMADEX 400 MG: 100 INJECTION, SOLUTION INTRAVENOUS at 13:29

## 2025-02-06 RX ADMIN — MIDAZOLAM HYDROCHLORIDE 2 MG: 1 INJECTION, SOLUTION INTRAMUSCULAR; INTRAVENOUS at 12:02

## 2025-02-06 RX ADMIN — PROPOFOL 150 MG: 10 INJECTION, EMULSION INTRAVENOUS at 12:31

## 2025-02-06 RX ADMIN — ROCURONIUM BROMIDE 20 MG: 50 INJECTION, SOLUTION INTRAVENOUS at 13:18

## 2025-02-06 RX ADMIN — PROTAMINE SULFATE 50 MG: 10 INJECTION, SOLUTION INTRAVENOUS at 13:27

## 2025-02-06 RX ADMIN — DEXAMETHASONE SODIUM PHOSPHATE 10 MG: 10 INJECTION, SOLUTION INTRAMUSCULAR; INTRAVENOUS at 12:21

## 2025-02-06 RX ADMIN — ACETAMINOPHEN 650 MG: 325 TABLET ORAL at 14:41

## 2025-02-06 ASSESSMENT — ACTIVITIES OF DAILY LIVING (ADL)
ADLS_ACUITY_SCORE: 41

## 2025-02-06 NOTE — DISCHARGE INSTRUCTIONS
[facility] Emergency Department  Cardiac Electrophysiology  [address]  [place] [address]   Office: [phone]  Fax: [phone]       Cardiac Electrophysiology - Post Ablation Discharge Instructions      PROCEDURE   Atrial fibrillation and atrial flutter ablation         MEDICATION INSTRUCTIONS   Continue taking your prior to procedure medications, including amiodarone for now - we will assess for cessation in follow-up  Continue taking your blood thinner apixaban (Eliquis).          DISCHARGE INSTRUCTIONS   Medications   Take your medications as prescribed.   It is important for you to continue your blood thinner without interruption, unless your electrophysiologist instructs you otherwise.     General instructions   Have an adult stay with you until tomorrow.   You may resume your normal diet.     You may shower tomorrow.  Do not take a bath, or use a hot tub or pool for at least 1 week.    Activity recommendations   Do not drive for 3 days.   Avoid stooping or squatting more than 90 degrees at the hips for 7 days.   Avoid repetitive motions such as loading , vacuuming, raking or shoveling for 7 days.   Avoid heavy lifting (greater than 25lbs) for 7 days.       Groin care instructions   For the first 24 hours after your ablation, check the groin access sites every 1-2 hours while awake.   You may keep a bandaid over the puncture sites for 1 or 2 days post-procedure and thereafter may keep these sites uncovered.  Change the bandaid daily.  If there is minor oozing, apply another bandaid and remove it after 12 hours.   For 2 days, when you cough, sneeze, laugh or move your bowels, hold your hand over the puncture sites and press firmly.   Do not scrub the groin access sites.   Do not use lotion or powder near the groin access sites.       Arrhythmias following ablation  Recurrent atrial fibrillation and palpitations are common within the first 3 months post ablation while your  heart recovers from the procedure.  These are usually more frequent in the first few weeks following ablation and should occur less frequently over time.  Please contact us if you are having frequent or long lasting atrial fibrillation episodes following ablation.    Common findings after ablation  Bruising and a dime or pea size lump at the access sites is common.  If you notice increased swelling, external bleeding, or have other concerns regarding your groin access sites please call your electrophysiology team's office, and if after hours consider emergency department evaluation.   Soreness and mild pain at your groin sites is normal, to help relieve this pain you can apply ice/cold packs to the sites for 20min 3-4 times per day.   Pleuritic chest discomfort (chest pain worse with taking deep breaths, worse with laying flat on your back) can occur after ablation, usually coming about within the first 24-72hrs post ablation.  If this occurs and is severe enough to be troublesome to you, please call us and consider starting a course of ibuprofen 400mg three times daily for 5 to 7 days.     Things to watch for   As with any type of procedure, please be more attentive to unusual symptoms post ablation (eg. fever, neurologic changes, pain with swallowing, loss of consciousness, etc) - we recommend ER evaluation for any such symptoms in the first few weeks post procedure.       Contact the EP Nurse line with any of the following.  Contact the cardiology on-call number after business hours.    Consider ER evaluation for severe symptoms.   Groin pain, swelling, or growing hard lump around the puncture sites.   Groin redness, tenderness, swelling, or drainage (blood or pus).   Neurological changes (for example: leg, arm or face weakness or numbness, difficulties with speech or word finding, problems walking or with your balance, vision changes).   Any numbness, coolness or changes in color in your extremities.  Sudden  onset severe abdominal or back pain.  Moderate or severe chest pain not relieved by Tylenol or Ibuprofen, particularly after the first 48 hours.   Shortness of breath.   Chills or fever greater than 100 F.   Difficulty swallowing food or liquids.  Coughing up blood.   Blood in your stool.  Nausea and vomiting.  Difficulty urinating.  Recurrent atrial fibrillation associated with prolonged rapid heart rates (for example, heart rates over 140bpm for greater than 4 hours) or associated with additional concerning symptoms (for example, chest pain, lightheadedness, loss of consciousness, sweating).     If you are being evaluated at an emergency department, please tell your ER doctor that you have recently underwent an atrial fibrillation ablation and ask the ER to contact our office.  Many special considerations apply following ablation - these may be overlooked during an ER evaluation.      Our office will have a follow-up visit scheduled for you in approximately 6 weeks.  Please do not hesitate to call us before that time should issues arise.         [facility]      To reach the  nurses working with Dr. Saldaña:   [phone]        To reach the general [facility] line or after hours service:   [phone]   If you are calling after hours, please listen to the entire voicemail,    a live  will answer at the end of the message.

## 2025-02-06 NOTE — Clinical Note
Arrhythmia Type: atrial flutter.   Method of Cardioversion: synchronous.   The arrhythmia was terminated.   Energy shock delivered: 250 joules.   Time shock delivered: 12:38 CST.   Post cardioversion rhythm: sinus rhythm.

## 2025-02-06 NOTE — ANESTHESIA POSTPROCEDURE EVALUATION
Patient: Angel Etienne    Procedure: Procedure(s):  Ablation Atrial Fibrillation       Anesthesia Type:  General    Note:  Disposition: Outpatient   Postop Pain Control: Uneventful            Sign Out: Well controlled pain   PONV: No   Neuro/Psych: Uneventful            Sign Out: Acceptable/Baseline neuro status   Airway/Respiratory: Uneventful            Sign Out: Acceptable/Baseline resp. status   CV/Hemodynamics: Uneventful            Sign Out: Acceptable CV status; No obvious hypovolemia; No obvious fluid overload   Other NRE: NONE   DID A NON-ROUTINE EVENT OCCUR? No           Last vitals:  Vitals Value Taken Time   /91 02/06/25 1430   Temp     Pulse 82 02/06/25 1441   Resp 19 02/06/25 1441   SpO2 97 % 02/06/25 1441   Vitals shown include unfiled device data.    Electronically Signed By: Bertram Hi MD  February 6, 2025  2:43 PM

## 2025-02-06 NOTE — ANESTHESIA PROCEDURE NOTES
Airway       Patient location during procedure: OR       Procedure Start/Stop Times: 2/6/2025 12:15 PM  Staff -        CRNA: Biju Sage APRN CRNA       Performed By: CRNAIndications and Patient Condition       Indications for airway management: doug-procedural       Induction type:intravenous       Mask difficulty assessment: 0 - not attempted    Final Airway Details       Final airway type: endotracheal airway       Successful airway: ETT - single  Endotracheal Airway Details        ETT size (mm): 8.5       Cuffed: yes       Cuff volume (mL): 8       Successful intubation technique: video laryngoscopy       VL Blade Size: Glidescope 4       Grade View of Cords: 1       Adjucts: stylet       Position: Left       Measured from: lips       Secured at (cm): 24       Bite block used: None    Post intubation assessment        Placement verified by: capnometry, equal breath sounds and chest rise        Number of attempts at approach: 1       Secured with: tape       Ease of procedure: easy       Dentition: Intact and Unchanged    Medication(s) Administered   Medication Administration Time: 2/6/2025 12:15 PM

## 2025-02-06 NOTE — PROGRESS NOTES
Patient is kept comfortable during post-procedure stay. VSS. Complained of headache at the start of recovery, treated with po Tylenol. Right femoral access site remains dry & free from signs of bleeding. Tolerated food and fluids. Ambulated without issues. Appointments made & included in AVS. Dr. Saldaña was able to speak with patient post procedure. Post-op instructions reviewed and packet given to patient & significant other. Able to ask questions. Verbalized no concerns. Belongings returned. Discharged in stable condition.

## 2025-02-06 NOTE — ANESTHESIA CARE TRANSFER NOTE
Patient: Angel Etienne    Procedure: Procedure(s):  Ablation Atrial Fibrillation       Diagnosis: atrial fibrillation  Diagnosis Additional Information: No value filed.    Anesthesia Type:   General     Note:    Oropharynx: oropharynx clear of all foreign objects  Level of Consciousness: awake  Oxygen Supplementation: face mask  Level of Supplemental Oxygen (L/min / FiO2): 6  Independent Airway: airway patency satisfactory and stable  Dentition: dentition unchanged  Vital Signs Stable: post-procedure vital signs reviewed and stable  Report to RN Given: handoff report given  Patient transferred to: Cardiac Special Care          Vitals:  Vitals Value Taken Time   /89    Temp 98.1    Pulse 91    Resp 16    SpO2 98        Electronically Signed By: LUCY Bobby CRNA  February 6, 2025  1:41 PM

## 2025-02-06 NOTE — INTERVAL H&P NOTE
"I have reviewed the surgical (or preoperative) H&P that is linked to this encounter, and examined the patient. There are no significant changes    Clinical Conditions Present on Arrival:  Clinically Significant Risk Factors Present on Admission                 # Drug Induced Coagulation Defect: home medication list includes an anticoagulant medication       # Obesity: Estimated body mass index is 37.75 kg/m  as calculated from the following:    Height as of this encounter: 1.88 m (6' 2\").    Weight as of this encounter: 133.4 kg (294 lb).       "

## 2025-02-06 NOTE — ANESTHESIA PREPROCEDURE EVALUATION
Anesthesia Pre-Procedure Evaluation    Patient: Angel Etienne   MRN: [id number] : 1965        Procedure : Procedure(s):  Ablation Atrial Fibrillation          Past Medical History:   Diagnosis Date    Atrial fibrillation (H)     High cholesterol       Past Surgical History:   Procedure Laterality Date    EP ABLATION PULMONARY VEIN ISOLATION N/A 2024    Procedure: Ablation Atrial Fibrillation;  Surgeon: Paramjit Saldaña MD;  Location: [facility]      Allergies   Allergen Reactions    Clobetasol Other (See Comments)     Allergy testing done - Clobetasol-77-Propionate    Inhaled Anticholinergic Agents Other (See Comments)     Stearyl Alcohol - Allergy testing done    No Clinical Screening - See Comments Other (See Comments)     Stearyl Alcohol - Allergy testing done    Octylisothiazolinone; Phenylenediamine - allergy testing done    Shellac Other (See Comments)     Allergy testing done    Trolamine (Triethanolamine) Other (See Comments)     Allergy testing done    Urea Other (See Comments)     Allergy testing done      Social History     Tobacco Use    Smoking status: Every Day     Current packs/day: 1.00     Average packs/day: 1.5 packs/day for 42.3 years (63.0 ttl pk-yrs)     Types: Cigarettes     Start date: 1982    Smokeless tobacco: Never   Substance Use Topics    Alcohol use: Yes      Wt Readings from Last 1 Encounters:   25 133.4 kg (294 lb)        Anesthesia Evaluation            ROS/MED HX  ENT/Pulmonary:     (+) sleep apnea,                                       Neurologic:       Cardiovascular:     (+)  hypertension- -   -  - -                                      METS/Exercise Tolerance:     Hematologic:       Musculoskeletal:       GI/Hepatic:       Renal/Genitourinary:       Endo:     (+)               Obesity,       Psychiatric/Substance Use:       Infectious Disease:       Malignancy:       Other:            Physical Exam    Airway        Mallampati: III   "  Neck ROM: full   Mouth opening: > 3 cm    Respiratory Devices and Support         Dental    unable to assess        Cardiovascular   cardiovascular exam normal          Pulmonary   pulmonary exam normal                OUTSIDE LABS:  CBC:   Lab Results   Component Value Date    WBC 7.2 01/31/2025    WBC 5.0 11/04/2024    HGB 17.0 01/31/2025    HGB 17.9 (H) 11/04/2024    HCT 49.2 01/31/2025    HCT 54.2 (H) 11/04/2024     01/31/2025     11/04/2024     BMP:   Lab Results   Component Value Date     01/31/2025     11/04/2024    POTASSIUM 4.4 01/31/2025    POTASSIUM 4.3 11/04/2024    CHLORIDE 106 01/31/2025    CHLORIDE 107 11/04/2024    CO2 27 01/31/2025    CO2 26 11/04/2024    BUN 18.5 01/31/2025    BUN 11.6 11/04/2024    CR 0.86 01/31/2025    CR 0.81 11/04/2024    GLC 91 01/31/2025    GLC 91 11/04/2024     COAGS:   Lab Results   Component Value Date    INR 1.14 11/04/2024     POC: No results found for: \"BGM\", \"HCG\", \"HCGS\"  HEPATIC:   Lab Results   Component Value Date    ALBUMIN 4.3 11/04/2024    PROTTOTAL 7.3 11/04/2024    ALT 28 11/04/2024    AST 28 11/04/2024    ALKPHOS 75 11/04/2024    BILITOTAL 0.3 11/04/2024     OTHER:   Lab Results   Component Value Date    A1C 5.8 (H) 11/04/2024    LEESA 9.9 01/31/2025    TSH 2.28 07/22/2024    SED 11 12/10/2024       Anesthesia Plan    ASA Status:  3       Anesthesia Type: General.     - Airway: ETT   Induction: Intravenous.   Maintenance: Inhalation.        Consents    Anesthesia Plan(s) and associated risks, benefits, and realistic alternatives discussed. Questions answered and patient/representative(s) expressed understanding.     - Discussed: Risks, Benefits and Alternatives for BOTH SEDATION and the PROCEDURE were discussed     - Discussed with:       - Extended Intubation/Ventilatory Support Discussed: No.      - Patient is DNR/DNI Status: No             Suspend during perioperative period? No.    Use of blood products discussed: No . " "    Postoperative Care    Pain management: IV analgesics.        Comments:               Bertram Hi MD    I have reviewed the pertinent notes and labs in the chart from the past 30 days and (re)examined the patient.  Any updates or changes from those notes are reflected in this note.    Clinically Significant Risk Factors Present on Admission                # Drug Induced Coagulation Defect: home medication list includes an anticoagulant medication    # Hypertension: Noted on problem list           # Obesity: Estimated body mass index is 37.75 kg/m  as calculated from the following:    Height as of this encounter: 1.88 m (6' 2\").    Weight as of this encounter: 133.4 kg (294 lb).                "

## 2025-02-06 NOTE — Clinical Note
[employer] Med system 12 lead EKG, hemodynamics 5 lead, pulse oximetery, NIBP, Physiocontrol hands off defibrillator/external pacer, with 3 monitoring leads to patient. Baseline assessment done.

## 2025-02-07 LAB
ATRIAL RATE - MUSE: 85 BPM
DIASTOLIC BLOOD PRESSURE - MUSE: NORMAL MMHG
INTERPRETATION ECG - MUSE: NORMAL
P AXIS - MUSE: 72 DEGREES
PR INTERVAL - MUSE: 296 MS
QRS DURATION - MUSE: 106 MS
QT - MUSE: 396 MS
QTC - MUSE: 471 MS
R AXIS - MUSE: -17 DEGREES
SYSTOLIC BLOOD PRESSURE - MUSE: NORMAL MMHG
T AXIS - MUSE: 54 DEGREES
VENTRICULAR RATE- MUSE: 85 BPM

## 2025-02-10 ENCOUNTER — VIRTUAL VISIT (OUTPATIENT)
Dept: CARDIOLOGY | Facility: CLINIC | Age: 60
End: 2025-02-10
Payer: COMMERCIAL

## 2025-02-10 DIAGNOSIS — I48.19 PERSISTENT ATRIAL FIBRILLATION (H): Primary | ICD-10-CM

## 2025-02-10 DIAGNOSIS — I48.19 ATRIAL FIBRILLATION, PERSISTENT (H): ICD-10-CM

## 2025-02-10 PROCEDURE — 99207 PR NO CHARGE NURSE ONLY: CPT | Mod: 93

## 2025-02-10 NOTE — PROGRESS NOTES
Post PVI Procedural Follow Up Call    Pt is s/p PVI from 2/6 with Dr Saldaña  PC was placed to pt, spoke to pt    General Assessment:     Weight: Pt reports pt is unable to report weight, but denies s/s of fluid retention    Pain: Pt denies generalized or localized pain abnormal to healing s/p     /GI: Pt denies difficulty swallowing, denies constipation, denies urinary retention/difficulty, reports no s/s of infection, report normal appetite, and reports staying hydrated.    Neurological: Pt Denies any neurological changes, or s/s of CVA    Respiratory: Pt denies SOB, denies difficulty breathing, denies throat pain, denies changes/abnormal sputum, and denies any further symptoms abnormal to normal healing process s/p PVI.    Activity: Pt is tolerating advancement in activity while following physical restrictions, staying well hydrated, and gradually working into baseline activity.     Rhythm Assessment:   Pt denies palpitations, denies irregularities in HR or rhythm, and denies symptoms or sustained AF episodes.    Procedure Site Assessment:   Pts some bruising around sites without significant change from hospital discharge and normal to PVI recovery and small pea/dime size hardening under suture sites normal to PVI recovery    Anticoagulation/Medication:  Pt remain on Eliquis without interruption  Per guidelines by Dr Saldaña no ASA needed upon discharge    Education completed with pt at this visit:  Reviewed normal post-op PVI healing process, when to contact EP-RN/LAURA-MD, contact information was given to the pt for further concerns or questions, and pt verbalized understanding    Follow up  Pts AVS was printed and mailed to pt by scheduling team and pt will be seen by EP NP at 6 wks, monitor will be ordered at this follow-up if indicated as well as 3mo follow-up with either EP HARLEY or LAURA EVANGELISTA    2/10/2025 2:12 PM  Bridgette Vaughn RN

## 2025-02-10 NOTE — PATIENT INSTRUCTIONS
Instructions Following your Ablation Procedure    Your anticoagulation medication Eliquis:  It is important to remain on your anticoagulation medication uninterrupted after your ablation to reduce your risk of a stroke or heart attack, do not stop this medication  Please contact me if you have any questions regarding your anticoagulation medication    Groin care instructions  Keep the site clean and dry, do not place a bandage over the site. If there is minor oozing, apply another bandaid and remove it after 12 hours.  Mild bruising at the access sites is normal. If you notice increased swelling, external bleeding, or have other concerns regarding your access sites please consider emergency department evaluation for significant changes and call your electrophysiology team's office.  You may experience mild discomfort at your groin sites, applying ice packs 20min 3-4 times a day can help alleviate this discomfort.    Activity recommendations  You can resume driving.  Avoid stooping or squatting more than 90 degrees at the hips, repetitive motions such as loading , vacuuming, raking or shoveling, and heavy lifting (greater than 25lbs) for 1 week  Increase your activity gradually over the next 5-10 days, working back to your normal daily activity/routine.    Post ablation instructions  Stay well hydrated, and increase your fluid intake during this recovery period  High protein foods aide in your bodies healing process  You may have some irregular heartbeats and/or atrial fibrillation following your ablation which is normal to recovery, these episodes should occur less frequently over time.   Recurrent atrial fibrillation can occur within the first 3 months post ablation while your heart recovers from the procedure. Please call the electrophysiology team's office if you have an episode lasting greater than 4 hours, or if you notice the episodes are increasing in frequency or duration  Pleuritic chest  discomfort (chest pain worse with taking deep breaths, worse with laying flat on your back) can occur after ablation, usually coming about within the first 24-48hrs post ablation. If this occurs and is severe enough to be troublesome to you, please call us and consider starting a course of ibuprofen 400mg three times daily for 5 to 7 days    Things to watch for  As with any type of procedure, please be more attentive to unusual symptoms post ablation (eg. fever, neurologic changes, pain with swallowing, loss of consciousness, etc) - we recommend ER evaluation for any such symptoms in the first few weeks post procedure.    Consider ER evaluation for the following:  Severe chest pain not relieved by Tylenol or Ibuprofen  You have chills or a fever greater than 101 F (38 C)  Neurologic changes (eg. leg, arm or face weakness or numbness, difficulties with speech or word finding, problems  walking or with your balance, vision changes)  Severe difficulty swallowing and/or you are coughing up blood  Shortness of breath  Increased groin pain or a large or growing hard lump around the site  Groin is red, swollen, hot or tender  Blood or fluid is draining from the groin site  Any numbness, coolness or changes in color in your extremities  Groin pain not relieved by Tylenol or Advil  Recurrent atrial fibrillation associated with sustained rapid heart rates or associated with additional concerning  symptoms.    Your follow up appointments are as follows:  You will be seen by the electrophysiologist nurse practitioner at 6 weeks after your ablation  At your 6 week appointment, a 3 month follow-up appointment will be arranged with either the Nurse Practitioner or the Electrophysiology provider     Sincerely,  Bridgette Vaughn RN [phone]    After hours please contact the on call service at # [phone]

## 2025-04-02 NOTE — TELEPHONE ENCOUNTER
Called pt and relayed message. Gave 3:10 as the appointment time as that is the arrival time.    Form placed in Dr. Caputo's signature folder.       Has appointment scheduled on 4-22-25 to establish care with you.

## 2025-04-09 ENCOUNTER — OFFICE VISIT (OUTPATIENT)
Dept: CARDIOLOGY | Facility: CLINIC | Age: 60
End: 2025-04-09
Attending: INTERNAL MEDICINE
Payer: COMMERCIAL

## 2025-04-09 VITALS
DIASTOLIC BLOOD PRESSURE: 76 MMHG | BODY MASS INDEX: 37.49 KG/M2 | HEART RATE: 86 BPM | RESPIRATION RATE: 20 BRPM | OXYGEN SATURATION: 96 % | SYSTOLIC BLOOD PRESSURE: 122 MMHG | WEIGHT: 292 LBS

## 2025-04-09 DIAGNOSIS — I48.19 PERSISTENT ATRIAL FIBRILLATION (H): ICD-10-CM

## 2025-04-09 PROCEDURE — 99213 OFFICE O/P EST LOW 20 MIN: CPT

## 2025-04-09 PROCEDURE — 3078F DIAST BP <80 MM HG: CPT

## 2025-04-09 PROCEDURE — 3074F SYST BP LT 130 MM HG: CPT

## 2025-04-09 PROCEDURE — G2211 COMPLEX E/M VISIT ADD ON: HCPCS

## 2025-04-09 NOTE — LETTER
4/9/2025    Torsten Thomas MD  [address]  [employer] [place] [address]    RE: Angel Etienne       Dear Colleague,     I had the pleasure of seeing Angel Etienne in the [facility].       [facility]  Cardiac Electrophysiology  [address]  [place] [address]   Office: [phone]  Fax: [phone]     HEART CARE ELECTROPHYSIOLOGY FOLLOW UP    Primary Care: Torsten Thomas MD    Assessment/Recommendations     Persistent atrial fibrillation: Status post redo PVI + R CTI with Dr. Saldaña 2/6/25. Hx of PVI 9/30/24. Had previously been on sotalol 8/2024-11/2024), but started on amidarone 11/2024 to present. Symptomatic with palpitations and intermittent HANEY.   1 episode of atrial fibrillation a few days after the ablation, none since.  He monitors with Kardia twice a day.    [id number] score of 1 for HTN. On eliquis    HTN: controlled    JIM on CPAP: wears most of the nigths    Plan:  Continue eliquis 5 mg BID for stroke prophylaxis  Amiodarone can be stopped today   Continue monitoring with kardia mobile  Follow up 3 months post ablation       History of Present Illness/Subjective    Angel Etienne is a 59 year old male with past medical history significant for persistent atrial fibrillation with hx of PVI, HTN, and JIM on CPAP. He presents today for follow up of repeat PVI + R CTI with Dr. Saldaña 2\/6/25.     After his first ablation, he was noted to have mildly elevated ESR and CRP post ablation and was treated with ibuprofen.  He had a number of AF recurrences post ablation treated with ongoing sotalol, transitioned to amiodarone, and DCCVs 10/16/2024, 11/14/2024, 12/30/2024.     Had one episode of atrial fibrillation following the ablation.  He has had no recurrences since.  He monitors with Kardia twice a day for peace of mind, but otherwise he has historically been able to tell when he goes into atrial fibrillation.  He is  excited to come off amiodarone.  He feels great and appreciates the care he has received.    He denies groin site issues, heartburn, difficulty swallowing, or neurologic changes. He denies chest discomfort, palpitations, abdominal fullness/bloating or peripheral edema, shortness of breath, paroxysmal nocturnal dyspnea, orthopnea, lightheadedness, dizziness, pre-syncope, or syncope.     Data Review     Mr. Nathan atrial fibrillation history is as summarized below:  Symptoms: palpitations, intermittent dyspnea with activity  Symptom onset date: 2022  Diagnosis date: 1/29/2024 at pre-operative evaluation  Admissions/ER visits: none  Prior medical therapies: sotalol (8/2024-11/2024), amiodarone (11/2024-present)  Prior DCCVs: 8/8/2024 (AF recurrence around 8/20/2024), 10/16/2024, 11/14/2024, 12/30/2024. Typical appearing AFL by ECG 1/23/2025.  Prior ablations: PVI 9/30/2024 (myself) - moderate LA dilation, mild fibrosis, no inducible AT/AFL/AF post ablation.   Multiple AF recurrences post ablation requiring DCCVs.  Percutaneous left atrial appendage occlusion: none    Cardiographics   ECGs (tracings independently reviewed)  2/6/25 NSR with first degree AV delay 85 bpm  ms QT/QTC: 396/471 ms (post ablation)  1/23/2025 - typical appearing AFL, 73bpm  12/30/2024 - SR 72bpm, DE 316ms, QTC 477ms  11/27/2024 - AF, 64bpm  8/28/24 - AF, 54bpm, QT//438 ms  8/14/24 - SR 79bpm, first degree AV block, QT//449 ms  8/8/24 (post DCCV) - SR 75bpm, first degree AV block, QT//446 ms  1/29/24 - AF, 65bpm     Holter Monitor 3/7/24  Holter monitoring (duration 24hrs).  Continuous atrial fibrillation, 48 to 99bpm, average 69bpm.  There were no pauses of greater than 5 seconds. Nocturnal bradycardia was noted.  Rare premature ventricular contractions (<1%).  Symptom triggers - none.     2/5/2024 TTE  The left ventricle is normal in size.  Left ventricular function is normal.The ejection fraction is 60-65%.  There  is mild concentric left ventricular hypertrophy.  Normal right ventricle size and systolic function.  The left atrium is severely dilated.  The right atrium is moderately dilated.  There is mild (1+) mitral regurgitation.    I have reviewed and updated the patient's past medical history, allergy list and medication list.                Physical Examination Review of Systems   BMI= There is no height or weight on file to calculate BMI.    Wt Readings from Last 3 Encounters:   02/06/25 133.4 kg (294 lb)   01/31/25 133.4 kg (294 lb)   01/23/25 134.3 kg (296 lb)       Vitals: There were no vitals taken for this visit.  General   Appearance:   Alert and oriented, in no acute distress.    HEENT:  Normocephalic and atraumatic. Conjunctiva and sclera are clear. Moist oral mucosa.    Neck: No JVP, carotid bruit or obvious thyromegaly.   Lungs:   Respirations unlabored. Clear bilaterally with no rales, rhonchi, or wheezes.     Cardiovascular:   Rhythm is regular. S1 and S2 are normal. No significant murmur is present. Lower extremities demonstrate no significant edema. Posterior tibial pulses are intact bilaterally.   Extremities: No cyanosis or clubbing   Skin: Skin is warm, dry, and otherwise intact.   Neurologic: Gait not assessed. Mood and affect appropriate.     ROS: 10 point ROS neg other than the symptoms noted above in the HPI.        Medical History  Surgical History Family History Social History   Past Medical History:   Diagnosis Date     Atrial fibrillation (H)      High cholesterol     Past Surgical History:   Procedure Laterality Date     EP ABLATION PULMONARY VEIN ISOLATION N/A 9/30/2024    Procedure: Ablation Atrial Fibrillation;  Surgeon: Paramjit Saldaña MD;  Location: [facility] CV     EP ABLATION PULMONARY VEIN ISOLATION N/A 2/6/2025    Procedure: Ablation Atrial Fibrillation;  Surgeon: Paramjit Saldaña MD;  Location: [facility]    Family History   Problem Relation Age of  Onset     Varicose Veins Father     Social History     Socioeconomic History     Marital status: Single     Spouse name: Not on file     Number of children: Not on file     Years of education: Not on file     Highest education level: Not on file   Occupational History     Not on file   Tobacco Use     Smoking status: Every Day     Current packs/day: 1.00     Average packs/day: 1.5 packs/day for 42.4 years (63.1 ttl pk-yrs)     Types: Cigarettes     Start date: 11/1/1982     Smokeless tobacco: Never   Vaping Use     Vaping status: Never Used   Substance and Sexual Activity     Alcohol use: Yes     Drug use: Yes     Types: Marijuana     Comment: 3x times a week- night time for sleep and back pain     Sexual activity: Not Currently     Partners: Female   Other Topics Concern     Not on file   Social History Narrative     Not on file     Social Drivers of Health     Financial Resource Strain: Low Risk  (1/29/2024)    Financial Resource Strain      Within the past 12 months, have you or your family members you live with been unable to get utilities (heat, electricity) when it was really needed?: No   Food Insecurity: Low Risk  (1/29/2024)    Food Insecurity      Within the past 12 months, did you worry that your food would run out before you got money to buy more?: No      Within the past 12 months, did the food you bought just not last and you didn t have money to get more?: No   Transportation Needs: Low Risk  (1/29/2024)    Transportation Needs      Within the past 12 months, has lack of transportation kept you from medical appointments, getting your medicines, non-medical meetings or appointments, work, or from getting things that you need?: No   Physical Activity: Not on file   Stress: Not on file   Social Connections: Not on file   Interpersonal Safety: Low Risk  (2/6/2025)    Interpersonal Safety      Do you feel physically and emotionally safe where you currently live?: Yes      Within the past 12 months, have  you been hit, slapped, kicked or otherwise physically hurt by someone?: No      Within the past 12 months, have you been humiliated or emotionally abused in other ways by your partner or ex-partner?: No   Housing Stability: Low Risk  (1/29/2024)    Housing Stability      Do you have housing? : Yes      Are you worried about losing your housing?: No          Medications  Allergies   Scheduled Meds:  Current Outpatient Medications   Medication Sig Dispense Refill     amiodarone (PACERONE) 200 MG tablet Take 1 tablet (200 mg) by mouth daily. 90 tablet 3     apixaban ANTICOAGULANT (ELIQUIS ANTICOAGULANT) 5 MG tablet Take 1 tablet (5 mg) by mouth 2 times daily. 180 tablet 1     atorvastatin (LIPITOR) 10 MG tablet Take 1 tablet (10 mg) by mouth daily. 90 tablet 0     betamethasone valerate (VALISONE) 0.1 % external cream Apply topically daily as needed.       clotrimazole-betamethasone (LOTRISONE) 1-0.05 % external cream        dupilumab (DUPIXENT) 300 MG/2ML prefilled syringe Inject 300 mg Subcutaneous every 14 days       empagliflozin (JARDIANCE) 10 MG TABS tablet Take 1 tablet (10 mg) by mouth daily 30 tablet 11     fenofibrate (TRIGLIDE/LOFIBRA) 160 MG tablet Take 1 tablet (160 mg) by mouth daily. 90 tablet 0     losartan (COZAAR) 25 MG tablet Take 1 tablet (25 mg) by mouth at bedtime. 90 tablet 0     nicotine (NICORETTE) 2 MG gum Place 2 mg inside cheek every hour as needed for nicotine withdrawal symptoms       triamcinolone (KENALOG) 0.1 % external cream       Allergies   Allergen Reactions     Clobetasol Other (See Comments)     Allergy testing done - Clobetasol-77-Propionate     Inhaled Anticholinergic Agents Other (See Comments)     Stearyl Alcohol - Allergy testing done     No Clinical Screening - See Comments Other (See Comments)     Stearyl Alcohol - Allergy testing done    Octylisothiazolinone; Phenylenediamine - allergy testing done     Shellac Other (See Comments)     Allergy testing done     Trolamine  (Triethanolamine) Other (See Comments)     Allergy testing done     Urea Other (See Comments)     Allergy testing done         Lab Results    Chemistry/lipid CBC Cardiac Enzymes/BNP/TSH/INR   Lab Results   Component Value Date    CHOL 184 2024    HDL 37 (L) 2024    TRIG 189 (H) 2024    BUN 18.5 2025     2025    CO2 27 2025    Lab Results   Component Value Date    WBC 7.2 2025    HGB 17.0 2025    HCT 49.2 2025    MCV 94 2025     2025    Last Comprehensive Metabolic Panel:  Lab Results   Component Value Date     2025    POTASSIUM 4.4 2025    CHLORIDE 106 2025    CO2 27 2025    ANIONGAP 10 2025    GLC 91 2025    BUN 18.5 2025    CR 0.86 2025    GFRESTIMATED >90 2025    LEESA 9.9 2025               The longitudinal plan of care for the diagnosis(es)/condition(s) as documented were addressed during this visit. Due to the added complexity in care, I will continue to support Karl in the subsequent management and with ongoing continuity of care.     This note has been dictated using voice recognition software. Any grammatical, typographical, or context distortions are unintentional and inherent to the software.    Virgilio Carey PA-C  Clinical Cardiac Electrophysiology  [facility]  Clinic and [phone]  Fax: [phone]  Electrophysiology Nurses: [phone]       Thank you for allowing me to participate in the care of your patient.      Sincerely,     Amanuel Carey PA-C     [facility]  cc:   Paramjit Saldaña MD  [address]  [place] [address]

## 2025-04-09 NOTE — PATIENT INSTRUCTIONS
Angel Etienne,    It was a pleasure to see you today at the [facility].     My recommendations after this visit include:  - continue eliquis 5 mg twice daily for stroke prevention  - stop amiodarone today   - continue monitoring with kardia as needed  - follow up 3 months post ablation or sooner if needed    Please do not hesitate to call with additional questions or concerns.     Virgilio Carey PA-C  Clinical Cardiac Electrophysiology  [facility]  Clinic and [phone]  Fax: [phone]  Electrophysiology Nurses: [phone]

## 2025-04-09 NOTE — PROGRESS NOTES
[facility]  Cardiac Electrophysiology  [address]  [place] [address]   Office: [phone]  Fax: [phone]     HEART CARE ELECTROPHYSIOLOGY FOLLOW UP    Primary Care: Torsten Thomas MD    Assessment/Recommendations     Persistent atrial fibrillation: Status post redo PVI + R CTI with Dr. Saldaña 2/6/25. Hx of PVI 9/30/24. Had previously been on sotalol 8/2024-11/2024), but started on amidarone 11/2024 to present. Symptomatic with palpitations and intermittent HANEY.   1 episode of atrial fibrillation a few days after the ablation, none since.  He monitors with Kardia twice a day.    [id number] score of 1 for HTN. On eliquis    HTN: controlled    JIM on CPAP: wears most of the nigths    Plan:  Continue eliquis 5 mg BID for stroke prophylaxis  Amiodarone can be stopped today   Continue monitoring with kardia mobile  Follow up 3 months post ablation       History of Present Illness/Subjective    Angel Etienne is a 59 year old male with past medical history significant for persistent atrial fibrillation with hx of PVI, HTN, and JIM on CPAP. He presents today for follow up of repeat PVI + R CTI with Dr. Saldaña 2\/6/25.     After his first ablation, he was noted to have mildly elevated ESR and CRP post ablation and was treated with ibuprofen.  He had a number of AF recurrences post ablation treated with ongoing sotalol, transitioned to amiodarone, and DCCVs 10/16/2024, 11/14/2024, 12/30/2024.     Had one episode of atrial fibrillation following the ablation.  He has had no recurrences since.  He monitors with Kardia twice a day for peace of mind, but otherwise he has historically been able to tell when he goes into atrial fibrillation.  He is excited to come off amiodarone.  He feels great and appreciates the care he has received.    He denies groin site issues, heartburn, difficulty swallowing, or neurologic changes. He denies chest discomfort, palpitations,  abdominal fullness/bloating or peripheral edema, shortness of breath, paroxysmal nocturnal dyspnea, orthopnea, lightheadedness, dizziness, pre-syncope, or syncope.     Data Review     Mr. Nathan atrial fibrillation history is as summarized below:  Symptoms: palpitations, intermittent dyspnea with activity  Symptom onset date: 2022  Diagnosis date: 1/29/2024 at pre-operative evaluation  Admissions/ER visits: none  Prior medical therapies: sotalol (8/2024-11/2024), amiodarone (11/2024-present)  Prior DCCVs: 8/8/2024 (AF recurrence around 8/20/2024), 10/16/2024, 11/14/2024, 12/30/2024. Typical appearing AFL by ECG 1/23/2025.  Prior ablations: PVI 9/30/2024 (myself) - moderate LA dilation, mild fibrosis, no inducible AT/AFL/AF post ablation.   Multiple AF recurrences post ablation requiring DCCVs.  Percutaneous left atrial appendage occlusion: none    Cardiographics   ECGs (tracings independently reviewed)  2/6/25 NSR with first degree AV delay 85 bpm  ms QT/QTC: 396/471 ms (post ablation)  1/23/2025 - typical appearing AFL, 73bpm  12/30/2024 - SR 72bpm, TX 316ms, QTC 477ms  11/27/2024 - AF, 64bpm  8/28/24 - AF, 54bpm, QT//438 ms  8/14/24 - SR 79bpm, first degree AV block, QT//449 ms  8/8/24 (post DCCV) - SR 75bpm, first degree AV block, QT//446 ms  1/29/24 - AF, 65bpm     Holter Monitor 3/7/24  Holter monitoring (duration 24hrs).  Continuous atrial fibrillation, 48 to 99bpm, average 69bpm.  There were no pauses of greater than 5 seconds. Nocturnal bradycardia was noted.  Rare premature ventricular contractions (<1%).  Symptom triggers - none.     2/5/2024 TTE  The left ventricle is normal in size.  Left ventricular function is normal.The ejection fraction is 60-65%.  There is mild concentric left ventricular hypertrophy.  Normal right ventricle size and systolic function.  The left atrium is severely dilated.  The right atrium is moderately dilated.  There is mild (1+) mitral  regurgitation.    I have reviewed and updated the patient's past medical history, allergy list and medication list.                Physical Examination Review of Systems   BMI= There is no height or weight on file to calculate BMI.    Wt Readings from Last 3 Encounters:   02/06/25 133.4 kg (294 lb)   01/31/25 133.4 kg (294 lb)   01/23/25 134.3 kg (296 lb)       Vitals: There were no vitals taken for this visit.  General   Appearance:   Alert and oriented, in no acute distress.    HEENT:  Normocephalic and atraumatic. Conjunctiva and sclera are clear. Moist oral mucosa.    Neck: No JVP, carotid bruit or obvious thyromegaly.   Lungs:   Respirations unlabored. Clear bilaterally with no rales, rhonchi, or wheezes.     Cardiovascular:   Rhythm is regular. S1 and S2 are normal. No significant murmur is present. Lower extremities demonstrate no significant edema. Posterior tibial pulses are intact bilaterally.   Extremities: No cyanosis or clubbing   Skin: Skin is warm, dry, and otherwise intact.   Neurologic: Gait not assessed. Mood and affect appropriate.     ROS: 10 point ROS neg other than the symptoms noted above in the HPI.        Medical History  Surgical History Family History Social History   Past Medical History:   Diagnosis Date    Atrial fibrillation (H)     High cholesterol     Past Surgical History:   Procedure Laterality Date    EP ABLATION PULMONARY VEIN ISOLATION N/A 9/30/2024    Procedure: Ablation Atrial Fibrillation;  Surgeon: Paramjit Saldaña MD;  Location: [facility] CV    EP ABLATION PULMONARY VEIN ISOLATION N/A 2/6/2025    Procedure: Ablation Atrial Fibrillation;  Surgeon: Paramjit Saldaña MD;  Location: [facility] CV    Family History   Problem Relation Age of Onset    Varicose Veins Father     Social History     Socioeconomic History    Marital status: Single     Spouse name: Not on file    Number of children: Not on file    Years of education: Not on file    Highest  education level: Not on file   Occupational History    Not on file   Tobacco Use    Smoking status: Every Day     Current packs/day: 1.00     Average packs/day: 1.5 packs/day for 42.4 years (63.1 ttl pk-yrs)     Types: Cigarettes     Start date: 11/1/1982    Smokeless tobacco: Never   Vaping Use    Vaping status: Never Used   Substance and Sexual Activity    Alcohol use: Yes    Drug use: Yes     Types: Marijuana     Comment: 3x times a week- night time for sleep and back pain    Sexual activity: Not Currently     Partners: Female   Other Topics Concern    Not on file   Social History Narrative    Not on file     Social Drivers of Health     Financial Resource Strain: Low Risk  (1/29/2024)    Financial Resource Strain     Within the past 12 months, have you or your family members you live with been unable to get utilities (heat, electricity) when it was really needed?: No   Food Insecurity: Low Risk  (1/29/2024)    Food Insecurity     Within the past 12 months, did you worry that your food would run out before you got money to buy more?: No     Within the past 12 months, did the food you bought just not last and you didn t have money to get more?: No   Transportation Needs: Low Risk  (1/29/2024)    Transportation Needs     Within the past 12 months, has lack of transportation kept you from medical appointments, getting your medicines, non-medical meetings or appointments, work, or from getting things that you need?: No   Physical Activity: Not on file   Stress: Not on file   Social Connections: Not on file   Interpersonal Safety: Low Risk  (2/6/2025)    Interpersonal Safety     Do you feel physically and emotionally safe where you currently live?: Yes     Within the past 12 months, have you been hit, slapped, kicked or otherwise physically hurt by someone?: No     Within the past 12 months, have you been humiliated or emotionally abused in other ways by your partner or ex-partner?: No   Housing Stability: Low Risk   (1/29/2024)    Housing Stability     Do you have housing? : Yes     Are you worried about losing your housing?: No          Medications  Allergies   Scheduled Meds:  Current Outpatient Medications   Medication Sig Dispense Refill    amiodarone (PACERONE) 200 MG tablet Take 1 tablet (200 mg) by mouth daily. 90 tablet 3    apixaban ANTICOAGULANT (ELIQUIS ANTICOAGULANT) 5 MG tablet Take 1 tablet (5 mg) by mouth 2 times daily. 180 tablet 1    atorvastatin (LIPITOR) 10 MG tablet Take 1 tablet (10 mg) by mouth daily. 90 tablet 0    betamethasone valerate (VALISONE) 0.1 % external cream Apply topically daily as needed.      clotrimazole-betamethasone (LOTRISONE) 1-0.05 % external cream       dupilumab (DUPIXENT) 300 MG/2ML prefilled syringe Inject 300 mg Subcutaneous every 14 days      empagliflozin (JARDIANCE) 10 MG TABS tablet Take 1 tablet (10 mg) by mouth daily 30 tablet 11    fenofibrate (TRIGLIDE/LOFIBRA) 160 MG tablet Take 1 tablet (160 mg) by mouth daily. 90 tablet 0    losartan (COZAAR) 25 MG tablet Take 1 tablet (25 mg) by mouth at bedtime. 90 tablet 0    nicotine (NICORETTE) 2 MG gum Place 2 mg inside cheek every hour as needed for nicotine withdrawal symptoms      triamcinolone (KENALOG) 0.1 % external cream       Allergies   Allergen Reactions    Clobetasol Other (See Comments)     Allergy testing done - Clobetasol-77-Propionate    Inhaled Anticholinergic Agents Other (See Comments)     Stearyl Alcohol - Allergy testing done    No Clinical Screening - See Comments Other (See Comments)     Stearyl Alcohol - Allergy testing done    Octylisothiazolinone; Phenylenediamine - allergy testing done    Shellac Other (See Comments)     Allergy testing done    Trolamine (Triethanolamine) Other (See Comments)     Allergy testing done    Urea Other (See Comments)     Allergy testing done         Lab Results    Chemistry/lipid CBC Cardiac Enzymes/BNP/TSH/INR   Lab Results   Component Value Date    CHOL 184 11/04/2024    HDL  37 (L) 2024    TRIG 189 (H) 2024    BUN 18.5 2025     2025    CO2 27 2025    Lab Results   Component Value Date    WBC 7.2 2025    HGB 17.0 2025    HCT 49.2 2025    MCV 94 2025     2025    Last Comprehensive Metabolic Panel:  Lab Results   Component Value Date     2025    POTASSIUM 4.4 2025    CHLORIDE 106 2025    CO2 27 2025    ANIONGAP 10 2025    GLC 91 2025    BUN 18.5 2025    CR 0.86 2025    GFRESTIMATED >90 2025    LEESA 9.9 2025               The longitudinal plan of care for the diagnosis(es)/condition(s) as documented were addressed during this visit. Due to the added complexity in care, I will continue to support Karl in the subsequent management and with ongoing continuity of care.     This note has been dictated using voice recognition software. Any grammatical, typographical, or context distortions are unintentional and inherent to the software.    Virgilio Carey PA-C  Clinical Cardiac Electrophysiology  [facility]  Clinic and [phone]  Fax: [phone]  Electrophysiology Nurses: [phone]

## 2025-04-28 DIAGNOSIS — I50.32 CHRONIC DIASTOLIC HEART FAILURE (H): ICD-10-CM

## 2025-04-28 DIAGNOSIS — R73.03 PREDIABETES: ICD-10-CM

## 2025-04-28 RX ORDER — EMPAGLIFLOZIN 10 MG/1
10 TABLET, FILM COATED ORAL DAILY
Qty: 90 TABLET | Refills: 0 | Status: SHIPPED | OUTPATIENT
Start: 2025-04-28

## 2025-05-04 DIAGNOSIS — I10 HTN, GOAL BELOW 140/90: ICD-10-CM

## 2025-05-04 DIAGNOSIS — E78.5 HYPERLIPIDEMIA LDL GOAL <100: ICD-10-CM

## 2025-05-04 DIAGNOSIS — E78.5 HYPERLIPIDEMIA LDL GOAL <70: ICD-10-CM

## 2025-05-05 RX ORDER — ATORVASTATIN CALCIUM 10 MG/1
10 TABLET, FILM COATED ORAL DAILY
Qty: 90 TABLET | Refills: 0 | Status: SHIPPED | OUTPATIENT
Start: 2025-05-05

## 2025-05-05 RX ORDER — FENOFIBRATE 160 MG/1
160 TABLET ORAL DAILY
Qty: 90 TABLET | Refills: 0 | Status: SHIPPED | OUTPATIENT
Start: 2025-05-05

## 2025-05-05 RX ORDER — LOSARTAN POTASSIUM 25 MG/1
25 TABLET ORAL AT BEDTIME
Qty: 90 TABLET | Refills: 0 | Status: SHIPPED | OUTPATIENT
Start: 2025-05-05

## 2025-06-03 ENCOUNTER — OFFICE VISIT (OUTPATIENT)
Dept: FAMILY MEDICINE | Facility: CLINIC | Age: 60
End: 2025-06-03
Payer: COMMERCIAL

## 2025-06-03 VITALS
RESPIRATION RATE: 16 BRPM | DIASTOLIC BLOOD PRESSURE: 78 MMHG | BODY MASS INDEX: 35.81 KG/M2 | SYSTOLIC BLOOD PRESSURE: 136 MMHG | HEIGHT: 75 IN | WEIGHT: 288 LBS | OXYGEN SATURATION: 97 % | HEART RATE: 93 BPM | TEMPERATURE: 97.5 F

## 2025-06-03 DIAGNOSIS — Z12.5 SCREENING FOR PROSTATE CANCER: ICD-10-CM

## 2025-06-03 DIAGNOSIS — E66.812 CLASS 2 SEVERE OBESITY DUE TO EXCESS CALORIES WITH SERIOUS COMORBIDITY AND BODY MASS INDEX (BMI) OF 36.0 TO 36.9 IN ADULT (H): ICD-10-CM

## 2025-06-03 DIAGNOSIS — Z23 NEED FOR TDAP VACCINATION: ICD-10-CM

## 2025-06-03 DIAGNOSIS — N40.1 BENIGN PROSTATIC HYPERPLASIA WITH URINARY FREQUENCY: ICD-10-CM

## 2025-06-03 DIAGNOSIS — Z23 NEED FOR ZOSTER VACCINE: ICD-10-CM

## 2025-06-03 DIAGNOSIS — E29.1 MALE HYPOGONADISM: ICD-10-CM

## 2025-06-03 DIAGNOSIS — E78.5 HYPERLIPIDEMIA LDL GOAL <70: ICD-10-CM

## 2025-06-03 DIAGNOSIS — E66.01 CLASS 2 SEVERE OBESITY DUE TO EXCESS CALORIES WITH SERIOUS COMORBIDITY AND BODY MASS INDEX (BMI) OF 36.0 TO 36.9 IN ADULT (H): ICD-10-CM

## 2025-06-03 DIAGNOSIS — Z00.00 ENCOUNTER FOR ANNUAL PHYSICAL EXAM: Primary | ICD-10-CM

## 2025-06-03 DIAGNOSIS — R35.0 BENIGN PROSTATIC HYPERPLASIA WITH URINARY FREQUENCY: ICD-10-CM

## 2025-06-03 SDOH — HEALTH STABILITY: PHYSICAL HEALTH: ON AVERAGE, HOW MANY DAYS PER WEEK DO YOU ENGAGE IN MODERATE TO STRENUOUS EXERCISE (LIKE A BRISK WALK)?: 2 DAYS

## 2025-06-03 ASSESSMENT — PAIN SCALES - GENERAL: PAINLEVEL_OUTOF10: MILD PAIN (2)

## 2025-06-03 ASSESSMENT — SOCIAL DETERMINANTS OF HEALTH (SDOH): HOW OFTEN DO YOU GET TOGETHER WITH FRIENDS OR RELATIVES?: ONCE A WEEK

## 2025-06-03 NOTE — NURSING NOTE
Prior to immunization administration, verified patients identity using patient s name and date of birth. Please see Immunization Activity for additional information.     Screening Questionnaire for Adult Immunization    Are you sick today?   No   Do you have allergies to medications, food, a vaccine component or latex?   No   Have you ever had a serious reaction after receiving a vaccination?   No   Do you have a long-term health problem with heart, lung, kidney, or metabolic disease (e.g., diabetes), asthma, a blood disorder, no spleen, complement component deficiency, a cochlear implant, or a spinal fluid leak?  Are you on long-term aspirin therapy?   No   Do you have cancer, leukemia, HIV/AIDS, or any other immune system problem?   No   Do you have a parent, brother, or sister with an immune system problem?   No   In the past 3 months, have you taken medications that affect  your immune system, such as prednisone, other steroids, or anticancer drugs; drugs for the treatment of rheumatoid arthritis, Crohn s disease, or psoriasis; or have you had radiation treatments?   No   Have you had a seizure, or a brain or other nervous system problem?   No   During the past year, have you received a transfusion of blood or blood    products, or been given immune (gamma) globulin or antiviral drug?   No   For women: Are you pregnant or is there a chance you could become       pregnant during the next month?   No   Have you received any vaccinations in the past 4 weeks?   No     Immunization questionnaire answers were all negative.      Patient instructed to remain in clinic for 15 minutes afterwards, and to report any adverse reactions.     Screening performed by Serena Kim MA on 6/3/2025 at 2:46 PM.

## 2025-06-03 NOTE — PATIENT INSTRUCTIONS
At [facility], we strive to deliver an exceptional experience to you, every time we see you. If you receive a survey, please let us know what we are doing well and/or what we could improve upon, as we do value your feedback.  If you have MyChart, you can expect to receive results automatically within 24 hours of their completion.  Your provider will send a note interpreting your results as well.   If you do not have MyChart, you should receive your results in about a week by mail.    Your care team:                            Family Medicine Internal Medicine   MD Torsten Apple, MD Amirah Chahal, MD Vincent Stubbs, MD Shalonda Soto, PABolivarC Emma Shea APRCONOR, VALE Perry, MD Pediatrics   MD Zina Fairchild, MD Erinn Butts, PAADAM Lazo APRN CNP   Bhavani Zelaya, MD Concepcion Thurston, MD Joyce Coffey, CNP      Same-Day Provider (No follow-up visits)    KETTY Fox PA-C     Clinic hours: Monday - Thursday 7 am-6 pm; Fridays 7 am-5 pm.   Urgent care: Monday - Friday 10 am- 8 pm; Saturday and Sunday 9 am-5 pm.    Clinic: [phone]       [facility]: Monday - Thursday 8 am - 7 pm; Friday 8 am - 6 pm  [facility]: [phone]     [facility] Scheduling: Monday - Friday 7 am - 7 pm; Saturday 7 am - 3:30 pm  [phone] [place] : [phone]     PATIENT INSTRUCTIONS:  Within 8 weeks from today, schedule your second dose of Shingrix (shingles vaccine).   Schedule your dose of PCV20 vaccine 4 weeks after your second dose of Shingrix.

## 2025-06-03 NOTE — PROGRESS NOTES
Advance Care Planning  Discussed advance care planning with patient; however, patient declined at this time.        6/3/2025   General Health   How would you rate your overall physical health? (!) POOR   Feel stress (tense, anxious, or unable to sleep) Very much   (!) STRESS CONCERN      6/3/2025   Nutrition   Three or more servings of calcium each day? Yes   Diet: Regular (no restrictions)   How many servings of fruit and vegetables per day? (!) 2-3   How many sweetened beverages each day? 0-1         6/3/2025   Exercise   Days per week of moderate/strenous exercise 2 days   (!) EXERCISE CONCERN      6/3/2025   Social Factors   Frequency of gathering with friends or relatives Once a week   Worry food won't last until get money to buy more No   Food not last or not have enough money for food? No   Do you have housing? (Housing is defined as stable permanent housing and does not include staying outside in a car, in a tent, in an abandoned building, in an overnight shelter, or couch-surfing.) Yes   Are you worried about losing your housing? No   Lack of transportation? No   Unable to get utilities (heat,electricity)? No         6/3/2025   Fall Risk   Fallen 2 or more times in the past year? No   Trouble with walking or balance? No          6/3/2025   Dental   Dentist two times every year? (!) NO           Today's PHQ-2 Score:       1/23/2025     4:15 PM   PHQ-2 ( 1999 [employer])   Q1: Little interest or pleasure in doing things 0   Q2: Feeling down, depressed or hopeless 2   PHQ-2 Score 2         6/3/2025   Substance Use   If I could quit smoking, I would Neutral   I want to quit somking, worry about health affects Neutral   Willing to make a plan to quit smoking Neutral   Willing to cut down before quitting Completely agree   Alcohol more than 3/day or more than 7/wk Yes   How often do you have a drink containing alcohol 4 or more times a week   How many alcohol drinks on typical day 5 or 6   How often do you have 5+  drinks at one occasion Weekly   Audit 2/3 Score 5   How often not able to stop drinking once started Monthly   How often failed to do what normally expected Less than monthly   How often needed first drink in am after a heavy drinking session Never   How often feeling of guilt or remorse after drinking Less than monthly   How often unable to remember what happened the night before Never   Have you or someone else been injured because of your drinking No   Has anyone been concerned or suggested you cut down on drinking Yes, but not in the last year   TOTAL SCORE - AUDIT 15   Do you use any other substances recreationally? (!) CANNABIS PRODUCTS     Social History     Tobacco Use    Smoking status: Every Day     Current packs/day: 1.00     Average packs/day: 1.5 packs/day for 42.6 years (63.3 ttl pk-yrs)     Types: Cigarettes     Start date: 11/1/1982    Smokeless tobacco: Never   Vaping Use    Vaping status: Never Used   Substance Use Topics    Alcohol use: Yes    Drug use: Yes     Types: Marijuana     Comment: 3x times a week- night time for sleep and back pain           6/3/2025   STI Screening   New sexual partner(s) since last STI/HIV test? No   Last PSA:   Prostate Specific Antigen Screen   Date Value Ref Range Status   11/16/2023 0.46 0.00 - 3.50 ng/mL Final     ASCVD Risk   The 10-year ASCVD risk score (Robert MG, et al., 2019) is: 17.3%    Values used to calculate the score:      Age: 59 years      Sex: Male      Is Non- : No      Diabetic: No      Tobacco smoker: Yes      Systolic Blood Pressure: 136 mmHg      Is BP treated: Yes      HDL Cholesterol: 37 mg/dL      Total Cholesterol: 184 mg/dL2      Labs reviewed in EPIC  BP Readings from Last 3 Encounters:   06/03/25 136/78   04/09/25 122/76   02/06/25 (!) 150/81    Wt Readings from Last 3 Encounters:   06/03/25 130.6 kg (288 lb)   04/09/25 132.5 kg (292 lb)   02/06/25 133.4 kg (294 lb)                  Patient Active Problem  List   Diagnosis    Hidradenitis suppurativa    Intrinsic atopic dermatitis    Edema of both lower extremities due to peripheral venous insufficiency    Dermatitis, seborrheic    Benign prostatic hyperplasia with urinary frequency    Tobacco use disorder    Persistent atrial fibrillation (H)    JIM on CPAP    Class 2 severe obesity due to excess calories with serious comorbidity in adult (H)    Tobacco dependence    Hyperlipidemia LDL goal <70    Essential hypertension    Anxiety    Acute foot pain    Suspected sleep apnea     Past Surgical History:   Procedure Laterality Date    EP ABLATION PULMONARY VEIN ISOLATION N/A 9/30/2024    Procedure: Ablation Atrial Fibrillation;  Surgeon: Paramjit Saldaña MD;  Location: [facility]    EP ABLATION PULMONARY VEIN ISOLATION N/A 2/6/2025    Procedure: Ablation Atrial Fibrillation;  Surgeon: Paramjit Saldaña MD;  Location: [facility]       Social History     Tobacco Use    Smoking status: Every Day     Current packs/day: 1.00     Average packs/day: 1.5 packs/day for 42.6 years (63.3 ttl pk-yrs)     Types: Cigarettes     Start date: 11/1/1982    Smokeless tobacco: Never   Substance Use Topics    Alcohol use: Yes     Family History   Problem Relation Age of Onset    Varicose Veins Father          Allergies   Allergen Reactions    Clobetasol Other (See Comments)     Allergy testing done - Clobetasol-77-Propionate    Inhaled Anticholinergic Agents Other (See Comments)     Stearyl Alcohol - Allergy testing done    No Clinical Screening - See Comments Other (See Comments)     Stearyl Alcohol - Allergy testing done    Octylisothiazolinone; Phenylenediamine - allergy testing done    Shellac Other (See Comments)     Allergy testing done    Trolamine (Triethanolamine) Other (See Comments)     Allergy testing done    Urea Other (See Comments)     Allergy testing done

## 2025-06-04 ENCOUNTER — TELEPHONE (OUTPATIENT)
Dept: FAMILY MEDICINE | Facility: CLINIC | Age: 60
End: 2025-06-04

## 2025-06-04 ENCOUNTER — PATIENT OUTREACH (OUTPATIENT)
Dept: CARE COORDINATION | Facility: CLINIC | Age: 60
End: 2025-06-04
Payer: COMMERCIAL

## 2025-06-04 DIAGNOSIS — G47.33 OSA (OBSTRUCTIVE SLEEP APNEA): Primary | ICD-10-CM

## 2025-06-04 NOTE — TELEPHONE ENCOUNTER
Plan does not cover semaglutide-weight management (WEGOVY) 0.25 MG/0.5ML pen .      Please go to [email or web address] to initiate Prior Auth or switch to alternative medication.    Insurance type and ID number: [id number]      Additional Information:     Roxy Petty

## 2025-06-05 NOTE — TELEPHONE ENCOUNTER
[facility]ail Pharmacy Prior Authorization Team   Phone: [phone]      [facility] key: (Key: [id number])    PA Initiation    Medication: WEGOVY 0.25 MG/0.5ML SC SOAJ  Insurance Company: [employer] [place] - Phone [phone] Fax [phone]  Pharmacy Filling the Rx: [facility] [address] [place] [address] [address]  Filling Pharmacy Phone: [phone]  Filling Pharmacy Fax: [phone]  Start Date: 6/5/2025

## 2025-06-06 NOTE — TELEPHONE ENCOUNTER
Retail Pharmacy Prior Authorization Team   Phone: [phone]      PRIOR AUTHORIZATION DENIED    Medication: WEGOVY 0.25 MG/0.5ML SC SOAJ  Insurance Company: [employer] [place] - Phone [phone] Fax [phone]  Denial Date: 6/5/2025  Denial Reason(s):       Appeal Information: Drug exclusions cannot be appealed.  This medication will not be covered by the prescription plan for any reason. The drug is not on formulary and there are no loopholes to gaining approval.    Patient Notified: No. The [facility] PA Team does not notify of the denial outcomes as the patient often will ask what their provider will prescribe in place of the denied medication, or additional information in regards to other therapies they can take in place of the denied medication.  This is not something we can advise on in our department.

## 2025-06-24 NOTE — PATIENT INSTRUCTIONS
Angel    Thank you for entrusting your care with us at the [facility].      We are prescribing some compression stockings for you. I have included different suppliers that should help you get measured and fitting to ensure proper fitting socks. You should wear these stockings as much as you can. It is especially important to wear them with long periods of standing, sitting, long car rides or if you will be flying. Compression socks should get refilled every 4-6 months. They do not need to be worn at night while in bed. It is recommended to wear compression level of 20-30mmhg or higher from toes to knees.              Varicose Veins    Varicose veins are twisted, enlarged veins near the surface of the skin. They develop most often in the legs and ankles.    Some people may be more likely than others to get varicose veins because of several things. These include aging, pregnancy, being overweight, or because a parent has them. Standing or sitting for long periods of time can also increase risk of varicose veins.    Follow-up care is a key part of your treatment and safety. Be sure to make and go to all appointments, and call your doctor if you are having problems. It's also a good idea to know your test results and keep a list of the medicines you take.      Varicose veins are caused by weakened valves and veins in your legs. Normally, one-way valves in your veins keep blood flowing from your legs up toward your heart. When these valves don't work as they should, blood collects in your legs, and pressure builds up. The veins become weak, large, and twisted.    How can you care for yourself at home?  Wear compression stockings during the day to help relieve symptoms and improve blood flow. Talk to your doctor about which ones to get and where to get them.  Prop up your legs at or above the level of your heart when possible. Try to do this for about 30 minutes at a time, about 3 times a day.  This helps keep the blood from pooling in your lower legs and improves blood flow to the rest of your body.  Avoid sitting and standing for long periods. This puts added stress on your veins.  Stay at a healthy weight. Lose weight if you need to.  Try to take several short walks every day.  Get at least 30 minutes of exercise on most days of the week. Walking is a good choice. You also may want to do other activities, such as running, swimming, cycling, or playing tennis or team sports.  Do calf muscle exercises every day. When you are sitting down, rotate your feet at the ankles in both directions, making small circles. Extend your legs, and point and flex your feet.  Avoid crossing your legs at the knees when sitting.  Take good care of your skin. Treat cuts and scrapes on your legs right away. Keep your legs clean and moisturized to prevent drying and cracking. Prevent sunburns.  Do not smoke. Smoking can make varicose veins worse. If you need help quitting, talk to your doctor about stop-smoking programs and medicines. These can increase your chances of quitting for good.  If you bump your leg so hard that you know it is likely to bruise, prop up your leg and apply ice or cold packs right away. Apply the ice or cold pack for 10 to 20 minutes, 3 or more times a day. Put a thin cloth between the ice and your skin.  If you cut or scratch the skin over a vein, it may bleed a lot. Prop up your leg and apply firm pressure for a full 15 minutes.  If you have a blood clot in a varicose vein, you may have tenderness and swelling over the vein. The vein may feel firm. Be sure to call your doctor right away if you have these symptoms. If your doctor has told you how to care for the clot, follow the instructions.     Care may include the following:    Prop up your leg and apply a damp cloth that is warm or cool.  Ask your doctor if you can take an over-the-counter pain medicine, such as acetaminophen (Tylenol), ibuprofen  (Advil, Motrin), or naproxen (Aleve). Be safe with medicines. Read and follow all instructions on the label.    When should you call for help?     Call 911 anytime you think you may need emergency care. For example, call if:    You have sudden chest pain and shortness of breath, or you cough up blood.    Call your doctor now or seek immediate medical care if:    You have signs of a blood clot in your leg (called a deep vein thrombosis), such as:  Pain in your calf, back of the knee, thigh, or groin.  Swelling in the leg or groin.  A color change on the leg or groin. The skin may be reddish or purplish, depending on your usual skin color.  A varicose vein begins to bleed and you cannot stop it.  You have a tender lump in your leg.  You get an open sore.    Watch closely for changes in your health, and be sure to contact your doctor if:    Your varicose vein symptoms do not improve with home treatment.    Current as of: December 19, 2022  Author: [employer]wise Staff  Medical Review:Chris Mercer MD - Family Medicine & HILDA Carney MD - Internal Medicine & Conrado Gonzalez MD - Family Medicine & Danny Mccall MD - Family Medicine & Eric Quick MD - Diagnostic Radiology    Please bring your compression prescription to a home medical supply store. Here is a list of locations but not limited to.     [facility]  [facility]  [address] [place] [address]  Phone: [phone]  Fax: [phone] [facility]  [address] [place] [address]  Phone: [phone]  Fax: [phone]   [facility]  [address] [place] [address]  Phone: [phone]  Fax: [phone] [facility]  [address]  [address] [place] [address]  Phone: [phone]  Fax: [phone]   [facility]  [address] [place] [address]  Phone: [phone] [facility]   [address] ([facility])  [place] [address]  Phone: [phone]   [facility]  [address] [place] [address]      Phone: [phone]  Fax: [phone] [place]  [address] [place] [address]      Phone: [phone]  Fax: [phone]     [facility] [email or web address]  [address], [place] [address]  [phone]    [facility]  [email or web address]  [address] [place] [address]  Phone: [phone]     [address] [place] [address]  Phone: [phone]    [address] [place] [address]  Phone: [phone]    [address], [place] [address]  Phone: [phone] [address], [place] [address]  Phone: [phone]    [facility] [email or web address]  [address] [place] [address]  Phone: [phone]    [address], [place] [address]  Phone: [phone]    [address], [place] [address]  Phone: [phone]    [facility]ice Sumner  [email or web address]  [phone]    Pre-Procedure Instructions for Varicose Vein Ablation   We look forward to scheduling a varicose vein procedure for you. First, we will submit a prior authorization to your insurance company if required. This typically takes 10-14 days. We will contact you once we have gotten the approval to schedule the procedure.  The following is helpful information for you regarding your treatment:  **Important: A  will be needed post procedure.  (Unable to use Taxi or Uber).  Please allow 1-2 hours for your vein procedure appointment.  Take your routine medications as you normally would except for blood thinners. Aspirin is ok to continue.  If you take Warfarin, Xarelto, or Eliquis this will need to be HELD prior to procedure  according to primary care provider or cardiology who prescribes this medication. Please notify us if you take this medication.  We have thigh high compression stocking sizes medium to X-large that will be applied immediately after the procedure. You will need to provide your own if one of these sizes will not fit. Another option is to bring in knee high compression and biker compression shorts.   Please wear comfortable clothing.  We recommend that you bring a change of undergarment in case it gets stained by the cleansing solution.  Feel free to bring a personal music player or a CD to listen to during your procedure.  It is advised not to fly within 3 weeks after the procedure.  You do not have to fast prior to this procedure.  For any questions regarding your procedure please call [phone] to speak with the nurse.  If you would like a Good [place] Estimate for your upcoming procedure contact Cost of Care Estimates at [phone], advocates are available Monday through Friday 8am - 4:30pm.    Radiofrequency Ablation Codes:   - 00775 for first vein in either leg  Varicose veins may be a sign of something more severe - venous reflux disease.  Healthy leg veins have valves that keep blood flowing to the heart. Venous reflux disease develops when the valves stop working properly and allow blood to flow backward (i.e., reflux) and pool in the lower leg veins.   If venous reflux disease is left untreated, symptoms can worsen over time. Your doctor can help you understand if you have this condition.     Superficial venous reflux disease may cause the following signs and symptoms in your legs:  Varicose veins  Aching  Swelling  Cramping  Heaviness or tiredness  Itching  Restlessness  Open skin sores    Superficial venous reflux disease treatment aims to reduce or stop the backward flow of blood. The following may be prescribed to treat your superficial venous reflux disease. Your doctor can help you decide which  treatment is best for you:   Compression stockings   Removing diseased vein   Closing diseased vein (through thermal or non-thermal treatment)     Radiofrequency Ablation (RFA) Treatment for Varicose Veins  Radiofrequency ablation (RFA) is a thermal procedure to treat varicose veins. It uses heat created from radiofrequency (RF). During RFA treatment, RF heat is sent into your vein through a thin, flexible tube (catheter). This closes off blood flow in the main problem vein.           Getting ready for your treatment  Tell your provider if you:  Are pregnant or think you may be pregnant  Are breastfeeding  Smoke, use alcohol or street drugs on a regular basis  Have any allergies or intolerances to certain medicines. Explain what reaction you have had to these medicines in the past.      The day of your treatment  The treatment takes 45 to 60 minutes. The entire treatment (including time to prepare and recover) takes about 1 to 3 hours. You can go home the same day. For the treatment:   You'll lie down on a hospital bed.  An imaging method, such as ultrasound, is used to guide the procedure.  The leg to be treated is injected with numbing medicine.  Once your leg is numb, a needle makes a small hole (puncture) in the vein to be treated.  The catheter with the RF heat source is inserted into your vein.  More numbing medicine may be injected around your vein.  Once the catheter is in the right position, it is then slowly drawn backward. As the catheter sends out heat, the vein is closed off.  In some cases, other side branch varicose veins may be removed or tied off through a few small cuts (incisions).  When the treatment is done, the catheter is removed. Pressure is applied to the insertion site to stop any bleeding. An elastic compression stocking or a bandage may then be put on your leg.       Recovering at home  Once at home, follow all the instructions you've been given. Be sure to:  Check for signs of infection  at the catheter insertion sites (see below)  Wear thigh high compressions as directed  Keep your legs raised (elevated) as directed  Walk a few times a day  Avoid heavy exercise, lifting, and standing for long periods as advised. No lifting >20lbs for 2 weeks.   Avoid air travel, hot baths, saunas, or whirlpools as advised  Do not drive or operate heavy machinery for 24 hours after the procedure  Leakage from the numbing medications the first few hours is normal.          Call your healthcare provider if you have any of the following:  Fever of 100.4 F (38 C) or higher, or as directed by your provider  Chest pain or trouble breathing  Signs of infection at the catheter insertion site. These include increased redness or swelling (inflammation), warmth, increasing pain, bleeding, or bad-smelling discharge.  Severe numbness or tingling in the treated leg  Severe pain or swelling in the treated leg      Follow-up  You'll have an ultrasound within the same week as the procedure to check for problems, such as blood clots. You will follow up with the provider after 6 weeks.   Risks and possible complications   These include the following:  Bleeding, Infection, Blood clots, Damage to the nerves in the treated area, Irritation or burning of the skin over the treated vein. Treatment doesn't improve the look or the symptoms of the problem veins  Risks of any medicines used during the treatment

## 2025-06-25 ENCOUNTER — OFFICE VISIT (OUTPATIENT)
Dept: CARDIOLOGY | Facility: CLINIC | Age: 60
End: 2025-06-25
Payer: COMMERCIAL

## 2025-06-25 VITALS
HEART RATE: 76 BPM | SYSTOLIC BLOOD PRESSURE: 130 MMHG | WEIGHT: 287.6 LBS | BODY MASS INDEX: 35.76 KG/M2 | HEIGHT: 75 IN | RESPIRATION RATE: 16 BRPM | DIASTOLIC BLOOD PRESSURE: 70 MMHG

## 2025-06-25 DIAGNOSIS — I48.19 PERSISTENT ATRIAL FIBRILLATION (H): ICD-10-CM

## 2025-06-25 PROCEDURE — 99214 OFFICE O/P EST MOD 30 MIN: CPT

## 2025-06-25 PROCEDURE — G2211 COMPLEX E/M VISIT ADD ON: HCPCS

## 2025-06-25 PROCEDURE — 3075F SYST BP GE 130 - 139MM HG: CPT

## 2025-06-25 PROCEDURE — 3078F DIAST BP <80 MM HG: CPT

## 2025-06-25 NOTE — PROGRESS NOTES
"[facility]  Cardiac Electrophysiology  [address]  [place] [address]   Office: [phone]  Fax: [phone]     HEART CARE ELECTROPHYSIOLOGY FOLLOW UP    Primary Care: Torsten Thomas MD    Assessment/Recommendations     Persistent atrial fibrillation: Status post redo PVI + R CTI with Dr. Saldaña 2/6/25. Hx of PVI 9/30/24. Had previously been on sotalol 8/2024-11/2024), but started on amidarone 11/2024 to present. Symptomatic with palpitations and intermittent HANEY.   1 episode of atrial fibrillation a few days after the ablation, none since.  He monitors with Kardia twice a day. Amiodarone was stopped at 6 week follow up.     [id number] score of 1 for HTN. On eliquis    HTN: controlled    JIM on CPAP: wears most of the nights    Plan:  He is beyond 3 months post ablation, has a low [id number] score, would be okay to discontinue Eliquis as he would like to come off of it.  If he has sustained recurrence of A-fib, we discussed resuming Eliquis 5 mg twice daily, he is in agreement to that as well.  Continue monitoring with kardia mobile  Follow up 12 months post ablation       History of Present Illness/Subjective    Angel Etienne is a 59 year old male with past medical history significant for persistent atrial fibrillation with hx of PVI, HTN, and JIM on CPAP. He presents today for follow up of repeat PVI + R CTI with Dr. Saldaña 2\/6/25.     After his first ablation, he was noted to have mildly elevated ESR and CRP post ablation and was treated with ibuprofen.  He had a number of AF recurrences post ablation treated with ongoing sotalol, transitioned to amiodarone, and DCCVs 10/16/2024, 11/14/2024, 12/30/2024.     Since previous visit, he has had no atrial fibrillation.  He has had 2 episodes where he has felt \"off\".  He has checked his Kardia both times and each showed sinus rhythm.  He would like to get to the gym and start resistance training soon. " He has a varicose vein procedure coming up soon.     He denies groin site issues, heartburn, difficulty swallowing, or neurologic changes. He denies chest discomfort, palpitations, abdominal fullness/bloating or peripheral edema, shortness of breath, paroxysmal nocturnal dyspnea, orthopnea, lightheadedness, dizziness, pre-syncope, or syncope.     Data Review     Mr. Nathan atrial fibrillation history is as summarized below:  Symptoms: palpitations, intermittent dyspnea with activity  Symptom onset date: 2022  Diagnosis date: 1/29/2024 at pre-operative evaluation  Admissions/ER visits: none  Prior medical therapies: sotalol (8/2024-11/2024), amiodarone (11/2024-present)  Prior DCCVs: 8/8/2024 (AF recurrence around 8/20/2024), 10/16/2024, 11/14/2024, 12/30/2024. Typical appearing AFL by ECG 1/23/2025.  Prior ablations: PVI 9/30/2024 (myself) - moderate LA dilation, mild fibrosis, no inducible AT/AFL/AF post ablation.   Multiple AF recurrences post ablation requiring DCCVs.  Percutaneous left atrial appendage occlusion: none    Cardiographics   ECGs (tracings independently reviewed)  2/6/25 NSR with first degree AV delay 85 bpm  ms QT/QTC: 396/471 ms (post ablation)  1/23/2025 - typical appearing AFL, 73bpm  12/30/2024 - SR 72bpm, LA 316ms, QTC 477ms  11/27/2024 - AF, 64bpm  8/28/24 - AF, 54bpm, QT//438 ms  8/14/24 - SR 79bpm, first degree AV block, QT//449 ms  8/8/24 (post DCCV) - SR 75bpm, first degree AV block, QT//446 ms  1/29/24 - AF, 65bpm     Holter Monitor 3/7/24  Holter monitoring (duration 24hrs).  Continuous atrial fibrillation, 48 to 99bpm, average 69bpm.  There were no pauses of greater than 5 seconds. Nocturnal bradycardia was noted.  Rare premature ventricular contractions (<1%).  Symptom triggers - none.     2/5/2024 TTE  The left ventricle is normal in size.  Left ventricular function is normal.The ejection fraction is 60-65%.  There is mild concentric left ventricular  hypertrophy.  Normal right ventricle size and systolic function.  The left atrium is severely dilated.  The right atrium is moderately dilated.  There is mild (1+) mitral regurgitation.    I have reviewed and updated the patient's past medical history, allergy list and medication list.                Physical Examination Review of Systems   BMI= There is no height or weight on file to calculate BMI.    Wt Readings from Last 3 Encounters:   06/03/25 130.6 kg (288 lb)   04/09/25 132.5 kg (292 lb)   02/06/25 133.4 kg (294 lb)       Vitals: There were no vitals taken for this visit.  General   Appearance:   Alert and oriented, in no acute distress.    HEENT:  Normocephalic and atraumatic. Conjunctiva and sclera are clear. Moist oral mucosa.    Neck: No JVP, carotid bruit or obvious thyromegaly.   Lungs:   Respirations unlabored. Clear bilaterally with no rales, rhonchi, or wheezes.     Cardiovascular:   Rhythm is regular. S1 and S2 are normal. No significant murmur is present. Lower extremities demonstrate no significant edema. Posterior tibial pulses are intact bilaterally.   Extremities: No cyanosis or clubbing   Skin: Skin is warm, dry, and otherwise intact.   Neurologic: Gait not assessed. Mood and affect appropriate.     ROS: 10 point ROS neg other than the symptoms noted above in the HPI.        Medical History  Surgical History Family History Social History   Past Medical History:   Diagnosis Date    Atrial fibrillation (H)     High cholesterol     Past Surgical History:   Procedure Laterality Date    EP ABLATION PULMONARY VEIN ISOLATION N/A 9/30/2024    Procedure: Ablation Atrial Fibrillation;  Surgeon: Paramjit Saldaña MD;  Location: [facility]    EP ABLATION PULMONARY VEIN ISOLATION N/A 2/6/2025    Procedure: Ablation Atrial Fibrillation;  Surgeon: Paramjit Saldaña MD;  Location: [facility] CV    Family History   Problem Relation Age of Onset    Varicose Veins Father     Social  History     Socioeconomic History    Marital status: Single     Spouse name: Not on file    Number of children: Not on file    Years of education: Not on file    Highest education level: Not on file   Occupational History    Not on file   Tobacco Use    Smoking status: Every Day     Current packs/day: 1.00     Average packs/day: 1.5 packs/day for 42.6 years (63.4 ttl pk-yrs)     Types: Cigarettes     Start date: 11/1/1982    Smokeless tobacco: Never   Vaping Use    Vaping status: Never Used   Substance and Sexual Activity    Alcohol use: Yes    Drug use: Yes     Types: Marijuana     Comment: 3x times a week- night time for sleep and back pain    Sexual activity: Not Currently     Partners: Female   Other Topics Concern    Not on file   Social History Narrative    Not on file     Social Drivers of Health     Financial Resource Strain: Low Risk  (6/3/2025)    Financial Resource Strain     Within the past 12 months, have you or your family members you live with been unable to get utilities (heat, electricity) when it was really needed?: No   Food Insecurity: Low Risk  (6/3/2025)    Food Insecurity     Within the past 12 months, did you worry that your food would run out before you got money to buy more?: No     Within the past 12 months, did the food you bought just not last and you didn t have money to get more?: No   Transportation Needs: Low Risk  (6/3/2025)    Transportation Needs     Within the past 12 months, has lack of transportation kept you from medical appointments, getting your medicines, non-medical meetings or appointments, work, or from getting things that you need?: No   Physical Activity: Unknown (6/3/2025)    Exercise Vital Sign     Days of Exercise per Week: 2 days     Minutes of Exercise per Session: Not on file   Stress: Stress Concern Present (6/3/2025)    [employer] - Occupational Stress Questionnaire     Feeling of Stress : Very much   Social Connections: Unknown  (6/3/2025)    Social Connection and Isolation Panel [NHANES]     Frequency of Communication with Friends and Family: Not on file     Frequency of Social Gatherings with Friends and Family: Once a week     Attends Baptism Services: Not on file     Active Member of Clubs or Organizations: Not on file     Attends Club or Organization Meetings: Not on file     Marital Status: Not on file   Interpersonal Safety: Low Risk  (6/3/2025)    Interpersonal Safety     Do you feel physically and emotionally safe where you currently live?: Yes     Within the past 12 months, have you been hit, slapped, kicked or otherwise physically hurt by someone?: No     Within the past 12 months, have you been humiliated or emotionally abused in other ways by your partner or ex-partner?: No   Housing Stability: Low Risk  (6/3/2025)    Housing Stability     Do you have housing? : Yes     Are you worried about losing your housing?: No          Medications  Allergies   Scheduled Meds:  Current Outpatient Medications   Medication Sig Dispense Refill    apixaban ANTICOAGULANT (ELIQUIS ANTICOAGULANT) 5 MG tablet Take 1 tablet (5 mg) by mouth 2 times daily. 180 tablet 1    atorvastatin (LIPITOR) 10 MG tablet Take 1 tablet by mouth once daily 90 tablet 0    betamethasone valerate (VALISONE) 0.1 % external cream Apply topically daily as needed.      clotrimazole-betamethasone (LOTRISONE) 1-0.05 % external cream       dupilumab (DUPIXENT) 300 MG/2ML prefilled syringe Inject 300 mg Subcutaneous every 14 days      fenofibrate (TRIGLIDE/LOFIBRA) 160 MG tablet Take 1 tablet by mouth once daily 90 tablet 0    JARDIANCE 10 MG TABS tablet Take 1 tablet by mouth once daily 90 tablet 0    losartan (COZAAR) 25 MG tablet TAKE 1 TABLET BY MOUTH AT BEDTIME 90 tablet 0    nicotine (NICORETTE) 2 MG gum Place 2 mg inside cheek every hour as needed for nicotine withdrawal symptoms      semaglutide-weight management (WEGOVY) 0.25 MG/0.5ML pen Inject 0.5 mLs (0.25 mg)  subcutaneously once a week. 2 mL 2    triamcinolone (KENALOG) 0.1 % external cream       Allergies   Allergen Reactions    Clobetasol Other (See Comments)     Allergy testing done - Clobetasol-77-Propionate    Inhaled Anticholinergic Agents Other (See Comments)     Stearyl Alcohol - Allergy testing done    No Clinical Screening - See Comments Other (See Comments)     Stearyl Alcohol - Allergy testing done    Octylisothiazolinone; Phenylenediamine - allergy testing done    Shellac Other (See Comments)     Allergy testing done    Trolamine (Triethanolamine) Other (See Comments)     Allergy testing done    Urea Other (See Comments)     Allergy testing done         Lab Results    Chemistry/lipid CBC Cardiac Enzymes/BNP/TSH/INR   Lab Results   Component Value Date    CHOL 175 2025    HDL 38 (L) 2025    TRIG 275 (H) 2025    BUN 15.0 2025     2025    CO2 23 2025    Lab Results   Component Value Date    WBC 6.0 2025    HGB 17.1 2025    HCT 49.9 2025    MCV 97 2025     2025    Last Comprehensive Metabolic Panel:  Lab Results   Component Value Date     2025    POTASSIUM 4.4 2025    CHLORIDE 106 2025    CO2 23 2025    ANIONGAP 12 2025     (H) 2025    BUN 15.0 2025    CR 0.75 2025    GFRESTIMATED >90 2025    LEESA 9.2 2025               The longitudinal plan of care for the diagnosis(es)/condition(s) as documented were addressed during this visit. Due to the added complexity in care, I will continue to support Karl in the subsequent management and with ongoing continuity of care.     This note has been dictated using voice recognition software. Any grammatical, typographical, or context distortions are unintentional and inherent to the software.    Virgilio Carey PA-C  Clinical Cardiac Electrophysiology  [facility]  Clinic and [phone]  Fax:  [phone]  Electrophysiology Nurses: [phone]

## 2025-06-25 NOTE — PATIENT INSTRUCTIONS
Angel Etienne,    It was a pleasure to see you today at the [facility] [place].     My recommendations after this visit include:  - would be okay to stop eliquis given your low risk for stroke. If recurrence of afib, would recommend restarting eliquis 5 mg twice daily   - continue monitoring for atrial fibrillation with kardia  - continue increasing activity as tolerated  - follow up 2026, one year post ablation, or sooner if needed    Please do not hesitate to call with additional questions or concerns.     Virgilio Carey PA-C  Clinical Cardiac Electrophysiology  [facility]  Clinic and [phone]  Fax: [phone]  Electrophysiology Nurses: [phone]

## 2025-06-25 NOTE — LETTER
6/25/2025    Torsten Thomas MD  [address]  [place] [address]    RE: Angel Etienne       Dear Colleague,     I had the pleasure of seeing Angel Etienne in the [facility].       [facility]  Cardiac Electrophysiology  [address]  [place] [address]   Office: [phone]  Fax: [phone]     HEART CARE ELECTROPHYSIOLOGY FOLLOW UP    Primary Care: Torsten Thomas MD    Assessment/Recommendations     Persistent atrial fibrillation: Status post redo PVI + R CTI with Dr. Saldaña 2/6/25. Hx of PVI 9/30/24. Had previously been on sotalol 8/2024-11/2024), but started on amidarone 11/2024 to present. Symptomatic with palpitations and intermittent HANEY.   1 episode of atrial fibrillation a few days after the ablation, none since.  He monitors with Tradoriadia twice a day. Amiodarone was stopped at 6 week follow up.     [id number] score of 1 for HTN. On eliquis    HTN: controlled    JIM on CPAP: wears most of the nights    Plan:  He is beyond 3 months post ablation, has a low [id number] score, would be okay to discontinue Eliquis as he would like to come off of it.  If he has sustained recurrence of A-fib, we discussed resuming Eliquis 5 mg twice daily, he is in agreement to that as well.  Continue monitoring with [employer] mobile  Follow up 12 months post ablation       History of Present Illness/Subjective    Angel Etienne is a 59 year old male with past medical history significant for persistent atrial fibrillation with hx of PVI, HTN, and JIM on CPAP. He presents today for follow up of repeat PVI + R CTI with Dr. Saldaña 2\/6/25.     After his first ablation, he was noted to have mildly elevated ESR and CRP post ablation and was treated with ibuprofen.  He had a number of AF recurrences post ablation treated with ongoing sotalol, transitioned to amiodarone, and DCCVs 10/16/2024, 11/14/2024, 12/30/2024.     Since previous visit, he  "has had no atrial fibrillation.  He has had 2 episodes where he has felt \"off\".  He has checked his Kardia both times and each showed sinus rhythm.  He would like to get to the gym and start resistance training soon. He has a varicose vein procedure coming up soon.     He denies groin site issues, heartburn, difficulty swallowing, or neurologic changes. He denies chest discomfort, palpitations, abdominal fullness/bloating or peripheral edema, shortness of breath, paroxysmal nocturnal dyspnea, orthopnea, lightheadedness, dizziness, pre-syncope, or syncope.     Data Review     Mr. Nathan atrial fibrillation history is as summarized below:  Symptoms: palpitations, intermittent dyspnea with activity  Symptom onset date: 2022  Diagnosis date: 1/29/2024 at pre-operative evaluation  Admissions/ER visits: none  Prior medical therapies: sotalol (8/2024-11/2024), amiodarone (11/2024-present)  Prior DCCVs: 8/8/2024 (AF recurrence around 8/20/2024), 10/16/2024, 11/14/2024, 12/30/2024. Typical appearing AFL by ECG 1/23/2025.  Prior ablations: PVI 9/30/2024 (myself) - moderate LA dilation, mild fibrosis, no inducible AT/AFL/AF post ablation.   Multiple AF recurrences post ablation requiring DCCVs.  Percutaneous left atrial appendage occlusion: none    Cardiographics   ECGs (tracings independently reviewed)  2/6/25 NSR with first degree AV delay 85 bpm  ms QT/QTC: 396/471 ms (post ablation)  1/23/2025 - typical appearing AFL, 73bpm  12/30/2024 - SR 72bpm, SD 316ms, QTC 477ms  11/27/2024 - AF, 64bpm  8/28/24 - AF, 54bpm, QT//438 ms  8/14/24 - SR 79bpm, first degree AV block, QT//449 ms  8/8/24 (post DCCV) - SR 75bpm, first degree AV block, QT//446 ms  1/29/24 - AF, 65bpm     Holter Monitor 3/7/24  Holter monitoring (duration 24hrs).  Continuous atrial fibrillation, 48 to 99bpm, average 69bpm.  There were no pauses of greater than 5 seconds. Nocturnal bradycardia was noted.  Rare premature ventricular " contractions (<1%).  Symptom triggers - none.     2/5/2024 TTE  The left ventricle is normal in size.  Left ventricular function is normal.The ejection fraction is 60-65%.  There is mild concentric left ventricular hypertrophy.  Normal right ventricle size and systolic function.  The left atrium is severely dilated.  The right atrium is moderately dilated.  There is mild (1+) mitral regurgitation.    I have reviewed and updated the patient's past medical history, allergy list and medication list.                Physical Examination Review of Systems   BMI= There is no height or weight on file to calculate BMI.    Wt Readings from Last 3 Encounters:   06/03/25 130.6 kg (288 lb)   04/09/25 132.5 kg (292 lb)   02/06/25 133.4 kg (294 lb)       Vitals: There were no vitals taken for this visit.  General   Appearance:   Alert and oriented, in no acute distress.    HEENT:  Normocephalic and atraumatic. Conjunctiva and sclera are clear. Moist oral mucosa.    Neck: No JVP, carotid bruit or obvious thyromegaly.   Lungs:   Respirations unlabored. Clear bilaterally with no rales, rhonchi, or wheezes.     Cardiovascular:   Rhythm is regular. S1 and S2 are normal. No significant murmur is present. Lower extremities demonstrate no significant edema. Posterior tibial pulses are intact bilaterally.   Extremities: No cyanosis or clubbing   Skin: Skin is warm, dry, and otherwise intact.   Neurologic: Gait not assessed. Mood and affect appropriate.     ROS: 10 point ROS neg other than the symptoms noted above in the HPI.        Medical History  Surgical History Family History Social History   Past Medical History:   Diagnosis Date     Atrial fibrillation (H)      High cholesterol     Past Surgical History:   Procedure Laterality Date     EP ABLATION PULMONARY VEIN ISOLATION N/A 9/30/2024    Procedure: Ablation Atrial Fibrillation;  Surgeon: Paramjit Saldaña MD;  Location: [facility] CATH LAB CV     EP ABLATION PULMONARY VEIN  ISOLATION N/A 2/6/2025    Procedure: Ablation Atrial Fibrillation;  Surgeon: Paramjit Saldaña MD;  Location: [facility] CV    Family History   Problem Relation Age of Onset     Varicose Veins Father     Social History     Socioeconomic History     Marital status: Single     Spouse name: Not on file     Number of children: Not on file     Years of education: Not on file     Highest education level: Not on file   Occupational History     Not on file   Tobacco Use     Smoking status: Every Day     Current packs/day: 1.00     Average packs/day: 1.5 packs/day for 42.6 years (63.4 ttl pk-yrs)     Types: Cigarettes     Start date: 11/1/1982     Smokeless tobacco: Never   Vaping Use     Vaping status: Never Used   Substance and Sexual Activity     Alcohol use: Yes     Drug use: Yes     Types: Marijuana     Comment: 3x times a week- night time for sleep and back pain     Sexual activity: Not Currently     Partners: Female   Other Topics Concern     Not on file   Social History Narrative     Not on file     Social Drivers of Health     Financial Resource Strain: Low Risk  (6/3/2025)    Financial Resource Strain      Within the past 12 months, have you or your family members you live with been unable to get utilities (heat, electricity) when it was really needed?: No   Food Insecurity: Low Risk  (6/3/2025)    Food Insecurity      Within the past 12 months, did you worry that your food would run out before you got money to buy more?: No      Within the past 12 months, did the food you bought just not last and you didn t have money to get more?: No   Transportation Needs: Low Risk  (6/3/2025)    Transportation Needs      Within the past 12 months, has lack of transportation kept you from medical appointments, getting your medicines, non-medical meetings or appointments, work, or from getting things that you need?: No   Physical Activity: Unknown (6/3/2025)    Exercise Vital Sign      Days of Exercise per Week: 2  days      Minutes of Exercise per Session: Not on file   Stress: Stress Concern Present (6/3/2025)    [employer] [place] [employer] - Occupational Stress Questionnaire      Feeling of Stress : Very much   Social Connections: Unknown (6/3/2025)    Social Connection and Isolation Panel [NHANES]      Frequency of Communication with Friends and Family: Not on file      Frequency of Social Gatherings with Friends and Family: Once a week      Attends Religion Services: Not on file      Active Member of Clubs or Organizations: Not on file      Attends Club or Organization Meetings: Not on file      Marital Status: Not on file   Interpersonal Safety: Low Risk  (6/3/2025)    Interpersonal Safety      Do you feel physically and emotionally safe where you currently live?: Yes      Within the past 12 months, have you been hit, slapped, kicked or otherwise physically hurt by someone?: No      Within the past 12 months, have you been humiliated or emotionally abused in other ways by your partner or ex-partner?: No   Housing Stability: Low Risk  (6/3/2025)    Housing Stability      Do you have housing? : Yes      Are you worried about losing your housing?: No          Medications  Allergies   Scheduled Meds:  Current Outpatient Medications   Medication Sig Dispense Refill     apixaban ANTICOAGULANT (ELIQUIS ANTICOAGULANT) 5 MG tablet Take 1 tablet (5 mg) by mouth 2 times daily. 180 tablet 1     atorvastatin (LIPITOR) 10 MG tablet Take 1 tablet by mouth once daily 90 tablet 0     betamethasone valerate (VALISONE) 0.1 % external cream Apply topically daily as needed.       clotrimazole-betamethasone (LOTRISONE) 1-0.05 % external cream        dupilumab (DUPIXENT) 300 MG/2ML prefilled syringe Inject 300 mg Subcutaneous every 14 days       fenofibrate (TRIGLIDE/LOFIBRA) 160 MG tablet Take 1 tablet by mouth once daily 90 tablet 0     JARDIANCE 10 MG TABS tablet Take 1 tablet by mouth once daily 90 tablet 0     losartan  (COZAAR) 25 MG tablet TAKE 1 TABLET BY MOUTH AT BEDTIME 90 tablet 0     nicotine (NICORETTE) 2 MG gum Place 2 mg inside cheek every hour as needed for nicotine withdrawal symptoms       semaglutide-weight management (WEGOVY) 0.25 MG/0.5ML pen Inject 0.5 mLs (0.25 mg) subcutaneously once a week. 2 mL 2     triamcinolone (KENALOG) 0.1 % external cream       Allergies   Allergen Reactions     Clobetasol Other (See Comments)     Allergy testing done - Clobetasol-77-Propionate     Inhaled Anticholinergic Agents Other (See Comments)     Stearyl Alcohol - Allergy testing done     No Clinical Screening - See Comments Other (See Comments)     Stearyl Alcohol - Allergy testing done    Octylisothiazolinone; Phenylenediamine - allergy testing done     Shellac Other (See Comments)     Allergy testing done     Trolamine (Triethanolamine) Other (See Comments)     Allergy testing done     Urea Other (See Comments)     Allergy testing done         Lab Results    Chemistry/lipid CBC Cardiac Enzymes/BNP/TSH/INR   Lab Results   Component Value Date    CHOL 175 06/06/2025    HDL 38 (L) 06/06/2025    TRIG 275 (H) 06/06/2025    BUN 15.0 06/06/2025     06/06/2025    CO2 23 06/06/2025    Lab Results   Component Value Date    WBC 6.0 06/06/2025    HGB 17.1 06/06/2025    HCT 49.9 06/06/2025    MCV 97 06/06/2025     06/06/2025    Last Comprehensive Metabolic Panel:  Lab Results   Component Value Date     06/06/2025    POTASSIUM 4.4 06/06/2025    CHLORIDE 106 06/06/2025    CO2 23 06/06/2025    ANIONGAP 12 06/06/2025     (H) 06/06/2025    BUN 15.0 06/06/2025    CR 0.75 06/06/2025    GFRESTIMATED >90 06/06/2025    LEESA 9.2 06/06/2025               The longitudinal plan of care for the diagnosis(es)/condition(s) as documented were addressed during this visit. Due to the added complexity in care, I will continue to support Karl in the subsequent management and with ongoing continuity of care.     This note has been dictated  using voice recognition software. Any grammatical, typographical, or context distortions are unintentional and inherent to the software.    Virgilio Carey PA-C  Clinical Cardiac Electrophysiology  [facility]  Clinic and [phone]  Fax: [phone]  Electrophysiology Nurses: [phone]       Thank you for allowing me to participate in the care of your patient.      Sincerely,     Amanuel Carey PA-C     [facility]  cc:   Amanuel Carey PA-C  [address]  [place] [address]

## 2025-06-30 ENCOUNTER — TELEPHONE (OUTPATIENT)
Dept: FAMILY MEDICINE | Facility: CLINIC | Age: 60
End: 2025-06-30
Payer: COMMERCIAL

## 2025-06-30 NOTE — CONFIDENTIAL NOTE
Patient calling to check if Dr Thomas sent the Zepbound in to his Pharmacy as Wegovy was denied. Please send to [facility] in [place]  Noni Roberts MA/  [facility]   Primary Care

## 2025-06-30 NOTE — TELEPHONE ENCOUNTER
Patient calling to say that Dr Thomas would try to prescribe Zepbound instead due to heart condition. Please send to [facility] in [place]. Please advise.  Noni Roberts MA/  [facility]   Primary Care

## 2025-07-09 ENCOUNTER — OFFICE VISIT (OUTPATIENT)
Dept: VASCULAR SURGERY | Facility: CLINIC | Age: 60
End: 2025-07-09
Attending: SPECIALIST
Payer: COMMERCIAL

## 2025-07-09 ENCOUNTER — TELEPHONE (OUTPATIENT)
Dept: SLEEP MEDICINE | Facility: CLINIC | Age: 60
End: 2025-07-09
Payer: COMMERCIAL

## 2025-07-09 VITALS
HEART RATE: 89 BPM | DIASTOLIC BLOOD PRESSURE: 99 MMHG | SYSTOLIC BLOOD PRESSURE: 146 MMHG | WEIGHT: 284.2 LBS | BODY MASS INDEX: 35.34 KG/M2 | HEIGHT: 75 IN | OXYGEN SATURATION: 95 %

## 2025-07-09 DIAGNOSIS — I83.893 SYMPTOMATIC VARICOSE VEINS OF BOTH LOWER EXTREMITIES: Primary | ICD-10-CM

## 2025-07-09 PROCEDURE — 3080F DIAST BP >= 90 MM HG: CPT | Performed by: SPECIALIST

## 2025-07-09 PROCEDURE — 1125F AMNT PAIN NOTED PAIN PRSNT: CPT | Performed by: SPECIALIST

## 2025-07-09 PROCEDURE — 99213 OFFICE O/P EST LOW 20 MIN: CPT | Performed by: SPECIALIST

## 2025-07-09 PROCEDURE — 3077F SYST BP >= 140 MM HG: CPT | Performed by: SPECIALIST

## 2025-07-09 PROCEDURE — 99214 OFFICE O/P EST MOD 30 MIN: CPT | Performed by: SPECIALIST

## 2025-07-09 ASSESSMENT — PAIN SCALES - GENERAL: PAINLEVEL_OUTOF10: MODERATE PAIN (6)

## 2025-07-09 NOTE — TELEPHONE ENCOUNTER
PT CAME TO [facility] SHOWROOM STATING HE THINKS HE NEEDS A PRESSURE ADJUSTMENT.  I ASKED IF IT IS THE PRESSURE WHEN HE IS FALLING ASLEEP OR THE PRESSURE LATER IN THE NIGHT.  HE STATED HE THINKS HE NEEDS JUST A LITTLE MORE PRESSURE WHEN FALLING ASLEEP BUT THEN THE PRESSURE WAKES HIM AT NIGHT.  TOLD HIM I CAN INCREASE HIS RAMP START PRESSURE AND THEN MESSAGE THE DR ABOUT THE RX PRESSURE.  ADJUSTED HIS RAMP START PRESSURE TO 6.6, TOLD HIM HOW TO TURN OFF THE RAMP IF HE WANTS TO START AT RX MIN OF 7.  MESSAGED DR MICHEL ABOUT THE RX PRESSURE CHANGE AND GAVE PT THE DR'S # TO CALL AND DISCUSS THIS W/ HIM AS WELL.  PT THANKED ME FOR THE ASSISTANCE.

## 2025-07-09 NOTE — PROGRESS NOTES
[facility] Vein Consult      Assessment:     1. varicose veins and spider veins, bilateral   2. History of A-fib,    3. Insuffiencey of left greater saphenous vein,       Plan:     1. Treatment options of conservative therapy of stockings use, exercise, weight loss, elevating legs when possible.    2. Script for compression stockings 20-30 mm hg  3. Ultrasound to evaluate legs for incompetency of both deep and superficial system .   4. Surgical treatment   Endovenous closure ofleft, greater saphenous vein     Risks and benefits of surgical intervention including infection, burns, dvt, thrombophlebitis, not closing, recurrence, numbness and nerve injury and need for further intervention were all discused    5. Follow up: For procedure if approved .   6. Call for any questions concerns or issues    Subjective:      Angel Etienne is a 59 year old male  who was referred by Torsten Thomas  for evaluation of varicose veins. Symptoms include pain, aching, fatigue, burning, edema, dermatitis, and episodes of superficial thrombophlebitis. Patient has history of leg swelling, pain and vein issues that have progressed. Pain and symptoms have affected daily living and work activities needing medications. Here for evaluation today. Stocking use with compression stockings of 20-30 mm hg or greater for greater then 3 months    Allergies:Clobetasol, Inhaled anticholinergic agents, No clinical screening - see comments, Shellac, Trolamine (triethanolamine), and Urea    Past Medical History:   Diagnosis Date    Atrial fibrillation (H)     High cholesterol        Past Surgical History:   Procedure Laterality Date    EP ABLATION PULMONARY VEIN ISOLATION N/A 9/30/2024    Procedure: Ablation Atrial Fibrillation;  Surgeon: Paramjit Saldaña MD;  Location: [facility] CV    EP ABLATION PULMONARY VEIN ISOLATION N/A 2/6/2025    Procedure: Ablation Atrial Fibrillation;  Surgeon: Paramjit Saldaña MD;   Location[facility] LAB CV         Current Outpatient Medications:     atorvastatin (LIPITOR) 10 MG tablet, Take 1 tablet by mouth once daily, Disp: 90 tablet, Rfl: 0    betamethasone valerate (VALISONE) 0.1 % external cream, Apply topically daily as needed., Disp: , Rfl:     clotrimazole-betamethasone (LOTRISONE) 1-0.05 % external cream, , Disp: , Rfl:     dupilumab (DUPIXENT) 300 MG/2ML prefilled syringe, Inject 300 mg Subcutaneous every 14 days, Disp: , Rfl:     fenofibrate (TRIGLIDE/LOFIBRA) 160 MG tablet, Take 1 tablet by mouth once daily, Disp: 90 tablet, Rfl: 0    JARDIANCE 10 MG TABS tablet, Take 1 tablet by mouth once daily, Disp: 90 tablet, Rfl: 0    losartan (COZAAR) 25 MG tablet, TAKE 1 TABLET BY MOUTH AT BEDTIME, Disp: 90 tablet, Rfl: 0    nicotine (NICORETTE) 2 MG gum, Place 2 mg inside cheek every hour as needed for nicotine withdrawal symptoms (Patient not taking: Reported on 6/25/2025), Disp: , Rfl:     semaglutide-weight management (WEGOVY) 0.25 MG/0.5ML pen, Inject 0.5 mLs (0.25 mg) subcutaneously once a week. (Patient not taking: Reported on 6/25/2025), Disp: 2 mL, Rfl: 2    triamcinolone (KENALOG) 0.1 % external cream, , Disp: , Rfl:     Current Facility-Administered Medications:     lidocaine 112 mL, EPINEPHrine (ADRENALIN) 1.12 mg in sodium chloride 0.9 % 1,113.12 mL (TUMESCENT), , Irrigation, Once, Carlos Guevara MD     Family History   Problem Relation Age of Onset    Varicose Veins Father         reports that he has been smoking cigarettes. He started smoking about 42 years ago. He has a 63.4 pack-year smoking history. He has never used smokeless tobacco. He reports current alcohol use. He reports current drug use. Drug: Marijuana.      Review of Systems:    Pertinent items are noted in HPI.  Patient has symptomatic veins and changes of bilateral legs. These have progressed to the point of causing symptoms on a daily basis. This causes issues with daily activities and chores such as  "washing dishes, vacuuming, mowing lawn, outdoor upkeep, and standing for long lengths of time       Objective:     Vitals:    07/09/25 1124   BP: (!) 146/99   BP Location: Right arm   Pulse: 89   SpO2: 95%   Weight: 128.9 kg (284 lb 3.2 oz)   Height: 1.905 m (6' 3\")     Body mass index is 35.52 kg/m .    EXAM:  GENERAL: This is a well-developed 59 year old male who appears his stated age  HEAD: normocephalic  HEENT: Pupils equal and reactive bilaterally  MOUTH: mucus membranes intact. Normal dentation  CARDIAC: RRR without murmur  CHEST/LUNG:  Clear to auscultation bilaterally  ABDOMEN: Soft, nontender, nondistended, no masses noted   NEUROLOGIC: Focally intact, nonfocal, alert and oriented x 3  INTEGUMENT: No open lesions or ulcers  VASCULAR: Pulses intact, symmetrical upper and lower extremities. There areskin changes consistent with chronic venous insufficiency. Varicose veins present in bilateral greater saphenous distribution. Spider veins present bilateral.                          Patient presents with moderate, bilateral  lower extremity secondary lymphedema.     Patient requires a standard-fit knee high compression stocking          Imaging:    Reviewed and my interpretation of the US is     Exam Information    Exam Date Exam Time Accession # Performing Department Results    4/1/24  3:44 PM [id number] [facility] [place]      PACS Images     Show images for US Venous Competency Bilateral     Study Result    Narrative & Impression   BILATERAL Venous Insufficiency Ultrasound (Date: 04/01/24)    BILATERAL Lower Extremity          Indication:  Surveillance Bilateral Symptomatic Varicose Veins, Pain, and Swelling. Bilateral Ankles Discolored. Hx: Vein Stripping at [facility].      Previous: 11/11/2016.      Patient History: Swelling, Stasis, Vein Stripping, and Obesity     Presenting Symptoms:  Swelling, Varicose Veins, Pain, and Stasis     Technique:   Supine and " Upright Ultrasound of the Deep and Superficial Veins with Valsalva and Compression Augmentation Maneuvers. Duplex Imaging is performed utilizing gray-scale, Two-dimensional images, color-flow imaging, Doppler waveform analysis, and Spectral doppler imaging done with provacative maneuvers.      Incompetency Criteria:  Deep vein reflux reported when greater than 1000 msec flow reversal. Superficial vein reflux reported when equal to or greater than 600 msec flow reversal.  vein reflux reported as greater than 350 msec flow reversal.      Right  Leg Deep Veins    CFV SFJ DFV PROX FV   PROX FV MID FV DIST POP V. PERON.   V. PTV'S   Compressibility  (FC,PC,NC) FC FC FC FC FC FC FC FC FC   Reflux +   - - - - -   -         Right Leg Superficial Veins  Location SFJ PROX THIGH MID THIGH KNEE MID CALF   GSV (mm) 12.1 Stripped  Stripped Stripped Stripped   Reflux + NA NA NA NA   AASV (mm) 3.6           Reflux -           PASV (mm)   3.8          Reflux   -            Location SPJ PROX CALF MID CALF   SSV (mm) 3.8 Stripped  Stripped   Reflux - NA NA      Left  Leg Deep Veins    CFV SFJ DFV PROX FV   PROX FV MID FV DIST POP V. PERON.   V. PTV'S   Compressibility  (FC,PC,NC) FC FC FC FC FC FC FC FC FC   Reflux -   - - - - -   -         Left Leg Superficial Veins  Location SFJ PROX THIGH KNEE MID CALF   GSV (mm) 7.9 5.3 2.4 4.1   Reflux + + - -   AASV (mm) 3.2         Reflux -            Location SPJ PROX CALF MID CALF   SSV (mm) 5.1 4.0 4.0   Reflux - - -      Comments: No incompetent  veins visualized.  Left GSV Proximal Thigh Anterior Branch Incomeptent ( 4.6 mm/ 2406ms).  Left GSV is straight enough to ablate.      Impression:       Right Deep Vein Findings: Patent deep venous system with no evidence of DVT and with focal reflux and common femoral vein     Left Deep Vein Findings: Patent deep venous system with no evidence of DVT and without reflux     Superficial Vein Findings:      Right Greater Saphenous  Vein: Patent Greater Saphenous vein with Reflux noted at the level of saphenofemoral junction with a Maximum diameter of 12.1 mm.  The rest of the GSV is not visualized due to history of stripping.     Right Small Saphenous Vein: Not visualized consistent with history of stripping.     Left Greater Saphenous Vein: Patent Greater Saphenous vein with Reflux noted from proximal thigh to saphenofemoral junction with a Maximum diameter of 7.9 mm.     Left Small Saphenous Vein: Patent Small Saphenous Vein without evidence of reflux.        Reference:     Compressibility: FC= Fully compressible, PC= Partially compressible, NC= Non-compressible, NV= Not Visualized     Reflux: (+) Incompetent  (-) Competent, (NV) = Not Visualized     Interpretation criteria:          Duration of Retrograde flow (milliseconds)  Category Deep Veins Superficial Veins  veins   Competent < 1000ms < 500ms < 350ms   Incompetent > 1000ms > 500ms > 350ms              Carlos Guevara MD  General Surgery [phone]  Vascular Surgery [phone]

## 2025-07-09 NOTE — PROGRESS NOTES
"Vascular Clinic Rooming Questions      Patient is here for  follow up  for Varicose Veins. The patient does  wear compression stockings. The patient has varicose veins that are problematic in bilateral legs. Patient states their varicose veins are bothersome when standing, sitting, working, and household chores. The patient has been using medications for their leg discomfort.      BP (!) 146/99 (BP Location: Right arm)   Pulse 89   Ht 6' 3\" (1.905 m)   Wt 284 lb 3.2 oz (128.9 kg)   SpO2 95%   BMI 35.52 kg/m      The provider has been notified that the patient has no concerns.     Questions patient would like addressed today are: N/A.    Refills are needed: N/A    Has homecare services and agency name:  No    "

## 2025-07-09 NOTE — NURSING NOTE
Patient returns for symptomatic varicose veins bilateral and hemosiderin. He has used conservative treatment for >1 year. Patient has discontinued Eliquis after heart ablation. Left GSV reflux/RFA reviewed with patient and his mother. [employer] [place] no preauth required.

## 2025-07-18 NOTE — TELEPHONE ENCOUNTER
Outpatient Medication Detail     Disp Refills Start End ILDEFONSO   tirzepatide-Weight Management (ZEPBOUND) 2.5 MG/0.5ML prefilled pen 2 mL 2 7/17/2025 -- --   Sig - Route: Inject 0.5 mLs (2.5 mg) subcutaneously every 7 days. - Subcutaneous   Sent to pharmacy as: Tirzepatide-Weight Management 2.5 MG/0.5ML Subcutaneous Solution Auto-injector (Zepbound)   Class: E-Prescribe   Order: [id number]   E-Prescribing Status: Receipt confirmed by pharmacy (7/17/2025  7:53 PM CDT)       Pharmacy    [id number] - [place] [address] [address]     Associated Diagnoses    JIM (obstructive sleep apnea) [G47.33]  - Primary

## 2025-07-28 ENCOUNTER — TELEPHONE (OUTPATIENT)
Dept: VASCULAR SURGERY | Facility: CLINIC | Age: 60
End: 2025-07-28
Payer: COMMERCIAL

## 2025-07-28 NOTE — TELEPHONE ENCOUNTER
Caller: Patient    Provider: Dr. Carlos Guevara    Detailed reason for call: Patient states he was diagnosed with COVID on Friday of last week; symptoms started on Monday 7/21 and he still has a slight cough and runny nose. Patient is scheduled for closure Weds 7/30- need to reschedule or any precautions patient should follow if OK to keep?     Best phone number to contact: [phone]    Best time to contact: Any time    Ok to leave a detailed message: Yes    Ok to speak to authorized person if needed: No      (Noted to patient if reason is related to wound or incision, to please send a photo via email or [employer].)

## 2025-07-28 NOTE — TELEPHONE ENCOUNTER
"Spoke with Karl, he states that when he saw Dr. Collins a few weeks ago he was told \"you just made the deadline for the procedure without needing another ultrasound.\"  Please review and advise if we can r/s the procedure without the study being done.  "
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
## 2025-07-30 DIAGNOSIS — R73.03 PREDIABETES: ICD-10-CM

## 2025-07-30 DIAGNOSIS — I50.32 CHRONIC DIASTOLIC HEART FAILURE (H): ICD-10-CM

## 2025-07-30 RX ORDER — EMPAGLIFLOZIN 10 MG/1
10 TABLET, FILM COATED ORAL DAILY
Qty: 90 TABLET | Refills: 0 | Status: SHIPPED | OUTPATIENT
Start: 2025-07-30

## 2025-09-03 ENCOUNTER — TELEPHONE (OUTPATIENT)
Dept: VASCULAR SURGERY | Facility: CLINIC | Age: 60
End: 2025-09-03
Payer: COMMERCIAL

## (undated) DEVICE — CATH EP 7FR X 115CM DECANAV CA

## (undated) DEVICE — INTRODUCER SHEATH VASC CATH 8.5FR CARTO GIDE STH MED D138502

## (undated) DEVICE — INTRO SHEATH 8FRX10CM PINNACLE RSS802

## (undated) DEVICE — ELECTRODE DEFIB CADENCE 22550R

## (undated) DEVICE — 8F SOUNDSTAR ECO ULTRASOUND CATHETER

## (undated) DEVICE — INTRODUCER SHEATH 8.5FR VIZIGO BI-DIRECT LARGE CURVE D138503

## (undated) DEVICE — CATHETER OCTARAY LONG SPLINE CURVE F 3-3-3-3-3 D160906

## (undated) DEVICE — CUSTOM PACK EP

## (undated) DEVICE — TUBE SET SMARKABLATE IRRIGATION

## (undated) DEVICE — INTRO SHEATH 9FRX10CM PINNACLE RSS902

## (undated) DEVICE — PATCH CARTO 3 EXTERNAL REFERENCE 3D MAPPING CREFP6

## (undated) DEVICE — PROBE TEMP CIRCA S-CATH M ESPH 12-SNSR 3D MAP CS-21EP

## (undated) DEVICE — CATH TRANSSEPTAL VERSACROSS 45D 63X230CM VXSK0032

## (undated) DEVICE — TRANSDUCER TRAY ARTERIAL 42646-06

## (undated) DEVICE — CATH ABLATION 8FR 115CM F-J CURVE BI-DIR QDOT MICRO D139504

## (undated) RX ORDER — HEPARIN SODIUM 1000 [USP'U]/ML
INJECTION, SOLUTION INTRAVENOUS; SUBCUTANEOUS
Status: DISPENSED
Start: 2024-09-30

## (undated) RX ORDER — ATROPINE SULFATE 0.1 MG/ML
INJECTION INTRAVENOUS
Status: DISPENSED
Start: 2024-11-14

## (undated) RX ORDER — PROPOFOL 10 MG/ML
INJECTION, EMULSION INTRAVENOUS
Status: DISPENSED
Start: 2024-09-30

## (undated) RX ORDER — LABETALOL HYDROCHLORIDE 5 MG/ML
INJECTION, SOLUTION INTRAVENOUS
Status: DISPENSED
Start: 2024-09-30

## (undated) RX ORDER — PROTAMINE SULFATE 10 MG/ML
INJECTION, SOLUTION INTRAVENOUS
Status: DISPENSED
Start: 2025-02-06

## (undated) RX ORDER — LIDOCAINE HYDROCHLORIDE 10 MG/ML
INJECTION, SOLUTION EPIDURAL; INFILTRATION; INTRACAUDAL; PERINEURAL
Status: DISPENSED
Start: 2024-09-30

## (undated) RX ORDER — METHOHEXITAL IN WATER/PF 100MG/10ML
SYRINGE (ML) INTRAVENOUS
Status: DISPENSED
Start: 2024-11-14

## (undated) RX ORDER — ADENOSINE 3 MG/ML
INJECTION, SOLUTION INTRAVENOUS
Status: DISPENSED
Start: 2024-09-30

## (undated) RX ORDER — LIDOCAINE HYDROCHLORIDE AND EPINEPHRINE 10; 10 MG/ML; UG/ML
INJECTION, SOLUTION INFILTRATION; PERINEURAL
Status: DISPENSED
Start: 2024-09-30

## (undated) RX ORDER — METHOHEXITAL IN WATER/PF 100MG/10ML
SYRINGE (ML) INTRAVENOUS
Status: DISPENSED
Start: 2024-08-08

## (undated) RX ORDER — PHENYLEPHRINE HYDROCHLORIDE 10 MG/ML
INJECTION INTRAVENOUS
Status: DISPENSED
Start: 2024-09-30

## (undated) RX ORDER — METHOHEXITAL IN WATER/PF 100MG/10ML
SYRINGE (ML) INTRAVENOUS
Status: DISPENSED
Start: 2024-10-16

## (undated) RX ORDER — DEXAMETHASONE SODIUM PHOSPHATE 10 MG/ML
INJECTION, SOLUTION INTRAMUSCULAR; INTRAVENOUS
Status: DISPENSED
Start: 2024-09-30

## (undated) RX ORDER — ONDANSETRON 2 MG/ML
INJECTION INTRAMUSCULAR; INTRAVENOUS
Status: DISPENSED
Start: 2024-09-30

## (undated) RX ORDER — ACETAMINOPHEN 325 MG/1
TABLET ORAL
Status: DISPENSED
Start: 2025-02-06

## (undated) RX ORDER — FENTANYL CITRATE 50 UG/ML
INJECTION, SOLUTION INTRAMUSCULAR; INTRAVENOUS
Status: DISPENSED
Start: 2024-09-30

## (undated) RX ORDER — METHOHEXITAL IN WATER/PF 100MG/10ML
SYRINGE (ML) INTRAVENOUS
Status: DISPENSED
Start: 2024-12-30